# Patient Record
Sex: FEMALE | Race: WHITE | NOT HISPANIC OR LATINO | Employment: OTHER | URBAN - NONMETROPOLITAN AREA
[De-identification: names, ages, dates, MRNs, and addresses within clinical notes are randomized per-mention and may not be internally consistent; named-entity substitution may affect disease eponyms.]

---

## 2017-01-04 RX ORDER — PRAVASTATIN SODIUM 40 MG
TABLET ORAL
Qty: 30 TABLET | Refills: 5 | OUTPATIENT
Start: 2017-01-04

## 2017-01-16 ENCOUNTER — LAB (OUTPATIENT)
Dept: FAMILY MEDICINE CLINIC | Facility: CLINIC | Age: 52
End: 2017-01-16

## 2017-01-16 DIAGNOSIS — E53.8 VITAMIN B 12 DEFICIENCY: Chronic | ICD-10-CM

## 2017-01-16 DIAGNOSIS — E78.5 HYPERLIPIDEMIA: Chronic | ICD-10-CM

## 2017-01-16 DIAGNOSIS — E55.9 VITAMIN D DEFICIENCY: ICD-10-CM

## 2017-01-16 DIAGNOSIS — E08.40 DIABETES MELLITUS DUE TO UNDERLYING CONDITION WITH DIABETIC NEUROPATHY (HCC): ICD-10-CM

## 2017-01-16 LAB
25(OH)D3 SERPL-MCNC: 25 NG/ML
ALBUMIN SERPL-MCNC: 4.6 G/DL (ref 3.5–5)
ALBUMIN UR-MCNC: 5.2 MG/L
ALBUMIN/GLOB SERPL: 1.4 G/DL (ref 1.5–2.5)
ALP SERPL-CCNC: 93 U/L (ref 46–116)
ALT SERPL W P-5'-P-CCNC: 46 U/L (ref 10–36)
ANION GAP SERPL CALCULATED.3IONS-SCNC: 9.5 MMOL/L (ref 3.6–11.2)
AST SERPL-CCNC: 36 U/L (ref 10–30)
BASOPHILS # BLD AUTO: 0.03 10*3/MM3 (ref 0–0.3)
BASOPHILS NFR BLD AUTO: 0.4 % (ref 0–2)
BILIRUB SERPL-MCNC: 0.9 MG/DL (ref 0.2–1.8)
BUN BLD-MCNC: 12 MG/DL (ref 7–21)
BUN/CREAT SERPL: 18.5 (ref 7–25)
CALCIUM SPEC-SCNC: 9.7 MG/DL (ref 7.7–10)
CHLORIDE SERPL-SCNC: 103 MMOL/L (ref 99–112)
CHOLEST SERPL-MCNC: 147 MG/DL (ref 0–200)
CO2 SERPL-SCNC: 27.5 MMOL/L (ref 24.3–31.9)
CREAT BLD-MCNC: 0.65 MG/DL (ref 0.43–1.29)
DEPRECATED RDW RBC AUTO: 40.3 FL (ref 37–54)
EOSINOPHIL # BLD AUTO: 0.29 10*3/MM3 (ref 0–0.7)
EOSINOPHIL NFR BLD AUTO: 4 % (ref 0–5)
ERYTHROCYTE [DISTWIDTH] IN BLOOD BY AUTOMATED COUNT: 12.7 % (ref 11.5–14.5)
GFR SERPL CREATININE-BSD FRML MDRD: 95 ML/MIN/1.73
GLOBULIN UR ELPH-MCNC: 3.4 GM/DL
GLUCOSE BLD-MCNC: 279 MG/DL (ref 70–110)
HBA1C MFR BLD: 8.9 % (ref 4.5–5.7)
HCT VFR BLD AUTO: 44.5 % (ref 37–47)
HDLC SERPL-MCNC: 38 MG/DL (ref 60–100)
HGB BLD-MCNC: 15.1 G/DL (ref 12–16)
IMM GRANULOCYTES # BLD: 0.04 10*3/MM3 (ref 0–0.03)
IMM GRANULOCYTES NFR BLD: 0.6 % (ref 0–0.5)
LDLC SERPL CALC-MCNC: 56 MG/DL (ref 0–100)
LDLC/HDLC SERPL: 1.47 {RATIO}
LYMPHOCYTES # BLD AUTO: 2.68 10*3/MM3 (ref 1–3)
LYMPHOCYTES NFR BLD AUTO: 37.3 % (ref 21–51)
MCH RBC QN AUTO: 30.4 PG (ref 27–33)
MCHC RBC AUTO-ENTMCNC: 33.9 G/DL (ref 33–37)
MCV RBC AUTO: 89.5 FL (ref 80–94)
MONOCYTES # BLD AUTO: 0.56 10*3/MM3 (ref 0.1–0.9)
MONOCYTES NFR BLD AUTO: 7.8 % (ref 0–10)
NEUTROPHILS # BLD AUTO: 3.58 10*3/MM3 (ref 1.4–6.5)
NEUTROPHILS NFR BLD AUTO: 49.9 % (ref 30–70)
OSMOLALITY SERPL CALC.SUM OF ELEC: 289.2 MOSM/KG (ref 273–305)
PLATELET # BLD AUTO: 264 10*3/MM3 (ref 130–400)
PMV BLD AUTO: 10.9 FL (ref 6–10)
POTASSIUM BLD-SCNC: 3.9 MMOL/L (ref 3.5–5.3)
PROT SERPL-MCNC: 8 G/DL (ref 6–8)
RBC # BLD AUTO: 4.97 10*6/MM3 (ref 4.2–5.4)
SODIUM BLD-SCNC: 140 MMOL/L (ref 135–153)
TRIGL SERPL-MCNC: 265 MG/DL (ref 0–150)
TSH SERPL DL<=0.05 MIU/L-ACNC: 1.77 MIU/ML (ref 0.55–4.78)
VIT B12 BLD-MCNC: 430 PG/ML (ref 211–911)
VLDLC SERPL-MCNC: 53 MG/DL
WBC NRBC COR # BLD: 7.18 10*3/MM3 (ref 4.5–12.5)

## 2017-01-16 PROCEDURE — 80061 LIPID PANEL: CPT | Performed by: NURSE PRACTITIONER

## 2017-01-16 PROCEDURE — 82043 UR ALBUMIN QUANTITATIVE: CPT | Performed by: NURSE PRACTITIONER

## 2017-01-16 PROCEDURE — 83036 HEMOGLOBIN GLYCOSYLATED A1C: CPT | Performed by: NURSE PRACTITIONER

## 2017-01-16 PROCEDURE — 82306 VITAMIN D 25 HYDROXY: CPT | Performed by: NURSE PRACTITIONER

## 2017-01-16 PROCEDURE — 80053 COMPREHEN METABOLIC PANEL: CPT | Performed by: NURSE PRACTITIONER

## 2017-01-16 PROCEDURE — 82607 VITAMIN B-12: CPT | Performed by: NURSE PRACTITIONER

## 2017-01-16 PROCEDURE — 84443 ASSAY THYROID STIM HORMONE: CPT | Performed by: NURSE PRACTITIONER

## 2017-01-16 PROCEDURE — 36415 COLL VENOUS BLD VENIPUNCTURE: CPT

## 2017-01-16 PROCEDURE — 85025 COMPLETE CBC W/AUTO DIFF WBC: CPT | Performed by: NURSE PRACTITIONER

## 2017-01-18 ENCOUNTER — OFFICE VISIT (OUTPATIENT)
Dept: FAMILY MEDICINE CLINIC | Facility: CLINIC | Age: 52
End: 2017-01-18

## 2017-01-18 VITALS
TEMPERATURE: 97 F | HEIGHT: 68 IN | WEIGHT: 183 LBS | HEART RATE: 89 BPM | OXYGEN SATURATION: 97 % | DIASTOLIC BLOOD PRESSURE: 62 MMHG | BODY MASS INDEX: 27.74 KG/M2 | SYSTOLIC BLOOD PRESSURE: 120 MMHG

## 2017-01-18 DIAGNOSIS — Z79.4 DIABETES MELLITUS DUE TO UNDERLYING CONDITION WITH DIABETIC NEUROPATHY, WITH LONG-TERM CURRENT USE OF INSULIN (HCC): Primary | Chronic | ICD-10-CM

## 2017-01-18 DIAGNOSIS — E53.8 VITAMIN B 12 DEFICIENCY: ICD-10-CM

## 2017-01-18 DIAGNOSIS — Z12.31 ENCOUNTER FOR SCREENING MAMMOGRAM FOR BREAST CANCER: ICD-10-CM

## 2017-01-18 DIAGNOSIS — Z90.710 S/P HYSTERECTOMY: ICD-10-CM

## 2017-01-18 DIAGNOSIS — Z13.820 ENCOUNTER FOR SCREENING FOR OSTEOPOROSIS: ICD-10-CM

## 2017-01-18 DIAGNOSIS — Z13.9 SCREENING: ICD-10-CM

## 2017-01-18 DIAGNOSIS — E78.5 HYPERLIPIDEMIA, UNSPECIFIED HYPERLIPIDEMIA TYPE: Chronic | ICD-10-CM

## 2017-01-18 DIAGNOSIS — E08.40 DIABETES MELLITUS DUE TO UNDERLYING CONDITION WITH DIABETIC NEUROPATHY, WITH LONG-TERM CURRENT USE OF INSULIN (HCC): Primary | Chronic | ICD-10-CM

## 2017-01-18 DIAGNOSIS — E55.9 VITAMIN D DEFICIENCY: ICD-10-CM

## 2017-01-18 PROCEDURE — 99214 OFFICE O/P EST MOD 30 MIN: CPT | Performed by: NURSE PRACTITIONER

## 2017-01-18 RX ORDER — ERGOCALCIFEROL 1.25 MG/1
50000 CAPSULE ORAL
Qty: 4 CAPSULE | Refills: 3 | Status: SHIPPED | OUTPATIENT
Start: 2017-01-18 | End: 2017-02-09

## 2017-01-18 NOTE — MR AVS SNAPSHOT
Sandra Leahy   1/18/2017 8:30 AM   Office Visit    Dept Phone:  [phone]   Encounter #:  [id number]    Provider:  ASH Chaudhry   Department:  [facility]                Your Full Care Plan              Today's Medication Changes          These changes are accurate as of: 1/18/17  9:14 AM.  If you have any questions, ask your nurse or doctor.               New Medication(s)Ordered:     vitamin D 02707 UNITS capsule capsule   Commonly known as:  ERGOCALCIFEROL   Take 1 capsule by mouth Every 7 (Seven) Days for 4 doses.   Started by:  ASH Chaudhry         Stop taking medication(s)listed here:     amoxicillin-clavulanate 875-125 MG per tablet   Commonly known as:  AUGMENTIN   Stopped by:  ASH Chaudhry           neomycin-polymyxin-hydrocortisone 3.5-72570-6 otic solution   Commonly known as:  CORTISPORIN   Stopped by:  ASH Chaudhry           rosuvastatin 20 MG tablet   Commonly known as:  CRESTOR   Stopped by:  ASH Chaudhry                Where to Get Your Medications      These medications were sent to [facility] [address] [place] [address] - [phone]  - [phone]   [address] [facility] [address]     Phone:  [phone]     vitamin D 33020 UNITS capsule capsule                  Your Updated Medication List          This list is accurate as of: 1/18/17  9:14 AM.  Always use your most recent med list.                aspirin 81 MG tablet       CYMBALTA 30 MG capsule   Generic drug:  DULoxetine       Dulaglutide 0.75 MG/0.5ML solution pen-injector   Commonly known as:  TRULICITY   Inject 0.75 mg under the skin 1 (one) time per week.       meloxicam 15 MG tablet   Commonly known as:  MOBIC   Take 1 tablet by mouth daily.       metFORMIN 1000 MG tablet   Commonly known as:  GLUCOPHAGE   Take 1 tablet by mouth 2 (two) times a day.       RELION SHORT PEN  NEEDLES 31G X 8 MM misc   Generic drug:  Insulin Pen Needle       vitamin D 96906 UNITS capsule capsule   Commonly known as:  ERGOCALCIFEROL   Take 1 capsule by mouth Every 7 (Seven) Days for 4 doses.       ZETIA 10 MG tablet   Generic drug:  ezetimibe               You Were Diagnosed With        Codes Comments    Vitamin D deficiency    -  Primary ICD-10-CM: E55.9  ICD-9-CM: 268.9       Medications to be Given to You by a Medical Professional       Instructions    Type 2 Diabetes Mellitus, Adult  Type 2 diabetes mellitus, often simply referred to as type 2 diabetes, is a long-lasting (chronic) disease. In type 2 diabetes, the pancreas does not make enough insulin (a hormone), the cells are less responsive to the insulin that is made (insulin resistance), or both. Normally, insulin moves sugars from food into the tissue cells. The tissue cells use the sugars for energy. The lack of insulin or the lack of normal response to insulin causes excess sugars to build up in the blood instead of going into the tissue cells. As a result, high blood sugar (hyperglycemia) develops. The effect of high sugar (glucose) levels can cause many complications.   Type 2 diabetes was also previously called adult-onset diabetes, but it can occur at any age.     RISK FACTORS   A person is predisposed to developing type 2 diabetes if someone in the family has the disease and also has one or more of the following primary risk factors:  · Weight gain, or being overweight or obese.  · An inactive lifestyle.  · A history of consistently eating high-calorie foods.  Maintaining a normal weight and regular physical activity can reduce the chance of developing type 2 diabetes.  SYMPTOMS   A person with type 2 diabetes may not show symptoms initially. The symptoms of type 2 diabetes appear slowly. The symptoms include:  · Increased thirst (polydipsia).  · Increased urination (polyuria).  · Increased urination during the night (nocturia).  · Sudden  or unexplained weight changes.  · Frequent, recurring infections.  · Tiredness (fatigue).  · Weakness.  · Vision changes, such as blurred vision.  · Fruity smell to your breath.  · Abdominal pain.  · Nausea or vomiting.  · Cuts or bruises which are slow to heal.  · Tingling or numbness in the hands or feet.  · An open skin wound (ulcer).  DIAGNOSIS  Type 2 diabetes is frequently not diagnosed until complications of diabetes are present. Type 2 diabetes is diagnosed when symptoms or complications are present and when blood glucose levels are increased. Your blood glucose level may be checked by one or more of the following blood tests:  · A fasting blood glucose test. You will not be allowed to eat for at least 8 hours before a blood sample is taken.  · A random blood glucose test. Your blood glucose is checked at any time of the day regardless of when you ate.  · A hemoglobin A1c blood glucose test. A hemoglobin A1c test provides information about blood glucose control over the previous 3 months.  · An oral glucose tolerance test (OGTT). Your blood glucose is measured after you have not eaten (fasted) for 2 hours and then after you drink a glucose-containing beverage.  TREATMENT   · You may need to take insulin or diabetes medicine daily to keep blood glucose levels in the desired range.  · If you use insulin, you may need to adjust the dosage depending on the carbohydrates that you eat with each meal or snack.  · Lifestyle changes are recommended as part of your treatment. These may include:    Following an individualized diet plan developed by a nutritionist or dietitian.    Exercising daily.  Your health care providers will set individualized treatment goals for you based on your age, your medicines, how long you have had diabetes, and any other medical conditions you have. Generally, the goal of treatment is to maintain the following blood glucose levels:  · Before meals (preprandial):  mg/dL.  · After  meals (postprandial): below 180 mg/dL.  · A1c: less than 6.5-7%.  HOME CARE INSTRUCTIONS   · Have your hemoglobin A1c level checked twice a year.  · Perform daily blood glucose monitoring as directed by your health care provider.  · Monitor urine ketones when you are ill and as directed by your health care provider.  · Take your diabetes medicine or insulin as directed by your health care provider to maintain your blood glucose levels in the desired range.    Never run out of diabetes medicine or insulin. It is needed every day.    If you are using insulin, you may need to adjust the amount of insulin given based on your intake of carbohydrates. Carbohydrates can raise blood glucose levels but need to be included in your diet. Carbohydrates provide vitamins, minerals, and fiber which are an essential part of a healthy diet. Carbohydrates are found in fruits, vegetables, whole grains, dairy products, legumes, and foods containing added sugars.  · Eat healthy foods. You should make an appointment to see a registered dietitian to help you create an eating plan that is right for you.  · Lose weight if you are overweight.  · Carry a medical alert card or wear your medical alert jewelry.  · Carry a 15-gram carbohydrate snack with you at all times to treat low blood glucose (hypoglycemia). Some examples of 15-gram carbohydrate snacks include:    Glucose tablets, 3 or 4.    Glucose gel, 15-gram tube.    Raisins, 2 tablespoons (24 grams).    Jelly beans, 6.    Animal crackers, 8.    Regular pop, 4 ounces (120 mL).    Gummy treats, 9.  · Recognize hypoglycemia. Hypoglycemia occurs with blood glucose levels of 70 mg/dL and below. The risk for hypoglycemia increases when fasting or skipping meals, during or after intense exercise, and during sleep. Hypoglycemia symptoms can include:    Tremors or shakes.    Decreased ability to concentrate.    Sweating.    Increased heart rate.    Headache.    Dry mouth.    Hunger.     Irritability.    Anxiety.    Restless sleep.    Altered speech or coordination.    Confusion.  · Treat hypoglycemia promptly. If you are alert and able to safely swallow, follow the 15:15 rule:    Take 15-20 grams of rapid-acting glucose or carbohydrate. Rapid-acting options include glucose gel, glucose tablets, or 4 ounces (120 mL) of fruit juice, regular soda, or low-fat milk.    Check your blood glucose level 15 minutes after taking the glucose.    Take 15-20 grams more of glucose if the repeat blood glucose level is still 70 mg/dL or below.    Eat a meal or snack within 1 hour once blood glucose levels return to normal.  · Be alert to feeling very thirsty and urinating more frequently than usual, which are early signs of hyperglycemia. An early awareness of hyperglycemia allows for prompt treatment. Treat hyperglycemia as directed by your health care provider.  · Engage in at least 150 minutes of moderate-intensity physical activity a week, spread over at least 3 days of the week or as directed by your health care provider. In addition, you should engage in resistance exercise at least 2 times a week or as directed by your health care provider. Try to spend no more than 90 minutes at one time inactive.  · Adjust your medicine and food intake as needed if you start a new exercise or sport.  · Follow your sick-day plan anytime you are unable to eat or drink as usual.  · Do not use any tobacco products including cigarettes, chewing tobacco, or electronic cigarettes. If you need help quitting, ask your health care provider.  · Limit alcohol intake to no more than 1 drink per day for nonpregnant women and 2 drinks per day for men. You should drink alcohol only when you are also eating food. Talk with your health care provider whether alcohol is safe for you. Tell your health care provider if you drink alcohol several times a week.  · Keep all follow-up visits as directed by your health care provider. This is  important.  · Schedule an eye exam soon after the diagnosis of type 2 diabetes and then annually.  · Perform daily skin and foot care. Examine your skin and feet daily for cuts, bruises, redness, nail problems, bleeding, blisters, or sores. A foot exam by a health care provider should be done annually.  · Brush your teeth and gums at least twice a day and floss at least once a day. Follow up with your dentist regularly.  · Share your diabetes management plan with your workplace or school.  · Keep your immunizations up to date. It is recommended that you receive a flu (influenza) vaccine every year. It is also recommended that you receive a pneumonia (pneumococcal) vaccine. If you are 65 years of age or older and have never received a pneumonia vaccine, this vaccine may be given as a series of two separate shots. Ask your health care provider which additional vaccines may be recommended.  · Learn to manage stress.  · Obtain ongoing diabetes education and support as needed.  · Participate in or seek rehabilitation as needed to maintain or improve independence and quality of life. Request a physical or occupational therapy referral if you are having foot or hand numbness, or difficulties with grooming, dressing, eating, or physical activity.  SEEK MEDICAL CARE IF:   · You are unable to eat food or drink fluids for more than 6 hours.  · You have nausea and vomiting for more than 6 hours.  · Your blood glucose level is over 240 mg/dL.  · There is a change in mental status.  · You develop an additional serious illness.  · You have diarrhea for more than 6 hours.  · You have been sick or have had a fever for a couple of days and are not getting better.  · You have pain during any physical activity.    SEEK IMMEDIATE MEDICAL CARE IF:  · You have difficulty breathing.  · You have moderate to large ketone levels.     This information is not intended to replace advice given to you by your health care provider. Make sure you  "discuss any questions you have with your health care provider.     Document Released: 12/18/2006 Document Revised: 09/07/2016 Document Reviewed: 07/16/2013  [employer] Interactive Patient Education ©2016 [employer]  DASH Eating Plan  DASH stands for \"Dietary Approaches to Stop Hypertension.\" The DASH eating plan is a healthy eating plan that has been shown to reduce high blood pressure (hypertension). Additional health benefits may include reducing the risk of type 2 diabetes mellitus, heart disease, and stroke. The DASH eating plan may also help with weight loss.  WHAT DO I NEED TO KNOW ABOUT THE DASH EATING PLAN?  For the DASH eating plan, you will follow these general guidelines:  · Choose foods with a percent daily value for sodium of less than 5% (as listed on the food label).  · Use salt-free seasonings or herbs instead of table salt or sea salt.  · Check with your health care provider or pharmacist before using salt substitutes.  · Eat lower-sodium products, often labeled as \"lower sodium\" or \"no salt added.\"  · Eat fresh foods.  · Eat more vegetables, fruits, and low-fat dairy products.  · Choose whole grains. Look for the word \"whole\" as the first word in the ingredient list.  · Choose fish and skinless chicken or turkey more often than red meat. Limit fish, poultry, and meat to 6 oz (170 g) each day.  · Limit sweets, desserts, sugars, and sugary drinks.  · Choose heart-healthy fats.  · Limit cheese to 1 oz (28 g) per day.  · Eat more home-cooked food and less restaurant, buffet, and fast food.  · Limit fried foods.  · Cook foods using methods other than frying.  · Limit canned vegetables. If you do use them, rinse them well to decrease the sodium.  · When eating at a restaurant, ask that your food be prepared with less salt, or no salt if possible.  WHAT FOODS CAN I EAT?  Seek help from a dietitian for individual calorie needs.  Grains  Whole grain or whole wheat bread. Brown rice. Whole grain or whole " wheat pasta. Quinoa, bulgur, and whole grain cereals. Low-sodium cereals. Corn or whole wheat flour tortillas. Whole grain cornbread. Whole grain crackers. Low-sodium crackers.  Vegetables  Fresh or frozen vegetables (raw, steamed, roasted, or grilled). Low-sodium or reduced-sodium tomato and vegetable juices. Low-sodium or reduced-sodium tomato sauce and paste. Low-sodium or reduced-sodium canned vegetables.   Fruits  All fresh, canned (in natural juice), or frozen fruits.  Meat and Other Protein Products  Ground beef (85% or leaner), grass-fed beef, or beef trimmed of fat. Skinless chicken or turkey. Ground chicken or turkey. Pork trimmed of fat. All fish and seafood. Eggs. Dried beans, peas, or lentils. Unsalted nuts and seeds. Unsalted canned beans.  Dairy  Low-fat dairy products, such as skim or 1% milk, 2% or reduced-fat cheeses, low-fat ricotta or cottage cheese, or plain low-fat yogurt. Low-sodium or reduced-sodium cheeses.  Fats and Oils  Tub margarines without trans fats. Light or reduced-fat mayonnaise and salad dressings (reduced sodium). Avocado. Safflower, olive, or canola oils. Natural peanut or almond butter.  Other  Unsalted popcorn and pretzels.  The items listed above may not be a complete list of recommended foods or beverages. Contact your dietitian for more options.  WHAT FOODS ARE NOT RECOMMENDED?  Grains  White bread. White pasta. White rice. Refined cornbread. Bagels and croissants. Crackers that contain trans fat.  Vegetables  Creamed or fried vegetables. Vegetables in a cheese sauce. Regular canned vegetables. Regular canned tomato sauce and paste. Regular tomato and vegetable juices.  Fruits  Dried fruits. Canned fruit in light or heavy syrup. Fruit juice.  Meat and Other Protein Products  Fatty cuts of meat. Ribs, chicken wings, alicea, sausage, bologna, salami, chitterlings, fatback, hot dogs, bratwurst, and packaged luncheon meats. Salted nuts and seeds. Canned beans with  salt.  Dairy  Whole or 2% milk, cream, half-and-half, and cream cheese. Whole-fat or sweetened yogurt. Full-fat cheeses or blue cheese. Nondairy creamers and whipped toppings. Processed cheese, cheese spreads, or cheese curds.  Condiments  Onion and garlic salt, seasoned salt, table salt, and sea salt. Canned and packaged gravies. Worcestershire sauce. Tartar sauce. Barbecue sauce. Teriyaki sauce. Soy sauce, including reduced sodium. Steak sauce. Fish sauce. Oyster sauce. Cocktail sauce. Horseradish. Ketchup and mustard. Meat flavorings and tenderizers. Bouillon cubes. Hot sauce. Tabasco sauce. Marinades. Taco seasonings. Relishes.  Fats and Oils  Butter, stick margarine, lard, shortening, ghee, and alicea fat. Coconut, palm kernel, or palm oils. Regular salad dressings.  Other  Pickles and olives. Salted popcorn and pretzels.  The items listed above may not be a complete list of foods and beverages to avoid. Contact your dietitian for more information.  WHERE CAN I FIND MORE INFORMATION?  National Heart, Lung, and [employer] [place]: www.nhlbi.nih.gov/health/health-topics/topics/dash/     This information is not intended to replace advice given to you by your health care provider. Make sure you discuss any questions you have with your health care provider.     Document Released: 12/06/2012 Document Revised: 01/08/2016 Document Reviewed: 10/22/2014  [employer] Interactive Patient Education ©2016 [employer]  Food Choices to Lower Your Triglycerides  Triglycerides are a type of fat in your blood. High levels of triglycerides can increase the risk of heart disease and stroke. If your triglyceride levels are high, the foods you eat and your eating habits are very important. Choosing the right foods can help lower your triglycerides.   WHAT GENERAL GUIDELINES DO I NEED TO FOLLOW?  · Lose weight if you are overweight.    · Limit or avoid alcohol.    · Fill one half of your plate with vegetables and green salads.    · Limit  "fruit to two servings a day. Choose fruit instead of juice.    · Make one fourth of your plate whole grains. Look for the word \"whole\" as the first word in the ingredient list.  · Fill one fourth of your plate with lean protein foods.  · Enjoy fatty fish (such as salmon, mackerel, sardines, and tuna) three times a week.    · Choose healthy fats.    · Limit foods high in starch and sugar.  · Eat more home-cooked food and less restaurant, buffet, and fast food.  · Limit fried foods.  · Cook foods using methods other than frying.  · Limit saturated fats.  · Check ingredient lists to avoid foods with partially hydrogenated oils (trans fats) in them.  WHAT FOODS CAN I EAT?   Grains  Whole grains, such as whole wheat or whole grain breads, crackers, cereals, and pasta. Unsweetened oatmeal, bulgur, barley, quinoa, or brown rice. Corn or whole wheat flour tortillas.   Vegetables  Fresh or frozen vegetables (raw, steamed, roasted, or grilled). Green salads.  Fruits  All fresh, canned (in natural juice), or frozen fruits.  Meat and Other Protein Products  Ground beef (85% or leaner), grass-fed beef, or beef trimmed of fat. Skinless chicken or turkey. Ground chicken or turkey. Pork trimmed of fat. All fish and seafood. Eggs. Dried beans, peas, or lentils. Unsalted nuts or seeds. Unsalted canned or dry beans.  Dairy  Low-fat dairy products, such as skim or 1% milk, 2% or reduced-fat cheeses, low-fat ricotta or cottage cheese, or plain low-fat yogurt.  Fats and Oils  Tub margarines without trans fats. Light or reduced-fat mayonnaise and salad dressings. Avocado. Safflower, olive, or canola oils. Natural peanut or almond butter.  The items listed above may not be a complete list of recommended foods or beverages. Contact your dietitian for more options.  WHAT FOODS ARE NOT RECOMMENDED?   Grains  White bread. White pasta. White rice. Cornbread. Bagels, pastries, and croissants. Crackers that contain trans fat.  Vegetables  White " potatoes. Corn. Creamed or fried vegetables. Vegetables in a cheese sauce.  Fruits  Dried fruits. Canned fruit in light or heavy syrup. Fruit juice.  Meat and Other Protein Products  Fatty cuts of meat. Ribs, chicken wings, alicea, sausage, bologna, salami, chitterlings, fatback, hot dogs, bratwurst, and packaged luncheon meats.  Dairy  Whole or 2% milk, cream, half-and-half, and cream cheese. Whole-fat or sweetened yogurt. Full-fat cheeses. Nondairy creamers and whipped toppings. Processed cheese, cheese spreads, or cheese curds.  Sweets and Desserts  Corn syrup, sugars, honey, and molasses. Candy. Jam and jelly. Syrup. Sweetened cereals. Cookies, pies, cakes, donuts, muffins, and ice cream.  Fats and Oils  Butter, stick margarine, lard, shortening, ghee, or alicea fat. Coconut, palm kernel, or palm oils.  Beverages  Alcohol. Sweetened drinks (such as sodas, lemonade, and fruit drinks or punches).  The items listed above may not be a complete list of foods and beverages to avoid. Contact your dietitian for more information.     This information is not intended to replace advice given to you by your health care provider. Make sure you discuss any questions you have with your health care provider.     Document Released: 10/05/2005 Document Revised: 01/08/2016 Document Reviewed: 10/22/2014  [employer] Interactive Patient Education ©2016 [employer]       Patient Instructions History      Upcoming Appointments     Visit Type Date Time Department    OFFICE VISIT 1/18/2017  8:30 AM [employer] [facility]    LAB 4/20/2017  8:30 AM [employer] [facility]    OFFICE VISIT 4/26/2017  8:30 AM [facility]      [employer] Signup     [employer] allows you to send messages to your doctor, view your test results, renew your prescriptions, schedule appointments, and more. To sign up, go to [employer] and click on the Sign Up Now link in the New User? box. Enter your [employer] Activation Code exactly as it  "appears below along with the last four digits of your Social Security Number and your Date of Birth () to complete the sign-up process. If you do not sign up before the expiration date, you must request a new code.    [employer] Activation Code: [email or web address]  Expires: 2017  5:44 AM    If you have questions, you can email [email or web address] or call [phone] to talk to our [employer] staff. Remember, [employer] is NOT to be used for urgent needs. For medical emergencies, dial 911.               Other Info from Your Visit           Your Appointments     2017  8:30 AM EDT   Lab with [facility] (--)    [address] [place] [id number]   [phone]            2017  8:30 AM EDT   Office Visit with ASH Chaudhry   [facility] (--)    [address] [place] [id number]   [phone]           Arrive 15 minutes prior to appointment.              Allergies     Exenatide  Nausea And Vomiting    Intolerant    Codeine      Lantus [Insulin Glargine]  Confusion    Sitagliptin        Vital Signs     Blood Pressure Pulse Temperature Height Weight Oxygen Saturation    120/62 (BP Location: Left arm, Patient Position: Sitting, Cuff Size: Adult) 89 97 °F (36.1 °C) (Oral) 68\" (172.7 cm) 183 lb (83 kg) 97%    Body Mass Index Smoking Status                27.83 kg/m2 Former Smoker          Problems and Diagnoses Noted     Vitamin D deficiency        "

## 2017-01-18 NOTE — PATIENT INSTRUCTIONS
Type 2 Diabetes Mellitus, Adult  Type 2 diabetes mellitus, often simply referred to as type 2 diabetes, is a long-lasting (chronic) disease. In type 2 diabetes, the pancreas does not make enough insulin (a hormone), the cells are less responsive to the insulin that is made (insulin resistance), or both. Normally, insulin moves sugars from food into the tissue cells. The tissue cells use the sugars for energy. The lack of insulin or the lack of normal response to insulin causes excess sugars to build up in the blood instead of going into the tissue cells. As a result, high blood sugar (hyperglycemia) develops. The effect of high sugar (glucose) levels can cause many complications.   Type 2 diabetes was also previously called adult-onset diabetes, but it can occur at any age.     RISK FACTORS   A person is predisposed to developing type 2 diabetes if someone in the family has the disease and also has one or more of the following primary risk factors:  · Weight gain, or being overweight or obese.  · An inactive lifestyle.  · A history of consistently eating high-calorie foods.  Maintaining a normal weight and regular physical activity can reduce the chance of developing type 2 diabetes.  SYMPTOMS   A person with type 2 diabetes may not show symptoms initially. The symptoms of type 2 diabetes appear slowly. The symptoms include:  · Increased thirst (polydipsia).  · Increased urination (polyuria).  · Increased urination during the night (nocturia).  · Sudden or unexplained weight changes.  · Frequent, recurring infections.  · Tiredness (fatigue).  · Weakness.  · Vision changes, such as blurred vision.  · Fruity smell to your breath.  · Abdominal pain.  · Nausea or vomiting.  · Cuts or bruises which are slow to heal.  · Tingling or numbness in the hands or feet.  · An open skin wound (ulcer).  DIAGNOSIS  Type 2 diabetes is frequently not diagnosed until complications of diabetes are present. Type 2 diabetes is diagnosed  when symptoms or complications are present and when blood glucose levels are increased. Your blood glucose level may be checked by one or more of the following blood tests:  · A fasting blood glucose test. You will not be allowed to eat for at least 8 hours before a blood sample is taken.  · A random blood glucose test. Your blood glucose is checked at any time of the day regardless of when you ate.  · A hemoglobin A1c blood glucose test. A hemoglobin A1c test provides information about blood glucose control over the previous 3 months.  · An oral glucose tolerance test (OGTT). Your blood glucose is measured after you have not eaten (fasted) for 2 hours and then after you drink a glucose-containing beverage.  TREATMENT   · You may need to take insulin or diabetes medicine daily to keep blood glucose levels in the desired range.  · If you use insulin, you may need to adjust the dosage depending on the carbohydrates that you eat with each meal or snack.  · Lifestyle changes are recommended as part of your treatment. These may include:    Following an individualized diet plan developed by a nutritionist or dietitian.    Exercising daily.  Your health care providers will set individualized treatment goals for you based on your age, your medicines, how long you have had diabetes, and any other medical conditions you have. Generally, the goal of treatment is to maintain the following blood glucose levels:  · Before meals (preprandial):  mg/dL.  · After meals (postprandial): below 180 mg/dL.  · A1c: less than 6.5-7%.  HOME CARE INSTRUCTIONS   · Have your hemoglobin A1c level checked twice a year.  · Perform daily blood glucose monitoring as directed by your health care provider.  · Monitor urine ketones when you are ill and as directed by your health care provider.  · Take your diabetes medicine or insulin as directed by your health care provider to maintain your blood glucose levels in the desired range.    Never run  out of diabetes medicine or insulin. It is needed every day.    If you are using insulin, you may need to adjust the amount of insulin given based on your intake of carbohydrates. Carbohydrates can raise blood glucose levels but need to be included in your diet. Carbohydrates provide vitamins, minerals, and fiber which are an essential part of a healthy diet. Carbohydrates are found in fruits, vegetables, whole grains, dairy products, legumes, and foods containing added sugars.  · Eat healthy foods. You should make an appointment to see a registered dietitian to help you create an eating plan that is right for you.  · Lose weight if you are overweight.  · Carry a medical alert card or wear your medical alert jewelry.  · Carry a 15-gram carbohydrate snack with you at all times to treat low blood glucose (hypoglycemia). Some examples of 15-gram carbohydrate snacks include:    Glucose tablets, 3 or 4.    Glucose gel, 15-gram tube.    Raisins, 2 tablespoons (24 grams).    Jelly beans, 6.    Animal crackers, 8.    Regular pop, 4 ounces (120 mL).    Gummy treats, 9.  · Recognize hypoglycemia. Hypoglycemia occurs with blood glucose levels of 70 mg/dL and below. The risk for hypoglycemia increases when fasting or skipping meals, during or after intense exercise, and during sleep. Hypoglycemia symptoms can include:    Tremors or shakes.    Decreased ability to concentrate.    Sweating.    Increased heart rate.    Headache.    Dry mouth.    Hunger.    Irritability.    Anxiety.    Restless sleep.    Altered speech or coordination.    Confusion.  · Treat hypoglycemia promptly. If you are alert and able to safely swallow, follow the 15:15 rule:    Take 15-20 grams of rapid-acting glucose or carbohydrate. Rapid-acting options include glucose gel, glucose tablets, or 4 ounces (120 mL) of fruit juice, regular soda, or low-fat milk.    Check your blood glucose level 15 minutes after taking the glucose.    Take 15-20 grams more of  glucose if the repeat blood glucose level is still 70 mg/dL or below.    Eat a meal or snack within 1 hour once blood glucose levels return to normal.  · Be alert to feeling very thirsty and urinating more frequently than usual, which are early signs of hyperglycemia. An early awareness of hyperglycemia allows for prompt treatment. Treat hyperglycemia as directed by your health care provider.  · Engage in at least 150 minutes of moderate-intensity physical activity a week, spread over at least 3 days of the week or as directed by your health care provider. In addition, you should engage in resistance exercise at least 2 times a week or as directed by your health care provider. Try to spend no more than 90 minutes at one time inactive.  · Adjust your medicine and food intake as needed if you start a new exercise or sport.  · Follow your sick-day plan anytime you are unable to eat or drink as usual.  · Do not use any tobacco products including cigarettes, chewing tobacco, or electronic cigarettes. If you need help quitting, ask your health care provider.  · Limit alcohol intake to no more than 1 drink per day for nonpregnant women and 2 drinks per day for men. You should drink alcohol only when you are also eating food. Talk with your health care provider whether alcohol is safe for you. Tell your health care provider if you drink alcohol several times a week.  · Keep all follow-up visits as directed by your health care provider. This is important.  · Schedule an eye exam soon after the diagnosis of type 2 diabetes and then annually.  · Perform daily skin and foot care. Examine your skin and feet daily for cuts, bruises, redness, nail problems, bleeding, blisters, or sores. A foot exam by a health care provider should be done annually.  · Brush your teeth and gums at least twice a day and floss at least once a day. Follow up with your dentist regularly.  · Share your diabetes management plan with your workplace or  "school.  · Keep your immunizations up to date. It is recommended that you receive a flu (influenza) vaccine every year. It is also recommended that you receive a pneumonia (pneumococcal) vaccine. If you are 65 years of age or older and have never received a pneumonia vaccine, this vaccine may be given as a series of two separate shots. Ask your health care provider which additional vaccines may be recommended.  · Learn to manage stress.  · Obtain ongoing diabetes education and support as needed.  · Participate in or seek rehabilitation as needed to maintain or improve independence and quality of life. Request a physical or occupational therapy referral if you are having foot or hand numbness, or difficulties with grooming, dressing, eating, or physical activity.  SEEK MEDICAL CARE IF:   · You are unable to eat food or drink fluids for more than 6 hours.  · You have nausea and vomiting for more than 6 hours.  · Your blood glucose level is over 240 mg/dL.  · There is a change in mental status.  · You develop an additional serious illness.  · You have diarrhea for more than 6 hours.  · You have been sick or have had a fever for a couple of days and are not getting better.  · You have pain during any physical activity.    SEEK IMMEDIATE MEDICAL CARE IF:  · You have difficulty breathing.  · You have moderate to large ketone levels.     This information is not intended to replace advice given to you by your health care provider. Make sure you discuss any questions you have with your health care provider.     Document Released: 12/18/2006 Document Revised: 09/07/2016 Document Reviewed: 07/16/2013  [employer] Interactive Patient Education ©2016 [employer]  DASH Eating Plan  DASH stands for \"Dietary Approaches to Stop Hypertension.\" The DASH eating plan is a healthy eating plan that has been shown to reduce high blood pressure (hypertension). Additional health benefits may include reducing the risk of type 2 diabetes " "mellitus, heart disease, and stroke. The DASH eating plan may also help with weight loss.  WHAT DO I NEED TO KNOW ABOUT THE DASH EATING PLAN?  For the DASH eating plan, you will follow these general guidelines:  · Choose foods with a percent daily value for sodium of less than 5% (as listed on the food label).  · Use salt-free seasonings or herbs instead of table salt or sea salt.  · Check with your health care provider or pharmacist before using salt substitutes.  · Eat lower-sodium products, often labeled as \"lower sodium\" or \"no salt added.\"  · Eat fresh foods.  · Eat more vegetables, fruits, and low-fat dairy products.  · Choose whole grains. Look for the word \"whole\" as the first word in the ingredient list.  · Choose fish and skinless chicken or turkey more often than red meat. Limit fish, poultry, and meat to 6 oz (170 g) each day.  · Limit sweets, desserts, sugars, and sugary drinks.  · Choose heart-healthy fats.  · Limit cheese to 1 oz (28 g) per day.  · Eat more home-cooked food and less restaurant, buffet, and fast food.  · Limit fried foods.  · Cook foods using methods other than frying.  · Limit canned vegetables. If you do use them, rinse them well to decrease the sodium.  · When eating at a restaurant, ask that your food be prepared with less salt, or no salt if possible.  WHAT FOODS CAN I EAT?  Seek help from a dietitian for individual calorie needs.  Grains  Whole grain or whole wheat bread. Brown rice. Whole grain or whole wheat pasta. Quinoa, bulgur, and whole grain cereals. Low-sodium cereals. Corn or whole wheat flour tortillas. Whole grain cornbread. Whole grain crackers. Low-sodium crackers.  Vegetables  Fresh or frozen vegetables (raw, steamed, roasted, or grilled). Low-sodium or reduced-sodium tomato and vegetable juices. Low-sodium or reduced-sodium tomato sauce and paste. Low-sodium or reduced-sodium canned vegetables.   Fruits  All fresh, canned (in natural juice), or frozen fruits.  Meat " and Other Protein Products  Ground beef (85% or leaner), grass-fed beef, or beef trimmed of fat. Skinless chicken or turkey. Ground chicken or turkey. Pork trimmed of fat. All fish and seafood. Eggs. Dried beans, peas, or lentils. Unsalted nuts and seeds. Unsalted canned beans.  Dairy  Low-fat dairy products, such as skim or 1% milk, 2% or reduced-fat cheeses, low-fat ricotta or cottage cheese, or plain low-fat yogurt. Low-sodium or reduced-sodium cheeses.  Fats and Oils  Tub margarines without trans fats. Light or reduced-fat mayonnaise and salad dressings (reduced sodium). Avocado. Safflower, olive, or canola oils. Natural peanut or almond butter.  Other  Unsalted popcorn and pretzels.  The items listed above may not be a complete list of recommended foods or beverages. Contact your dietitian for more options.  WHAT FOODS ARE NOT RECOMMENDED?  Grains  White bread. White pasta. White rice. Refined cornbread. Bagels and croissants. Crackers that contain trans fat.  Vegetables  Creamed or fried vegetables. Vegetables in a cheese sauce. Regular canned vegetables. Regular canned tomato sauce and paste. Regular tomato and vegetable juices.  Fruits  Dried fruits. Canned fruit in light or heavy syrup. Fruit juice.  Meat and Other Protein Products  Fatty cuts of meat. Ribs, chicken wings, alicea, sausage, bologna, salami, chitterlings, fatback, hot dogs, bratwurst, and packaged luncheon meats. Salted nuts and seeds. Canned beans with salt.  Dairy  Whole or 2% milk, cream, half-and-half, and cream cheese. Whole-fat or sweetened yogurt. Full-fat cheeses or blue cheese. Nondairy creamers and whipped toppings. Processed cheese, cheese spreads, or cheese curds.  Condiments  Onion and garlic salt, seasoned salt, table salt, and sea salt. Canned and packaged gravies. Worcestershire sauce. Tartar sauce. Barbecue sauce. Teriyaki sauce. Soy sauce, including reduced sodium. Steak sauce. Fish sauce. Oyster sauce. Cocktail sauce.  "Horseradish. Ketchup and mustard. Meat flavorings and tenderizers. Bouillon cubes. Hot sauce. Tabasco sauce. Marinades. Taco seasonings. Relishes.  Fats and Oils  Butter, stick margarine, lard, shortening, ghee, and alicea fat. Coconut, palm kernel, or palm oils. Regular salad dressings.  Other  Pickles and olives. Salted popcorn and pretzels.  The items listed above may not be a complete list of foods and beverages to avoid. Contact your dietitian for more information.  WHERE CAN I FIND MORE INFORMATION?  [employer] [place]: www.nhlbi.nih.gov/health/health-topics/topics/dash/     This information is not intended to replace advice given to you by your health care provider. Make sure you discuss any questions you have with your health care provider.     Document Released: 12/06/2012 Document Revised: 01/08/2016 Document Reviewed: 10/22/2014  [employer] Interactive Patient Education ©2016 [employer]  Food Choices to Lower Your Triglycerides  Triglycerides are a type of fat in your blood. High levels of triglycerides can increase the risk of heart disease and stroke. If your triglyceride levels are high, the foods you eat and your eating habits are very important. Choosing the right foods can help lower your triglycerides.   WHAT GENERAL GUIDELINES DO I NEED TO FOLLOW?  · Lose weight if you are overweight.    · Limit or avoid alcohol.    · Fill one half of your plate with vegetables and green salads.    · Limit fruit to two servings a day. Choose fruit instead of juice.    · Make one fourth of your plate whole grains. Look for the word \"whole\" as the first word in the ingredient list.  · Fill one fourth of your plate with lean protein foods.  · Enjoy fatty fish (such as salmon, mackerel, sardines, and tuna) three times a week.    · Choose healthy fats.    · Limit foods high in starch and sugar.  · Eat more home-cooked food and less restaurant, buffet, and fast food.  · Limit fried foods.  · Cook " foods using methods other than frying.  · Limit saturated fats.  · Check ingredient lists to avoid foods with partially hydrogenated oils (trans fats) in them.  WHAT FOODS CAN I EAT?   Grains  Whole grains, such as whole wheat or whole grain breads, crackers, cereals, and pasta. Unsweetened oatmeal, bulgur, barley, quinoa, or brown rice. Corn or whole wheat flour tortillas.   Vegetables  Fresh or frozen vegetables (raw, steamed, roasted, or grilled). Green salads.  Fruits  All fresh, canned (in natural juice), or frozen fruits.  Meat and Other Protein Products  Ground beef (85% or leaner), grass-fed beef, or beef trimmed of fat. Skinless chicken or turkey. Ground chicken or turkey. Pork trimmed of fat. All fish and seafood. Eggs. Dried beans, peas, or lentils. Unsalted nuts or seeds. Unsalted canned or dry beans.  Dairy  Low-fat dairy products, such as skim or 1% milk, 2% or reduced-fat cheeses, low-fat ricotta or cottage cheese, or plain low-fat yogurt.  Fats and Oils  Tub margarines without trans fats. Light or reduced-fat mayonnaise and salad dressings. Avocado. Safflower, olive, or canola oils. Natural peanut or almond butter.  The items listed above may not be a complete list of recommended foods or beverages. Contact your dietitian for more options.  WHAT FOODS ARE NOT RECOMMENDED?   Grains  White bread. White pasta. White rice. Cornbread. Bagels, pastries, and croissants. Crackers that contain trans fat.  Vegetables  White potatoes. Corn. Creamed or fried vegetables. Vegetables in a cheese sauce.  Fruits  Dried fruits. Canned fruit in light or heavy syrup. Fruit juice.  Meat and Other Protein Products  Fatty cuts of meat. Ribs, chicken wings, alicea, sausage, bologna, salami, chitterlings, fatback, hot dogs, bratwurst, and packaged luncheon meats.  Dairy  Whole or 2% milk, cream, half-and-half, and cream cheese. Whole-fat or sweetened yogurt. Full-fat cheeses. Nondairy creamers and whipped toppings. Processed  cheese, cheese spreads, or cheese curds.  Sweets and Desserts  Corn syrup, sugars, honey, and molasses. Candy. Jam and jelly. Syrup. Sweetened cereals. Cookies, pies, cakes, donuts, muffins, and ice cream.  Fats and Oils  Butter, stick margarine, lard, shortening, ghee, or alicea fat. Coconut, palm kernel, or palm oils.  Beverages  Alcohol. Sweetened drinks (such as sodas, lemonade, and fruit drinks or punches).  The items listed above may not be a complete list of foods and beverages to avoid. Contact your dietitian for more information.     This information is not intended to replace advice given to you by your health care provider. Make sure you discuss any questions you have with your health care provider.     Document Released: 10/05/2005 Document Revised: 01/08/2016 Document Reviewed: 10/22/2014  [employer] Interactive Patient Education ©2016 [employer]

## 2017-01-18 NOTE — PROGRESS NOTES
HPI Comments: Sandra presents to the office today in regards to her DM, type 2, Hyperlipidemia. She recently had lab work done pertaining to these conditions which will be reviewed with her. She does report that she and her  had just recovered from a virus when she had her lab work. She also shares that her medications have now been approved by patient assistance and she is receiving them on a regular basis.        Diabetes   She has type 2 diabetes mellitus. Her disease course has been improving. Associated symptoms include foot paresthesias. Pertinent negatives for diabetes include no blurred vision, no chest pain, no foot ulcerations, no polydipsia, no polyphagia, no polyuria and no weakness. Symptoms are improving. Diabetic complications include peripheral neuropathy. Risk factors for coronary artery disease include diabetes mellitus, dyslipidemia, family history and post-menopausal. Current diabetic treatment includes insulin injections, oral agent (monotherapy) and diet. She is following a generally healthy diet. She has had a previous visit with a dietitian. Eye exam is not current.   Hyperlipidemia   Pertinent negatives include no chest pain or shortness of breath. Current antihyperlipidemic treatment includes ezetimibe and statins. Risk factors for coronary artery disease include diabetes mellitus, dyslipidemia, family history, post-menopausal and stress.   The following portions of the patient's history were reviewed and updated as appropriate: allergies, current medications, past family history, past medical history, past social history, past surgical history and problem list.    Review of Systems   Constitutional: Negative for activity change, appetite change, chills, fatigue, fever and unexpected weight change.   HENT: Negative.    Eyes: Negative for visual disturbance.   Respiratory: Negative for cough, chest tightness, shortness of breath and wheezing.    Cardiovascular: Negative for chest  pain, palpitations and leg swelling.   Gastrointestinal: Negative for abdominal pain, constipation, diarrhea, nausea and vomiting.   Endocrine: Negative for cold intolerance, heat intolerance, polydipsia, polyphagia and polyuria.   Skin: Negative for color change and rash.   Neurological: Negative for dizziness, tremors, speech difficulty, weakness, light-headedness and headaches.   Hematological: Negative for adenopathy.   Psychiatric/Behavioral: Negative for confusion, decreased concentration and suicidal ideas. The patient is not nervous/anxious.    All other systems reviewed and are negative.    Physical Exam   Constitutional: She is oriented to person, place, and time. She appears well-developed and well-nourished.   HENT:   Head: Atraumatic.   Nose: Nose normal.   Mouth/Throat: Oropharynx is clear and moist.   Eyes: Conjunctivae and EOM are normal. Pupils are equal, round, and reactive to light. Right eye exhibits no discharge. Left eye exhibits no discharge.   Neck: Normal range of motion. Neck supple. No JVD present. No thyromegaly present.   Cardiovascular: Normal rate, regular rhythm and normal heart sounds.  Exam reveals no friction rub.    No murmur heard.  Pulmonary/Chest: Effort normal and breath sounds normal. No respiratory distress. She has no wheezes. She has no rales.   Abdominal: Soft. Bowel sounds are normal. She exhibits no distension. There is no tenderness. There is no rebound and no guarding.   Musculoskeletal: She exhibits no edema or tenderness.   Lymphadenopathy:     She has no cervical adenopathy.   Neurological: She is alert and oriented to person, place, and time.   Skin: Skin is warm and dry. No rash noted. No erythema.   Psychiatric: She has a normal mood and affect. Her behavior is normal. Judgment and thought content normal.   Nursing note and vitals reviewed.    Assessment/Plan   Diagnoses and all orders for this visit:    Diabetes mellitus due to underlying condition with  "diabetic neuropathy, with long-term current use of insulin  Comments:  Findings and recommendations discussed with Sandra. Reinforced diabetes self management skills with her including nutrition and activity.  Orders:  -     Comprehensive Metabolic Panel; Future  -     TSH; Future  -     Hemoglobin A1c; Future  -     MicroAlbumin, Urine, Random; Future    Hyperlipidemia, unspecified hyperlipidemia type  Comments:  Reviewed her lipid panel results with her which included:   TC: 147 mg/dL; Tri mg/dL HDL: 38 mg/dL and LDL: 56 mg/dL.   Orders:  -     Lipid Panel; Future    Vitamin D deficiency  Comments:  Vitamin D: 25 and will restart weekly Vitamin D capsules.  Orders:  -     vitamin D (ERGOCALCIFEROL) 46363 UNITS capsule capsule; Take 1 capsule by mouth Every 7 (Seven) Days for 4 doses.  -     Vitamin D 25 Hydroxy; Future    Vitamin B 12 deficiency  Comments:  Reviewed her Vitamin B12 results with her.   Orders:  -     Vitamin B12; Future    S/P hysterectomy  -     CBC & Differential; Future    Screening    Encounter for screening mammogram for breast cancer  -     Mammo Screening Bilateral With CAD; Future    Encounter for screening for osteoporosis  -     DEXA Bone Density Axial; Future    Findings and recommendations discussed with Sandra. Reinforced diabetes and cardiovascular management skills with her including nutrition and activity. Reviewed her lab results which included an A1C of 8.9 which is up from her last of 7.8. She reports she feels her elevated A1C and Lipid panel are a result of the holidays and she is \"back on track\".    Reviewed her health maintenance recommendations including her RANJAN which she has yet to have completed. She will schedule an appt soon for this. Will schedule a Mammogram and DEXA for her to have completed at [facility].   She will f/u with me in April; Sooner if problems/concerns occur.            "

## 2017-01-19 PROBLEM — Z90.710 S/P TAH (TOTAL ABDOMINAL HYSTERECTOMY): Status: ACTIVE | Noted: 2017-01-19

## 2017-01-19 RX ORDER — PRAVASTATIN SODIUM 40 MG
TABLET ORAL
Qty: 30 TABLET | Refills: 2 | Status: SHIPPED | OUTPATIENT
Start: 2017-01-19 | End: 2017-03-13 | Stop reason: SDUPTHER

## 2017-01-31 ENCOUNTER — HOSPITAL ENCOUNTER (OUTPATIENT)
Dept: MAMMOGRAPHY | Facility: HOSPITAL | Age: 52
Discharge: HOME | End: 2017-01-31
Admitting: NURSE PRACTITIONER

## 2017-01-31 ENCOUNTER — HOSPITAL ENCOUNTER (OUTPATIENT)
Dept: BONE DENSITY | Facility: HOSPITAL | Age: 52
Discharge: HOME | End: 2017-01-31

## 2017-01-31 ENCOUNTER — TELEPHONE (OUTPATIENT)
Dept: RETAIL CLINIC | Facility: CLINIC | Age: 52
End: 2017-01-31

## 2017-01-31 DIAGNOSIS — Z13.820 ENCOUNTER FOR SCREENING FOR OSTEOPOROSIS: ICD-10-CM

## 2017-01-31 DIAGNOSIS — Z12.31 ENCOUNTER FOR SCREENING MAMMOGRAM FOR BREAST CANCER: ICD-10-CM

## 2017-01-31 PROCEDURE — 77063 BREAST TOMOSYNTHESIS BI: CPT

## 2017-01-31 PROCEDURE — G0202 SCR MAMMO BI INCL CAD: HCPCS | Performed by: RADIOLOGY

## 2017-01-31 PROCEDURE — 77080 DXA BONE DENSITY AXIAL: CPT

## 2017-01-31 PROCEDURE — G0202 SCR MAMMO BI INCL CAD: HCPCS

## 2017-01-31 PROCEDURE — 77063 BREAST TOMOSYNTHESIS BI: CPT | Performed by: RADIOLOGY

## 2017-01-31 PROCEDURE — 77080 DXA BONE DENSITY AXIAL: CPT | Performed by: RADIOLOGY

## 2017-01-31 NOTE — TELEPHONE ENCOUNTER
Please call Sandra and let her know that both of her tests she recently had done: the Mammogram and Bone Density were good.

## 2017-03-13 RX ORDER — PRAVASTATIN SODIUM 40 MG
40 TABLET ORAL DAILY
Qty: 90 TABLET | Refills: 2 | Status: SHIPPED | OUTPATIENT
Start: 2017-03-13 | End: 2018-02-01

## 2017-03-29 RX ORDER — DULOXETIN HYDROCHLORIDE 30 MG/1
30 CAPSULE, DELAYED RELEASE ORAL 2 TIMES DAILY
Qty: 60 CAPSULE | Refills: 3 | Status: SHIPPED | OUTPATIENT
Start: 2017-03-29 | End: 2017-04-26 | Stop reason: SDUPTHER

## 2017-04-03 RX ORDER — EZETIMIBE 10 MG/1
10 TABLET ORAL DAILY
Qty: 30 TABLET | Refills: 3 | Status: SHIPPED | OUTPATIENT
Start: 2017-04-03 | End: 2017-08-02 | Stop reason: SDUPTHER

## 2017-04-20 ENCOUNTER — LAB (OUTPATIENT)
Dept: FAMILY MEDICINE CLINIC | Facility: CLINIC | Age: 52
End: 2017-04-20

## 2017-04-20 DIAGNOSIS — E08.40 DIABETES MELLITUS DUE TO UNDERLYING CONDITION WITH DIABETIC NEUROPATHY, WITH LONG-TERM CURRENT USE OF INSULIN (HCC): ICD-10-CM

## 2017-04-20 DIAGNOSIS — E53.8 VITAMIN B 12 DEFICIENCY: ICD-10-CM

## 2017-04-20 DIAGNOSIS — Z90.710 S/P HYSTERECTOMY: ICD-10-CM

## 2017-04-20 DIAGNOSIS — E55.9 VITAMIN D DEFICIENCY: ICD-10-CM

## 2017-04-20 DIAGNOSIS — E78.5 HYPERLIPIDEMIA, UNSPECIFIED HYPERLIPIDEMIA TYPE: Chronic | ICD-10-CM

## 2017-04-20 DIAGNOSIS — Z79.4 DIABETES MELLITUS DUE TO UNDERLYING CONDITION WITH DIABETIC NEUROPATHY, WITH LONG-TERM CURRENT USE OF INSULIN (HCC): ICD-10-CM

## 2017-04-20 LAB
25(OH)D3 SERPL-MCNC: 54 NG/ML
ALBUMIN SERPL-MCNC: 4.5 G/DL (ref 3.5–5)
ALBUMIN UR-MCNC: 6.4 MG/L
ALBUMIN/GLOB SERPL: 1.3 G/DL (ref 1.5–2.5)
ALP SERPL-CCNC: 92 U/L (ref 35–104)
ALT SERPL W P-5'-P-CCNC: 39 U/L (ref 10–36)
ANION GAP SERPL CALCULATED.3IONS-SCNC: 3.4 MMOL/L (ref 3.6–11.2)
AST SERPL-CCNC: 41 U/L (ref 10–30)
BASOPHILS # BLD AUTO: 0.03 10*3/MM3 (ref 0–0.3)
BASOPHILS NFR BLD AUTO: 0.5 % (ref 0–2)
BILIRUB SERPL-MCNC: 0.6 MG/DL (ref 0.2–1.8)
BUN BLD-MCNC: 10 MG/DL (ref 7–21)
BUN/CREAT SERPL: 14.3 (ref 7–25)
CALCIUM SPEC-SCNC: 10.2 MG/DL (ref 7.7–10)
CHLORIDE SERPL-SCNC: 104 MMOL/L (ref 99–112)
CHOLEST SERPL-MCNC: 146 MG/DL (ref 0–200)
CO2 SERPL-SCNC: 31.6 MMOL/L (ref 24.3–31.9)
CREAT BLD-MCNC: 0.7 MG/DL (ref 0.43–1.29)
DEPRECATED RDW RBC AUTO: 39.1 FL (ref 37–54)
EOSINOPHIL # BLD AUTO: 0.22 10*3/MM3 (ref 0–0.7)
EOSINOPHIL NFR BLD AUTO: 3.6 % (ref 0–5)
ERYTHROCYTE [DISTWIDTH] IN BLOOD BY AUTOMATED COUNT: 12.3 % (ref 11.5–14.5)
GFR SERPL CREATININE-BSD FRML MDRD: 88 ML/MIN/1.73
GLOBULIN UR ELPH-MCNC: 3.5 GM/DL
GLUCOSE BLD-MCNC: 296 MG/DL (ref 70–110)
HBA1C MFR BLD: 9.5 % (ref 4.5–5.7)
HCT VFR BLD AUTO: 42.1 % (ref 37–47)
HDLC SERPL-MCNC: 40 MG/DL (ref 60–100)
HGB BLD-MCNC: 14.3 G/DL (ref 12–16)
IMM GRANULOCYTES # BLD: 0.02 10*3/MM3 (ref 0–0.03)
IMM GRANULOCYTES NFR BLD: 0.3 % (ref 0–0.5)
LDLC SERPL CALC-MCNC: 64 MG/DL (ref 0–100)
LDLC/HDLC SERPL: 1.59 {RATIO}
LYMPHOCYTES # BLD AUTO: 2.22 10*3/MM3 (ref 1–3)
LYMPHOCYTES NFR BLD AUTO: 36.3 % (ref 21–51)
MCH RBC QN AUTO: 30.6 PG (ref 27–33)
MCHC RBC AUTO-ENTMCNC: 34 G/DL (ref 33–37)
MCV RBC AUTO: 90 FL (ref 80–94)
MONOCYTES # BLD AUTO: 0.57 10*3/MM3 (ref 0.1–0.9)
MONOCYTES NFR BLD AUTO: 9.3 % (ref 0–10)
NEUTROPHILS # BLD AUTO: 3.06 10*3/MM3 (ref 1.4–6.5)
NEUTROPHILS NFR BLD AUTO: 50 % (ref 30–70)
OSMOLALITY SERPL CALC.SUM OF ELEC: 287.6 MOSM/KG (ref 273–305)
PLATELET # BLD AUTO: 256 10*3/MM3 (ref 130–400)
PMV BLD AUTO: 10.8 FL (ref 6–10)
POTASSIUM BLD-SCNC: 3.9 MMOL/L (ref 3.5–5.3)
PROT SERPL-MCNC: 8 G/DL (ref 6–8)
RBC # BLD AUTO: 4.68 10*6/MM3 (ref 4.2–5.4)
SODIUM BLD-SCNC: 139 MMOL/L (ref 135–153)
TRIGL SERPL-MCNC: 212 MG/DL (ref 0–150)
TSH SERPL DL<=0.05 MIU/L-ACNC: 1.78 MIU/ML (ref 0.55–4.78)
VIT B12 BLD-MCNC: 466 PG/ML (ref 211–911)
VLDLC SERPL-MCNC: 42.4 MG/DL
WBC NRBC COR # BLD: 6.12 10*3/MM3 (ref 4.5–12.5)

## 2017-04-20 PROCEDURE — 85025 COMPLETE CBC W/AUTO DIFF WBC: CPT | Performed by: NURSE PRACTITIONER

## 2017-04-20 PROCEDURE — 80053 COMPREHEN METABOLIC PANEL: CPT | Performed by: NURSE PRACTITIONER

## 2017-04-20 PROCEDURE — 82043 UR ALBUMIN QUANTITATIVE: CPT | Performed by: NURSE PRACTITIONER

## 2017-04-20 PROCEDURE — 83036 HEMOGLOBIN GLYCOSYLATED A1C: CPT | Performed by: NURSE PRACTITIONER

## 2017-04-20 PROCEDURE — 80061 LIPID PANEL: CPT | Performed by: NURSE PRACTITIONER

## 2017-04-20 PROCEDURE — 36415 COLL VENOUS BLD VENIPUNCTURE: CPT

## 2017-04-20 PROCEDURE — 84443 ASSAY THYROID STIM HORMONE: CPT | Performed by: NURSE PRACTITIONER

## 2017-04-20 PROCEDURE — 82607 VITAMIN B-12: CPT | Performed by: NURSE PRACTITIONER

## 2017-04-20 PROCEDURE — 82306 VITAMIN D 25 HYDROXY: CPT | Performed by: NURSE PRACTITIONER

## 2017-04-26 ENCOUNTER — OFFICE VISIT (OUTPATIENT)
Dept: FAMILY MEDICINE CLINIC | Facility: CLINIC | Age: 52
End: 2017-04-26

## 2017-04-26 VITALS
BODY MASS INDEX: 27.28 KG/M2 | DIASTOLIC BLOOD PRESSURE: 76 MMHG | HEART RATE: 87 BPM | WEIGHT: 180 LBS | OXYGEN SATURATION: 97 % | TEMPERATURE: 97.4 F | HEIGHT: 68 IN | SYSTOLIC BLOOD PRESSURE: 126 MMHG

## 2017-04-26 DIAGNOSIS — E53.8 VITAMIN B 12 DEFICIENCY: ICD-10-CM

## 2017-04-26 DIAGNOSIS — H69.83 DYSFUNCTION OF EUSTACHIAN TUBE, BILATERAL: ICD-10-CM

## 2017-04-26 DIAGNOSIS — E78.5 HYPERLIPIDEMIA, UNSPECIFIED HYPERLIPIDEMIA TYPE: Chronic | ICD-10-CM

## 2017-04-26 DIAGNOSIS — Z79.4 DIABETES MELLITUS DUE TO UNDERLYING CONDITION WITH DIABETIC NEUROPATHY, WITH LONG-TERM CURRENT USE OF INSULIN (HCC): Primary | Chronic | ICD-10-CM

## 2017-04-26 DIAGNOSIS — E55.9 VITAMIN D DEFICIENCY: ICD-10-CM

## 2017-04-26 DIAGNOSIS — E08.40 DIABETES MELLITUS DUE TO UNDERLYING CONDITION WITH DIABETIC NEUROPATHY, WITH LONG-TERM CURRENT USE OF INSULIN (HCC): Primary | Chronic | ICD-10-CM

## 2017-04-26 DIAGNOSIS — Z12.11 SCREENING FOR COLON CANCER: ICD-10-CM

## 2017-04-26 PROCEDURE — 99214 OFFICE O/P EST MOD 30 MIN: CPT | Performed by: NURSE PRACTITIONER

## 2017-04-26 RX ORDER — OMEGA-3-ACID ETHYL ESTERS 1 G/1
2 CAPSULE, LIQUID FILLED ORAL 2 TIMES DAILY
COMMUNITY
End: 2020-02-13

## 2017-04-26 RX ORDER — CETIRIZINE HYDROCHLORIDE, PSEUDOEPHEDRINE HYDROCHLORIDE 5; 120 MG/1; MG/1
1 TABLET, FILM COATED, EXTENDED RELEASE ORAL 2 TIMES DAILY
Qty: 20 TABLET | Refills: 0 | Status: SHIPPED | OUTPATIENT
Start: 2017-04-26 | End: 2017-05-06

## 2017-04-26 RX ORDER — DULOXETIN HYDROCHLORIDE 30 MG/1
30 CAPSULE, DELAYED RELEASE ORAL 2 TIMES DAILY
Qty: 60 CAPSULE | Refills: 3 | Status: SHIPPED | OUTPATIENT
Start: 2017-04-26 | End: 2018-03-29 | Stop reason: SDUPTHER

## 2017-04-26 NOTE — PROGRESS NOTES
History of Present Illness     Sandra presents to the office today in regards to her DM, type 2, Hyperlipidemia. She recently had lab work done pertaining to these conditions which will be reviewed with her. She does report having decrease concentration at times.     The following portions of the patient's history were reviewed and updated as appropriate: allergies, current medications, past family history, past medical history, past social history, past surgical history and problem list.    Review of Systems   Constitutional: Positive for fatigue. Negative for activity change, appetite change, chills, fever and unexpected weight change.   HENT: Negative.    Eyes: Negative for discharge and visual disturbance.   Respiratory: Negative for cough, chest tightness, shortness of breath and wheezing.    Cardiovascular: Negative for chest pain, palpitations and leg swelling.   Gastrointestinal: Negative for abdominal pain, nausea and vomiting.   Endocrine: Negative for cold intolerance, heat intolerance, polydipsia, polyphagia and polyuria.   Skin: Negative for color change and rash.   Neurological: Positive for numbness. Negative for dizziness, tremors, speech difficulty, weakness, light-headedness and headaches.   Hematological: Negative for adenopathy.   Psychiatric/Behavioral: Positive for decreased concentration. Negative for confusion and suicidal ideas. The patient is not nervous/anxious.    All other systems reviewed and are negative.    Physical Exam   Constitutional: She is oriented to person, place, and time. She appears well-developed and well-nourished. No distress.   HENT:   Head: Normocephalic.   Nose: Nose normal.   Mouth/Throat: Oropharynx is clear and moist. No oropharyngeal exudate.   Eyes: Conjunctivae and EOM are normal. Pupils are equal, round, and reactive to light. No scleral icterus.   Neck: Neck supple. Normal carotid pulses and no JVD present. Carotid bruit is not present. No tracheal deviation  present. No thyromegaly present.   Cardiovascular: Normal rate, regular rhythm and normal heart sounds.  Exam reveals no friction rub.    No murmur heard.  Pulmonary/Chest: Effort normal and breath sounds normal. No respiratory distress. She has no wheezes. She has no rales.   Abdominal: Soft. Bowel sounds are normal. She exhibits no distension. There is no tenderness. There is no rebound and no guarding.   Musculoskeletal: She exhibits no edema or tenderness.    Sandra had a diabetic foot exam performed today.  Lymphadenopathy:     She has no cervical adenopathy.   Neurological: She is alert and oriented to person, place, and time.   Skin: Skin is warm and dry. No rash noted. No erythema.   Psychiatric: She has a normal mood and affect. Her behavior is normal. Judgment and thought content normal.   Nursing note and vitals reviewed.    Assessment/Plan   Diagnoses and all orders for this visit:    Diabetes mellitus due to underlying condition with diabetic neuropathy, with long-term current use of insulin  Comments:  Findings and recommendations discussed with Sandra. Reviewed her lab results with her. Discussed options with her. Will restart Trulicity 0.75 weekly.  Orders:  -     Dulaglutide (TRULICITY) 0.75 MG/0.5ML solution pen-injector; Inject 0.75 mg under the skin 1 (One) Time Per Week for 4 doses.  -     DULoxetine (CYMBALTA) 30 MG capsule; Take 1 capsule by mouth 2 (Two) Times a Day.  -     aspirin 81 MG tablet; Take 1 tablet by mouth Daily for 30 days.  -     Comprehensive Metabolic Panel; Future  -     TSH; Future  -     Hemoglobin A1c; Future  -     MicroAlbumin, Urine, Random; Future  -     Hepatitis C Antibody; Future    Hyperlipidemia, unspecified hyperlipidemia type  Comments:  Lipid panel reviewed. Continue current treatment  Orders:  -     Lipid Panel; Future    Vitamin D deficiency  Comments:  Vitamin D stable  Orders:  -     Vitamin D 25 Hydroxy; Future    Vitamin B 12  deficiency  Comments:  Vitamin B12 stable  Orders:  -     CBC & Differential; Future  -     Vitamin B12; Future    Dysfunction of eustachian tube, bilateral  Comments:  Reviewed options. Will try Zyrtec D.   Orders:  -     cetirizine-pseudoephedrine (ZyrTEC-D) 5-120 MG per 12 hr tablet; Take 1 tablet by mouth 2 (Two) Times a Day for 20 doses.    Screening for colon cancer  -     Occult Blood X 3, Stool; Future    Findings and recommendations discussed with Sandra. Reviewed her lab results with her. Discussed options with her. Will restart Trulicity 0.75 weekly. Discussed options regarding her decrease concentration. Emphasized importance of improving glycemic control. If this worsens, she will seek further medical evaluation or does not continue to improve.   Reinforced lifestyle and risk reduction recommendations including nutrition and exercise.   Health Maintenance recommendations reviewed with her. She does plan to have her RANJAN this summer. Vaccinations will be encouraged at her next visit which will be in July; sooner if problems/concerns occur.     Lab Results   Component Value Date    HGBA1C 9.50 (H) 04/20/2017    HGBA1C 8.90 (H) 01/16/2017    HGBA1C 7.80 (H) 09/14/2016     Lab Results   Component Value Date    MICROALBUR 6.4 04/20/2017    LDLCALC 64 04/20/2017    CREATININE 0.70 04/20/2017

## 2017-04-26 NOTE — PATIENT INSTRUCTIONS
Diabetes and Exercise  Exercising regularly is important. It is not just about losing weight. It has many health benefits, such as:  · Improving your overall fitness, flexibility, and endurance.  · Increasing your bone density.  · Helping with weight control.  · Decreasing your body fat.  · Increasing your muscle strength.  · Reducing stress and tension.  · Improving your overall health.  People with diabetes who exercise gain additional benefits because exercise:  · Reduces appetite.  · Improves the body's use of blood sugar (glucose).  · Helps lower or control blood glucose.  · Decreases blood pressure.  · Helps control blood lipids (such as cholesterol and triglycerides).  · Improves the body's use of the hormone insulin by:    Increasing the body's insulin sensitivity.    Reducing the body's insulin needs.  · Decreases the risk for heart disease because exercising:    Lowers cholesterol and triglycerides levels.    Increases the levels of good cholesterol (such as high-density lipoproteins [HDL]) in the body.    Lowers blood glucose levels.  YOUR ACTIVITY PLAN   Choose an activity that you enjoy, and set realistic goals. To exercise safely, you should begin practicing any new physical activity slowly, and gradually increase the intensity of the exercise over time. Your health care provider or diabetes educator can help create an activity plan that works for you. General recommendations include:  · Encouraging children to engage in at least 60 minutes of physical activity each day.  · Stretching and performing strength training exercises, such as yoga or weight lifting, at least 2 times per week.  · Performing a total of at least 150 minutes of moderate-intensity exercise each week, such as brisk walking or water aerobics.  · Exercising at least 3 days per week, making sure you allow no more than 2 consecutive days to pass without exercising.  · Avoiding long periods of inactivity (90 minutes or more). When you  have to spend an extended period of time sitting down, take frequent breaks to walk or stretch.  RECOMMENDATIONS FOR EXERCISING WITH TYPE 1 OR TYPE 2 DIABETES   · Check your blood glucose before exercising. If blood glucose levels are greater than 240 mg/dL, check for urine ketones. Do not exercise if ketones are present.  · Avoid injecting insulin into areas of the body that are going to be exercised. For example, avoid injecting insulin into:    The arms when playing tennis.    The legs when jogging.  · Keep a record of:    Food intake before and after you exercise.    Expected peak times of insulin action.    Blood glucose levels before and after you exercise.    The type and amount of exercise you have done.  · Review your records with your health care provider. Your health care provider will help you to develop guidelines for adjusting food intake and insulin amounts before and after exercising.  · If you take insulin or oral hypoglycemic agents, watch for signs and symptoms of hypoglycemia. They include:    Dizziness.    Shaking.    Sweating.    Chills.    Confusion.  · Drink plenty of water while you exercise to prevent dehydration or heat stroke. Body water is lost during exercise and must be replaced.  · Talk to your health care provider before starting an exercise program to make sure it is safe for you. Remember, almost any type of activity is better than none.     This information is not intended to replace advice given to you by your health care provider. Make sure you discuss any questions you have with your health care provider.     Document Released: 03/09/2005 Document Revised: 05/03/2016 Document Reviewed: 05/27/2014  [employer] Interactive Patient Education ©2016 [employer]  Food Choices to Lower Your Triglycerides  Triglycerides are a type of fat in your blood. High levels of triglycerides can increase the risk of heart disease and stroke. If your triglyceride levels are high, the foods you eat  "and your eating habits are very important. Choosing the right foods can help lower your triglycerides.   WHAT GENERAL GUIDELINES DO I NEED TO FOLLOW?  · Lose weight if you are overweight.    · Limit or avoid alcohol.    · Fill one half of your plate with vegetables and green salads.    · Limit fruit to two servings a day. Choose fruit instead of juice.    · Make one fourth of your plate whole grains. Look for the word \"whole\" as the first word in the ingredient list.  · Fill one fourth of your plate with lean protein foods.  · Enjoy fatty fish (such as salmon, mackerel, sardines, and tuna) three times a week.    · Choose healthy fats.    · Limit foods high in starch and sugar.  · Eat more home-cooked food and less restaurant, buffet, and fast food.  · Limit fried foods.  · Cook foods using methods other than frying.  · Limit saturated fats.  · Check ingredient lists to avoid foods with partially hydrogenated oils (trans fats) in them.  WHAT FOODS CAN I EAT?   Grains  Whole grains, such as whole wheat or whole grain breads, crackers, cereals, and pasta. Unsweetened oatmeal, bulgur, barley, quinoa, or brown rice. Corn or whole wheat flour tortillas.   Vegetables  Fresh or frozen vegetables (raw, steamed, roasted, or grilled). Green salads.  Fruits  All fresh, canned (in natural juice), or frozen fruits.  Meat and Other Protein Products  Ground beef (85% or leaner), grass-fed beef, or beef trimmed of fat. Skinless chicken or turkey. Ground chicken or turkey. Pork trimmed of fat. All fish and seafood. Eggs. Dried beans, peas, or lentils. Unsalted nuts or seeds. Unsalted canned or dry beans.  Dairy  Low-fat dairy products, such as skim or 1% milk, 2% or reduced-fat cheeses, low-fat ricotta or cottage cheese, or plain low-fat yogurt.  Fats and Oils  Tub margarines without trans fats. Light or reduced-fat mayonnaise and salad dressings. Avocado. Safflower, olive, or canola oils. Natural peanut or almond butter.  The items " listed above may not be a complete list of recommended foods or beverages. Contact your dietitian for more options.  WHAT FOODS ARE NOT RECOMMENDED?   Grains  White bread. White pasta. White rice. Cornbread. Bagels, pastries, and croissants. Crackers that contain trans fat.  Vegetables  White potatoes. Corn. Creamed or fried vegetables. Vegetables in a cheese sauce.  Fruits  Dried fruits. Canned fruit in light or heavy syrup. Fruit juice.  Meat and Other Protein Products  Fatty cuts of meat. Ribs, chicken wings, alicea, sausage, bologna, salami, chitterlings, fatback, hot dogs, bratwurst, and packaged luncheon meats.  Dairy  Whole or 2% milk, cream, half-and-half, and cream cheese. Whole-fat or sweetened yogurt. Full-fat cheeses. Nondairy creamers and whipped toppings. Processed cheese, cheese spreads, or cheese curds.  Sweets and Desserts  Corn syrup, sugars, honey, and molasses. Candy. Jam and jelly. Syrup. Sweetened cereals. Cookies, pies, cakes, donuts, muffins, and ice cream.  Fats and Oils  Butter, stick margarine, lard, shortening, ghee, or alicea fat. Coconut, palm kernel, or palm oils.  Beverages  Alcohol. Sweetened drinks (such as sodas, lemonade, and fruit drinks or punches).  The items listed above may not be a complete list of foods and beverages to avoid. Contact your dietitian for more information.     This information is not intended to replace advice given to you by your health care provider. Make sure you discuss any questions you have with your health care provider.     Document Released: 10/05/2005 Document Revised: 01/08/2016 Document Reviewed: 10/22/2014  [employer] Interactive Patient Education ©2016 [employer]  Type 2 Diabetes Mellitus, Adult  Type 2 diabetes (type 2 diabetes mellitus) is a long-term (chronic) disease. In type 2 diabetes, one or both of these problems may be present:  · The pancreas does not make enough of a hormone called insulin.  · The cells in the body are less responsive  to the insulin that the body makes (insulin resistance).  Normally, insulin moves sugars from food into tissue cells. The tissue cells use the sugars for energy. The lack of insulin or the lack of normal response to insulin causes excess sugars to build up in the blood instead of going into the tissue cells. As a result, high blood sugar (hyperglycemia) develops. High blood sugar (glucose) levels can cause many complications.  Previously, type 2 diabetes was also called adult-onset diabetes, but it can occur at any age.  RISK FACTORS  This condition is more likely to develop in people who:  · Have a family member with the disease.  · Are overweight or obese.  · Have an inactive (sedentary) lifestyle.  · Have a history of pre-diabetes, gestational diabetes, or polycystic ovarian syndrome (PCOS).  Doing physical activity on a regular basis and maintaining a healthy weight can reduce the chance of developing type 2 diabetes.  SYMPTOMS  In the early stage of this condition, a person may not show symptoms. The symptoms of this condition appear slowly. The symptoms include:  · Increased thirst (polydipsia).  · Increased hunger.  · Increased urination (polyuria).  · Increased urination during the night (nocturia).  · Sudden or unexplained weight changes.  · Frequent, recurring infections.  · Tiredness (fatigue).  · Weakness.  · Vision changes, such as blurred vision.  · Fruity-smelling breath.  · Cuts or bruises that are slow to heal.  · Tingling or numbness in the hands or feet.  DIAGNOSIS  In many cases, this condition is not diagnosed until complications of diabetes are present. Type 2 diabetes is diagnosed when symptoms or complications are present and when blood glucose levels are increased. Your blood glucose level may be checked with one or more of the following blood tests:  · A fasting blood glucose test. You will not be allowed to eat for at least 8 hours before a blood sample is taken. A diagnosis can be made if  the level is greater than 126 mg/dL.  · A random blood glucose test. This test checks your blood glucose at any time of the day regardless of when you ate. A diagnosis can be made if the level is greater than 200 mg/dL.  · An A1c (hemoglobin A1c) blood test. This test provides information about blood glucose control over the previous 3 months. A diagnosis can be made if the level is greater than 6.5%.  · An oral glucose tolerance test (OGTT). This test measures your blood glucose at two times:    Baseline level after you have not eaten (have fasted) overnight.    Two hours after you drink a glucose-containing beverage. A diagnosis can be made if the level is greater than 200 mg/dL.  TREATMENT  · You may need to take insulin or diabetes medicine daily to keep your blood glucose levels in the desired range.  · If you use insulin, you may need to adjust the dosage depending on the carbohydrates that you eat with each meal or snack.  · Lifestyle changes are recommended as part of your treatment. These may include:    Following an individualized diet plan that is developed by a nutritionist or dietitian.    Exercising regularly.  Your health care providers will set individualized treatment goals for you based on your age, your medicines, how long you have had diabetes, and any other medical conditions you have. Generally, the goal of treatment is to maintain the following blood glucose levels:  · Before meals (preprandial):  mg/dL.  · After meals (postprandial): below 180 mg/dL.  · A1c: less than 6.5-7%.  HOME CARE INSTRUCTIONS  Knowing Your Levels  · Have your A1c level checked two times per year.  · Check your blood glucose level every day as told by your health care provider.  · Check your urine ketones when you are ill and as often as told by your health care provider.  Medicine  · Take your diabetes medicines as told by your health care provider to keep your blood glucose levels in the desired range.    Do not  run out of insulin or any other diabetes medicines. You must have them available every day.    If you are using insulin, you may need to adjust the amount of insulin that you give yourself based on your intake of carbs (carbohydrates). Carbohydrates can raise blood glucose levels, but they need to be included in your diet. Carbohydrates provide vitamins, minerals, and fiber, which are essential parts of a healthy diet. Carbohydrates are found in fruits, vegetables, whole grains, dairy products, legumes, and foods that contain added sugars.  Eating and Diet  · Eat healthy snacks between healthy meals. Have 3 meals and 3 snacks each day. Make an appointment to see a registered dietitian to help you create an eating plan that is right for you.  · Lose weight if you are overweight.  · Always carry a 15-gram carbohydrate snack with you to treat low blood glucose (hypoglycemia). Examples of 15-gram carbohydrate snacks include:    3-4 glucose pills.    Glucose gel, 15-gram tube.    Raisins, 2 Tbsp (24 grams).    Fruit juice, 4 oz (120 mL).    Regular (not diet) soda, 4 oz (120 mL).  · Follow your sick-day plan whenever you cannot eat or drink as usual.  Managing Blood Sugar  · Recognize symptoms of low blood sugar (hypoglycemia). Hypoglycemia occurs with blood glucose levels at or below 70 mg/dL. The risk for hypoglycemia increases during or after intense exercise, during sleep, when ill, and when skipping meals or fasting. Hypoglycemia symptoms can include:    Tremors or shakes.    Decreased ability to concentrate.    Sweating.    Increased heart rate.    Headache.    Dry mouth.    Hunger.    Irritability.    Anxiety.    Restless sleep.    A change in speech.    A change in coordination.    Confusion.  · Treat hypoglycemia right away. If you are alert and able to swallow safely, follow the 15:15 rule:    Take 15-20 grams of a rapid-acting carbohydrate. Rapid-acting options include glucose gel, glucose pills, or 4 oz (120  mL) of fruit juice, regular soda, or low-fat milk.    Check your blood glucose level 15 minutes after you take the carbohydrate.    If the repeat blood glucose level is still at or below 70 mg/dL, take 15-20 grams of a carbohydrate again.    After your blood glucose level returns to normal, eat a meal or a snack within 1 hour.  · Be alert to early signs of high blood sugar (hyperglycemia), such as feeling very thirsty and urinating more frequently than usual. Early awareness of hyperglycemia allows for prompt treatment. Treat hyperglycemia as told by your health care provider.  Activity  · Be physically active.    Do moderate-intensity physical activity for at least 150 minutes per week or as much as told by your health care provider. Spread out activity over at least 3 days of the week or as told by your health care provider.    Do strength exercises using hand weights or resistance bands at least 2 times per week or as told by your health care provider.  · If you start a new exercise or activity, adjust your insulin or food intake as needed.  Lifestyle  · Do not use tobacco products, including cigarettes, chewing tobacco, or e-cigarettes. If you need help quitting, ask your health care provider.  · Limit alcohol intake to no more than 1 drink per day for non-pregnant women and 2 drinks per day for men.    One drink equals 12 oz of beer, 5 oz of wine, or 1½ oz of hard liquor.    Drink alcohol only when you are also eating food.    Ask your health care provider if alcohol is safe for you.    Tell your health care provider if you drink alcohol several times per week.  Caring for Your Body  · Make an appointment for an eye exam soon after you are told that you have type 2 diabetes, and schedule an exam one time each year after that.  · Perform skin and foot care every day. Check your skin and feet every day for cuts, bruises, redness, nail problems, bleeding, blisters, or sores. Schedule a foot exam with your health  care provider one time each year.  · Brush your teeth and gums two times per day, and floss one time per day. Visit your dentist on a regular basis.  General Instructions  · Keep all follow-up visits as told by your health care provider. This is important.  · Share your diabetes management plan with your workplace or school.  · Carry a medical alert card or wear your medical alert jewelry.  · Keep your vaccinations up to date. It is recommended that you receive a flu (influenza) vaccine every year. It is also recommended that you receive a pneumonia (pneumococcal) vaccine. If you are 65 years of age or older and you have never received a pneumonia vaccine, this vaccine may be given as a series of two shots. Ask your health care provider which additional vaccines may be recommended.  · Learn to manage stress. If you need help reducing stress, ask your health care provider.  · Ask your health care provider about:    Joining a support group.    Talking with a diabetes educator.  · Participate in or seek rehabilitation as needed to maintain or improve independence and quality of life. Request a physical or occupational therapy referral if you are having foot or hand numbness or difficulties with grooming, dressing, eating, or physical activity.  SEEK MEDICAL CARE IF:  · Your blood glucose level is higher than 240 mg/dL.  · You have a change in mental status.  · You develop an additional serious illness.  · You have been sick or have had a fever for 2 days or longer and you are not getting better.  · You have problems with your vision.  · You have any of the following problems for more than 6 hours:    You cannot eat or drink.    You have nausea and vomiting.    You have diarrhea.  SEEK IMMEDIATE MEDICAL CARE IF:  · You have difficulty breathing.  · You have moderate-to-high ketone levels.     This information is not intended to replace advice given to you by your health care provider. Make sure you discuss any questions  you have with your health care provider.     Document Released: 12/18/2006 Document Revised: 01/13/2017 Document Reviewed: 10/20/2016  [employer] Interactive Patient Education ©2016 [employer]

## 2017-07-19 ENCOUNTER — OFFICE VISIT (OUTPATIENT)
Dept: FAMILY MEDICINE CLINIC | Facility: CLINIC | Age: 52
End: 2017-07-19

## 2017-07-19 VITALS
DIASTOLIC BLOOD PRESSURE: 80 MMHG | TEMPERATURE: 98.2 F | BODY MASS INDEX: 26.67 KG/M2 | HEART RATE: 92 BPM | WEIGHT: 176 LBS | SYSTOLIC BLOOD PRESSURE: 134 MMHG | HEIGHT: 68 IN | OXYGEN SATURATION: 97 %

## 2017-07-19 DIAGNOSIS — E08.40 DIABETES MELLITUS DUE TO UNDERLYING CONDITION WITH DIABETIC NEUROPATHY, UNSPECIFIED LONG TERM INSULIN USE STATUS: Primary | Chronic | ICD-10-CM

## 2017-07-19 DIAGNOSIS — R42 DIZZINESS: ICD-10-CM

## 2017-07-19 DIAGNOSIS — Z79.4 DIABETES MELLITUS DUE TO UNDERLYING CONDITION WITH DIABETIC NEUROPATHY, WITH LONG-TERM CURRENT USE OF INSULIN (HCC): ICD-10-CM

## 2017-07-19 DIAGNOSIS — E55.9 VITAMIN D DEFICIENCY: ICD-10-CM

## 2017-07-19 DIAGNOSIS — E53.8 VITAMIN B 12 DEFICIENCY: ICD-10-CM

## 2017-07-19 DIAGNOSIS — E08.40 DIABETES MELLITUS DUE TO UNDERLYING CONDITION WITH DIABETIC NEUROPATHY, WITH LONG-TERM CURRENT USE OF INSULIN (HCC): ICD-10-CM

## 2017-07-19 DIAGNOSIS — E78.5 HYPERLIPIDEMIA, UNSPECIFIED HYPERLIPIDEMIA TYPE: Chronic | ICD-10-CM

## 2017-07-19 DIAGNOSIS — H69.83 DYSFUNCTION OF BOTH EUSTACHIAN TUBES: ICD-10-CM

## 2017-07-19 LAB
25(OH)D3 SERPL-MCNC: 42 NG/ML
ALBUMIN SERPL-MCNC: 5 G/DL (ref 3.5–5)
ALBUMIN UR-MCNC: 45.2 MG/L
ALBUMIN/GLOB SERPL: 1.3 G/DL (ref 1.5–2.5)
ALP SERPL-CCNC: 103 U/L (ref 35–104)
ALT SERPL W P-5'-P-CCNC: 68 U/L (ref 10–36)
ANION GAP SERPL CALCULATED.3IONS-SCNC: 5.2 MMOL/L (ref 3.6–11.2)
AST SERPL-CCNC: 69 U/L (ref 10–30)
BASOPHILS # BLD AUTO: 0.03 10*3/MM3 (ref 0–0.3)
BASOPHILS NFR BLD AUTO: 0.5 % (ref 0–2)
BILIRUB SERPL-MCNC: 0.8 MG/DL (ref 0.2–1.8)
BUN BLD-MCNC: 10 MG/DL (ref 7–21)
BUN/CREAT SERPL: 14.7 (ref 7–25)
CALCIUM SPEC-SCNC: 9.8 MG/DL (ref 7.7–10)
CHLORIDE SERPL-SCNC: 107 MMOL/L (ref 99–112)
CHOLEST SERPL-MCNC: 162 MG/DL (ref 0–200)
CO2 SERPL-SCNC: 29.8 MMOL/L (ref 24.3–31.9)
CREAT BLD-MCNC: 0.68 MG/DL (ref 0.43–1.29)
DEPRECATED RDW RBC AUTO: 40.9 FL (ref 37–54)
EOSINOPHIL # BLD AUTO: 0.26 10*3/MM3 (ref 0–0.7)
EOSINOPHIL NFR BLD AUTO: 4.3 % (ref 0–5)
ERYTHROCYTE [DISTWIDTH] IN BLOOD BY AUTOMATED COUNT: 12.6 % (ref 11.5–14.5)
GFR SERPL CREATININE-BSD FRML MDRD: 91 ML/MIN/1.73
GLOBULIN UR ELPH-MCNC: 3.8 GM/DL
GLUCOSE BLD-MCNC: 181 MG/DL (ref 70–110)
HBA1C MFR BLD: 8.2 % (ref 4.5–5.7)
HCT VFR BLD AUTO: 44.6 % (ref 37–47)
HCV AB SER DONR QL: NORMAL
HDLC SERPL-MCNC: 46 MG/DL (ref 60–100)
HGB BLD-MCNC: 14.9 G/DL (ref 12–16)
IMM GRANULOCYTES # BLD: 0.03 10*3/MM3 (ref 0–0.03)
IMM GRANULOCYTES NFR BLD: 0.5 % (ref 0–0.5)
LDLC SERPL CALC-MCNC: 89 MG/DL (ref 0–100)
LDLC/HDLC SERPL: 1.94 {RATIO}
LYMPHOCYTES # BLD AUTO: 2.36 10*3/MM3 (ref 1–3)
LYMPHOCYTES NFR BLD AUTO: 39.3 % (ref 21–51)
MCH RBC QN AUTO: 30.6 PG (ref 27–33)
MCHC RBC AUTO-ENTMCNC: 33.4 G/DL (ref 33–37)
MCV RBC AUTO: 91.6 FL (ref 80–94)
MONOCYTES # BLD AUTO: 0.43 10*3/MM3 (ref 0.1–0.9)
MONOCYTES NFR BLD AUTO: 7.2 % (ref 0–10)
NEUTROPHILS # BLD AUTO: 2.9 10*3/MM3 (ref 1.4–6.5)
NEUTROPHILS NFR BLD AUTO: 48.2 % (ref 30–70)
OSMOLALITY SERPL CALC.SUM OF ELEC: 286.7 MOSM/KG (ref 273–305)
PLATELET # BLD AUTO: 260 10*3/MM3 (ref 130–400)
PMV BLD AUTO: 10.9 FL (ref 6–10)
POTASSIUM BLD-SCNC: 4 MMOL/L (ref 3.5–5.3)
PROT SERPL-MCNC: 8.8 G/DL (ref 6–8)
RBC # BLD AUTO: 4.87 10*6/MM3 (ref 4.2–5.4)
SODIUM BLD-SCNC: 142 MMOL/L (ref 135–153)
TRIGL SERPL-MCNC: 133 MG/DL (ref 0–150)
TSH SERPL DL<=0.05 MIU/L-ACNC: 2.32 MIU/ML (ref 0.55–4.78)
VIT B12 BLD-MCNC: 318 PG/ML (ref 211–911)
VLDLC SERPL-MCNC: 26.6 MG/DL
WBC NRBC COR # BLD: 6.01 10*3/MM3 (ref 4.5–12.5)

## 2017-07-19 PROCEDURE — 80053 COMPREHEN METABOLIC PANEL: CPT | Performed by: NURSE PRACTITIONER

## 2017-07-19 PROCEDURE — 99214 OFFICE O/P EST MOD 30 MIN: CPT | Performed by: NURSE PRACTITIONER

## 2017-07-19 PROCEDURE — 83036 HEMOGLOBIN GLYCOSYLATED A1C: CPT | Performed by: NURSE PRACTITIONER

## 2017-07-19 PROCEDURE — 36415 COLL VENOUS BLD VENIPUNCTURE: CPT | Performed by: NURSE PRACTITIONER

## 2017-07-19 PROCEDURE — 82306 VITAMIN D 25 HYDROXY: CPT | Performed by: NURSE PRACTITIONER

## 2017-07-19 PROCEDURE — 86803 HEPATITIS C AB TEST: CPT | Performed by: NURSE PRACTITIONER

## 2017-07-19 PROCEDURE — 84443 ASSAY THYROID STIM HORMONE: CPT | Performed by: NURSE PRACTITIONER

## 2017-07-19 PROCEDURE — 80061 LIPID PANEL: CPT | Performed by: NURSE PRACTITIONER

## 2017-07-19 PROCEDURE — 85025 COMPLETE CBC W/AUTO DIFF WBC: CPT | Performed by: NURSE PRACTITIONER

## 2017-07-19 PROCEDURE — 82607 VITAMIN B-12: CPT | Performed by: NURSE PRACTITIONER

## 2017-07-19 PROCEDURE — 82043 UR ALBUMIN QUANTITATIVE: CPT | Performed by: NURSE PRACTITIONER

## 2017-07-19 RX ORDER — MECLIZINE HCL 12.5 MG/1
12.5 TABLET ORAL 3 TIMES DAILY PRN
Qty: 20 TABLET | Refills: 0 | Status: SHIPPED | OUTPATIENT
Start: 2017-07-19 | End: 2018-10-11

## 2017-07-19 NOTE — PROGRESS NOTES
HPI Comments:   Sandra presents to the office today in regards to her DM, type 2, Hyperlipidemia. She did not have her lab work done pertaining to these conditions because she has had intermittent dizziness and wanted to wait until today to have her labs done. The intermittent dizziness started in  early May. Sandra report this is improving with time and is not daily.     Diabetes   She has type 2 diabetes mellitus. Her disease course has been improving. Associated symptoms include foot paresthesias. Pertinent negatives for diabetes include no blurred vision, no chest pain, no foot ulcerations, no polydipsia, no polyphagia, no polyuria and no weakness. Symptoms are improving. Diabetic complications include peripheral neuropathy. Risk factors for coronary artery disease include diabetes mellitus, dyslipidemia, family history and post-menopausal. Current diabetic treatment includes insulin injections, oral agent (monotherapy) and diet. She is following a generally healthy diet. She has had a previous visit with a dietitian. Eye exam is not current.     Hyperlipidemia   Pertinent negatives include no chest pain or shortness of breath. Current antihyperlipidemic treatment includes ezetimibe and statins. Risk factors for coronary artery disease include diabetes mellitus, dyslipidemia, family history, post-menopausal and stress.     Dizziness   This is a new problem. The current episode started more than 1 month ago. The problem occurs intermittently. The problem has been gradually improving. Associated symptoms include fatigue, nausea and numbness (Neuropathy). Pertinent negatives include no abdominal pain, chest pain, chills, congestion, coughing, fever, headaches, rash, vomiting or weakness. Exacerbated by: Movement: Dizzy. She has tried lying down, rest and position changes for the symptoms. The treatment provided mild relief.      Vitals:    07/19/17 0938 07/19/17 1014   BP: 140/80 134/80   BP Location:  Left arm  "  Patient Position:  Sitting   Cuff Size:  Adult   Pulse: 94 92   Temp: 98.2 °F (36.8 °C)    TempSrc: Tympanic    SpO2: 97%    Weight: 176 lb (79.8 kg) 176 lb (79.8 kg)   Height: 68\" (172.7 cm)       The following portions of the patient's history were reviewed and updated as appropriate: allergies, current medications, past family history, past medical history, past social history, past surgical history and problem list.    Review of Systems   Constitutional: Positive for fatigue. Negative for activity change, appetite change, chills, fever and unexpected weight change.   HENT: Negative.  Negative for congestion, ear pain, postnasal drip, rhinorrhea, sinus pressure, tinnitus and trouble swallowing.    Eyes: Negative for visual disturbance.   Respiratory: Negative for cough, chest tightness, shortness of breath and wheezing.    Cardiovascular: Negative for chest pain, palpitations and leg swelling.   Gastrointestinal: Positive for nausea. Negative for abdominal pain, constipation, diarrhea and vomiting.   Endocrine: Negative for cold intolerance, heat intolerance, polydipsia, polyphagia and polyuria.   Skin: Negative for color change and rash.   Neurological: Positive for dizziness and numbness (Neuropathy). Negative for tremors, speech difficulty, weakness, light-headedness and headaches.   Hematological: Negative for adenopathy.   Psychiatric/Behavioral: Negative for agitation, behavioral problems, confusion, decreased concentration and suicidal ideas. The patient is not nervous/anxious.    All other systems reviewed and are negative.    Physical Exam   Constitutional: She is oriented to person, place, and time. She appears well-developed and well-nourished. She is cooperative. No distress.   HENT:   Head: Normocephalic.   Right Ear: No tenderness. Tympanic membrane is not erythematous and not bulging. A middle ear effusion is present.   Left Ear: No tenderness. Tympanic membrane is not erythematous and not " bulging. A middle ear effusion is present.   Nose: Nose normal.   Mouth/Throat: Oropharynx is clear and moist.   Eyes: Conjunctivae and EOM are normal. Pupils are equal, round, and reactive to light. Right eye exhibits no discharge. Left eye exhibits no discharge. No scleral icterus.   Neck: Normal range of motion. Neck supple. No JVD present. No thyromegaly present.   Cardiovascular: Normal rate, regular rhythm and normal heart sounds.  Exam reveals no friction rub.    No murmur heard.  Pulmonary/Chest: Effort normal and breath sounds normal. No respiratory distress. She has no wheezes. She has no rales.   Abdominal: Soft. She exhibits no distension. There is no tenderness. There is no rebound and no guarding.   Musculoskeletal: She exhibits no edema or tenderness.   Lymphadenopathy:     She has no cervical adenopathy.   Neurological: She is alert and oriented to person, place, and time. No cranial nerve deficit. She exhibits normal muscle tone. Coordination normal.   Skin: Skin is warm and dry. No rash noted. No erythema.   Psychiatric: She has a normal mood and affect. Her behavior is normal. Judgment and thought content normal.   Nursing note and vitals reviewed.    Assessment/Plan   Problems Addressed this Visit        Cardiovascular and Mediastinum    Hyperlipidemia    Relevant Orders    Lipid Panel       Digestive    Vitamin B 12 deficiency    Relevant Medications    cyanocobalamin injection 1,000 mcg (Start on 7/20/2017  3:30 PM)    Other Relevant Orders    Vitamin B12    Vitamin D deficiency    Relevant Orders    Vitamin D 25 Hydroxy       Endocrine    Diabetes mellitus due to underlying condition with diabetic neuropathy - Primary    Relevant Orders    Comprehensive Metabolic Panel    TSH    Hemoglobin A1c       Nervous and Auditory    Dysfunction of both eustachian tubes    Relevant Orders    CBC & Differential      Other Visit Diagnoses     Dizziness        Will continue to monitor; Labs done today and  will be reviewed with her. S/S of concern reviewed and if occur to seek further medical evaluation.       Findings and recommendations reviewed with Sandra. Labs completed today and reviewed. These will be reviewed with her by phone. Discussed possible causes for dizziness/vertigo.Lifestyle modifications including nutrition, exercise reinforced.   Will f/u in October with routine labs done prior to her appt. Sooner if problems/concerns occur.

## 2017-07-19 NOTE — PATIENT INSTRUCTIONS
Diabetes and Foot Care  Diabetes may cause you to have problems because of poor blood supply (circulation) to your feet and legs. This may cause the skin on your feet to become thinner, break easier, and heal more slowly. Your skin may become dry, and the skin may peel and crack. You may also have nerve damage in your legs and feet causing decreased feeling in them. You may not notice minor injuries to your feet that could lead to infections or more serious problems. Taking care of your feet is one of the most important things you can do for yourself.  HOME CARE INSTRUCTIONS  · Wear shoes at all times, even in the house. Do not go barefoot. Bare feet are easily injured.  · Check your feet daily for blisters, cuts, and redness. If you cannot see the bottom of your feet, use a mirror or ask someone for help.  · Wash your feet with warm water (do not use hot water) and mild soap. Then pat your feet and the areas between your toes until they are completely dry. Do not soak your feet as this can dry your skin.  · Apply a moisturizing lotion or petroleum jelly (that does not contain alcohol and is unscented) to the skin on your feet and to dry, brittle toenails. Do not apply lotion between your toes.  · Trim your toenails straight across. Do not dig under them or around the cuticle. File the edges of your nails with an emery board or nail file.  · Do not cut corns or calluses or try to remove them with medicine.  · Wear clean socks or stockings every day. Make sure they are not too tight. Do not wear knee-high stockings since they may decrease blood flow to your legs.  · Wear shoes that fit properly and have enough cushioning. To break in new shoes, wear them for just a few hours a day. This prevents you from injuring your feet. Always look in your shoes before you put them on to be sure there are no objects inside.  · Do not cross your legs. This may decrease the blood flow to your feet.  · If you find a minor scrape,  "cut, or break in the skin on your feet, keep it and the skin around it clean and dry. These areas may be cleansed with mild soap and water. Do not cleanse the area with peroxide, alcohol, or iodine.  · When you remove an adhesive bandage, be sure not to damage the skin around it.  · If you have a wound, look at it several times a day to make sure it is healing.  · Do not use heating pads or hot water bottles. They may burn your skin. If you have lost feeling in your feet or legs, you may not know it is happening until it is too late.  · Make sure your health care provider performs a complete foot exam at least annually or more often if you have foot problems. Report any cuts, sores, or bruises to your health care provider immediately.  SEEK MEDICAL CARE IF:   · You have an injury that is not healing.  · You have cuts or breaks in the skin.  · You have an ingrown nail.  · You notice redness on your legs or feet.  · You feel burning or tingling in your legs or feet.  · You have pain or cramps in your legs and feet.  · Your legs or feet are numb.  · Your feet always feel cold.  SEEK IMMEDIATE MEDICAL CARE IF:   · There is increasing redness, swelling, or pain in or around a wound.  · There is a red line that goes up your leg.  · Pus is coming from a wound.  · You develop a fever or as directed by your health care provider.  · You notice a bad smell coming from an ulcer or wound.     This information is not intended to replace advice given to you by your health care provider. Make sure you discuss any questions you have with your health care provider.     Document Released: 12/15/2001 Document Revised: 04/10/2017 Document Reviewed: 05/27/2014  [employer] Interactive Patient Education ©2017 [employer]  DASH Eating Plan  DASH stands for \"Dietary Approaches to Stop Hypertension.\" The DASH eating plan is a healthy eating plan that has been shown to reduce high blood pressure (hypertension). Additional health benefits may " "include reducing the risk of type 2 diabetes mellitus, heart disease, and stroke. The DASH eating plan may also help with weight loss.  WHAT DO I NEED TO KNOW ABOUT THE DASH EATING PLAN?  For the DASH eating plan, you will follow these general guidelines:  · Choose foods with less than 150 milligrams of sodium per serving (as listed on the food label).  · Use salt-free seasonings or herbs instead of table salt or sea salt.  · Check with your health care provider or pharmacist before using salt substitutes.  · Eat lower-sodium products. These are often labeled as \"low-sodium\" or \"no salt added.\"  · Eat fresh foods. Avoid eating a lot of canned foods.  · Eat more vegetables, fruits, and low-fat dairy products.  · Choose whole grains. Look for the word \"whole\" as the first word in the ingredient list.  · Choose fish and skinless chicken or turkey more often than red meat. Limit fish, poultry, and meat to 6 oz (170 g) each day.  · Limit sweets, desserts, sugars, and sugary drinks.  · Choose heart-healthy fats.  · Eat more home-cooked food and less restaurant, buffet, and fast food.  · Limit fried foods.  · Do not garcía foods. Cook foods using methods such as baking, boiling, grilling, and broiling instead.  · When eating at a restaurant, ask that your food be prepared with less salt, or no salt if possible.  WHAT FOODS CAN I EAT?  Seek help from a dietitian for individual calorie needs.  Grains  Whole grain or whole wheat bread. Brown rice. Whole grain or whole wheat pasta. Quinoa, bulgur, and whole grain cereals. Low-sodium cereals. Corn or whole wheat flour tortillas. Whole grain cornbread. Whole grain crackers. Low-sodium crackers.  Vegetables  Fresh or frozen vegetables (raw, steamed, roasted, or grilled). Low-sodium or reduced-sodium tomato and vegetable juices. Low-sodium or reduced-sodium tomato sauce and paste. Low-sodium or reduced-sodium canned vegetables.   Fruits  All fresh, canned (in natural juice), or " frozen fruits.  Meat and Other Protein Products  Ground beef (85% or leaner), grass-fed beef, or beef trimmed of fat. Skinless chicken or turkey. Ground chicken or turkey. Pork trimmed of fat. All fish and seafood. Eggs. Dried beans, peas, or lentils. Unsalted nuts and seeds. Unsalted canned beans.  Dairy  Low-fat dairy products, such as skim or 1% milk, 2% or reduced-fat cheeses, low-fat ricotta or cottage cheese, or plain low-fat yogurt. Low-sodium or reduced-sodium cheeses.  Fats and Oils  Tub margarines without trans fats. Light or reduced-fat mayonnaise and salad dressings (reduced sodium). Avocado. Safflower, olive, or canola oils. Natural peanut or almond butter.  Other  Unsalted popcorn and pretzels.  The items listed above may not be a complete list of recommended foods or beverages. Contact your dietitian for more options.  WHAT FOODS ARE NOT RECOMMENDED?  Grains  White bread. White pasta. White rice. Refined cornbread. Bagels and croissants. Crackers that contain trans fat.  Vegetables  Creamed or fried vegetables. Vegetables in a cheese sauce. Regular canned vegetables. Regular canned tomato sauce and paste. Regular tomato and vegetable juices.  Fruits  Canned fruit in light or heavy syrup. Fruit juice.  Meat and Other Protein Products  Fatty cuts of meat. Ribs, chicken wings, alicea, sausage, bologna, salami, chitterlings, fatback, hot dogs, bratwurst, and packaged luncheon meats. Salted nuts and seeds. Canned beans with salt.  Dairy  Whole or 2% milk, cream, half-and-half, and cream cheese. Whole-fat or sweetened yogurt. Full-fat cheeses or blue cheese. Nondairy creamers and whipped toppings. Processed cheese, cheese spreads, or cheese curds.  Condiments  Onion and garlic salt, seasoned salt, table salt, and sea salt. Canned and packaged gravies. Worcestershire sauce. Tartar sauce. Barbecue sauce. Teriyaki sauce. Soy sauce, including reduced sodium. Steak sauce. Fish sauce. Oyster sauce. Cocktail sauce.  Horseradish. Ketchup and mustard. Meat flavorings and tenderizers. Bouillon cubes. Hot sauce. Tabasco sauce. Marinades. Taco seasonings. Relishes.  Fats and Oils  Butter, stick margarine, lard, shortening, ghee, and alicea fat. Coconut, palm kernel, or palm oils. Regular salad dressings.  Other  Pickles and olives. Salted popcorn and pretzels.  The items listed above may not be a complete list of foods and beverages to avoid. Contact your dietitian for more information.  WHERE CAN I FIND MORE INFORMATION?  National Heart, [employer], and [employer] [place]: www.nhlbi.nih.gov/health/health-topics/topics/dash/     This information is not intended to replace advice given to you by your health care provider. Make sure you discuss any questions you have with your health care provider.     Document Released: 12/06/2012 Document Revised: 04/10/2017 Document Reviewed: 10/22/2014  [employer] Interactive Patient Education ©2017 [employer]  Type 2 Diabetes Mellitus, Self Care, Adult  When you have type 2 diabetes (type 2 diabetes mellitus), you must keep your blood sugar (glucose) under control. You can do this with:  · Nutrition.  · Exercise.  · Lifestyle changes.  · Medicines or insulin, if needed.  · Support from your doctors and others.  HOW DO I MANAGE MY BLOOD SUGAR?  · Check your blood sugar level every day, as often as told.  · Call your doctor if your blood sugar is above your goal numbers for 2 tests in a row.  · Have your A1c (hemoglobin A1c) level checked at least two times a year. Have it checked more often if your doctor tells you to.  Your doctor will set treatment goals for you. Generally, you should have these blood sugar levels:  · Before meals (preprandial):  mg/dL (4.4-7.2 mmol/L).  · After meals (postprandial): lower than 180 mg/dL (10 mmol/L).  · A1c level: less than 7%.  WHAT DO I NEED TO KNOW ABOUT HIGH BLOOD SUGAR?  High blood sugar is called hyperglycemia. Know the signs of high blood sugar. Signs may  include:  · Feeling:    Thirsty.    Hungry.    Very tired.  · Needing to pee (urinate) more than usual.  · Blurry vision.  WHAT DO I NEED TO KNOW ABOUT LOW BLOOD SUGAR?  Low blood sugar is called hypoglycemia. This is when blood sugar is at or below 70 mg/dL (3.9 mmol/L). Symptoms may include:  · Feeling:    Hungry.    Worried or nervous (anxious).    Sweaty and clammy.    Confused.    Dizzy.    Sleepy.    Sick to your stomach (nauseous).  · Having:    A fast heartbeat (palpitations).    A headache.    A change in your vision.    Jerky movements that you cannot control (seizure).    Nightmares.    Tingling or no feeling (numbness) around the mouth, lips, or tongue.  · Having trouble with:    Talking.    Paying attention (concentrating).    Moving (coordination).    Sleeping.  · Shaking.  · Passing out (fainting).  · Getting upset easily (irritability).  Treating low blood sugar  To treat low blood sugar, eat or drink something sugary right away. If you can think clearly and swallow safely, follow the 15:15 rule:   · Take 15 grams of a fast-acting carb (carbohydrate). Some fast-acting carbs are:    1 tube of glucose gel.    3 sugar tablets (glucose pills).    6-8 pieces of hard candy.    4 oz (120 mL) of fruit juice.    4 oz (120 mL) regular (not diet) soda.  · Check your blood sugar 15 minutes after you take the carb.  · If your blood sugar is still at or below 70 mg/dL (3.9 mmol/L), take 15 grams of a carb again.  · If your blood sugar does not go above 70 mg/dL (3.9 mmol/L) after 3 tries, get help right away.  · After your blood sugar goes back to normal, eat a meal or a snack within 1 hour.  Treating very low blood sugar  If your blood sugar is at or below 54 mg/dL (3 mmol/L), you have very low blood sugar (severe hypoglycemia). This is an emergency. Do not wait to see if the symptoms will go away. Get medical help right away. Call your local emergency services (911 in the [place] Do not drive yourself to the  hospital.  If you have very low blood sugar and you cannot eat or drink, you may need a glucagon shot (injection). A family member or friend should learn how to check your blood sugar and how to give you a glucagon shot. Ask your doctor if you need to have a glucagon shot kit at home.  WHAT ELSE IS IMPORTANT TO MANAGE MY DIABETES?  Medicine  Follow these instructions about insulin and diabetes medicines:  · Take them as told by your doctor.  · Adjust them as told by your doctor.  · Do not run out of them.  Having diabetes can raise your risk for other long-term conditions. These include heart or kidney disease. Your doctor may prescribe medicines to help prevent problems from diabetes.  Food  · Make healthy food choices. These include:    Chicken, fish, egg whites, and beans.    Oats, whole wheat, bulgur, brown rice, quinoa, and millet.    Fresh fruits and vegetables.    Low-fat dairy products.    Nuts, avocado, olive oil, and canola oil.  · Make a food plan with a specialist (dietitian).  · Follow instructions from your doctor about what you cannot eat or drink.  · Drink enough fluid to keep your pee (urine) clear or pale yellow.  · Eat healthy snacks between healthy meals.  · Keep track of carbs that you eat. Read food labels. Learn food serving sizes.  · Follow your sick day plan when you cannot eat or drink normally. Make this plan with your doctor so it is ready to use.  Activity  · Exercise at least 3 times a week.  · Do not go more than 2 days without exercising.  · Talk with your doctor before you start a new exercise. Your doctor may need to adjust your insulin, medicines, or food.  Lifestyle  · Do not use any tobacco products. These include cigarettes, chewing tobacco, and e-cigarettes.If you need help quitting, ask your doctor.  · Ask your doctor how much alcohol is safe for you.  · Learn to deal with stress. If you need help with this, ask your doctor.  Body care  · Stay up to date with your shots  (immunizations).  · Have your eyes and feet checked by a doctor as often as told.  · Check your skin and feet every day. Check for cuts, bruises, redness, blisters, or sores.  · Brush your teeth and gums two times a day.  · Floss at least one time a day.  · Go to the dentist least one time every 6 months.  · Stay at a healthy weight.  General instructions  · Take over-the-counter and prescription medicines only as told by your doctor.  · Share your diabetes care plan with:    Your work or school.    People you live with.  · Check your pee (urine) for ketones:    When you are sick.    As told by your doctor.  · Carry a card or wear jewelry that says that you have diabetes.  · Ask your doctor:    Do I need to meet with a diabetes educator?    Where can I find a support group for people with diabetes?  · Keep all follow-up visits as told by your doctor. This is important.  WHERE TO FIND MORE INFORMATION:  To learn more about diabetes, visit:  · American Diabetes Association: www.diabetes.org  · American Association of Diabetes Educators: www.diabeteseducator.org/patient-resources     This information is not intended to replace advice given to you by your health care provider. Make sure you discuss any questions you have with your health care provider.     Document Released: 04/10/2017 Document Reviewed: 01/20/2017  [employer] Interactive Patient Education ©2017 [employer]

## 2017-07-20 PROBLEM — H69.93 DYSFUNCTION OF BOTH EUSTACHIAN TUBES: Status: ACTIVE | Noted: 2017-07-20

## 2017-07-20 PROBLEM — H69.83 DYSFUNCTION OF BOTH EUSTACHIAN TUBES: Status: ACTIVE | Noted: 2017-07-20

## 2017-07-20 RX ORDER — CYANOCOBALAMIN 1000 UG/ML
1000 INJECTION, SOLUTION INTRAMUSCULAR; SUBCUTANEOUS WEEKLY
Status: SHIPPED | OUTPATIENT
Start: 2017-07-20 | End: 2017-08-17

## 2017-08-03 RX ORDER — EZETIMIBE 10 MG/1
TABLET ORAL
Qty: 30 TABLET | Refills: 5 | Status: SHIPPED | OUTPATIENT
Start: 2017-08-03 | End: 2017-08-11

## 2017-08-04 ENCOUNTER — CLINICAL SUPPORT (OUTPATIENT)
Dept: FAMILY MEDICINE CLINIC | Facility: CLINIC | Age: 52
End: 2017-08-04

## 2017-08-04 PROCEDURE — 96372 THER/PROPH/DIAG INJ SC/IM: CPT | Performed by: NURSE PRACTITIONER

## 2017-08-04 RX ADMIN — CYANOCOBALAMIN 1000 MCG: 1000 INJECTION, SOLUTION INTRAMUSCULAR; SUBCUTANEOUS at 08:46

## 2017-08-11 RX ORDER — EZETIMIBE 10 MG/1
10 TABLET ORAL DAILY
Qty: 30 TABLET | Refills: 5 | Status: SHIPPED | OUTPATIENT
Start: 2017-08-11 | End: 2018-02-26 | Stop reason: SDUPTHER

## 2017-08-14 ENCOUNTER — CLINICAL SUPPORT (OUTPATIENT)
Dept: FAMILY MEDICINE CLINIC | Facility: CLINIC | Age: 52
End: 2017-08-14

## 2017-08-14 DIAGNOSIS — E53.8 VITAMIN B 12 DEFICIENCY: ICD-10-CM

## 2017-08-14 PROCEDURE — 96372 THER/PROPH/DIAG INJ SC/IM: CPT | Performed by: NURSE PRACTITIONER

## 2017-08-14 RX ADMIN — CYANOCOBALAMIN 1000 MCG: 1000 INJECTION, SOLUTION INTRAMUSCULAR; SUBCUTANEOUS at 08:11

## 2017-08-18 ENCOUNTER — CLINICAL SUPPORT (OUTPATIENT)
Dept: FAMILY MEDICINE CLINIC | Facility: CLINIC | Age: 52
End: 2017-08-18

## 2017-08-18 DIAGNOSIS — E53.8 VITAMIN B 12 DEFICIENCY: ICD-10-CM

## 2017-08-18 PROCEDURE — 96372 THER/PROPH/DIAG INJ SC/IM: CPT | Performed by: NURSE PRACTITIONER

## 2017-08-18 RX ADMIN — CYANOCOBALAMIN 1000 MCG: 1000 INJECTION, SOLUTION INTRAMUSCULAR; SUBCUTANEOUS at 08:24

## 2017-08-21 RX ORDER — FLUCONAZOLE 150 MG/1
150 TABLET ORAL DAILY
Qty: 3 TABLET | Refills: 0 | Status: SHIPPED | OUTPATIENT
Start: 2017-08-21 | End: 2017-10-14

## 2017-08-25 ENCOUNTER — CLINICAL SUPPORT (OUTPATIENT)
Dept: FAMILY MEDICINE CLINIC | Facility: CLINIC | Age: 52
End: 2017-08-25

## 2017-08-25 DIAGNOSIS — E53.8 B12 DEFICIENCY: Primary | ICD-10-CM

## 2017-08-25 DIAGNOSIS — E08.40 DIABETES MELLITUS DUE TO UNDERLYING CONDITION WITH DIABETIC NEUROPATHY, UNSPECIFIED LONG TERM INSULIN USE STATUS: ICD-10-CM

## 2017-08-25 PROCEDURE — 96372 THER/PROPH/DIAG INJ SC/IM: CPT | Performed by: GENERAL PRACTICE

## 2017-08-25 RX ORDER — CYANOCOBALAMIN 1000 UG/ML
1000 INJECTION, SOLUTION INTRAMUSCULAR; SUBCUTANEOUS
Status: DISCONTINUED | OUTPATIENT
Start: 2017-08-25 | End: 2018-01-03

## 2017-08-25 RX ADMIN — CYANOCOBALAMIN 1000 MCG: 1000 INJECTION, SOLUTION INTRAMUSCULAR; SUBCUTANEOUS at 08:14

## 2017-09-01 ENCOUNTER — CLINICAL SUPPORT (OUTPATIENT)
Dept: FAMILY MEDICINE CLINIC | Facility: CLINIC | Age: 52
End: 2017-09-01

## 2017-09-01 DIAGNOSIS — E53.8 VITAMIN B 12 DEFICIENCY: ICD-10-CM

## 2017-09-01 PROCEDURE — 96372 THER/PROPH/DIAG INJ SC/IM: CPT | Performed by: GENERAL PRACTICE

## 2017-09-01 RX ADMIN — CYANOCOBALAMIN 1000 MCG: 1000 INJECTION, SOLUTION INTRAMUSCULAR; SUBCUTANEOUS at 08:36

## 2017-09-08 ENCOUNTER — CLINICAL SUPPORT (OUTPATIENT)
Dept: FAMILY MEDICINE CLINIC | Facility: CLINIC | Age: 52
End: 2017-09-08

## 2017-09-08 PROCEDURE — 96372 THER/PROPH/DIAG INJ SC/IM: CPT | Performed by: GENERAL PRACTICE

## 2017-09-08 RX ADMIN — CYANOCOBALAMIN 1000 MCG: 1000 INJECTION, SOLUTION INTRAMUSCULAR; SUBCUTANEOUS at 08:30

## 2017-09-15 ENCOUNTER — CLINICAL SUPPORT (OUTPATIENT)
Dept: FAMILY MEDICINE CLINIC | Facility: CLINIC | Age: 52
End: 2017-09-15

## 2017-09-15 DIAGNOSIS — E53.8 VITAMIN B 12 DEFICIENCY: ICD-10-CM

## 2017-09-15 PROCEDURE — 96372 THER/PROPH/DIAG INJ SC/IM: CPT | Performed by: GENERAL PRACTICE

## 2017-09-25 RX ADMIN — CYANOCOBALAMIN 1000 MCG: 1000 INJECTION, SOLUTION INTRAMUSCULAR; SUBCUTANEOUS at 15:41

## 2017-10-10 ENCOUNTER — LAB (OUTPATIENT)
Dept: FAMILY MEDICINE CLINIC | Facility: CLINIC | Age: 52
End: 2017-10-10

## 2017-10-10 DIAGNOSIS — E53.8 VITAMIN B 12 DEFICIENCY: ICD-10-CM

## 2017-10-10 DIAGNOSIS — E08.40 DIABETES MELLITUS DUE TO UNDERLYING CONDITION WITH DIABETIC NEUROPATHY, UNSPECIFIED LONG TERM INSULIN USE STATUS: ICD-10-CM

## 2017-10-10 DIAGNOSIS — H69.83 DYSFUNCTION OF BOTH EUSTACHIAN TUBES: ICD-10-CM

## 2017-10-10 DIAGNOSIS — E55.9 VITAMIN D DEFICIENCY: ICD-10-CM

## 2017-10-10 DIAGNOSIS — E78.5 HYPERLIPIDEMIA, UNSPECIFIED HYPERLIPIDEMIA TYPE: Chronic | ICD-10-CM

## 2017-10-10 LAB
25(OH)D3 SERPL-MCNC: 25 NG/ML
ALBUMIN SERPL-MCNC: 4.3 G/DL (ref 3.5–5)
ALBUMIN/GLOB SERPL: 1.3 G/DL (ref 1.5–2.5)
ALP SERPL-CCNC: 116 U/L (ref 35–104)
ALT SERPL W P-5'-P-CCNC: 54 U/L (ref 10–36)
ANION GAP SERPL CALCULATED.3IONS-SCNC: 8.3 MMOL/L (ref 3.6–11.2)
AST SERPL-CCNC: 51 U/L (ref 10–30)
BASOPHILS # BLD AUTO: 0.02 10*3/MM3 (ref 0–0.3)
BASOPHILS NFR BLD AUTO: 0.4 % (ref 0–2)
BILIRUB SERPL-MCNC: 0.5 MG/DL (ref 0.2–1.8)
BUN BLD-MCNC: 10 MG/DL (ref 7–21)
BUN/CREAT SERPL: 14.9 (ref 7–25)
CALCIUM SPEC-SCNC: 9.7 MG/DL (ref 7.7–10)
CHLORIDE SERPL-SCNC: 108 MMOL/L (ref 99–112)
CHOLEST SERPL-MCNC: 153 MG/DL (ref 0–200)
CO2 SERPL-SCNC: 24.7 MMOL/L (ref 24.3–31.9)
CREAT BLD-MCNC: 0.67 MG/DL (ref 0.43–1.29)
DEPRECATED RDW RBC AUTO: 38.9 FL (ref 37–54)
EOSINOPHIL # BLD AUTO: 0.24 10*3/MM3 (ref 0–0.7)
EOSINOPHIL NFR BLD AUTO: 4.2 % (ref 0–5)
ERYTHROCYTE [DISTWIDTH] IN BLOOD BY AUTOMATED COUNT: 12.2 % (ref 11.5–14.5)
GFR SERPL CREATININE-BSD FRML MDRD: 92 ML/MIN/1.73
GLOBULIN UR ELPH-MCNC: 3.3 GM/DL
GLUCOSE BLD-MCNC: 317 MG/DL (ref 70–110)
HBA1C MFR BLD: 9.5 % (ref 4.5–5.7)
HCT VFR BLD AUTO: 41.4 % (ref 37–47)
HDLC SERPL-MCNC: 36 MG/DL (ref 60–100)
HGB BLD-MCNC: 14.1 G/DL (ref 12–16)
IMM GRANULOCYTES # BLD: 0.02 10*3/MM3 (ref 0–0.03)
IMM GRANULOCYTES NFR BLD: 0.4 % (ref 0–0.5)
LDLC SERPL CALC-MCNC: 66 MG/DL (ref 0–100)
LDLC/HDLC SERPL: 1.82 {RATIO}
LYMPHOCYTES # BLD AUTO: 2.22 10*3/MM3 (ref 1–3)
LYMPHOCYTES NFR BLD AUTO: 39.2 % (ref 21–51)
MCH RBC QN AUTO: 30.7 PG (ref 27–33)
MCHC RBC AUTO-ENTMCNC: 34.1 G/DL (ref 33–37)
MCV RBC AUTO: 90 FL (ref 80–94)
MONOCYTES # BLD AUTO: 0.49 10*3/MM3 (ref 0.1–0.9)
MONOCYTES NFR BLD AUTO: 8.7 % (ref 0–10)
NEUTROPHILS # BLD AUTO: 2.67 10*3/MM3 (ref 1.4–6.5)
NEUTROPHILS NFR BLD AUTO: 47.1 % (ref 30–70)
OSMOLALITY SERPL CALC.SUM OF ELEC: 292.4 MOSM/KG (ref 273–305)
PLATELET # BLD AUTO: 237 10*3/MM3 (ref 130–400)
PMV BLD AUTO: 10.8 FL (ref 6–10)
POTASSIUM BLD-SCNC: 3.8 MMOL/L (ref 3.5–5.3)
PROT SERPL-MCNC: 7.6 G/DL (ref 6–8)
RBC # BLD AUTO: 4.6 10*6/MM3 (ref 4.2–5.4)
SODIUM BLD-SCNC: 141 MMOL/L (ref 135–153)
TRIGL SERPL-MCNC: 257 MG/DL (ref 0–150)
TSH SERPL DL<=0.05 MIU/L-ACNC: 1.76 MIU/ML (ref 0.55–4.78)
VIT B12 BLD-MCNC: 636 PG/ML (ref 211–911)
VLDLC SERPL-MCNC: 51.4 MG/DL
WBC NRBC COR # BLD: 5.66 10*3/MM3 (ref 4.5–12.5)

## 2017-10-10 PROCEDURE — 80053 COMPREHEN METABOLIC PANEL: CPT | Performed by: NURSE PRACTITIONER

## 2017-10-10 PROCEDURE — 85025 COMPLETE CBC W/AUTO DIFF WBC: CPT | Performed by: NURSE PRACTITIONER

## 2017-10-10 PROCEDURE — 83036 HEMOGLOBIN GLYCOSYLATED A1C: CPT | Performed by: NURSE PRACTITIONER

## 2017-10-10 PROCEDURE — 84443 ASSAY THYROID STIM HORMONE: CPT | Performed by: NURSE PRACTITIONER

## 2017-10-10 PROCEDURE — 36415 COLL VENOUS BLD VENIPUNCTURE: CPT

## 2017-10-10 PROCEDURE — 96372 THER/PROPH/DIAG INJ SC/IM: CPT | Performed by: GENERAL PRACTICE

## 2017-10-10 PROCEDURE — 82607 VITAMIN B-12: CPT | Performed by: NURSE PRACTITIONER

## 2017-10-10 PROCEDURE — 80061 LIPID PANEL: CPT | Performed by: NURSE PRACTITIONER

## 2017-10-10 PROCEDURE — 82306 VITAMIN D 25 HYDROXY: CPT | Performed by: NURSE PRACTITIONER

## 2017-10-10 RX ADMIN — CYANOCOBALAMIN 1000 MCG: 1000 INJECTION, SOLUTION INTRAMUSCULAR; SUBCUTANEOUS at 08:39

## 2017-10-11 ENCOUNTER — OFFICE VISIT (OUTPATIENT)
Dept: FAMILY MEDICINE CLINIC | Facility: CLINIC | Age: 52
End: 2017-10-11

## 2017-10-11 VITALS
SYSTOLIC BLOOD PRESSURE: 110 MMHG | TEMPERATURE: 98.1 F | DIASTOLIC BLOOD PRESSURE: 70 MMHG | HEART RATE: 91 BPM | BODY MASS INDEX: 26.22 KG/M2 | OXYGEN SATURATION: 97 % | HEIGHT: 68 IN | WEIGHT: 173 LBS

## 2017-10-11 DIAGNOSIS — R42 DIZZINESS: ICD-10-CM

## 2017-10-11 DIAGNOSIS — H69.83 DYSFUNCTION OF BOTH EUSTACHIAN TUBES: ICD-10-CM

## 2017-10-11 DIAGNOSIS — E53.8 VITAMIN B 12 DEFICIENCY: ICD-10-CM

## 2017-10-11 DIAGNOSIS — E78.5 HYPERLIPIDEMIA, UNSPECIFIED HYPERLIPIDEMIA TYPE: Chronic | ICD-10-CM

## 2017-10-11 DIAGNOSIS — E55.9 VITAMIN D DEFICIENCY: ICD-10-CM

## 2017-10-11 DIAGNOSIS — E08.40 DIABETES MELLITUS DUE TO UNDERLYING CONDITION WITH DIABETIC NEUROPATHY, UNSPECIFIED LONG TERM INSULIN USE STATUS: Primary | Chronic | ICD-10-CM

## 2017-10-11 PROCEDURE — 99214 OFFICE O/P EST MOD 30 MIN: CPT | Performed by: NURSE PRACTITIONER

## 2017-10-11 NOTE — PROGRESS NOTES
HPI Comments: Sandra presents to the office today for follow up and to review labs she recently had done in regards to her DM, type 2, Hyperlipidemia and Vitamin deficiencies.     Diabetes   She has type 2 diabetes mellitus. Her disease course has been worsening. Associated symptoms include foot paresthesias and weakness. Pertinent negatives for diabetes include no blurred vision, no chest pain, no foot ulcerations, no polydipsia, no polyphagia, no polyuria. Symptoms are improving. Diabetic complications include peripheral neuropathy. Risk factors for coronary artery disease include diabetes mellitus, dyslipidemia, family history and post-menopausal. Current diabetic treatment includes insulin injections, oral agent (monotherapy) and diet. She is following a generally healthy diet. She has had a previous visit with a dietitian. Eye exam has been encouraged but is not current.     Hyperlipidemia   Pertinent negatives include no chest pain or shortness of breath. Current antihyperlipidemic treatment includes ezetimibe and statins. Risk factors for coronary artery disease include diabetes mellitus, dyslipidemia, family history, post-menopausal and stress.     Dizziness   She reports she has continued to have intermittent episodes of dizziness and almost syncope. The current episodes started more than 1 month ago. The episodes occurs intermittently. The episodes are described as occurring suddenly with  warning. She first has a metallic taste and can warn her family she is becoming weak. She does no LOC and can speak and understand those around her. She has no headache, no loss of bladder or bowel control. Pertinent negatives include no abdominal pain or chest pain. The episodes last appx three minutes and afterwards she feels exhausted and has to rest. The family has monitored her bp and blood sugar during these episodes and they are within normal range. She has had previous CT of Head, Chest Xray and EKG. Results of  "CT and Xray are within normal limits. I have requested obtaining the results of the EKG.       Vitals:    10/11/17 1140   BP: 110/70   Pulse: 91   Temp: 98.1 °F (36.7 °C)   TempSrc: Temporal Artery    SpO2: 97%   Weight: 173 lb (78.5 kg)   Height: 68\" (172.7 cm)        The following portions of the patient's history were reviewed and updated as appropriate: allergies, current medications, past family history, past medical history, past social history, past surgical history and problem list.    Review of Systems   Constitutional: Positive for fatigue. Negative for activity change, appetite change, chills, fever and unexpected weight change.   HENT: Negative.    Eyes: Negative for visual disturbance.   Respiratory: Negative for cough, chest tightness, shortness of breath and wheezing.    Cardiovascular: Negative for chest pain, palpitations and leg swelling.   Gastrointestinal: Negative for abdominal pain, constipation, diarrhea, nausea and vomiting.   Endocrine: Negative for cold intolerance, heat intolerance, polydipsia, polyphagia and polyuria.   Skin: Negative for color change and rash.   Neurological: Positive for dizziness and numbness. Negative for tremors, speech difficulty, weakness, light-headedness and headaches. Syncope: Near.   Hematological: Negative for adenopathy.   Psychiatric/Behavioral: Negative for confusion, decreased concentration and suicidal ideas. The patient is not nervous/anxious.    All other systems reviewed and are negative.    Physical Exam   Constitutional: She is oriented to person, place, and time. She appears well-developed and well-nourished. No distress.   HENT:   Head: Normocephalic.   Right Ear: Tympanic membrane is not erythematous and not bulging. A middle ear effusion is present.   Left Ear: Tympanic membrane is not erythematous and not bulging. A middle ear effusion is present.   Nose: Nose normal. Right sinus exhibits no maxillary sinus tenderness and no frontal sinus " tenderness. Left sinus exhibits no maxillary sinus tenderness and no frontal sinus tenderness.   Mouth/Throat: Oropharynx is clear and moist and mucous membranes are normal. No oropharyngeal exudate.   Eyes: Conjunctivae and EOM are normal. Pupils are equal, round, and reactive to light. Right eye exhibits no discharge. Left eye exhibits no discharge. No scleral icterus.   Neck: Neck supple. Normal carotid pulses and no JVD present. Carotid bruit is not present. No rigidity. No thyromegaly present.   Cardiovascular: Normal rate, regular rhythm and normal heart sounds.  Exam reveals no friction rub.    No murmur heard.  Pulmonary/Chest: Effort normal. No respiratory distress. She has no decreased breath sounds. She has no wheezes. She has no rhonchi. She has no rales.   Abdominal: Soft. Bowel sounds are normal. She exhibits no distension. There is no tenderness. There is no rebound and no guarding.   Musculoskeletal: She exhibits no edema or tenderness.   Lymphadenopathy:     She has no cervical adenopathy.   Neurological: She is alert and oriented to person, place, and time. No cranial nerve deficit or sensory deficit. She exhibits normal muscle tone. Gait normal.   Skin: Skin is warm and dry. No rash noted. No erythema.   Psychiatric: She has a normal mood and affect. Her behavior is normal. Judgment and thought content normal.   Nursing note and vitals reviewed.    Assessment/Plan   Problems Addressed this Visit        Cardiovascular and Mediastinum    Hyperlipidemia       Digestive    Vitamin B 12 deficiency    Vitamin D deficiency       Endocrine    Diabetes mellitus due to underlying condition with diabetic neuropathy - Primary       Nervous and Auditory    Dysfunction of both eustachian tubes       Other    Dizziness        Results for orders placed or performed in visit on 10/10/17   Comprehensive Metabolic Panel   Result Value Ref Range    Glucose 317 (H) 70 - 110 mg/dL    BUN 10 7 - 21 mg/dL    Creatinine  0.67 0.43 - 1.29 mg/dL    Sodium 141 135 - 153 mmol/L    Potassium 3.8 3.5 - 5.3 mmol/L    Chloride 108 99 - 112 mmol/L    CO2 24.7 24.3 - 31.9 mmol/L    Calcium 9.7 7.7 - 10.0 mg/dL    Total Protein 7.6 6.0 - 8.0 g/dL    Albumin 4.30 3.50 - 5.00 g/dL    ALT (SGPT) 54 (H) 10 - 36 U/L    AST (SGOT) 51 (H) 10 - 30 U/L    Alkaline Phosphatase 116 (H) 35 - 104 U/L    Total Bilirubin 0.5 0.2 - 1.8 mg/dL    eGFR Non African Amer 92 >60 mL/min/1.73    Globulin 3.3 gm/dL    A/G Ratio 1.3 (L) 1.5 - 2.5 g/dL    BUN/Creatinine Ratio 14.9 7.0 - 25.0    Anion Gap 8.3 3.6 - 11.2 mmol/L   Lipid Panel   Result Value Ref Range    Total Cholesterol 153 0 - 200 mg/dL    Triglycerides 257 (H) 0 - 150 mg/dL    HDL Cholesterol 36 (L) 60 - 100 mg/dL    LDL Cholesterol  66 0 - 100 mg/dL    VLDL Cholesterol 51.4 mg/dL    LDL/HDL Ratio 1.82    TSH   Result Value Ref Range    TSH 1.765 0.550 - 4.780 mIU/mL   Hemoglobin A1c   Result Value Ref Range    Hemoglobin A1C 9.50 (H) 4.50 - 5.70 %   Vitamin D 25 Hydroxy   Result Value Ref Range    25 Hydroxy, Vitamin D 25.0 ng/ml   Vitamin B12   Result Value Ref Range    Vitamin B-12 636 211 - 911 pg/mL   CBC Auto Differential   Result Value Ref Range    WBC 5.66 4.50 - 12.50 10*3/mm3    RBC 4.60 4.20 - 5.40 10*6/mm3    Hemoglobin 14.1 12.0 - 16.0 g/dL    Hematocrit 41.4 37.0 - 47.0 %    MCV 90.0 80.0 - 94.0 fL    MCH 30.7 27.0 - 33.0 pg    MCHC 34.1 33.0 - 37.0 g/dL    RDW 12.2 11.5 - 14.5 %    RDW-SD 38.9 37.0 - 54.0 fl    MPV 10.8 (H) 6.0 - 10.0 fL    Platelets 237 130 - 400 10*3/mm3    Neutrophil % 47.1 30.0 - 70.0 %    Lymphocyte % 39.2 21.0 - 51.0 %    Monocyte % 8.7 0.0 - 10.0 %    Eosinophil % 4.2 0.0 - 5.0 %    Basophil % 0.4 0.0 - 2.0 %    Immature Grans % 0.4 0.0 - 0.5 %    Neutrophils, Absolute 2.67 1.40 - 6.50 10*3/mm3    Lymphocytes, Absolute 2.22 1.00 - 3.00 10*3/mm3    Monocytes, Absolute 0.49 0.10 - 0.90 10*3/mm3    Eosinophils, Absolute 0.24 0.00 - 0.70 10*3/mm3    Basophils, Absolute  0.02 0.00 - 0.30 10*3/mm3    Immature Grans, Absolute 0.02 0.00 - 0.03 10*3/mm3   Osmolality, Calculated   Result Value Ref Range    Osmolality Calc 292.4 273.0 - 305.0 mOsm/kg     Findings and recommendations discussed with Sandra. Reviewed her recent lab results with her which included: FBS of 317 mg/dL and an A1C of 9.5. Lipid panel reviewed and included: TC of 153 mg/dL; Triglycerides: 257 mg/dL; HDL: 36 mg/dL and LDL: 66 mg/dL. Discussed her episodes of dizziness and near syncope. Discuss options with her. She does admit she forgets to take her medications at times. Strategies to correct this were discussed. She will increase her blood glucose, blood pressure and include pulse monitoring.Logbook provided for her. Encouraged her to keep a diary of any additional episodes and if worsen to go to nearest Emergency Room. She will f/u with me in two weeks.  Lifestyle and risk reduction modifications reinforced. Provided education pertaining to nutrition and  disease management.

## 2017-10-14 PROBLEM — R42 DIZZINESS: Status: ACTIVE | Noted: 2017-10-14

## 2017-10-23 ENCOUNTER — LAB (OUTPATIENT)
Dept: FAMILY MEDICINE CLINIC | Facility: CLINIC | Age: 52
End: 2017-10-23

## 2017-10-23 DIAGNOSIS — E08.40 DIABETES MELLITUS DUE TO UNDERLYING CONDITION WITH DIABETIC NEUROPATHY, UNSPECIFIED LONG TERM INSULIN USE STATUS: Primary | ICD-10-CM

## 2017-10-23 DIAGNOSIS — R42 DIZZINESS: ICD-10-CM

## 2017-10-23 LAB
ALBUMIN SERPL-MCNC: 4.7 G/DL (ref 3.5–5)
ALBUMIN/GLOB SERPL: 1.3 G/DL (ref 1.5–2.5)
ALP SERPL-CCNC: 81 U/L (ref 35–104)
ALT SERPL W P-5'-P-CCNC: 46 U/L (ref 10–36)
ANION GAP SERPL CALCULATED.3IONS-SCNC: 4.8 MMOL/L (ref 3.6–11.2)
AST SERPL-CCNC: 39 U/L (ref 10–30)
BILIRUB SERPL-MCNC: 1 MG/DL (ref 0.2–1.8)
BUN BLD-MCNC: 10 MG/DL (ref 7–21)
BUN/CREAT SERPL: 14.3 (ref 7–25)
CALCIUM SPEC-SCNC: 9.9 MG/DL (ref 7.7–10)
CHLORIDE SERPL-SCNC: 106 MMOL/L (ref 99–112)
CO2 SERPL-SCNC: 29.2 MMOL/L (ref 24.3–31.9)
CREAT BLD-MCNC: 0.7 MG/DL (ref 0.43–1.29)
GFR SERPL CREATININE-BSD FRML MDRD: 88 ML/MIN/1.73
GLOBULIN UR ELPH-MCNC: 3.5 GM/DL
GLUCOSE BLD-MCNC: 191 MG/DL (ref 70–110)
OSMOLALITY SERPL CALC.SUM OF ELEC: 283.6 MOSM/KG (ref 273–305)
POTASSIUM BLD-SCNC: 3.9 MMOL/L (ref 3.5–5.3)
PROT SERPL-MCNC: 8.2 G/DL (ref 6–8)
SODIUM BLD-SCNC: 140 MMOL/L (ref 135–153)

## 2017-10-23 PROCEDURE — 80053 COMPREHEN METABOLIC PANEL: CPT | Performed by: NURSE PRACTITIONER

## 2017-10-23 PROCEDURE — 36415 COLL VENOUS BLD VENIPUNCTURE: CPT

## 2017-10-25 ENCOUNTER — OFFICE VISIT (OUTPATIENT)
Dept: FAMILY MEDICINE CLINIC | Facility: CLINIC | Age: 52
End: 2017-10-25

## 2017-10-25 VITALS
DIASTOLIC BLOOD PRESSURE: 80 MMHG | BODY MASS INDEX: 26.22 KG/M2 | WEIGHT: 173 LBS | OXYGEN SATURATION: 98 % | HEIGHT: 68 IN | TEMPERATURE: 98.4 F | SYSTOLIC BLOOD PRESSURE: 120 MMHG | HEART RATE: 93 BPM

## 2017-10-25 DIAGNOSIS — E78.5 HYPERLIPIDEMIA, UNSPECIFIED HYPERLIPIDEMIA TYPE: Chronic | ICD-10-CM

## 2017-10-25 DIAGNOSIS — R42 DIZZINESS: ICD-10-CM

## 2017-10-25 DIAGNOSIS — E55.9 VITAMIN D DEFICIENCY: ICD-10-CM

## 2017-10-25 DIAGNOSIS — E08.40 DIABETES MELLITUS DUE TO UNDERLYING CONDITION WITH DIABETIC NEUROPATHY, UNSPECIFIED LONG TERM INSULIN USE STATUS: Chronic | ICD-10-CM

## 2017-10-25 DIAGNOSIS — Z23 ENCOUNTER FOR IMMUNIZATION: Primary | ICD-10-CM

## 2017-10-25 DIAGNOSIS — E53.8 VITAMIN B 12 DEFICIENCY: ICD-10-CM

## 2017-10-25 PROCEDURE — 99214 OFFICE O/P EST MOD 30 MIN: CPT | Performed by: NURSE PRACTITIONER

## 2017-10-25 PROCEDURE — 90686 IIV4 VACC NO PRSV 0.5 ML IM: CPT | Performed by: NURSE PRACTITIONER

## 2017-10-25 PROCEDURE — G0008 ADMIN INFLUENZA VIRUS VAC: HCPCS | Performed by: NURSE PRACTITIONER

## 2017-10-25 NOTE — PATIENT INSTRUCTIONS
Decrease Cymbalta for now to once daily in the evening  Put Vitamin B 112 injections on hold  Increase insulin to 42 units for two days..and if blood sugars are still running above 150 mg/dL most of the time increase ot 45 unts    Type 2 Diabetes Mellitus, Self Care, Adult  When you have type 2 diabetes (type 2 diabetes mellitus), you must keep your blood sugar (glucose) under control. You can do this with:  · Nutrition.  · Exercise.  · Lifestyle changes.  · Medicines or insulin, if needed.  · Support from your doctors and others.  HOW DO I MANAGE MY BLOOD SUGAR?  · Check your blood sugar level every day, as often as told.  · Call your doctor if your blood sugar is above your goal numbers for 2 tests in a row.  · Have your A1c (hemoglobin A1c) level checked at least two times a year. Have it checked more often if your doctor tells you to.  Your doctor will set treatment goals for you. Generally, you should have these blood sugar levels:  · Before meals (preprandial):  mg/dL (4.4-7.2 mmol/L).  · After meals (postprandial): lower than 180 mg/dL (10 mmol/L).  · A1c level: less than 7%.  WHAT DO I NEED TO KNOW ABOUT HIGH BLOOD SUGAR?  High blood sugar is called hyperglycemia. Know the signs of high blood sugar. Signs may include:  · Feeling:    Thirsty.    Hungry.    Very tired.  · Needing to pee (urinate) more than usual.  · Blurry vision.  WHAT DO I NEED TO KNOW ABOUT LOW BLOOD SUGAR?  Low blood sugar is called hypoglycemia. This is when blood sugar is at or below 70 mg/dL (3.9 mmol/L). Symptoms may include:  · Feeling:    Hungry.    Worried or nervous (anxious).    Sweaty and clammy.    Confused.    Dizzy.    Sleepy.    Sick to your stomach (nauseous).  · Having:    A fast heartbeat (palpitations).    A headache.    A change in your vision.    Jerky movements that you cannot control (seizure).    Nightmares.    Tingling or no feeling (numbness) around the mouth, lips, or tongue.  · Having trouble with:     Talking.    Paying attention (concentrating).    Moving (coordination).    Sleeping.  · Shaking.  · Passing out (fainting).  · Getting upset easily (irritability).  Treating low blood sugar  To treat low blood sugar, eat or drink something sugary right away. If you can think clearly and swallow safely, follow the 15:15 rule:   · Take 15 grams of a fast-acting carb (carbohydrate). Some fast-acting carbs are:    1 tube of glucose gel.    3 sugar tablets (glucose pills).    6-8 pieces of hard candy.    4 oz (120 mL) of fruit juice.    4 oz (120 mL) regular (not diet) soda.  · Check your blood sugar 15 minutes after you take the carb.  · If your blood sugar is still at or below 70 mg/dL (3.9 mmol/L), take 15 grams of a carb again.  · If your blood sugar does not go above 70 mg/dL (3.9 mmol/L) after 3 tries, get help right away.  · After your blood sugar goes back to normal, eat a meal or a snack within 1 hour.  Treating very low blood sugar  If your blood sugar is at or below 54 mg/dL (3 mmol/L), you have very low blood sugar (severe hypoglycemia). This is an emergency. Do not wait to see if the symptoms will go away. Get medical help right away. Call your local emergency services (911 in the [place] Do not drive yourself to the hospital.  If you have very low blood sugar and you cannot eat or drink, you may need a glucagon shot (injection). A family member or friend should learn how to check your blood sugar and how to give you a glucagon shot. Ask your doctor if you need to have a glucagon shot kit at home.  WHAT ELSE IS IMPORTANT TO MANAGE MY DIABETES?  Medicine  Follow these instructions about insulin and diabetes medicines:  · Take them as told by your doctor.  · Adjust them as told by your doctor.  · Do not run out of them.  Having diabetes can raise your risk for other long-term conditions. These include heart or kidney disease. Your doctor may prescribe medicines to help prevent problems from  diabetes.  Food  · Make healthy food choices. These include:    Chicken, fish, egg whites, and beans.    Oats, whole wheat, bulgur, brown rice, quinoa, and millet.    Fresh fruits and vegetables.    Low-fat dairy products.    Nuts, avocado, olive oil, and canola oil.  · Make a food plan with a specialist (dietitian).  · Follow instructions from your doctor about what you cannot eat or drink.  · Drink enough fluid to keep your pee (urine) clear or pale yellow.  · Eat healthy snacks between healthy meals.  · Keep track of carbs that you eat. Read food labels. Learn food serving sizes.  · Follow your sick day plan when you cannot eat or drink normally. Make this plan with your doctor so it is ready to use.  Activity  · Exercise at least 3 times a week.  · Do not go more than 2 days without exercising.  · Talk with your doctor before you start a new exercise. Your doctor may need to adjust your insulin, medicines, or food.  Lifestyle  · Do not use any tobacco products. These include cigarettes, chewing tobacco, and e-cigarettes.If you need help quitting, ask your doctor.  · Ask your doctor how much alcohol is safe for you.  · Learn to deal with stress. If you need help with this, ask your doctor.  Body care  · Stay up to date with your shots (immunizations).  · Have your eyes and feet checked by a doctor as often as told.  · Check your skin and feet every day. Check for cuts, bruises, redness, blisters, or sores.  · Brush your teeth and gums two times a day.  · Floss at least one time a day.  · Go to the dentist least one time every 6 months.  · Stay at a healthy weight.  General instructions  · Take over-the-counter and prescription medicines only as told by your doctor.  · Share your diabetes care plan with:    Your work or school.    People you live with.  · Check your pee (urine) for ketones:    When you are sick.    As told by your doctor.  · Carry a card or wear jewelry that says that you have diabetes.  · Ask  your doctor:    Do I need to meet with a diabetes educator?    Where can I find a support group for people with diabetes?  · Keep all follow-up visits as told by your doctor. This is important.  WHERE TO FIND MORE INFORMATION:  To learn more about diabetes, visit:  · American Diabetes Association: www.diabetes.org  · American Association of Diabetes Educators: www.diabeteseducator.org/patient-resources     This information is not intended to replace advice given to you by your health care provider. Make sure you discuss any questions you have with your health care provider.     Document Released: 04/10/2017 Document Reviewed: 01/20/2017  [employer] Interactive Patient Education ©2017 [employer]    Vitamin D Deficiency  Vitamin D deficiency is when your body does not have enough vitamin D. Vitamin D is important because:  · It helps your body use other minerals that your body needs.  · It helps keep your bones strong and healthy.  · It may help to prevent some diseases.  · It helps your heart and other muscles work well.  You can get vitamin D by:  · Eating foods with vitamin D in them.  · Drinking or eating milk or other foods that have had vitamin D added to them.  · Taking a vitamin D supplement.  · Being in the sun.  Not getting enough vitamin D can make your bones become soft. It can also cause other health problems.  HOME CARE  · Take medicines and supplements only as told by your doctor.  · Eat foods that have vitamin D. These include:     Dairy products, cereals, or juices with added vitamin D. Check the label for vitamin D.       Fatty fish like salmon or trout.       Eggs.       Oysters.    · Do not use tanning beds.    · Stay at a healthy weight. Lose weight, if needed.    · Keep all follow-up visits as told by your doctor. This is important.    GET HELP IF:  · Your symptoms do not go away.  · You feel sick to your stomach (nauseous).  · You throw up (vomit).    · You poop less often than usual or you  have trouble pooping (constipation).     This information is not intended to replace advice given to you by your health care provider. Make sure you discuss any questions you have with your health care provider.     Document Released: 12/06/2012 Document Revised: 09/07/2016 Document Reviewed: 05/04/2016  [employer] Patient Education ©2017 [employer]    Vitamin B12 Deficiency  Vitamin B12 deficiency means that your body is not getting enough vitamin B12. Your body needs vitamin B12 for important bodily functions. If you do not have enough vitamin B12 in your body, you can have health problems.  HOME CARE  · Take supplements only as told by your doctor. Follow the directions carefully.  · Get any shots (injections) as told by your doctor. Do not miss your visits to the doctor.  · Eat lots of healthy foods that contain vitamin B12. Ask your doctor if you should work with someone who is trained in how food affects health (dietitian). Foods that contain vitamin B12 include:    Meat.    Meat from birds (poultry).    Fish.    Eggs.    Cereal and dairy products that are fortified. This means that vitamin B12 has been added to the food. Check the label on the package to see if the food is fortified.  · Do not drink too much (do not abuse) alcohol.  · Keep all follow-up visits as told by your doctor. This is important.  GET HELP IF:  · Your symptoms come back.  GET HELP RIGHT AWAY IF:  · You have trouble breathing.  · You have chest pain.  · You get dizzy.  · You pass out (lose consciousness).     This information is not intended to replace advice given to you by your health care provider. Make sure you discuss any questions you have with your health care provider.     Document Released: 12/06/2012 Document Revised: 04/10/2017 Document Reviewed: 05/04/2016  [employer] Patient Education ©2017 [employer]

## 2017-10-26 NOTE — PROGRESS NOTES
HPI Comments: Sandra presents to the clinic today for follow up.She recently had lab work which revealed DM, uncontrolled and continued intermittent episodes of dizziness and close to syncope episodes. At her last visit, we discussed possible strategies including increase in monitoring blood sugars and improving glycemic control as well as monitoring and logging further episodes. She has had no further dizziness/close to syncopal episodes.     Diabetes   She has type 2 diabetes mellitus. Her disease course has been improving. Associated symptoms include foot paresthesias. Pertinent negatives for diabetes include no blurred vision, no chest pain, no foot ulcerations, no polydipsia, no polyphagia, no polyuria and no weakness. Symptoms are improving. Diabetic complications include peripheral neuropathy. Risk factors for coronary artery disease include diabetes mellitus, dyslipidemia, family history and post-menopausal. Current diabetic treatment includes insulin injections, oral agent (monotherapy) and diet. She is following a generally healthy diet. She has had a previous visit with a dietitian. Eye exam is not current. She does have an area on her right foot which she has had for several weeks which is painful. She does admit she goes barefoot in the house most of the time. She has tried putting a corn pad on the area which is has helped.     Hyperlipidemia   Pertinent negatives include no chest pain or shortness of breath. Current antihyperlipidemic treatment includes ezetimibe and statins. Risk factors for coronary artery disease include diabetes mellitus, dyslipidemia, family history, post-menopausal and stress.     Dizziness   Symptoms are improving.The current episode started more than 1 month ago. The problem occurs intermittently. The problem has been improving with improvement with glycemic control. Pertinent negatives include no abdominal pain, chest pain, chills, congestion, coughing, fever, headaches,  "rash, vomiting or weakness.    Upper Extremity Issue   This is a recurrent problem. The current episode started in the past 7 days. The problem has been waxing and waning. Associated symptoms include arthralgias (Left Shoulder). Exacerbated by: Movement. She has tried position changes for the symptoms. The treatment provided mild relief.      Vitals:    10/25/17 0957   BP: 120/80   Pulse: 93   Temp: 98.4 °F (36.9 °C)   TempSrc: Temporal Artery    SpO2: 98%   Weight: 173 lb (78.5 kg)   Height: 68\" (172.7 cm)      The following portions of the patient's history were reviewed and updated as appropriate: allergies, current medications, past family history, past medical history, past social history, past surgical history and problem list.    Review of Systems   Constitutional: Negative for activity change, appetite change and unexpected weight change.   HENT: Negative.    Eyes: Negative for visual disturbance.   Respiratory: Negative for chest tightness, shortness of breath and wheezing.    Cardiovascular: Negative for palpitations and leg swelling.   Endocrine: Negative for cold intolerance, heat intolerance, polydipsia, polyphagia and polyuria.   Musculoskeletal: Positive for arthralgias (Left Shoulder).   Skin: Positive for wound (Right foot). Negative for color change.   Neurological: Positive for dizziness (Improving and no recent episodes). Negative for tremors, speech difficulty and light-headedness.   Hematological: Negative for adenopathy.   Psychiatric/Behavioral: Negative for confusion, decreased concentration and self-injury. The patient is not nervous/anxious.    All other systems reviewed and are negative.    Physical Exam   Constitutional: She is oriented to person, place, and time. She appears well-developed and well-nourished. No distress.   HENT:   Head: Normocephalic.   Nose: Nose normal.   Mouth/Throat: Oropharynx is clear and moist. No oropharyngeal exudate.   Eyes: Conjunctivae are normal. Pupils " are equal, round, and reactive to light. No scleral icterus.   Neck: Neck supple. No JVD present. No thyromegaly present.   Cardiovascular: Normal rate, regular rhythm and normal heart sounds.  Exam reveals no friction rub.    No murmur heard.  Pulmonary/Chest: Effort normal and breath sounds normal. No respiratory distress. She has no wheezes. She has no rales.   Abdominal: Soft. She exhibits no distension. There is no tenderness. There is no rebound and no guarding.   Musculoskeletal: She exhibits tenderness. She exhibits no edema.        Left shoulder: She exhibits decreased range of motion and tenderness.    Diabetic foot exam performed: Circular area on right plantar area which is tender.    Vascular Status -  Her exam exhibits right foot vasculature normal. Her exam exhibits no right foot edema. Her exam exhibits left foot vasculature normal. Her exam exhibits no left foot edema.   Skin Integrity  -  Her right foot skin is intact.     Sandra 's left foot skin is intact. .  Lymphadenopathy:     She has no cervical adenopathy.   Neurological: She is alert and oriented to person, place, and time. No cranial nerve deficit. Coordination normal.   Skin: Skin is warm and dry. No rash noted. No erythema.   Psychiatric: She has a normal mood and affect. Her behavior is normal. Judgment and thought content normal.   Vitals reviewed.    Assessment/Plan   Problems Addressed this Visit        Cardiovascular and Mediastinum    Hyperlipidemia       Digestive    Vitamin B 12 deficiency    Vitamin D deficiency       Endocrine    Diabetes mellitus due to underlying condition with diabetic neuropathy       Other    Dizziness      Other Visit Diagnoses     Encounter for immunization    -  Primary    Influenza vaccination given today.    Relevant Orders    Flu Vaccine Quad PF 3YR+ (FLUARIX/FLUZONE [id number]) (Completed)        Findings and recommendations discussed with Sandra. She does bring in her blood glucose log which reveals  much improved glycemic control. Will increase her basal insulin to 42 units for two days and if continue to be not at goal will increase to 45 units. Noted her CMP which included a FBS of 191 mg/dL which is much improved. She had increased her Cymbalta to bid which was causing somnolence due to her increase neuropathy. She will decrease this to once daily or if needed she will try taking Cymbalta 60 mg nightly.   She Wwll continue to monitor her dizziness/ close to syncope episodes and if further episodes she will seek further medical evaluation.   She will also monitor her Left Shoulder and Right foot symptoms and if needed will notify the office. I will refer her to Podiatrist for right foot evaluation. Her Left shoulder has previously responded to steroid injection which she would like to postpone due to her glycemic control.   She will f/u with me in January with her routine labs done prior to her appt. Sooner if problems/concerns occur.

## 2017-11-09 DIAGNOSIS — M25.50 ARTHRALGIA, UNSPECIFIED JOINT: Primary | ICD-10-CM

## 2017-11-09 RX ORDER — MELOXICAM 7.5 MG/1
TABLET ORAL
Qty: 50 TABLET | Refills: 1 | Status: SHIPPED | OUTPATIENT
Start: 2017-11-09 | End: 2018-01-04 | Stop reason: SINTOL

## 2017-11-10 ENCOUNTER — TELEPHONE (OUTPATIENT)
Dept: FAMILY MEDICINE CLINIC | Facility: CLINIC | Age: 52
End: 2017-11-10

## 2017-11-10 NOTE — TELEPHONE ENCOUNTER
----- Message from ASH Chaudhry sent at 11/9/2017  1:51 PM EST -----  Regarding: RE: Pt request refill on meloxicam  I sent  It a prescription to [facility]. Please tell her she needs to take lowest dose which helps with pain. Also, check to see how she is feeling...thanks  ----- Message -----     From: Ramone Storey MD     Sent: 11/9/2017   9:38 AM       To: ASH Chaudhry  Subject: FW: Pt request refill on meloxicam                   ----- Message -----     From: Ramone Treviño MA     Sent: 11/8/2017   4:39 PM       To: Ramone Storey MD  Subject: Pt request refill on meloxicam                   Pt called and ask for refill on mobic. Please advise. PKF

## 2018-01-02 ENCOUNTER — TELEPHONE (OUTPATIENT)
Dept: FAMILY MEDICINE CLINIC | Facility: CLINIC | Age: 53
End: 2018-01-02

## 2018-01-02 NOTE — TELEPHONE ENCOUNTER
----- Message from ASH Chaudhry sent at 1/2/2018  1:03 PM EST -----  If she feels she needs immediate attention she needs to go to ER. If she wants to come in tomorrow, looks like 11:00 am would be the best time.   ----- Message -----     From: Jane Fang     Sent: 1/2/2018  12:48 PM       To: ASH Chaudhry    Sandra has not had a b 12 in about 3 months, she is now having the same issues as before when you first started the injections. Memory loss, fainting and etc     Left message for patient regarding apt for 11 am tomorrow, and to please call me back to confirm

## 2018-01-03 ENCOUNTER — OFFICE VISIT (OUTPATIENT)
Dept: FAMILY MEDICINE CLINIC | Facility: CLINIC | Age: 53
End: 2018-01-03

## 2018-01-03 VITALS
HEIGHT: 68 IN | DIASTOLIC BLOOD PRESSURE: 80 MMHG | OXYGEN SATURATION: 96 % | SYSTOLIC BLOOD PRESSURE: 120 MMHG | BODY MASS INDEX: 25.76 KG/M2 | WEIGHT: 170 LBS | HEART RATE: 100 BPM | TEMPERATURE: 97 F

## 2018-01-03 DIAGNOSIS — E53.8 VITAMIN B 12 DEFICIENCY: ICD-10-CM

## 2018-01-03 DIAGNOSIS — E78.5 HYPERLIPIDEMIA, UNSPECIFIED HYPERLIPIDEMIA TYPE: Chronic | ICD-10-CM

## 2018-01-03 DIAGNOSIS — E08.40 DIABETES MELLITUS DUE TO UNDERLYING CONDITION WITH DIABETIC NEUROPATHY, UNSPECIFIED LONG TERM INSULIN USE STATUS: Primary | Chronic | ICD-10-CM

## 2018-01-03 DIAGNOSIS — R55 NEAR SYNCOPE: ICD-10-CM

## 2018-01-03 DIAGNOSIS — E55.9 VITAMIN D DEFICIENCY: ICD-10-CM

## 2018-01-03 PROCEDURE — 96372 THER/PROPH/DIAG INJ SC/IM: CPT | Performed by: NURSE PRACTITIONER

## 2018-01-03 PROCEDURE — 99214 OFFICE O/P EST MOD 30 MIN: CPT | Performed by: NURSE PRACTITIONER

## 2018-01-03 RX ORDER — CYANOCOBALAMIN 1000 UG/ML
1000 INJECTION, SOLUTION INTRAMUSCULAR; SUBCUTANEOUS WEEKLY
Status: DISCONTINUED | OUTPATIENT
Start: 2018-01-03 | End: 2018-01-18

## 2018-01-03 RX ADMIN — CYANOCOBALAMIN 1000 MCG: 1000 INJECTION, SOLUTION INTRAMUSCULAR; SUBCUTANEOUS at 12:16

## 2018-01-03 NOTE — PATIENT INSTRUCTIONS
Type 2 Diabetes Mellitus, Self Care, Adult  When you have type 2 diabetes (type 2 diabetes mellitus), you must keep your blood sugar (glucose) under control. You can do this with:  · Nutrition.  · Exercise.  · Lifestyle changes.  · Medicines or insulin, if needed.  · Support from your doctors and others.  HOW DO I MANAGE MY BLOOD SUGAR?  · Check your blood sugar level every day, as often as told.  · Call your doctor if your blood sugar is above your goal numbers for 2 tests in a row.  · Have your A1c (hemoglobin A1c) level checked at least two times a year. Have it checked more often if your doctor tells you to.  Your doctor will set treatment goals for you. Generally, you should have these blood sugar levels:  · Before meals (preprandial):  mg/dL (4.4-7.2 mmol/L).  · After meals (postprandial): lower than 180 mg/dL (10 mmol/L).  · A1c level: less than 7%.  WHAT DO I NEED TO KNOW ABOUT HIGH BLOOD SUGAR?  High blood sugar is called hyperglycemia. Know the signs of high blood sugar. Signs may include:  · Feeling:    Thirsty.    Hungry.    Very tired.  · Needing to pee (urinate) more than usual.  · Blurry vision.  WHAT DO I NEED TO KNOW ABOUT LOW BLOOD SUGAR?  Low blood sugar is called hypoglycemia. This is when blood sugar is at or below 70 mg/dL (3.9 mmol/L). Symptoms may include:  · Feeling:    Hungry.    Worried or nervous (anxious).    Sweaty and clammy.    Confused.    Dizzy.    Sleepy.    Sick to your stomach (nauseous).  · Having:    A fast heartbeat (palpitations).    A headache.    A change in your vision.    Jerky movements that you cannot control (seizure).    Nightmares.    Tingling or no feeling (numbness) around the mouth, lips, or tongue.  · Having trouble with:    Talking.    Paying attention (concentrating).    Moving (coordination).    Sleeping.  · Shaking.  · Passing out (fainting).  · Getting upset easily (irritability).  Treating low blood sugar  To treat low blood sugar, eat or drink  something sugary right away. If you can think clearly and swallow safely, follow the 15:15 rule:   · Take 15 grams of a fast-acting carb (carbohydrate). Some fast-acting carbs are:    1 tube of glucose gel.    3 sugar tablets (glucose pills).    6-8 pieces of hard candy.    4 oz (120 mL) of fruit juice.    4 oz (120 mL) regular (not diet) soda.  · Check your blood sugar 15 minutes after you take the carb.  · If your blood sugar is still at or below 70 mg/dL (3.9 mmol/L), take 15 grams of a carb again.  · If your blood sugar does not go above 70 mg/dL (3.9 mmol/L) after 3 tries, get help right away.  · After your blood sugar goes back to normal, eat a meal or a snack within 1 hour.  Treating very low blood sugar  If your blood sugar is at or below 54 mg/dL (3 mmol/L), you have very low blood sugar (severe hypoglycemia). This is an emergency. Do not wait to see if the symptoms will go away. Get medical help right away. Call your local emergency services (911 in the [place] Do not drive yourself to the hospital.  If you have very low blood sugar and you cannot eat or drink, you may need a glucagon shot (injection). A family member or friend should learn how to check your blood sugar and how to give you a glucagon shot. Ask your doctor if you need to have a glucagon shot kit at home.  WHAT ELSE IS IMPORTANT TO MANAGE MY DIABETES?  Medicine  Follow these instructions about insulin and diabetes medicines:  · Take them as told by your doctor.  · Adjust them as told by your doctor.  · Do not run out of them.  Having diabetes can raise your risk for other long-term conditions. These include heart or kidney disease. Your doctor may prescribe medicines to help prevent problems from diabetes.  Food  · Make healthy food choices. These include:    Chicken, fish, egg whites, and beans.    Oats, whole wheat, bulgur, brown rice, quinoa, and millet.    Fresh fruits and vegetables.    Low-fat dairy products.    Nuts, avocado, olive  oil, and canola oil.  · Make a food plan with a specialist (dietitian).  · Follow instructions from your doctor about what you cannot eat or drink.  · Drink enough fluid to keep your pee (urine) clear or pale yellow.  · Eat healthy snacks between healthy meals.  · Keep track of carbs that you eat. Read food labels. Learn food serving sizes.  · Follow your sick day plan when you cannot eat or drink normally. Make this plan with your doctor so it is ready to use.  Activity  · Exercise at least 3 times a week.  · Do not go more than 2 days without exercising.  · Talk with your doctor before you start a new exercise. Your doctor may need to adjust your insulin, medicines, or food.  Lifestyle  · Do not use any tobacco products. These include cigarettes, chewing tobacco, and e-cigarettes.If you need help quitting, ask your doctor.  · Ask your doctor how much alcohol is safe for you.  · Learn to deal with stress. If you need help with this, ask your doctor.  Body care  · Stay up to date with your shots (immunizations).  · Have your eyes and feet checked by a doctor as often as told.  · Check your skin and feet every day. Check for cuts, bruises, redness, blisters, or sores.  · Brush your teeth and gums two times a day.  · Floss at least one time a day.  · Go to the dentist least one time every 6 months.  · Stay at a healthy weight.  General instructions  · Take over-the-counter and prescription medicines only as told by your doctor.  · Share your diabetes care plan with:    Your work or school.    People you live with.  · Check your pee (urine) for ketones:    When you are sick.    As told by your doctor.  · Carry a card or wear jewelry that says that you have diabetes.  · Ask your doctor:    Do I need to meet with a diabetes educator?    Where can I find a support group for people with diabetes?  · Keep all follow-up visits as told by your doctor. This is important.  WHERE TO FIND MORE INFORMATION:  To learn more about  diabetes, visit:  · American Diabetes Association: www.diabetes.org  · American Association of Diabetes Educators: www.diabeteseducator.org/patient-resources     This information is not intended to replace advice given to you by your health care provider. Make sure you discuss any questions you have with your health care provider.     Document Released: 04/10/2017 Document Reviewed: 01/20/2017  [employer] Interactive Patient Education ©2017 [employer]

## 2018-01-03 NOTE — PROGRESS NOTES
HPI Comments: Sandra presents to the clinic today for follow up related to her  DM, type 2 uncontrolled and continued intermittent episodes of dizziness and close to syncope episodes which she had been symptom free until December which she has had two episodes. At her last visit, we discussed possible strategies including increase in monitoring blood sugars, adherence to medications and improving glycemic control as well as monitoring and logging further episodes.    Diabetes   She has type 2 diabetes mellitus. Her disease course she reports has been improving. Associated symptoms include foot paresthesias. Pertinent negatives for diabetes include no blurred vision, no chest pain, no foot ulcerations, no polydipsia, no polyphagia, no polyuria and no weakness.  Diabetic complications include peripheral neuropathy. Risk factors for coronary artery disease include diabetes mellitus, dyslipidemia, family history and post-menopausal. Current diabetic treatment includes insulin injections, oral agent (monotherapy) and diet. She is following a generally healthy diet. She has had a previous visit with a dietitian. She did keep her Podiatry appt and was diagnosed with a Plantar Wart which was removed.     Hyperlipidemia   Pertinent negatives include no chest pain or shortness of breath. Current antihyperlipidemic treatment includes ezetimibe and statins. Risk factors for coronary artery disease include diabetes mellitus, dyslipidemia, family history, post-menopausal and stress.     Dizziness   Symptoms are waxing and waning. The current episode started more than 1 month ago. The problem occurs intermittently. The problem had been improving until three to four weeks ago. She relates the episodes always occur at home and possibly to being upset. The episodes begin with an odor which she cannot identify. Episodes last for 2-3 minutes without loss of consciousness.She is aware of voices around her. The episodes have been  "treated successfully with cool packs. She has had no trauma due to the episodes.  Her  has checked bp, blood sugar and pulse and they are within range. She denies any palpitations, loss of bladder/bowel control or tongue biting during these episodes.  Pertinent negatives include no abdominal pain, chest pain, chills, congestion, coughing, fever, headaches, rash, vomiting or weakness. Sandra feels they are related to her not having her Vitamin B 12 injections for the past several months.      Vitals:    01/03/18 1052   BP: 120/80   BP Location: Left arm   Patient Position: Sitting   Cuff Size: Adult   Pulse: 100   Temp: 97 °F (36.1 °C)   TempSrc: Tympanic   SpO2: 96%   Weight: 77.1 kg (170 lb)   Height: 172.7 cm (67.99\")      The following portions of the patient's history were reviewed and updated as appropriate: allergies, current medications, past family history, past medical history, past social history, past surgical history and problem list.    Review of Systems   Constitutional: Negative for activity change, appetite change, chills, fatigue, fever and unexpected weight change.   HENT: Negative.    Eyes: Negative for visual disturbance.   Respiratory: Negative for cough, chest tightness, shortness of breath and wheezing.    Cardiovascular: Negative for chest pain, palpitations and leg swelling.   Gastrointestinal: Negative for abdominal pain, constipation, diarrhea, nausea and vomiting.   Endocrine: Negative for cold intolerance, heat intolerance, polydipsia, polyphagia and polyuria.   Musculoskeletal: Positive for arthralgias (Baseline).   Skin: Negative for color change and rash.   Neurological: Positive for dizziness (Intermittent) and numbness (Peripheral Neuropathy). Negative for tremors, seizures, facial asymmetry, speech difficulty, weakness, light-headedness and headaches. Syncope: Near.   Hematological: Negative for adenopathy.   Psychiatric/Behavioral: Negative for confusion, decreased " concentration and suicidal ideas. The patient is not nervous/anxious.    All other systems reviewed and are negative.    Physical Exam   Constitutional: She is oriented to person, place, and time. She appears well-developed and well-nourished. No distress.   HENT:   Head: Normocephalic.   Right Ear: A middle ear effusion is present.   Left Ear: A middle ear effusion is present.   Nose: Nose normal.   Mouth/Throat: Oropharynx is clear and moist. No oropharyngeal exudate or posterior oropharyngeal erythema.   Eyes: Conjunctivae are normal. Pupils are equal, round, and reactive to light. Right eye exhibits no discharge. Left eye exhibits no discharge. No scleral icterus.   Neck: Neck supple. No JVD present.   Cardiovascular: Normal rate, regular rhythm and normal heart sounds.  Exam reveals no friction rub.    No murmur heard.  Pulmonary/Chest: Effort normal and breath sounds normal. No respiratory distress. She has no decreased breath sounds. She has no wheezes. She has no rhonchi. She has no rales.   Abdominal: Soft. Bowel sounds are normal. She exhibits no distension. There is no tenderness. There is no rigidity, no rebound and no guarding.   Musculoskeletal: She exhibits no edema.   Lymphadenopathy:     She has no cervical adenopathy.   Neurological: She is alert and oriented to person, place, and time. No cranial nerve deficit or sensory deficit. She exhibits normal muscle tone. Coordination normal.   Skin: Skin is warm and dry. No rash noted. No erythema.   Psychiatric: She has a normal mood and affect. Her behavior is normal.   Vitals reviewed.    Assessment/Plan   Problems Addressed this Visit        Cardiovascular and Mediastinum    Hyperlipidemia    Near syncope       Digestive    Vitamin B 12 deficiency    Relevant Medications    cyanocobalamin injection 1,000 mcg    Vitamin D deficiency       Endocrine    Diabetes mellitus due to underlying condition with diabetic neuropathy - Primary        Findings and  recommendations reviewed with Sandra and her . She will have her labs done next week. Reinforced benefits of glycemic control.  Treatment options discussed related to her near Syncopal episodes.. At this time, she declines any diagnostic testing or referrals. She feels strongly they are related to her not taking Vitamin B12 injections. These will be restarted on weekly basis for four weeks then monthly.  Reinforced S/S of concern and if occur to seek further medical evaluation immediately.   She will have her labs done on 01/09/2018. These will be reviewed with her when she comes in with her 's appt on 01/17th. Follow up appt with me will be discussed at that time.

## 2018-01-04 PROBLEM — R55 NEAR SYNCOPE: Status: ACTIVE | Noted: 2018-01-04

## 2018-01-09 ENCOUNTER — LAB (OUTPATIENT)
Dept: FAMILY MEDICINE CLINIC | Facility: CLINIC | Age: 53
End: 2018-01-09

## 2018-01-09 DIAGNOSIS — E08.40 DIABETES MELLITUS DUE TO UNDERLYING CONDITION WITH DIABETIC NEUROPATHY, UNSPECIFIED LONG TERM INSULIN USE STATUS: ICD-10-CM

## 2018-01-09 DIAGNOSIS — R42 DIZZINESS: ICD-10-CM

## 2018-01-09 DIAGNOSIS — E53.8 VITAMIN B 12 DEFICIENCY: ICD-10-CM

## 2018-01-09 DIAGNOSIS — B37.9 YEAST INFECTION: Primary | ICD-10-CM

## 2018-01-09 DIAGNOSIS — E78.5 HYPERLIPIDEMIA, UNSPECIFIED HYPERLIPIDEMIA TYPE: Chronic | ICD-10-CM

## 2018-01-09 DIAGNOSIS — E55.9 VITAMIN D DEFICIENCY: ICD-10-CM

## 2018-01-09 LAB
25(OH)D3 SERPL-MCNC: 29 NG/ML
ALBUMIN SERPL-MCNC: 4.6 G/DL (ref 3.5–5)
ALBUMIN/GLOB SERPL: 1.4 G/DL (ref 1.5–2.5)
ALP SERPL-CCNC: 79 U/L (ref 35–104)
ALT SERPL W P-5'-P-CCNC: 56 U/L (ref 10–36)
ANION GAP SERPL CALCULATED.3IONS-SCNC: 8.2 MMOL/L (ref 3.6–11.2)
AST SERPL-CCNC: 58 U/L (ref 10–30)
BASOPHILS # BLD AUTO: 0.02 10*3/MM3 (ref 0–0.3)
BASOPHILS NFR BLD AUTO: 0.3 % (ref 0–2)
BILIRUB SERPL-MCNC: 0.7 MG/DL (ref 0.2–1.8)
BUN BLD-MCNC: 10 MG/DL (ref 7–21)
BUN/CREAT SERPL: 14.5 (ref 7–25)
CALCIUM SPEC-SCNC: 9.7 MG/DL (ref 7.7–10)
CHLORIDE SERPL-SCNC: 103 MMOL/L (ref 99–112)
CHOLEST SERPL-MCNC: 153 MG/DL (ref 0–200)
CO2 SERPL-SCNC: 29.8 MMOL/L (ref 24.3–31.9)
CREAT BLD-MCNC: 0.69 MG/DL (ref 0.43–1.29)
DEPRECATED RDW RBC AUTO: 39.8 FL (ref 37–54)
EOSINOPHIL # BLD AUTO: 0.3 10*3/MM3 (ref 0–0.7)
EOSINOPHIL NFR BLD AUTO: 4.2 % (ref 0–5)
ERYTHROCYTE [DISTWIDTH] IN BLOOD BY AUTOMATED COUNT: 12.3 % (ref 11.5–14.5)
GFR SERPL CREATININE-BSD FRML MDRD: 89 ML/MIN/1.73
GLOBULIN UR ELPH-MCNC: 3.2 GM/DL
GLUCOSE BLD-MCNC: 222 MG/DL (ref 70–110)
HBA1C MFR BLD: 9.8 % (ref 4.5–5.7)
HCT VFR BLD AUTO: 43.4 % (ref 37–47)
HDLC SERPL-MCNC: 42 MG/DL (ref 60–100)
HGB BLD-MCNC: 14.7 G/DL (ref 12–16)
IMM GRANULOCYTES # BLD: 0.03 10*3/MM3 (ref 0–0.03)
IMM GRANULOCYTES NFR BLD: 0.4 % (ref 0–0.5)
LDLC SERPL CALC-MCNC: 85 MG/DL (ref 0–100)
LDLC/HDLC SERPL: 2.02 {RATIO}
LYMPHOCYTES # BLD AUTO: 2.63 10*3/MM3 (ref 1–3)
LYMPHOCYTES NFR BLD AUTO: 37 % (ref 21–51)
MCH RBC QN AUTO: 30.8 PG (ref 27–33)
MCHC RBC AUTO-ENTMCNC: 33.9 G/DL (ref 33–37)
MCV RBC AUTO: 91 FL (ref 80–94)
MONOCYTES # BLD AUTO: 0.65 10*3/MM3 (ref 0.1–0.9)
MONOCYTES NFR BLD AUTO: 9.2 % (ref 0–10)
NEUTROPHILS # BLD AUTO: 3.47 10*3/MM3 (ref 1.4–6.5)
NEUTROPHILS NFR BLD AUTO: 48.9 % (ref 30–70)
OSMOLALITY SERPL CALC.SUM OF ELEC: 287.2 MOSM/KG (ref 273–305)
PLATELET # BLD AUTO: 272 10*3/MM3 (ref 130–400)
PMV BLD AUTO: 10.6 FL (ref 6–10)
POTASSIUM BLD-SCNC: 3.8 MMOL/L (ref 3.5–5.3)
PROT SERPL-MCNC: 7.8 G/DL (ref 6–8)
RBC # BLD AUTO: 4.77 10*6/MM3 (ref 4.2–5.4)
SODIUM BLD-SCNC: 141 MMOL/L (ref 135–153)
TRIGL SERPL-MCNC: 130 MG/DL (ref 0–150)
TSH SERPL DL<=0.05 MIU/L-ACNC: 2.3 MIU/ML (ref 0.55–4.78)
VIT B12 BLD-MCNC: 669 PG/ML (ref 211–911)
VLDLC SERPL-MCNC: 26 MG/DL
WBC NRBC COR # BLD: 7.1 10*3/MM3 (ref 4.5–12.5)

## 2018-01-09 PROCEDURE — 96372 THER/PROPH/DIAG INJ SC/IM: CPT | Performed by: NURSE PRACTITIONER

## 2018-01-09 PROCEDURE — 80053 COMPREHEN METABOLIC PANEL: CPT | Performed by: NURSE PRACTITIONER

## 2018-01-09 PROCEDURE — 80061 LIPID PANEL: CPT | Performed by: NURSE PRACTITIONER

## 2018-01-09 PROCEDURE — 82306 VITAMIN D 25 HYDROXY: CPT | Performed by: NURSE PRACTITIONER

## 2018-01-09 PROCEDURE — 36415 COLL VENOUS BLD VENIPUNCTURE: CPT

## 2018-01-09 PROCEDURE — 83036 HEMOGLOBIN GLYCOSYLATED A1C: CPT | Performed by: NURSE PRACTITIONER

## 2018-01-09 PROCEDURE — 82607 VITAMIN B-12: CPT | Performed by: NURSE PRACTITIONER

## 2018-01-09 PROCEDURE — 85025 COMPLETE CBC W/AUTO DIFF WBC: CPT | Performed by: NURSE PRACTITIONER

## 2018-01-09 PROCEDURE — 84443 ASSAY THYROID STIM HORMONE: CPT | Performed by: NURSE PRACTITIONER

## 2018-01-09 RX ORDER — FLUCONAZOLE 150 MG/1
150 TABLET ORAL ONCE
Qty: 1 TABLET | Refills: 0 | Status: SHIPPED | OUTPATIENT
Start: 2018-01-09 | End: 2018-01-09

## 2018-01-09 RX ADMIN — CYANOCOBALAMIN 1000 MCG: 1000 INJECTION, SOLUTION INTRAMUSCULAR; SUBCUTANEOUS at 08:20

## 2018-01-18 ENCOUNTER — OFFICE VISIT (OUTPATIENT)
Dept: FAMILY MEDICINE CLINIC | Facility: CLINIC | Age: 53
End: 2018-01-18

## 2018-01-18 ENCOUNTER — TELEPHONE (OUTPATIENT)
Dept: FAMILY MEDICINE CLINIC | Facility: CLINIC | Age: 53
End: 2018-01-18

## 2018-01-18 DIAGNOSIS — E53.8 VITAMIN B12 DEFICIENCY: Primary | ICD-10-CM

## 2018-01-18 DIAGNOSIS — R55 NEAR SYNCOPE: ICD-10-CM

## 2018-01-18 DIAGNOSIS — R42 DIZZINESS: ICD-10-CM

## 2018-01-18 PROCEDURE — 96372 THER/PROPH/DIAG INJ SC/IM: CPT | Performed by: NURSE PRACTITIONER

## 2018-01-18 RX ORDER — CYANOCOBALAMIN 1000 UG/ML
INJECTION, SOLUTION INTRAMUSCULAR; SUBCUTANEOUS
Qty: 10 ML | Refills: 0 | Status: SHIPPED | OUTPATIENT
Start: 2018-01-18 | End: 2019-01-02

## 2018-01-18 RX ADMIN — CYANOCOBALAMIN 1000 MCG: 1000 INJECTION, SOLUTION INTRAMUSCULAR; SUBCUTANEOUS at 15:23

## 2018-01-18 NOTE — TELEPHONE ENCOUNTER
Vitamin D would not be the cause. I would encourage her to work on her blood sugar control and consider an evaluation. I could order tests but if we get Gabrielle's input first I feel we could limit some of the costly tests.

## 2018-01-18 NOTE — TELEPHONE ENCOUNTER
Sandra also wants me to ask you if she could start doing her b12 shot at home if so we could send in her med b12

## 2018-01-18 NOTE — TELEPHONE ENCOUNTER
If you could schedule the appt and I will send the Vitamin B 12 into her pharmacy. She will need to have a f/u appt with me. Also, Charles will need an appt.

## 2018-01-18 NOTE — TELEPHONE ENCOUNTER
Pt was in and had her b12 shot she was wondering about her labs I showed her that her b12 look good she is still concerned about her memory loss

## 2018-01-26 ENCOUNTER — CLINICAL SUPPORT (OUTPATIENT)
Dept: FAMILY MEDICINE CLINIC | Facility: CLINIC | Age: 53
End: 2018-01-26

## 2018-01-26 DIAGNOSIS — E53.8 VITAMIN B 12 DEFICIENCY: ICD-10-CM

## 2018-01-26 PROCEDURE — 96372 THER/PROPH/DIAG INJ SC/IM: CPT | Performed by: GENERAL PRACTICE

## 2018-01-26 RX ORDER — CYANOCOBALAMIN 1000 UG/ML
1000 INJECTION, SOLUTION INTRAMUSCULAR; SUBCUTANEOUS
Status: DISCONTINUED | OUTPATIENT
Start: 2018-01-26 | End: 2019-01-02

## 2018-01-26 RX ADMIN — CYANOCOBALAMIN 1000 MCG: 1000 INJECTION, SOLUTION INTRAMUSCULAR; SUBCUTANEOUS at 14:05

## 2018-01-31 DIAGNOSIS — M25.50 ARTHRALGIA, UNSPECIFIED JOINT: ICD-10-CM

## 2018-02-01 ENCOUNTER — OFFICE VISIT (OUTPATIENT)
Dept: FAMILY MEDICINE CLINIC | Facility: CLINIC | Age: 53
End: 2018-02-01

## 2018-02-01 DIAGNOSIS — E53.8 VITAMIN B 12 DEFICIENCY: ICD-10-CM

## 2018-02-01 PROCEDURE — 96372 THER/PROPH/DIAG INJ SC/IM: CPT | Performed by: NURSE PRACTITIONER

## 2018-02-01 RX ORDER — PRAVASTATIN SODIUM 40 MG
40 TABLET ORAL DAILY
Qty: 30 TABLET | Refills: 5 | Status: SHIPPED | OUTPATIENT
Start: 2018-02-01 | End: 2018-07-05 | Stop reason: SDUPTHER

## 2018-02-01 RX ORDER — INSULIN DETEMIR 100 [IU]/ML
INJECTION, SOLUTION SUBCUTANEOUS
Qty: 5 PEN | Refills: 5 | Status: SHIPPED | OUTPATIENT
Start: 2018-02-01 | End: 2018-05-11 | Stop reason: SDUPTHER

## 2018-02-01 RX ORDER — MELOXICAM 7.5 MG/1
TABLET ORAL
Qty: 50 TABLET | Refills: 1 | OUTPATIENT
Start: 2018-02-01

## 2018-02-01 RX ADMIN — CYANOCOBALAMIN 1000 MCG: 1000 INJECTION, SOLUTION INTRAMUSCULAR; SUBCUTANEOUS at 10:03

## 2018-02-07 ENCOUNTER — TELEPHONE (OUTPATIENT)
Dept: FAMILY MEDICINE CLINIC | Facility: CLINIC | Age: 53
End: 2018-02-07

## 2018-02-08 RX ORDER — FLUCONAZOLE 150 MG/1
150 TABLET ORAL EVERY OTHER DAY
Qty: 2 TABLET | Refills: 0 | Status: SHIPPED | OUTPATIENT
Start: 2018-02-08 | End: 2018-04-03 | Stop reason: SDUPTHER

## 2018-02-09 ENCOUNTER — CLINICAL SUPPORT (OUTPATIENT)
Dept: FAMILY MEDICINE CLINIC | Facility: CLINIC | Age: 53
End: 2018-02-09

## 2018-02-09 DIAGNOSIS — E53.8 VITAMIN B12 DEFICIENCY: Primary | ICD-10-CM

## 2018-02-09 PROCEDURE — 96372 THER/PROPH/DIAG INJ SC/IM: CPT | Performed by: GENERAL PRACTICE

## 2018-02-09 RX ORDER — CYANOCOBALAMIN 1000 UG/ML
1000 INJECTION, SOLUTION INTRAMUSCULAR; SUBCUTANEOUS
Status: DISCONTINUED | OUTPATIENT
Start: 2018-02-09 | End: 2019-01-02

## 2018-02-09 RX ADMIN — CYANOCOBALAMIN 1000 MCG: 1000 INJECTION, SOLUTION INTRAMUSCULAR; SUBCUTANEOUS at 14:44

## 2018-02-15 ENCOUNTER — CLINICAL SUPPORT (OUTPATIENT)
Dept: FAMILY MEDICINE CLINIC | Facility: CLINIC | Age: 53
End: 2018-02-15

## 2018-02-15 DIAGNOSIS — E53.8 VITAMIN B 12 DEFICIENCY: ICD-10-CM

## 2018-02-15 PROCEDURE — 96372 THER/PROPH/DIAG INJ SC/IM: CPT | Performed by: NURSE PRACTITIONER

## 2018-02-15 RX ADMIN — CYANOCOBALAMIN 1000 MCG: 1000 INJECTION, SOLUTION INTRAMUSCULAR; SUBCUTANEOUS at 15:09

## 2018-02-22 ENCOUNTER — CLINICAL SUPPORT (OUTPATIENT)
Dept: FAMILY MEDICINE CLINIC | Facility: CLINIC | Age: 53
End: 2018-02-22

## 2018-02-22 DIAGNOSIS — E53.8 VITAMIN B 12 DEFICIENCY: ICD-10-CM

## 2018-02-22 PROCEDURE — 96372 THER/PROPH/DIAG INJ SC/IM: CPT | Performed by: GENERAL PRACTICE

## 2018-02-26 DIAGNOSIS — M25.50 ARTHRALGIA, UNSPECIFIED JOINT: ICD-10-CM

## 2018-02-26 RX ORDER — DULOXETIN HYDROCHLORIDE 30 MG/1
CAPSULE, DELAYED RELEASE ORAL
Qty: 60 CAPSULE | Refills: 0 | Status: SHIPPED | OUTPATIENT
Start: 2018-02-26 | End: 2018-05-11 | Stop reason: SDUPTHER

## 2018-02-26 RX ORDER — EZETIMIBE 10 MG/1
TABLET ORAL
Qty: 30 TABLET | Refills: 0 | Status: SHIPPED | OUTPATIENT
Start: 2018-02-26 | End: 2018-04-29 | Stop reason: SDUPTHER

## 2018-02-26 RX ORDER — MELOXICAM 7.5 MG/1
TABLET ORAL
Qty: 60 TABLET | Refills: 0 | Status: SHIPPED | OUTPATIENT
Start: 2018-02-26 | End: 2018-04-29 | Stop reason: SDUPTHER

## 2018-03-02 ENCOUNTER — CLINICAL SUPPORT (OUTPATIENT)
Dept: FAMILY MEDICINE CLINIC | Facility: CLINIC | Age: 53
End: 2018-03-02

## 2018-03-02 PROCEDURE — 96372 THER/PROPH/DIAG INJ SC/IM: CPT | Performed by: GENERAL PRACTICE

## 2018-03-02 RX ADMIN — CYANOCOBALAMIN 1000 MCG: 1000 INJECTION, SOLUTION INTRAMUSCULAR; SUBCUTANEOUS at 08:10

## 2018-03-15 ENCOUNTER — CLINICAL SUPPORT (OUTPATIENT)
Dept: FAMILY MEDICINE CLINIC | Facility: CLINIC | Age: 53
End: 2018-03-15

## 2018-03-15 DIAGNOSIS — E53.8 VITAMIN B 12 DEFICIENCY: ICD-10-CM

## 2018-03-15 PROCEDURE — 96372 THER/PROPH/DIAG INJ SC/IM: CPT | Performed by: GENERAL PRACTICE

## 2018-03-15 RX ADMIN — CYANOCOBALAMIN 1000 MCG: 1000 INJECTION, SOLUTION INTRAMUSCULAR; SUBCUTANEOUS at 08:17

## 2018-03-23 ENCOUNTER — CLINICAL SUPPORT (OUTPATIENT)
Dept: FAMILY MEDICINE CLINIC | Facility: CLINIC | Age: 53
End: 2018-03-23

## 2018-03-23 DIAGNOSIS — E53.8 VITAMIN B 12 DEFICIENCY: ICD-10-CM

## 2018-03-23 PROCEDURE — 96372 THER/PROPH/DIAG INJ SC/IM: CPT | Performed by: NURSE PRACTITIONER

## 2018-03-23 RX ADMIN — CYANOCOBALAMIN 1000 MCG: 1000 INJECTION, SOLUTION INTRAMUSCULAR; SUBCUTANEOUS at 14:15

## 2018-03-29 ENCOUNTER — CLINICAL SUPPORT (OUTPATIENT)
Dept: FAMILY MEDICINE CLINIC | Facility: CLINIC | Age: 53
End: 2018-03-29

## 2018-03-29 DIAGNOSIS — E08.40 DIABETES MELLITUS DUE TO UNDERLYING CONDITION WITH DIABETIC NEUROPATHY, WITH LONG-TERM CURRENT USE OF INSULIN (HCC): Chronic | ICD-10-CM

## 2018-03-29 DIAGNOSIS — E53.8 VITAMIN B 12 DEFICIENCY: ICD-10-CM

## 2018-03-29 DIAGNOSIS — Z79.4 DIABETES MELLITUS DUE TO UNDERLYING CONDITION WITH DIABETIC NEUROPATHY, WITH LONG-TERM CURRENT USE OF INSULIN (HCC): Chronic | ICD-10-CM

## 2018-03-29 PROCEDURE — 96372 THER/PROPH/DIAG INJ SC/IM: CPT | Performed by: GENERAL PRACTICE

## 2018-03-29 RX ORDER — DULOXETIN HYDROCHLORIDE 30 MG/1
30 CAPSULE, DELAYED RELEASE ORAL DAILY
Qty: 60 CAPSULE | Refills: 2 | Status: SHIPPED | OUTPATIENT
Start: 2018-03-29 | End: 2018-06-08 | Stop reason: SDUPTHER

## 2018-03-29 RX ADMIN — CYANOCOBALAMIN 1000 MCG: 1000 INJECTION, SOLUTION INTRAMUSCULAR; SUBCUTANEOUS at 08:11

## 2018-04-03 RX ORDER — FLUCONAZOLE 150 MG/1
150 TABLET ORAL EVERY OTHER DAY
Qty: 2 TABLET | Refills: 0 | Status: SHIPPED | OUTPATIENT
Start: 2018-04-03 | End: 2018-06-21

## 2018-04-06 ENCOUNTER — CLINICAL SUPPORT (OUTPATIENT)
Dept: FAMILY MEDICINE CLINIC | Facility: CLINIC | Age: 53
End: 2018-04-06

## 2018-04-06 DIAGNOSIS — E53.8 VITAMIN B 12 DEFICIENCY: ICD-10-CM

## 2018-04-06 PROCEDURE — 96372 THER/PROPH/DIAG INJ SC/IM: CPT | Performed by: GENERAL PRACTICE

## 2018-04-06 RX ADMIN — CYANOCOBALAMIN 1000 MCG: 1000 INJECTION, SOLUTION INTRAMUSCULAR; SUBCUTANEOUS at 11:53

## 2018-04-16 ENCOUNTER — CLINICAL SUPPORT (OUTPATIENT)
Dept: FAMILY MEDICINE CLINIC | Facility: CLINIC | Age: 53
End: 2018-04-16

## 2018-04-16 DIAGNOSIS — E53.8 VITAMIN B 12 DEFICIENCY: ICD-10-CM

## 2018-04-16 PROCEDURE — 96372 THER/PROPH/DIAG INJ SC/IM: CPT | Performed by: GENERAL PRACTICE

## 2018-04-16 RX ADMIN — CYANOCOBALAMIN 1000 MCG: 1000 INJECTION, SOLUTION INTRAMUSCULAR; SUBCUTANEOUS at 09:50

## 2018-04-23 ENCOUNTER — CLINICAL SUPPORT (OUTPATIENT)
Dept: FAMILY MEDICINE CLINIC | Facility: CLINIC | Age: 53
End: 2018-04-23

## 2018-04-23 DIAGNOSIS — E53.8 VITAMIN B 12 DEFICIENCY: ICD-10-CM

## 2018-04-23 PROCEDURE — 96372 THER/PROPH/DIAG INJ SC/IM: CPT | Performed by: GENERAL PRACTICE

## 2018-04-23 RX ADMIN — CYANOCOBALAMIN 1000 MCG: 1000 INJECTION, SOLUTION INTRAMUSCULAR; SUBCUTANEOUS at 16:07

## 2018-04-29 DIAGNOSIS — M25.50 ARTHRALGIA, UNSPECIFIED JOINT: ICD-10-CM

## 2018-04-30 ENCOUNTER — CLINICAL SUPPORT (OUTPATIENT)
Dept: FAMILY MEDICINE CLINIC | Facility: CLINIC | Age: 53
End: 2018-04-30

## 2018-04-30 DIAGNOSIS — E53.8 VITAMIN B 12 DEFICIENCY: ICD-10-CM

## 2018-04-30 PROCEDURE — 96372 THER/PROPH/DIAG INJ SC/IM: CPT | Performed by: GENERAL PRACTICE

## 2018-04-30 RX ORDER — MELOXICAM 7.5 MG/1
TABLET ORAL
Qty: 60 TABLET | Refills: 0 | Status: SHIPPED | OUTPATIENT
Start: 2018-04-30 | End: 2018-05-30 | Stop reason: SDUPTHER

## 2018-04-30 RX ORDER — EZETIMIBE 10 MG/1
TABLET ORAL
Qty: 30 TABLET | Refills: 0 | Status: SHIPPED | OUTPATIENT
Start: 2018-04-30 | End: 2018-05-30 | Stop reason: SDUPTHER

## 2018-04-30 RX ADMIN — CYANOCOBALAMIN 1000 MCG: 1000 INJECTION, SOLUTION INTRAMUSCULAR; SUBCUTANEOUS at 07:54

## 2018-05-11 ENCOUNTER — OFFICE VISIT (OUTPATIENT)
Dept: FAMILY MEDICINE CLINIC | Facility: CLINIC | Age: 53
End: 2018-05-11

## 2018-05-11 VITALS
WEIGHT: 177 LBS | BODY MASS INDEX: 26.83 KG/M2 | HEIGHT: 68 IN | HEART RATE: 103 BPM | SYSTOLIC BLOOD PRESSURE: 118 MMHG | TEMPERATURE: 98.4 F | DIASTOLIC BLOOD PRESSURE: 66 MMHG | OXYGEN SATURATION: 99 %

## 2018-05-11 DIAGNOSIS — N30.91 CYSTITIS WITH HEMATURIA: Primary | ICD-10-CM

## 2018-05-11 DIAGNOSIS — R30.0 DYSURIA: ICD-10-CM

## 2018-05-11 LAB
BILIRUB BLD-MCNC: NEGATIVE MG/DL
CLARITY, POC: CLEAR
COLOR UR: ABNORMAL
GLUCOSE UR STRIP-MCNC: ABNORMAL MG/DL
KETONES UR QL: NEGATIVE
LEUKOCYTE EST, POC: NEGATIVE
NITRITE UR-MCNC: NEGATIVE MG/ML
PH UR: 6 [PH] (ref 5–8)
PROT UR STRIP-MCNC: NEGATIVE MG/DL
RBC # UR STRIP: ABNORMAL /UL
SP GR UR: 1.01 (ref 1–1.03)
UROBILINOGEN UR QL: NORMAL

## 2018-05-11 PROCEDURE — 81003 URINALYSIS AUTO W/O SCOPE: CPT | Performed by: PHYSICIAN ASSISTANT

## 2018-05-11 PROCEDURE — 99213 OFFICE O/P EST LOW 20 MIN: CPT | Performed by: PHYSICIAN ASSISTANT

## 2018-05-11 RX ORDER — PHENAZOPYRIDINE HYDROCHLORIDE 200 MG/1
200 TABLET, FILM COATED ORAL 3 TIMES DAILY PRN
Qty: 15 TABLET | Refills: 0 | Status: SHIPPED | OUTPATIENT
Start: 2018-05-11 | End: 2018-07-05

## 2018-05-11 RX ORDER — SULFAMETHOXAZOLE AND TRIMETHOPRIM 800; 160 MG/1; MG/1
1 TABLET ORAL 2 TIMES DAILY
Qty: 6 TABLET | Refills: 0 | Status: SHIPPED | OUTPATIENT
Start: 2018-05-11 | End: 2018-06-21

## 2018-05-11 NOTE — PROGRESS NOTES
"Rika Leahy is a 52 y.o. female.     Chief complaint-dysuria    History of Present Illness     Dysuria-  She complains of moderate burning pain with urination.  She also complains of lower abdominal pressure that began this morning.  She complains of dark urine for the past 3 days.  Some relief with 3 penicillin pills that she found from a leftover prescription.    The following portions of the patient's history were reviewed and updated as appropriate: allergies, current medications, past family history, past medical history, past social history, past surgical history and problem list.    Review of Systems   Constitutional: Negative for activity change, appetite change and fever.   HENT: Negative for ear pain, sinus pressure and sore throat.    Eyes: Negative for pain and visual disturbance.   Respiratory: Negative for cough and chest tightness.    Cardiovascular: Negative for chest pain and palpitations.   Gastrointestinal: Negative for abdominal pain, constipation, diarrhea, nausea and vomiting.   Endocrine: Negative for polydipsia and polyuria.   Genitourinary: Positive for frequency, hematuria, pelvic pain and urgency. Negative for dysuria.   Musculoskeletal: Negative for back pain and myalgias.   Skin: Negative for color change and rash.   Allergic/Immunologic: Negative for food allergies and immunocompromised state.   Neurological: Negative for dizziness, syncope and headaches.   Hematological: Negative for adenopathy. Does not bruise/bleed easily.   Psychiatric/Behavioral: Negative for hallucinations and suicidal ideas. The patient is not nervous/anxious.        /66   Pulse 103   Temp 98.4 °F (36.9 °C) (Oral)   Ht 172.7 cm (67.99\")   Wt 80.3 kg (177 lb)   SpO2 99%   BMI 26.92 kg/m²   Lab on 01/09/2018   Component Date Value Ref Range Status   • Glucose 01/09/2018 222* 70 - 110 mg/dL Final   • BUN 01/09/2018 10  7 - 21 mg/dL Final   • Creatinine 01/09/2018 0.69  0.43 - 1.29 mg/dL " Final   • Sodium 01/09/2018 141  135 - 153 mmol/L Final   • Potassium 01/09/2018 3.8  3.5 - 5.3 mmol/L Final   • Chloride 01/09/2018 103  99 - 112 mmol/L Final   • CO2 01/09/2018 29.8  24.3 - 31.9 mmol/L Final   • Calcium 01/09/2018 9.7  7.7 - 10.0 mg/dL Final   • Total Protein 01/09/2018 7.8  6.0 - 8.0 g/dL Final   • Albumin 01/09/2018 4.60  3.50 - 5.00 g/dL Final   • ALT (SGPT) 01/09/2018 56* 10 - 36 U/L Final   • AST (SGOT) 01/09/2018 58* 10 - 30 U/L Final   • Alkaline Phosphatase 01/09/2018 79  35 - 104 U/L Final   • Total Bilirubin 01/09/2018 0.7  0.2 - 1.8 mg/dL Final   • eGFR Non African Amer 01/09/2018 89  >60 mL/min/1.73 Final   • Globulin 01/09/2018 3.2  gm/dL Final   • A/G Ratio 01/09/2018 1.4* 1.5 - 2.5 g/dL Final   • BUN/Creatinine Ratio 01/09/2018 14.5  7.0 - 25.0 Final   • Anion Gap 01/09/2018 8.2  3.6 - 11.2 mmol/L Final   • Total Cholesterol 01/09/2018 153  0 - 200 mg/dL Final   • Triglycerides 01/09/2018 130  0 - 150 mg/dL Final   • HDL Cholesterol 01/09/2018 42* 60 - 100 mg/dL Final   • LDL Cholesterol  01/09/2018 85  0 - 100 mg/dL Final   • VLDL Cholesterol 01/09/2018 26  mg/dL Final   • LDL/HDL Ratio 01/09/2018 2.02   Final   • TSH 01/09/2018 2.297  0.550 - 4.780 mIU/mL Final   • Hemoglobin A1C 01/09/2018 9.80* 4.50 - 5.70 % Final   • 25 Hydroxy, Vitamin D 01/09/2018 29.0  ng/ml Final   • Vitamin B-12 01/09/2018 669  211 - 911 pg/mL Final   • WBC 01/09/2018 7.10  4.50 - 12.50 10*3/mm3 Final   • RBC 01/09/2018 4.77  4.20 - 5.40 10*6/mm3 Final   • Hemoglobin 01/09/2018 14.7  12.0 - 16.0 g/dL Final   • Hematocrit 01/09/2018 43.4  37.0 - 47.0 % Final   • MCV 01/09/2018 91.0  80.0 - 94.0 fL Final   • MCH 01/09/2018 30.8  27.0 - 33.0 pg Final   • MCHC 01/09/2018 33.9  33.0 - 37.0 g/dL Final   • RDW 01/09/2018 12.3  11.5 - 14.5 % Final   • RDW-SD 01/09/2018 39.8  37.0 - 54.0 fl Final   • MPV 01/09/2018 10.6* 6.0 - 10.0 fL Final   • Platelets 01/09/2018 272  130 - 400 10*3/mm3 Final   • Neutrophil %  01/09/2018 48.9  30.0 - 70.0 % Final   • Lymphocyte % 01/09/2018 37.0  21.0 - 51.0 % Final   • Monocyte % 01/09/2018 9.2  0.0 - 10.0 % Final   • Eosinophil % 01/09/2018 4.2  0.0 - 5.0 % Final   • Basophil % 01/09/2018 0.3  0.0 - 2.0 % Final   • Immature Grans % 01/09/2018 0.4  0.0 - 0.5 % Final   • Neutrophils, Absolute 01/09/2018 3.47  1.40 - 6.50 10*3/mm3 Final   • Lymphocytes, Absolute 01/09/2018 2.63  1.00 - 3.00 10*3/mm3 Final   • Monocytes, Absolute 01/09/2018 0.65  0.10 - 0.90 10*3/mm3 Final   • Eosinophils, Absolute 01/09/2018 0.30  0.00 - 0.70 10*3/mm3 Final   • Basophils, Absolute 01/09/2018 0.02  0.00 - 0.30 10*3/mm3 Final   • Immature Grans, Absolute 01/09/2018 0.03  0.00 - 0.03 10*3/mm3 Final   • Osmolality Calc 01/09/2018 287.2  273.0 - 305.0 mOsm/kg Final       Physical Exam   Constitutional: She is oriented to person, place, and time. She appears well-developed and well-nourished.   Cardiovascular: Normal rate, regular rhythm and normal heart sounds.    No murmur heard.  Pulmonary/Chest: Effort normal and breath sounds normal. She has no wheezes.   Neurological: She is alert and oriented to person, place, and time.   Skin: Skin is warm. No erythema.   Psychiatric: She has a normal mood and affect. Her behavior is normal.   Nursing note and vitals reviewed.      Assessment/Plan     Diagnoses and all orders for this visit:    Cystitis with hematuria  -     sulfamethoxazole-trimethoprim (BACTRIM DS) 800-160 MG per tablet; Take 1 tablet by mouth 2 (Two) Times a Day.  -     phenazopyridine (PYRIDIUM) 200 MG tablet; Take 1 tablet by mouth 3 (Three) Times a Day As Needed for bladder spasms.    Dysuria  -     POC Urinalysis Dipstick, Automated      Urinalysis likely inaccurate due to patient taking antibiotics from old prescription.           This document has been electronically signed by:  Jazmine Mathews PA-C

## 2018-05-16 ENCOUNTER — OFFICE VISIT (OUTPATIENT)
Dept: PSYCHIATRY | Facility: CLINIC | Age: 53
End: 2018-05-16

## 2018-05-16 DIAGNOSIS — Z00.8 ENCOUNTER FOR PSYCHOLOGICAL EVALUATION: Primary | ICD-10-CM

## 2018-05-16 PROCEDURE — 90791 PSYCH DIAGNOSTIC EVALUATION: CPT | Performed by: SOCIAL WORKER
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
## 2018-05-30 DIAGNOSIS — M25.50 ARTHRALGIA, UNSPECIFIED JOINT: ICD-10-CM

## 2018-05-30 RX ORDER — EZETIMIBE 10 MG/1
TABLET ORAL
Qty: 30 TABLET | Refills: 0 | Status: SHIPPED | OUTPATIENT
Start: 2018-05-30 | End: 2018-08-27

## 2018-05-30 RX ORDER — MELOXICAM 7.5 MG/1
TABLET ORAL
Qty: 60 TABLET | Refills: 0 | Status: SHIPPED | OUTPATIENT
Start: 2018-05-30 | End: 2018-06-08 | Stop reason: SDUPTHER

## 2018-06-01 ENCOUNTER — CLINICAL SUPPORT (OUTPATIENT)
Dept: FAMILY MEDICINE CLINIC | Facility: CLINIC | Age: 53
End: 2018-06-01

## 2018-06-01 DIAGNOSIS — E53.8 VITAMIN B 12 DEFICIENCY: ICD-10-CM

## 2018-06-01 PROCEDURE — 96372 THER/PROPH/DIAG INJ SC/IM: CPT | Performed by: GENERAL PRACTICE

## 2018-06-01 RX ORDER — PEN NEEDLE, DIABETIC 29 G X1/2"
NEEDLE, DISPOSABLE MISCELLANEOUS
Qty: 30 EACH | Refills: 3 | Status: SHIPPED | OUTPATIENT
Start: 2018-06-01 | End: 2018-07-05 | Stop reason: SDUPTHER

## 2018-06-01 RX ADMIN — CYANOCOBALAMIN 1000 MCG: 1000 INJECTION, SOLUTION INTRAMUSCULAR; SUBCUTANEOUS at 10:33

## 2018-06-08 DIAGNOSIS — E08.40 DIABETES MELLITUS DUE TO UNDERLYING CONDITION WITH DIABETIC NEUROPATHY, WITH LONG-TERM CURRENT USE OF INSULIN (HCC): Chronic | ICD-10-CM

## 2018-06-08 DIAGNOSIS — M25.50 ARTHRALGIA, UNSPECIFIED JOINT: ICD-10-CM

## 2018-06-08 DIAGNOSIS — Z79.4 DIABETES MELLITUS DUE TO UNDERLYING CONDITION WITH DIABETIC NEUROPATHY, WITH LONG-TERM CURRENT USE OF INSULIN (HCC): Chronic | ICD-10-CM

## 2018-06-08 RX ORDER — EZETIMIBE 10 MG/1
10 TABLET ORAL DAILY
Qty: 30 TABLET | Refills: 0 | Status: SHIPPED | OUTPATIENT
Start: 2018-06-08 | End: 2018-07-29 | Stop reason: SDUPTHER

## 2018-06-08 RX ORDER — MELOXICAM 7.5 MG/1
7.5 TABLET ORAL DAILY
Qty: 60 TABLET | Refills: 0 | Status: SHIPPED | OUTPATIENT
Start: 2018-06-08 | End: 2018-08-27

## 2018-06-08 RX ORDER — DULOXETIN HYDROCHLORIDE 30 MG/1
30 CAPSULE, DELAYED RELEASE ORAL DAILY
Qty: 60 CAPSULE | Refills: 2 | Status: SHIPPED | OUTPATIENT
Start: 2018-06-08 | End: 2019-03-02

## 2018-06-21 ENCOUNTER — OFFICE VISIT (OUTPATIENT)
Dept: FAMILY MEDICINE CLINIC | Facility: CLINIC | Age: 53
End: 2018-06-21

## 2018-06-21 VITALS
BODY MASS INDEX: 26.52 KG/M2 | HEIGHT: 68 IN | SYSTOLIC BLOOD PRESSURE: 130 MMHG | HEART RATE: 95 BPM | TEMPERATURE: 96.6 F | DIASTOLIC BLOOD PRESSURE: 80 MMHG | WEIGHT: 175 LBS | OXYGEN SATURATION: 96 %

## 2018-06-21 DIAGNOSIS — B37.31 VAGINAL YEAST INFECTION: ICD-10-CM

## 2018-06-21 DIAGNOSIS — R30.0 DYSURIA: Primary | ICD-10-CM

## 2018-06-21 LAB
BILIRUB BLD-MCNC: NEGATIVE MG/DL
CLARITY, POC: ABNORMAL
COLOR UR: YELLOW
GLUCOSE UR STRIP-MCNC: ABNORMAL MG/DL
KETONES UR QL: NEGATIVE
LEUKOCYTE EST, POC: NEGATIVE
NITRITE UR-MCNC: NEGATIVE MG/ML
PH UR: 6 [PH] (ref 5–8)
PROT UR STRIP-MCNC: NEGATIVE MG/DL
RBC # UR STRIP: NEGATIVE /UL
SP GR UR: 1.02 (ref 1–1.03)
UROBILINOGEN UR QL: NORMAL

## 2018-06-21 PROCEDURE — 99214 OFFICE O/P EST MOD 30 MIN: CPT | Performed by: NURSE PRACTITIONER

## 2018-06-21 PROCEDURE — 81003 URINALYSIS AUTO W/O SCOPE: CPT | Performed by: NURSE PRACTITIONER

## 2018-06-21 RX ORDER — FLUCONAZOLE 150 MG/1
150 TABLET ORAL WEEKLY
Qty: 1 TABLET | Refills: 5 | Status: SHIPPED | OUTPATIENT
Start: 2018-06-21 | End: 2018-06-21 | Stop reason: SDUPTHER

## 2018-06-21 RX ORDER — FLUCONAZOLE 150 MG/1
150 TABLET ORAL EVERY OTHER DAY
Qty: 3 TABLET | Refills: 0 | Status: SHIPPED | OUTPATIENT
Start: 2018-06-21 | End: 2018-06-26

## 2018-06-21 NOTE — PROGRESS NOTES
"Rika Leahy is a 52 y.o. female.     Chief Complaint   Patient presents with   • Difficulty Urinating       History of Present Illness     Dysuria and vaginal itch ×3 days.    Diabetes with diabetic neuropathy-some hyperglycemia.  Frequent yeast infections.      The following portions of the patient's history were reviewed and updated as appropriate: allergies, current medications, past family history, past medical history, past social history, past surgical history and problem list.    Review of Systems   Constitutional: Negative for appetite change, fatigue and unexpected weight change.   HENT: Negative for congestion, ear pain, nosebleeds, postnasal drip, rhinorrhea, sore throat, trouble swallowing and voice change.    Eyes: Negative for pain and visual disturbance.   Respiratory: Negative for cough, shortness of breath and wheezing.    Cardiovascular: Negative for chest pain and palpitations.   Gastrointestinal: Negative for abdominal pain, blood in stool, constipation and diarrhea.   Endocrine: Negative for cold intolerance and polydipsia.   Genitourinary: Positive for dysuria and vaginal discharge. Negative for difficulty urinating, flank pain and hematuria.   Musculoskeletal: Negative for arthralgias, back pain, gait problem, joint swelling and myalgias.   Skin: Negative for color change and rash.   Allergic/Immunologic: Positive for environmental allergies.   Neurological: Negative for syncope, numbness and headaches.   Hematological: Negative.    Psychiatric/Behavioral: Negative for sleep disturbance and suicidal ideas.   All other systems reviewed and are negative.      Objective     /80   Pulse 95   Temp 96.6 °F (35.9 °C) (Tympanic)   Ht 172.7 cm (67.99\")   Wt 79.4 kg (175 lb)   SpO2 96%   BMI 26.62 kg/m²   Office Visit on 05/11/2018   Component Date Value Ref Range Status   • Color 05/11/2018 Mari  Yellow, Straw, Dark Yellow, Mari Final   • Clarity, UA 05/11/2018 Clear  Clear " Final   • Glucose, UA 05/11/2018 3+* Negative, 1000 mg/dL (3+) mg/dL Final   • Bilirubin 05/11/2018 Negative  Negative Final   • Ketones, UA 05/11/2018 Negative  Negative Final   • Specific Gravity  05/11/2018 1.015  1.005 - 1.030 Final   • Blood, UA 05/11/2018 2+* Negative Final   • pH, Urine 05/11/2018 6.0  5.0 - 8.0 Final   • Protein, POC 05/11/2018 Negative  Negative mg/dL Final   • Urobilinogen, UA 05/11/2018 Normal  Normal Final   • Leukocytes 05/11/2018 Negative  Negative Final   • Nitrite, UA 05/11/2018 Negative  Negative Final       Physical Exam   Constitutional: She is oriented to person, place, and time. Vital signs are normal. She appears well-developed and well-nourished. No distress.   Pleasant adult female.   HENT:   Head: Atraumatic.   Eyes: Pupils are equal, round, and reactive to light.   Neck: Neck supple.   Cardiovascular: Normal rate, regular rhythm and normal heart sounds.    Pulmonary/Chest: Effort normal and breath sounds normal. No respiratory distress.   Abdominal: Soft. Bowel sounds are normal. There is no tenderness. There is no guarding.   Lymphadenopathy:     She has no cervical adenopathy.   Neurological: She is alert and oriented to person, place, and time. No cranial nerve deficit.   Skin: Skin is warm and dry. No rash noted. She is not diaphoretic. No erythema. No pallor.   Psychiatric: She has a normal mood and affect. Her speech is normal and behavior is normal. Thought content normal. Cognition and memory are normal.   Vitals reviewed.      Assessment/Plan     Problem List Items Addressed This Visit        Genitourinary    Vaginal yeast infection    Relevant Medications    fluconazole (DIFLUCAN) 150 MG tablet      Other Visit Diagnoses     Dysuria    -  Primary    Relevant Orders    POC Urinalysis Dipstick, Automated    POC Urinalysis Dipstick        Start Diflucan 150 mg 1 weekly ×4.  Due to frequency of vaginal yeast infections I will provide her with refills on this  regimen.  I have discussed diagnosis in detail today allowing time for questions and answers. Pt is aware of reasons to seek urgent or emergent medical care as well as reasons to return to the clinic for evaluation. Possible side effects, interactions and progression of symptoms discussed as well. Pt / family states understanding.   Emotional support and active listening provided.   Sick day rules reviewed.  UA obtained today.    RTC 2-5 days if not improved, sooner if condition worsens/changes. Symptomatic care advised as well as reasons for urgent or emergent care. Pt / family state understanding.     Errors in dictation may reflect use of voice recognition software and not all errors in transcription may have been detected prior to signing.     .               This document has been electronically signed by:  ASH Coates, NP-C

## 2018-06-28 ENCOUNTER — LAB (OUTPATIENT)
Dept: FAMILY MEDICINE CLINIC | Facility: CLINIC | Age: 53
End: 2018-06-28

## 2018-06-28 DIAGNOSIS — E53.8 VITAMIN B 12 DEFICIENCY: ICD-10-CM

## 2018-06-28 DIAGNOSIS — E55.9 VITAMIN D DEFICIENCY: ICD-10-CM

## 2018-06-28 DIAGNOSIS — R55 NEAR SYNCOPE: ICD-10-CM

## 2018-06-28 DIAGNOSIS — R42 DIZZINESS: ICD-10-CM

## 2018-06-28 DIAGNOSIS — H69.83 DYSFUNCTION OF BOTH EUSTACHIAN TUBES: ICD-10-CM

## 2018-06-28 DIAGNOSIS — E78.5 HYPERLIPIDEMIA, UNSPECIFIED HYPERLIPIDEMIA TYPE: Primary | ICD-10-CM

## 2018-06-28 DIAGNOSIS — E08.40 DIABETES MELLITUS DUE TO UNDERLYING CONDITION WITH DIABETIC NEUROPATHY, UNSPECIFIED LONG TERM INSULIN USE STATUS: ICD-10-CM

## 2018-06-28 LAB
25(OH)D3 SERPL-MCNC: 36 NG/ML
ALBUMIN SERPL-MCNC: 4.7 G/DL (ref 3.5–5)
ALBUMIN UR-MCNC: 18.1 MG/L
ALBUMIN/GLOB SERPL: 1.5 G/DL (ref 1.5–2.5)
ALP SERPL-CCNC: 89 U/L (ref 35–104)
ALT SERPL W P-5'-P-CCNC: 46 U/L (ref 10–36)
ANION GAP SERPL CALCULATED.3IONS-SCNC: 7.9 MMOL/L (ref 3.6–11.2)
AST SERPL-CCNC: 33 U/L (ref 10–30)
BASOPHILS # BLD AUTO: 0.03 10*3/MM3 (ref 0–0.3)
BASOPHILS NFR BLD AUTO: 0.5 % (ref 0–2)
BILIRUB SERPL-MCNC: 0.9 MG/DL (ref 0.2–1.8)
BUN BLD-MCNC: 14 MG/DL (ref 7–21)
BUN/CREAT SERPL: 18.7 (ref 7–25)
CALCIUM SPEC-SCNC: 9.6 MG/DL (ref 7.7–10)
CHLORIDE SERPL-SCNC: 102 MMOL/L (ref 99–112)
CHOLEST SERPL-MCNC: 159 MG/DL (ref 0–200)
CO2 SERPL-SCNC: 26.1 MMOL/L (ref 24.3–31.9)
CREAT BLD-MCNC: 0.75 MG/DL (ref 0.43–1.29)
DEPRECATED RDW RBC AUTO: 37.2 FL (ref 37–54)
EOSINOPHIL # BLD AUTO: 0.28 10*3/MM3 (ref 0–0.7)
EOSINOPHIL NFR BLD AUTO: 4.2 % (ref 0–5)
ERYTHROCYTE [DISTWIDTH] IN BLOOD BY AUTOMATED COUNT: 11.8 % (ref 11.5–14.5)
GFR SERPL CREATININE-BSD FRML MDRD: 81 ML/MIN/1.73
GLOBULIN UR ELPH-MCNC: 3.1 GM/DL
GLUCOSE BLD-MCNC: 306 MG/DL (ref 70–110)
HBA1C MFR BLD: 10.7 % (ref 4.5–5.7)
HCT VFR BLD AUTO: 43.2 % (ref 37–47)
HDLC SERPL-MCNC: 37 MG/DL (ref 60–100)
HGB BLD-MCNC: 15.3 G/DL (ref 12–16)
IMM GRANULOCYTES # BLD: 0.03 10*3/MM3 (ref 0–0.03)
IMM GRANULOCYTES NFR BLD: 0.5 % (ref 0–0.5)
LDLC SERPL CALC-MCNC: 91 MG/DL (ref 0–100)
LDLC/HDLC SERPL: 2.47 {RATIO}
LYMPHOCYTES # BLD AUTO: 2.04 10*3/MM3 (ref 1–3)
LYMPHOCYTES NFR BLD AUTO: 31 % (ref 21–51)
MCH RBC QN AUTO: 31 PG (ref 27–33)
MCHC RBC AUTO-ENTMCNC: 35.4 G/DL (ref 33–37)
MCV RBC AUTO: 87.6 FL (ref 80–94)
MONOCYTES # BLD AUTO: 0.5 10*3/MM3 (ref 0.1–0.9)
MONOCYTES NFR BLD AUTO: 7.6 % (ref 0–10)
NEUTROPHILS # BLD AUTO: 3.71 10*3/MM3 (ref 1.4–6.5)
NEUTROPHILS NFR BLD AUTO: 56.2 % (ref 30–70)
OSMOLALITY SERPL CALC.SUM OF ELEC: 284 MOSM/KG (ref 273–305)
PLATELET # BLD AUTO: 253 10*3/MM3 (ref 130–400)
PMV BLD AUTO: 10.8 FL (ref 6–10)
POTASSIUM BLD-SCNC: 4.1 MMOL/L (ref 3.5–5.3)
PROT SERPL-MCNC: 7.8 G/DL (ref 6–8)
RBC # BLD AUTO: 4.93 10*6/MM3 (ref 4.2–5.4)
SODIUM BLD-SCNC: 136 MMOL/L (ref 135–153)
TRIGL SERPL-MCNC: 153 MG/DL (ref 0–150)
TSH SERPL DL<=0.05 MIU/L-ACNC: 2.34 MIU/ML (ref 0.55–4.78)
VIT B12 BLD-MCNC: 776 PG/ML (ref 211–911)
VLDLC SERPL-MCNC: 30.6 MG/DL
WBC NRBC COR # BLD: 6.59 10*3/MM3 (ref 4.5–12.5)

## 2018-06-28 PROCEDURE — 80061 LIPID PANEL: CPT | Performed by: NURSE PRACTITIONER

## 2018-06-28 PROCEDURE — 82043 UR ALBUMIN QUANTITATIVE: CPT | Performed by: NURSE PRACTITIONER

## 2018-06-28 PROCEDURE — 83036 HEMOGLOBIN GLYCOSYLATED A1C: CPT | Performed by: NURSE PRACTITIONER

## 2018-06-28 PROCEDURE — 36415 COLL VENOUS BLD VENIPUNCTURE: CPT

## 2018-06-28 PROCEDURE — 82607 VITAMIN B-12: CPT | Performed by: NURSE PRACTITIONER

## 2018-06-28 PROCEDURE — 85025 COMPLETE CBC W/AUTO DIFF WBC: CPT | Performed by: NURSE PRACTITIONER

## 2018-06-28 PROCEDURE — 84443 ASSAY THYROID STIM HORMONE: CPT | Performed by: NURSE PRACTITIONER

## 2018-06-28 PROCEDURE — 82306 VITAMIN D 25 HYDROXY: CPT | Performed by: NURSE PRACTITIONER

## 2018-06-28 PROCEDURE — 96372 THER/PROPH/DIAG INJ SC/IM: CPT | Performed by: NURSE PRACTITIONER

## 2018-06-28 PROCEDURE — 80053 COMPREHEN METABOLIC PANEL: CPT | Performed by: NURSE PRACTITIONER

## 2018-06-28 PROCEDURE — 36415 COLL VENOUS BLD VENIPUNCTURE: CPT | Performed by: NURSE PRACTITIONER

## 2018-06-28 RX ADMIN — CYANOCOBALAMIN 1000 MCG: 1000 INJECTION, SOLUTION INTRAMUSCULAR; SUBCUTANEOUS at 09:20

## 2018-07-05 ENCOUNTER — OFFICE VISIT (OUTPATIENT)
Dept: FAMILY MEDICINE CLINIC | Facility: CLINIC | Age: 53
End: 2018-07-05

## 2018-07-05 VITALS
OXYGEN SATURATION: 97 % | WEIGHT: 175 LBS | HEIGHT: 68 IN | BODY MASS INDEX: 26.52 KG/M2 | SYSTOLIC BLOOD PRESSURE: 140 MMHG | DIASTOLIC BLOOD PRESSURE: 70 MMHG | TEMPERATURE: 98.1 F | HEART RATE: 95 BPM

## 2018-07-05 DIAGNOSIS — R41.3 MEMORY PROBLEM: ICD-10-CM

## 2018-07-05 DIAGNOSIS — R42 DIZZINESS: ICD-10-CM

## 2018-07-05 DIAGNOSIS — E78.5 HYPERLIPIDEMIA, UNSPECIFIED HYPERLIPIDEMIA TYPE: Chronic | ICD-10-CM

## 2018-07-05 DIAGNOSIS — E08.40 DIABETES MELLITUS DUE TO UNDERLYING CONDITION WITH DIABETIC NEUROPATHY, WITHOUT LONG-TERM CURRENT USE OF INSULIN (HCC): Primary | Chronic | ICD-10-CM

## 2018-07-05 DIAGNOSIS — E53.8 VITAMIN B 12 DEFICIENCY: ICD-10-CM

## 2018-07-05 DIAGNOSIS — E55.9 VITAMIN D DEFICIENCY: ICD-10-CM

## 2018-07-05 PROCEDURE — 99214 OFFICE O/P EST MOD 30 MIN: CPT | Performed by: NURSE PRACTITIONER

## 2018-07-05 RX ORDER — PRAVASTATIN SODIUM 40 MG
40 TABLET ORAL DAILY
Qty: 30 TABLET | Refills: 3 | Status: SHIPPED | OUTPATIENT
Start: 2018-07-05 | End: 2019-01-28 | Stop reason: SDUPTHER

## 2018-07-05 NOTE — PATIENT INSTRUCTIONS
Type 2 Diabetes Mellitus, Self Care, Adult  When you have type 2 diabetes (type 2 diabetes mellitus), you must keep your blood sugar (glucose) under control. You can do this with:  · Nutrition.  · Exercise.  · Lifestyle changes.  · Medicines or insulin, if needed.  · Support from your doctors and others.    How do I manage my blood sugar?  · Check your blood sugar level every day, as often as told.  · Call your doctor if your blood sugar is above your goal numbers for 2 tests in a row.  · Have your A1c (hemoglobin A1c) level checked at least two times a year. Have it checked more often if your doctor tells you to.  Your doctor will set treatment goals for you. Generally, you should have these blood sugar levels:  · Before meals (preprandial):  mg/dL (4.4-7.2 mmol/L).  · After meals (postprandial): lower than 180 mg/dL (10 mmol/L).  · A1c level: less than 7%.    What do I need to know about high blood sugar?  High blood sugar is called hyperglycemia. Know the signs of high blood sugar. Signs may include:  · Feeling:  ? Thirsty.  ? Hungry.  ? Very tired.  · Needing to pee (urinate) more than usual.  · Blurry vision.    What do I need to know about low blood sugar?  Low blood sugar is called hypoglycemia. This is when blood sugar is at or below 70 mg/dL (3.9 mmol/L). Symptoms may include:  · Feeling:  ? Hungry.  ? Worried or nervous (anxious).  ? Sweaty and clammy.  ? Confused.  ? Dizzy.  ? Sleepy.  ? Sick to your stomach (nauseous).  · Having:  ? A fast heartbeat (palpitations).  ? A headache.  ? A change in your vision.  ? Jerky movements that you cannot control (seizure).  ? Nightmares.  ? Tingling or no feeling (numbness) around the mouth, lips, or tongue.  · Having trouble with:  ? Talking.  ? Paying attention (concentrating).  ? Moving (coordination).  ? Sleeping.  · Shaking.  · Passing out (fainting).  · Getting upset easily (irritability).    Treating low blood sugar    To treat low blood sugar, eat or  drink something sugary right away. If you can think clearly and swallow safely, follow the 15:15 rule:  · Take 15 grams of a fast-acting carb (carbohydrate). Some fast-acting carbs are:  ? 1 tube of glucose gel.  ? 3 sugar tablets (glucose pills).  ? 6-8 pieces of hard candy.  ? 4 oz (120 mL) of fruit juice.  ? 4 oz (120 mL) regular (not diet) soda.  · Check your blood sugar 15 minutes after you take the carb.  · If your blood sugar is still at or below 70 mg/dL (3.9 mmol/L), take 15 grams of a carb again.  · If your blood sugar does not go above 70 mg/dL (3.9 mmol/L) after 3 tries, get help right away.  · After your blood sugar goes back to normal, eat a meal or a snack within 1 hour.    Treating very low blood sugar  If your blood sugar is at or below 54 mg/dL (3 mmol/L), you have very low blood sugar (severe hypoglycemia). This is an emergency. Do not wait to see if the symptoms will go away. Get medical help right away. Call your local emergency services (911 in the [place] Do not drive yourself to the hospital.  If you have very low blood sugar and you cannot eat or drink, you may need a glucagon shot (injection). A family member or friend should learn how to check your blood sugar and how to give you a glucagon shot. Ask your doctor if you need to have a glucagon shot kit at home.  What else is important to manage my diabetes?  Medicine  Follow these instructions about insulin and diabetes medicines:  · Take them as told by your doctor.  · Adjust them as told by your doctor.  · Do not run out of them.    Having diabetes can raise your risk for other long-term conditions. These include heart or kidney disease. Your doctor may prescribe medicines to help prevent problems from diabetes.  Food    · Make healthy food choices. These include:  ? Chicken, fish, egg whites, and beans.  ? Oats, whole wheat, bulgur, brown rice, quinoa, and millet.  ? Fresh fruits and vegetables.  ? Low-fat dairy products.  ? Nuts,  avocado, olive oil, and canola oil.  · Make a food plan with a specialist (dietitian).  · Follow instructions from your doctor about what you cannot eat or drink.  · Drink enough fluid to keep your pee (urine) clear or pale yellow.  · Eat healthy snacks between healthy meals.  · Keep track of carbs that you eat. Read food labels. Learn food serving sizes.  · Follow your sick day plan when you cannot eat or drink normally. Make this plan with your doctor so it is ready to use.  Activity  · Exercise at least 3 times a week.  · Do not go more than 2 days without exercising.  · Talk with your doctor before you start a new exercise. Your doctor may need to adjust your insulin, medicines, or food.  Lifestyle    · Do not use any tobacco products. These include cigarettes, chewing tobacco, and e-cigarettes.If you need help quitting, ask your doctor.  · Ask your doctor how much alcohol is safe for you.  · Learn to deal with stress. If you need help with this, ask your doctor.  Body care  · Stay up to date with your shots (immunizations).  · Have your eyes and feet checked by a doctor as often as told.  · Check your skin and feet every day. Check for cuts, bruises, redness, blisters, or sores.  · Brush your teeth and gums two times a day.  · Floss at least one time a day.  · Go to the dentist least one time every 6 months.  · Stay at a healthy weight.  General instructions    · Take over-the-counter and prescription medicines only as told by your doctor.  · Share your diabetes care plan with:  ? Your work or school.  ? People you live with.  · Check your pee (urine) for ketones:  ? When you are sick.  ? As told by your doctor.  · Carry a card or wear jewelry that says that you have diabetes.  · Ask your doctor:  ? Do I need to meet with a diabetes educator?  ? Where can I find a support group for people with diabetes?  · Keep all follow-up visits as told by your doctor. This is important.  Where to find more information:  To  "learn more about diabetes, visit:  · American Diabetes Association: www.diabetes.org  · American Association of Diabetes Educators: www.diabeteseducator.org/patient-resources    This information is not intended to replace advice given to you by your health care provider. Make sure you discuss any questions you have with your health care provider.  Document Released: 04/10/2017 Document Revised: 05/25/2017 Document Reviewed: 01/20/2017  [employer] Interactive Patient Education © 2018 [employer]  Start taking Levemir:  20 Units at 8:00am  40Units at 8:00pm  Gradually increase the morning dose of Levemir every 3 days by 2 units until blood sugars are hitting target of below 150 mg/dl but not under 70 mg/dL.  DASH Eating Plan  DASH stands for \"Dietary Approaches to Stop Hypertension.\" The DASH eating plan is a healthy eating plan that has been shown to reduce high blood pressure (hypertension). It may also reduce your risk for type 2 diabetes, heart disease, and stroke. The DASH eating plan may also help with weight loss.  What are tips for following this plan?  General guidelines  · Avoid eating more than 2,300 mg (milligrams) of salt (sodium) a day. If you have hypertension, you may need to reduce your sodium intake to 1,500 mg a day.  · Limit alcohol intake to no more than 1 drink a day for nonpregnant women and 2 drinks a day for men. One drink equals 12 oz of beer, 5 oz of wine, or 1½ oz of hard liquor.  · Work with your health care provider to maintain a healthy body weight or to lose weight. Ask what an ideal weight is for you.  · Get at least 30 minutes of exercise that causes your heart to beat faster (aerobic exercise) most days of the week. Activities may include walking, swimming, or biking.  · Work with your health care provider or diet and nutrition specialist (dietitian) to adjust your eating plan to your individual calorie needs.  Reading food labels  · Check food labels for the amount of sodium per " "serving. Choose foods with less than 5 percent of the Daily Value of sodium. Generally, foods with less than 300 mg of sodium per serving fit into this eating plan.  · To find whole grains, look for the word \"whole\" as the first word in the ingredient list.  Shopping  · Buy products labeled as \"low-sodium\" or \"no salt added.\"  · Buy fresh foods. Avoid canned foods and premade or frozen meals.  Cooking  · Avoid adding salt when cooking. Use salt-free seasonings or herbs instead of table salt or sea salt. Check with your health care provider or pharmacist before using salt substitutes.  · Do not garcía foods. Cook foods using healthy methods such as baking, boiling, grilling, and broiling instead.  · Cook with heart-healthy oils, such as olive, canola, soybean, or sunflower oil.  Meal planning    · Eat a balanced diet that includes:  ? 5 or more servings of fruits and vegetables each day. At each meal, try to fill half of your plate with fruits and vegetables.  ? Up to 6-8 servings of whole grains each day.  ? Less than 6 oz of lean meat, poultry, or fish each day. A 3-oz serving of meat is about the same size as a deck of cards. One egg equals 1 oz.  ? 2 servings of low-fat dairy each day.  ? A serving of nuts, seeds, or beans 5 times each week.  ? Heart-healthy fats. Healthy fats called Omega-3 fatty acids are found in foods such as flaxseeds and coldwater fish, like sardines, salmon, and mackerel.  · Limit how much you eat of the following:  ? Canned or prepackaged foods.  ? Food that is high in trans fat, such as fried foods.  ? Food that is high in saturated fat, such as fatty meat.  ? Sweets, desserts, sugary drinks, and other foods with added sugar.  ? Full-fat dairy products.  · Do not salt foods before eating.  · Try to eat at least 2 vegetarian meals each week.  · Eat more home-cooked food and less restaurant, buffet, and fast food.  · When eating at a restaurant, ask that your food be prepared with less salt " or no salt, if possible.  What foods are recommended?  The items listed may not be a complete list. Talk with your dietitian about what dietary choices are best for you.  Grains  Whole-grain or whole-wheat bread. Whole-grain or whole-wheat pasta. Brown rice. Oatmeal. Quinoa. Bulgur. Whole-grain and low-sodium cereals. Kristine bread. Low-fat, low-sodium crackers. Whole-wheat flour tortillas.  Vegetables  Fresh or frozen vegetables (raw, steamed, roasted, or grilled). Low-sodium or reduced-sodium tomato and vegetable juice. Low-sodium or reduced-sodium tomato sauce and tomato paste. Low-sodium or reduced-sodium canned vegetables.  Fruits  All fresh, dried, or frozen fruit. Canned fruit in natural juice (without added sugar).  Meat and other protein foods  Skinless chicken or turkey. Ground chicken or turkey. Pork with fat trimmed off. Fish and seafood. Egg whites. Dried beans, peas, or lentils. Unsalted nuts, nut butters, and seeds. Unsalted canned beans. Lean cuts of beef with fat trimmed off. Low-sodium, lean deli meat.  Dairy  Low-fat (1%) or fat-free (skim) milk. Fat-free, low-fat, or reduced-fat cheeses. Nonfat, low-sodium ricotta or cottage cheese. Low-fat or nonfat yogurt. Low-fat, low-sodium cheese.  Fats and oils  Soft margarine without trans fats. Vegetable oil. Low-fat, reduced-fat, or light mayonnaise and salad dressings (reduced-sodium). Canola, safflower, olive, soybean, and sunflower oils. Avocado.  Seasoning and other foods  Herbs. Spices. Seasoning mixes without salt. Unsalted popcorn and pretzels. Fat-free sweets.  What foods are not recommended?  The items listed may not be a complete list. Talk with your dietitian about what dietary choices are best for you.  Grains  Baked goods made with fat, such as croissants, muffins, or some breads. Dry pasta or rice meal packs.  Vegetables  Creamed or fried vegetables. Vegetables in a cheese sauce. Regular canned vegetables (not low-sodium or reduced-sodium).  Regular canned tomato sauce and paste (not low-sodium or reduced-sodium). Regular tomato and vegetable juice (not low-sodium or reduced-sodium). Pickles. Olives.  Fruits  Canned fruit in a light or heavy syrup. Fried fruit. Fruit in cream or butter sauce.  Meat and other protein foods  Fatty cuts of meat. Ribs. Fried meat. Ascencio. Sausage. Bologna and other processed lunch meats. Salami. Fatback. Hotdogs. Bratwurst. Salted nuts and seeds. Canned beans with added salt. Canned or smoked fish. Whole eggs or egg yolks. Chicken or turkey with skin.  Dairy  Whole or 2% milk, cream, and half-and-half. Whole or full-fat cream cheese. Whole-fat or sweetened yogurt. Full-fat cheese. Nondairy creamers. Whipped toppings. Processed cheese and cheese spreads.  Fats and oils  Butter. Stick margarine. Lard. Shortening. Ghee. Ascencio fat. Tropical oils, such as coconut, palm kernel, or palm oil.  Seasoning and other foods  Salted popcorn and pretzels. Onion salt, garlic salt, seasoned salt, table salt, and sea salt. Worcestershire sauce. Tartar sauce. Barbecue sauce. Teriyaki sauce. Soy sauce, including reduced-sodium. Steak sauce. Canned and packaged gravies. Fish sauce. Oyster sauce. Cocktail sauce. Horseradish that you find on the shelf. Ketchup. Mustard. Meat flavorings and tenderizers. Bouillon cubes. Hot sauce and Tabasco sauce. Premade or packaged marinades. Premade or packaged taco seasonings. Relishes. Regular salad dressings.  Where to find more information:  · National Heart, Lung, and [facility] [place]: www.nhlbi.nih.gov  · American Heart Association: www.heart.org  Summary  · The DASH eating plan is a healthy eating plan that has been shown to reduce high blood pressure (hypertension). It may also reduce your risk for type 2 diabetes, heart disease, and stroke.  · With the DASH eating plan, you should limit salt (sodium) intake to 2,300 mg a day. If you have hypertension, you may need to reduce your sodium intake to 1,500 mg  a day.  · When on the DASH eating plan, aim to eat more fresh fruits and vegetables, whole grains, lean proteins, low-fat dairy, and heart-healthy fats.  · Work with your health care provider or diet and nutrition specialist (dietitian) to adjust your eating plan to your individual calorie needs.  This information is not intended to replace advice given to you by your health care provider. Make sure you discuss any questions you have with your health care provider.  Document Released: 12/06/2012 Document Revised: 12/11/2017 Document Reviewed: 12/11/2017  [employer] Interactive Patient Education © 2018 [employer]

## 2018-07-05 NOTE — PROGRESS NOTES
"History of Present Illness   Sandra presents to the clinic today for follow up and to review lab results  related to her  DM, type 2 uncontrolled, Hyperlipidemia, and Vitamin deficiencies. In addition, she reports her episodes of dizziness have improved since her last visit. Sandra did have an appt with a LCSW to evaluate her memory loss which Sandra's reports ' She said I did not have Alzheimer\".     Diabetes   She has type 2 diabetes mellitus. Her disease course has been worsening . Associated symptoms include foot paresthesias. Pertinent negatives for diabetes include no blurred vision, no chest pain, no foot ulcerations, no polydipsia, no polyphagia, no polyuria and no weakness.  Diabetic complications include peripheral neuropathy. Risk factors for coronary artery disease include diabetes mellitus, dyslipidemia, family history and post-menopausal. Current diabetic treatment includes insulin injections, oral agent (monotherapy) and diet. She is following a generally unhealthy diet with increase in refined sugars. She has had a previous visit with a dietitian. She has seen a Podiatrist. Her eye exam is not UTD due to her schedule she reports.     Hyperlipidemia   Pertinent negatives include no chest pain or shortness of breath. Current antihyperlipidemic treatment includes ezetimibe and statins. Risk factors for coronary artery disease include diabetes mellitus, dyslipidemia, family history, post-menopausal and stress.    Dizziness   Symptoms are improving.  The last episode was more than 3 months ago. She relates the episodes had always occured at home and possibly to being upset.  She has had no trauma due to the episodes.  Her  has checked bp, blood sugar and pulse and they are within range. She denies any palpitations, loss of bladder/bowel control or tongue biting during these episodes.  Pertinent negatives include no abdominal pain, chest pain, chills, congestion, coughing, fever, headaches, rash, " "vomiting or weakness.     Vitals:    07/05/18 1440   BP: 140/70   Pulse: 95   Temp: 98.1 °F (36.7 °C)   TempSrc: Oral   SpO2: 97%   Weight: 79.4 kg (175 lb)   Height: 172.7 cm (67.99\")      The following portions of the patient's history were reviewed and updated as appropriate: allergies, current medications, past family history, past medical history, past social history, past surgical history and problem list.    Review of Systems   Constitutional: Positive for fatigue. Negative for activity change, appetite change, chills, fever and unexpected weight change.   HENT: Negative.    Eyes: Positive for visual disturbance (Relates to her elevated blood sugars).   Respiratory: Negative for cough, chest tightness, shortness of breath and wheezing.    Cardiovascular: Negative for chest pain, palpitations and leg swelling.   Gastrointestinal: Negative for abdominal pain, nausea and vomiting.   Endocrine: Negative for cold intolerance, heat intolerance, polydipsia, polyphagia and polyuria.   Skin: Negative for color change and rash.   Neurological: Negative for dizziness, tremors, seizures, speech difficulty, weakness, light-headedness and headaches.        Memory loss   Hematological: Negative for adenopathy.   Psychiatric/Behavioral: Negative for confusion, decreased concentration and suicidal ideas. The patient is not nervous/anxious.    All other systems reviewed and are negative.    Physical Exam   Constitutional: She is oriented to person, place, and time. She appears well-developed and well-nourished. No distress.   HENT:   Head: Normocephalic.   Nose: Nose normal.   Mouth/Throat: Oropharynx is clear and moist. No oropharyngeal exudate.   Eyes: Conjunctivae are normal. Pupils are equal, round, and reactive to light. Right eye exhibits no discharge. Left eye exhibits no discharge. No scleral icterus.   Neck: Neck supple. No JVD present. No thyromegaly present.   Cardiovascular: Normal rate, regular rhythm and normal " heart sounds.  Exam reveals no friction rub.    No murmur heard.  Pulmonary/Chest: Effort normal and breath sounds normal. No respiratory distress. She has no wheezes. She has no rales.   Abdominal: Soft. Bowel sounds are normal. She exhibits no distension. There is no tenderness. There is no rebound and no guarding.   Musculoskeletal: She exhibits no edema.    Sandra had a diabetic foot exam performed today.    Neurological Sensory Findings - Unaltered hot/cold right ankle/foot discrimination and unaltered hot/cold left ankle/foot discrimination. Unaltered sharp/dull right ankle/foot discrimination and unaltered sharp/dull left ankle/foot discrimination. No right ankle/foot altered proprioception and no left ankle/foot altered proprioception  Vascular Status -  Her right foot exhibits normal foot vasculature  and no edema. Her left foot exhibits normal foot vasculature  and no edema.  Skin Integrity  -  Her right foot skin is intact.Her left foot skin is intact..  Lymphadenopathy:     She has no cervical adenopathy.   Neurological: She is alert and oriented to person, place, and time. No cranial nerve deficit or sensory deficit.   Skin: Skin is warm and dry. No rash noted. No erythema.   Psychiatric: She has a normal mood and affect. Her speech is normal and behavior is normal. Judgment and thought content normal.   Vitals reviewed.    Assessment/Plan   Problems Addressed this Visit        Cardiovascular and Mediastinum    Hyperlipidemia    Relevant Medications    pravastatin (PRAVACHOL) 40 MG tablet    Other Relevant Orders    Lipid Panel       Digestive    Vitamin B 12 deficiency    Relevant Orders    Vitamin B12    Vitamin D deficiency    Relevant Orders    Vitamin D 25 Hydroxy       Endocrine    Diabetes mellitus due to underlying condition with diabetic neuropathy (CMS/[facility]) - Primary    Relevant Medications    insulin detemir (LEVEMIR) 100 UNIT/ML injection    Other Relevant Orders    Comprehensive Metabolic  Panel    TSH    Hemoglobin A1c    MicroAlbumin, Urine, Random - Urine, Clean Catch       Other    Dizziness    Relevant Orders    CBC & Differential        Results for orders placed or performed in visit on 06/28/18   Comprehensive Metabolic Panel   Result Value Ref Range    Glucose 306 (H) 70 - 110 mg/dL    BUN 14 7 - 21 mg/dL    Creatinine 0.75 0.43 - 1.29 mg/dL    Sodium 136 135 - 153 mmol/L    Potassium 4.1 3.5 - 5.3 mmol/L    Chloride 102 99 - 112 mmol/L    CO2 26.1 24.3 - 31.9 mmol/L    Calcium 9.6 7.7 - 10.0 mg/dL    Total Protein 7.8 6.0 - 8.0 g/dL    Albumin 4.70 3.50 - 5.00 g/dL    ALT (SGPT) 46 (H) 10 - 36 U/L    AST (SGOT) 33 (H) 10 - 30 U/L    Alkaline Phosphatase 89 35 - 104 U/L    Total Bilirubin 0.9 0.2 - 1.8 mg/dL    eGFR Non African Amer 81 >60 mL/min/1.73    Globulin 3.1 gm/dL    A/G Ratio 1.5 1.5 - 2.5 g/dL    BUN/Creatinine Ratio 18.7 7.0 - 25.0    Anion Gap 7.9 3.6 - 11.2 mmol/L   Hemoglobin A1c   Result Value Ref Range    Hemoglobin A1C 10.70 (H) 4.50 - 5.70 %   Lipid Panel   Result Value Ref Range    Total Cholesterol 159 0 - 200 mg/dL    Triglycerides 153 (H) 0 - 150 mg/dL    HDL Cholesterol 37 (L) 60 - 100 mg/dL    LDL Cholesterol  91 0 - 100 mg/dL    VLDL Cholesterol 30.6 mg/dL    LDL/HDL Ratio 2.47    MicroAlbumin, Urine, Random - Urine, Clean Catch   Result Value Ref Range    Microalbumin, Urine 18.1 mg/L   TSH   Result Value Ref Range    TSH 2.339 0.550 - 4.780 mIU/mL   Vitamin B12   Result Value Ref Range    Vitamin B-12 776 211 - 911 pg/mL   Vitamin D 25 Hydroxy   Result Value Ref Range    25 Hydroxy, Vitamin D 36.0 ng/ml   CBC Auto Differential   Result Value Ref Range    WBC 6.59 4.50 - 12.50 10*3/mm3    RBC 4.93 4.20 - 5.40 10*6/mm3    Hemoglobin 15.3 12.0 - 16.0 g/dL    Hematocrit 43.2 37.0 - 47.0 %    MCV 87.6 80.0 - 94.0 fL    MCH 31.0 27.0 - 33.0 pg    MCHC 35.4 33.0 - 37.0 g/dL    RDW 11.8 11.5 - 14.5 %    RDW-SD 37.2 37.0 - 54.0 fl    MPV 10.8 (H) 6.0 - 10.0 fL    Platelets  253 130 - 400 10*3/mm3    Neutrophil % 56.2 30.0 - 70.0 %    Lymphocyte % 31.0 21.0 - 51.0 %    Monocyte % 7.6 0.0 - 10.0 %    Eosinophil % 4.2 0.0 - 5.0 %    Basophil % 0.5 0.0 - 2.0 %    Immature Grans % 0.5 0.0 - 0.5 %    Neutrophils, Absolute 3.71 1.40 - 6.50 10*3/mm3    Lymphocytes, Absolute 2.04 1.00 - 3.00 10*3/mm3    Monocytes, Absolute 0.50 0.10 - 0.90 10*3/mm3    Eosinophils, Absolute 0.28 0.00 - 0.70 10*3/mm3    Basophils, Absolute 0.03 0.00 - 0.30 10*3/mm3    Immature Grans, Absolute 0.03 0.00 - 0.03 10*3/mm3   Osmolality, Calculated   Result Value Ref Range    Osmolality Calc 284.0 273.0 - 305.0 mOsm/kg     Findings and recommendations discussed with Sandra. Reviewed her recent lab results with her which included an A1C of 10.7 which is an increase from her last A1C of 9.8.  Emphasized the benefits of improving glycemic control. Discussed options with her. She will titrate her basal insulin especially morning dose until she has blood sugars consistently under 150 mg/dL. Encouraged aggressive glycemic control. Overall, she reports she is doing much better on the monthly Vitamin B12 injections. At this time, she or her  desire further evaluation.   Health Maintenance recommendations reviewed and at this time she declines. Will continue to encourage.  She will follow up with me in October with routine labs completed prior to her appointment; sooner if problems/concerns occur.

## 2018-07-07 PROBLEM — B37.31 VAGINAL YEAST INFECTION: Status: RESOLVED | Noted: 2018-06-21 | Resolved: 2018-07-07

## 2018-07-08 PROBLEM — R55 NEAR SYNCOPE: Status: RESOLVED | Noted: 2018-01-04 | Resolved: 2018-07-08

## 2018-07-08 PROBLEM — R41.3 MEMORY PROBLEM: Status: ACTIVE | Noted: 2018-07-08

## 2018-07-08 RX ORDER — ERGOCALCIFEROL 1.25 MG/1
50000 CAPSULE ORAL
Qty: 12 CAPSULE | Refills: 0 | Status: SHIPPED | OUTPATIENT
Start: 2018-07-08 | End: 2019-04-10 | Stop reason: SDUPTHER

## 2018-07-30 ENCOUNTER — CLINICAL SUPPORT (OUTPATIENT)
Dept: FAMILY MEDICINE CLINIC | Facility: CLINIC | Age: 53
End: 2018-07-30

## 2018-07-30 DIAGNOSIS — E53.8 VITAMIN B 12 DEFICIENCY: ICD-10-CM

## 2018-07-30 PROCEDURE — 96372 THER/PROPH/DIAG INJ SC/IM: CPT | Performed by: GENERAL PRACTICE

## 2018-07-30 RX ORDER — EZETIMIBE 10 MG/1
TABLET ORAL
Qty: 30 TABLET | Refills: 0 | Status: SHIPPED | OUTPATIENT
Start: 2018-07-30 | End: 2018-08-27 | Stop reason: SDUPTHER

## 2018-07-30 RX ADMIN — CYANOCOBALAMIN 1000 MCG: 1000 INJECTION, SOLUTION INTRAMUSCULAR; SUBCUTANEOUS at 09:51

## 2018-08-27 DIAGNOSIS — M25.50 ARTHRALGIA, UNSPECIFIED JOINT: ICD-10-CM

## 2018-08-27 RX ORDER — EZETIMIBE 10 MG/1
TABLET ORAL
Qty: 30 TABLET | Refills: 0 | Status: SHIPPED | OUTPATIENT
Start: 2018-08-27 | End: 2018-10-01 | Stop reason: SDUPTHER

## 2018-08-27 RX ORDER — MELOXICAM 7.5 MG/1
TABLET ORAL
Qty: 60 TABLET | Refills: 0 | Status: SHIPPED | OUTPATIENT
Start: 2018-08-27 | End: 2020-02-13

## 2018-08-30 ENCOUNTER — CLINICAL SUPPORT (OUTPATIENT)
Dept: FAMILY MEDICINE CLINIC | Facility: CLINIC | Age: 53
End: 2018-08-30

## 2018-08-30 DIAGNOSIS — E53.8 VITAMIN B12 DEFICIENCY: Primary | ICD-10-CM

## 2018-08-30 PROCEDURE — 96372 THER/PROPH/DIAG INJ SC/IM: CPT | Performed by: GENERAL PRACTICE

## 2018-08-30 RX ADMIN — CYANOCOBALAMIN 1000 MCG: 1000 INJECTION, SOLUTION INTRAMUSCULAR; SUBCUTANEOUS at 08:08

## 2018-09-26 ENCOUNTER — CLINICAL SUPPORT (OUTPATIENT)
Dept: FAMILY MEDICINE CLINIC | Facility: CLINIC | Age: 53
End: 2018-09-26

## 2018-09-26 DIAGNOSIS — E53.8 VITAMIN B 12 DEFICIENCY: Primary | ICD-10-CM

## 2018-09-26 PROCEDURE — 96372 THER/PROPH/DIAG INJ SC/IM: CPT | Performed by: GENERAL PRACTICE

## 2018-09-26 RX ORDER — CYANOCOBALAMIN 1000 UG/ML
1000 INJECTION, SOLUTION INTRAMUSCULAR; SUBCUTANEOUS
Status: DISCONTINUED | OUTPATIENT
Start: 2018-09-26 | End: 2019-01-02

## 2018-09-26 RX ADMIN — CYANOCOBALAMIN 1000 MCG: 1000 INJECTION, SOLUTION INTRAMUSCULAR; SUBCUTANEOUS at 13:57

## 2018-10-01 RX ORDER — EZETIMIBE 10 MG/1
TABLET ORAL
Qty: 30 TABLET | Refills: 5 | Status: SHIPPED | OUTPATIENT
Start: 2018-10-01 | End: 2019-04-10 | Stop reason: SDUPTHER

## 2018-10-05 ENCOUNTER — LAB (OUTPATIENT)
Dept: FAMILY MEDICINE CLINIC | Facility: CLINIC | Age: 53
End: 2018-10-05

## 2018-10-05 DIAGNOSIS — E53.8 VITAMIN B 12 DEFICIENCY: ICD-10-CM

## 2018-10-05 DIAGNOSIS — E55.9 VITAMIN D DEFICIENCY: ICD-10-CM

## 2018-10-05 DIAGNOSIS — E78.5 HYPERLIPIDEMIA, UNSPECIFIED HYPERLIPIDEMIA TYPE: Chronic | ICD-10-CM

## 2018-10-05 DIAGNOSIS — R42 DIZZINESS: ICD-10-CM

## 2018-10-05 DIAGNOSIS — E08.40 DIABETES MELLITUS DUE TO UNDERLYING CONDITION WITH DIABETIC NEUROPATHY, WITHOUT LONG-TERM CURRENT USE OF INSULIN (HCC): ICD-10-CM

## 2018-10-05 LAB
25(OH)D3 SERPL-MCNC: 43 NG/ML
ALBUMIN SERPL-MCNC: 4.6 G/DL (ref 3.5–5)
ALBUMIN UR-MCNC: 11.3 MG/L
ALBUMIN/GLOB SERPL: 1.5 G/DL (ref 1.5–2.5)
ALP SERPL-CCNC: 122 U/L (ref 35–104)
ALT SERPL W P-5'-P-CCNC: 45 U/L (ref 10–36)
ANION GAP SERPL CALCULATED.3IONS-SCNC: 8.3 MMOL/L (ref 3.6–11.2)
AST SERPL-CCNC: 37 U/L (ref 10–30)
BASOPHILS # BLD AUTO: 0.03 10*3/MM3 (ref 0–0.3)
BASOPHILS NFR BLD AUTO: 0.4 % (ref 0–2)
BILIRUB SERPL-MCNC: 0.5 MG/DL (ref 0.2–1.8)
BUN BLD-MCNC: 13 MG/DL (ref 7–21)
BUN/CREAT SERPL: 18.1 (ref 7–25)
CALCIUM SPEC-SCNC: 8.9 MG/DL (ref 7.7–10)
CHLORIDE SERPL-SCNC: 104 MMOL/L (ref 99–112)
CHOLEST SERPL-MCNC: 141 MG/DL (ref 0–200)
CO2 SERPL-SCNC: 20.7 MMOL/L (ref 24.3–31.9)
CREAT BLD-MCNC: 0.72 MG/DL (ref 0.43–1.29)
DEPRECATED RDW RBC AUTO: 38.5 FL (ref 37–54)
EOSINOPHIL # BLD AUTO: 0.3 10*3/MM3 (ref 0–0.7)
EOSINOPHIL NFR BLD AUTO: 4.2 % (ref 0–5)
ERYTHROCYTE [DISTWIDTH] IN BLOOD BY AUTOMATED COUNT: 12.2 % (ref 11.5–14.5)
GFR SERPL CREATININE-BSD FRML MDRD: 84 ML/MIN/1.73
GLOBULIN UR ELPH-MCNC: 3 GM/DL
GLUCOSE BLD-MCNC: 335 MG/DL (ref 70–110)
HBA1C MFR BLD: 8.9 % (ref 4.5–5.7)
HCT VFR BLD AUTO: 43.3 % (ref 37–47)
HDLC SERPL-MCNC: 35 MG/DL (ref 60–100)
HGB BLD-MCNC: 15 G/DL (ref 12–16)
IMM GRANULOCYTES # BLD: 0.02 10*3/MM3 (ref 0–0.03)
IMM GRANULOCYTES NFR BLD: 0.3 % (ref 0–0.5)
LDLC SERPL CALC-MCNC: ABNORMAL MG/DL (ref 0–100)
LDLC/HDLC SERPL: ABNORMAL {RATIO}
LYMPHOCYTES # BLD AUTO: 2.45 10*3/MM3 (ref 1–3)
LYMPHOCYTES NFR BLD AUTO: 34 % (ref 21–51)
MCH RBC QN AUTO: 30.7 PG (ref 27–33)
MCHC RBC AUTO-ENTMCNC: 34.6 G/DL (ref 33–37)
MCV RBC AUTO: 88.7 FL (ref 80–94)
MONOCYTES # BLD AUTO: 0.65 10*3/MM3 (ref 0.1–0.9)
MONOCYTES NFR BLD AUTO: 9 % (ref 0–10)
NEUTROPHILS # BLD AUTO: 3.75 10*3/MM3 (ref 1.4–6.5)
NEUTROPHILS NFR BLD AUTO: 52.1 % (ref 30–70)
OSMOLALITY SERPL CALC.SUM OF ELEC: 279.6 MOSM/KG (ref 273–305)
PLATELET # BLD AUTO: 250 10*3/MM3 (ref 130–400)
PMV BLD AUTO: 10.8 FL (ref 6–10)
POTASSIUM BLD-SCNC: 4.2 MMOL/L (ref 3.5–5.3)
PROT SERPL-MCNC: 7.6 G/DL (ref 6–8)
RBC # BLD AUTO: 4.88 10*6/MM3 (ref 4.2–5.4)
SODIUM BLD-SCNC: 133 MMOL/L (ref 135–153)
TRIGL SERPL-MCNC: 402 MG/DL (ref 0–150)
TSH SERPL DL<=0.05 MIU/L-ACNC: 2.23 MIU/ML (ref 0.55–4.78)
VIT B12 BLD-MCNC: >2000 PG/ML (ref 211–911)
VLDLC SERPL-MCNC: ABNORMAL MG/DL
WBC NRBC COR # BLD: 7.2 10*3/MM3 (ref 4.5–12.5)

## 2018-10-05 PROCEDURE — 84443 ASSAY THYROID STIM HORMONE: CPT | Performed by: NURSE PRACTITIONER

## 2018-10-05 PROCEDURE — 85025 COMPLETE CBC W/AUTO DIFF WBC: CPT | Performed by: NURSE PRACTITIONER

## 2018-10-05 PROCEDURE — 36415 COLL VENOUS BLD VENIPUNCTURE: CPT | Performed by: GENERAL PRACTICE

## 2018-10-05 PROCEDURE — 80061 LIPID PANEL: CPT | Performed by: NURSE PRACTITIONER

## 2018-10-05 PROCEDURE — 82607 VITAMIN B-12: CPT | Performed by: NURSE PRACTITIONER

## 2018-10-05 PROCEDURE — 80053 COMPREHEN METABOLIC PANEL: CPT | Performed by: NURSE PRACTITIONER

## 2018-10-05 PROCEDURE — 82306 VITAMIN D 25 HYDROXY: CPT | Performed by: NURSE PRACTITIONER

## 2018-10-05 PROCEDURE — 83036 HEMOGLOBIN GLYCOSYLATED A1C: CPT | Performed by: NURSE PRACTITIONER

## 2018-10-05 PROCEDURE — 82043 UR ALBUMIN QUANTITATIVE: CPT | Performed by: NURSE PRACTITIONER

## 2018-10-11 ENCOUNTER — OFFICE VISIT (OUTPATIENT)
Dept: FAMILY MEDICINE CLINIC | Facility: CLINIC | Age: 53
End: 2018-10-11

## 2018-10-11 VITALS
SYSTOLIC BLOOD PRESSURE: 130 MMHG | DIASTOLIC BLOOD PRESSURE: 88 MMHG | BODY MASS INDEX: 26.51 KG/M2 | TEMPERATURE: 98 F | OXYGEN SATURATION: 100 % | HEIGHT: 69 IN | HEART RATE: 88 BPM | WEIGHT: 179 LBS

## 2018-10-11 DIAGNOSIS — E55.9 VITAMIN D DEFICIENCY: ICD-10-CM

## 2018-10-11 DIAGNOSIS — R41.3 MEMORY PROBLEM: ICD-10-CM

## 2018-10-11 DIAGNOSIS — F41.9 ANXIETY: ICD-10-CM

## 2018-10-11 DIAGNOSIS — E08.40 DIABETES MELLITUS DUE TO UNDERLYING CONDITION WITH DIABETIC NEUROPATHY, WITHOUT LONG-TERM CURRENT USE OF INSULIN (HCC): Primary | Chronic | ICD-10-CM

## 2018-10-11 DIAGNOSIS — E78.5 HYPERLIPIDEMIA, UNSPECIFIED HYPERLIPIDEMIA TYPE: Chronic | ICD-10-CM

## 2018-10-11 DIAGNOSIS — E53.8 VITAMIN B 12 DEFICIENCY: ICD-10-CM

## 2018-10-11 DIAGNOSIS — Z23 ENCOUNTER FOR VACCINATION: ICD-10-CM

## 2018-10-11 LAB
ALBUMIN SERPL-MCNC: 4.6 G/DL (ref 3.5–5)
ALBUMIN/GLOB SERPL: 1.4 G/DL (ref 1.5–2.5)
ALP SERPL-CCNC: 91 U/L (ref 35–104)
ALT SERPL W P-5'-P-CCNC: 49 U/L (ref 10–36)
ANION GAP SERPL CALCULATED.3IONS-SCNC: 7.4 MMOL/L (ref 3.6–11.2)
AST SERPL-CCNC: 35 U/L (ref 10–30)
BILIRUB SERPL-MCNC: 0.6 MG/DL (ref 0.2–1.8)
BUN BLD-MCNC: 13 MG/DL (ref 7–21)
BUN/CREAT SERPL: 17.1 (ref 7–25)
CALCIUM SPEC-SCNC: 9.2 MG/DL (ref 7.7–10)
CHLORIDE SERPL-SCNC: 104 MMOL/L (ref 99–112)
CO2 SERPL-SCNC: 26.6 MMOL/L (ref 24.3–31.9)
CREAT BLD-MCNC: 0.76 MG/DL (ref 0.43–1.29)
GFR SERPL CREATININE-BSD FRML MDRD: 79 ML/MIN/1.73
GLOBULIN UR ELPH-MCNC: 3.3 GM/DL
GLUCOSE BLD-MCNC: 237 MG/DL (ref 70–110)
OSMOLALITY SERPL CALC.SUM OF ELEC: 283.5 MOSM/KG (ref 273–305)
POTASSIUM BLD-SCNC: 3.7 MMOL/L (ref 3.5–5.3)
PROT SERPL-MCNC: 7.9 G/DL (ref 6–8)
SODIUM BLD-SCNC: 138 MMOL/L (ref 135–153)

## 2018-10-11 PROCEDURE — 80053 COMPREHEN METABOLIC PANEL: CPT | Performed by: NURSE PRACTITIONER

## 2018-10-11 PROCEDURE — 90686 IIV4 VACC NO PRSV 0.5 ML IM: CPT | Performed by: NURSE PRACTITIONER

## 2018-10-11 PROCEDURE — 36415 COLL VENOUS BLD VENIPUNCTURE: CPT | Performed by: NURSE PRACTITIONER

## 2018-10-11 PROCEDURE — G0008 ADMIN INFLUENZA VIRUS VAC: HCPCS | Performed by: NURSE PRACTITIONER

## 2018-10-11 PROCEDURE — 99214 OFFICE O/P EST MOD 30 MIN: CPT | Performed by: NURSE PRACTITIONER

## 2018-10-11 RX ORDER — ESCITALOPRAM OXALATE 5 MG/1
10 TABLET ORAL DAILY
Qty: 30 TABLET | Refills: 0 | Status: SHIPPED | OUTPATIENT
Start: 2018-10-11 | End: 2018-10-31 | Stop reason: SDUPTHER

## 2018-10-11 NOTE — PROGRESS NOTES
"  History of Present Illness   Sandra presents to the clinic today for follow up and to review lab results  related to her  DM, type 2 uncontrolled, Hyperlipidemia, and Vitamin deficiencies. In addition, she reports her episodes of dizziness have improved since her last visit. Sandra did have an appt with a LCSW to evaluate her memory loss which Sandra's reports ' She said I did not have Alzheimer\". She feels her change in memory can be contributed to the increase in stress and demands of adopting two of their grandsons. She reports she has had increase in anxiety especially in social settings. Her , Charles, is with her today and agrees that the stress of having two young children has put extra demands on Sandra.      Diabetes   She has type 2 diabetes mellitus. Her disease course has been improving  Associated symptoms include increase thirst, fatigue and  foot paresthesias. Pertinent negatives for diabetes include no blurred vision, no chest pain, no foot ulcerations, no polyphagia, no polyuria and no weakness.  Diabetic complications include peripheral neuropathy. Risk factors for coronary artery disease include diabetes mellitus, dyslipidemia, family history and post-menopausal. Current diabetic treatment includes insulin injections, oral agent (monotherapy) and diet. She is following a generally unhealthy diet with increase in refined sugars. She has had a previous visit with a dietitian. She has seen a Podiatrist. Her eye exam is not UTD due to her schedule she reports.     Hyperlipidemia   Pertinent negatives include no chest pain or shortness of breath. Current antihyperlipidemic treatment includes ezetimibe and statins. Risk factors for coronary artery disease include diabetes mellitus, dyslipidemia, family history, post-menopausal and stress.    Dizziness   Symptoms are improving.  The last episode was more than 3 months ago. She relates the episodes had always occurred at home and possibly to being " "upset.  She has had no trauma due to the episodes.  Her  has checked bp, blood sugar and pulse and they are within range. She denies any palpitations, loss of bladder/bowel control or tongue biting during these episodes.  Pertinent negatives include no abdominal pain, chest pain, chills, congestion, coughing, fever, headaches, rash, vomiting or weakness.    Anxiety    Anxiety has been gradually worsening. Symptoms include excessive worry, confusion, decreased concentration, and malaise. Sandra reports no chest pain, nausea,  palpitations, shortness of breath or suicidal ideas. The severity of symptoms is interfering with daily activities. The symptoms are aggravated by family issues and social activities.   Risk factors include a major life event.     Refer to ROS for additional information.    Vitals:    10/11/18 1436   BP: 130/88   BP Location: Right arm   Patient Position: Sitting   Cuff Size: Adult   Pulse: 88   Temp: 98 °F (36.7 °C)   TempSrc: Tympanic   SpO2: 100%   Weight: 81.2 kg (179 lb)   Height: 175.3 cm (69\")      The following portions of the patient's history were reviewed and updated as appropriate: allergies, current medications, past family history, past medical history, past social history, past surgical history and problem list.    Review of Systems   Constitutional: Positive for fatigue. Negative for activity change, appetite change, chills, fever and unexpected weight change.   HENT: Negative.    Eyes: Negative for visual disturbance.   Respiratory: Negative for cough, chest tightness and wheezing.    Cardiovascular: Negative for leg swelling.   Gastrointestinal: Negative for abdominal pain, constipation, diarrhea and vomiting.   Endocrine: Positive for polydipsia. Negative for cold intolerance, heat intolerance, polyphagia and polyuria.   Skin: Negative for color change and rash.   Neurological: Positive for weakness and numbness. Negative for tremors, speech difficulty, light-headedness " and headaches.   Hematological: Negative for adenopathy.   Psychiatric/Behavioral: Positive for confusion and decreased concentration. Negative for behavioral problems, hallucinations, self-injury and suicidal ideas. The patient is nervous/anxious.    All other systems reviewed and are negative.    Physical Exam   Constitutional: She is oriented to person, place, and time. She appears well-developed and well-nourished. No distress.   HENT:   Head: Normocephalic.   Nose: Nose normal.   Mouth/Throat: Oropharynx is clear and moist. No oropharyngeal exudate.   Eyes: Pupils are equal, round, and reactive to light. Conjunctivae and EOM are normal. Right eye exhibits no discharge. Left eye exhibits no discharge. No scleral icterus.   Neck: Neck supple. No JVD present. No thyromegaly present.   Cardiovascular: Normal rate, regular rhythm, normal heart sounds and intact distal pulses.  Exam reveals no friction rub.    No murmur heard.  Pulmonary/Chest: Effort normal and breath sounds normal. No respiratory distress. She has no wheezes. She has no rales.   Abdominal: Soft. Bowel sounds are normal. She exhibits no distension. There is no tenderness. There is no rebound and no guarding.   Musculoskeletal: She exhibits no edema.   Lymphadenopathy:     She has no cervical adenopathy.   Neurological: She is alert and oriented to person, place, and time. She has normal strength. No cranial nerve deficit.   Skin: Skin is warm and dry. No rash noted. No erythema.   Psychiatric: Her speech is normal. Her mood appears anxious. Thought content is not paranoid and not delusional. She expresses no homicidal and no suicidal ideation. She expresses no suicidal plans and no homicidal plans.   Vitals reviewed.    Assessment/Plan   Problems Addressed this Visit        Cardiovascular and Mediastinum    Hyperlipidemia       Digestive    Vitamin B 12 deficiency    Vitamin D deficiency       Endocrine    Diabetes mellitus due to underlying  condition with diabetic neuropathy (CMS/HCC) - Primary    Relevant Medications    Insulin Pen Needle (RELION PEN NEEDLE 31G/8MM) 31G X 8 MM misc    Other Relevant Orders    Comprehensive Metabolic Panel (Completed)    Osmolality, Calculated (Completed)       Other    Memory problem    Anxiety    Relevant Medications    escitalopram (LEXAPRO) 5 MG tablet      Other Visit Diagnoses     Encounter for vaccination        Influenza vaccination given today.     Relevant Orders    Fluarix/Fluzone/Afluria Quad/FluLaval Quad (Completed)        Results for orders placed or performed in visit on 10/11/18   Comprehensive Metabolic Panel   Result Value Ref Range    Glucose 237 (H) 70 - 110 mg/dL    BUN 13 7 - 21 mg/dL    Creatinine 0.76 0.43 - 1.29 mg/dL    Sodium 138 135 - 153 mmol/L    Potassium 3.7 3.5 - 5.3 mmol/L    Chloride 104 99 - 112 mmol/L    CO2 26.6 24.3 - 31.9 mmol/L    Calcium 9.2 7.7 - 10.0 mg/dL    Total Protein 7.9 6.0 - 8.0 g/dL    Albumin 4.60 3.50 - 5.00 g/dL    ALT (SGPT) 49 (H) 10 - 36 U/L    AST (SGOT) 35 (H) 10 - 30 U/L    Alkaline Phosphatase 91 35 - 104 U/L    Total Bilirubin 0.6 0.2 - 1.8 mg/dL    eGFR Non African Amer 79 >60 mL/min/1.73    Globulin 3.3 gm/dL    A/G Ratio 1.4 (L) 1.5 - 2.5 g/dL    BUN/Creatinine Ratio 17.1 7.0 - 25.0    Anion Gap 7.4 3.6 - 11.2 mmol/L   Osmolality, Calculated   Result Value Ref Range    Osmolality Calc 283.5 273.0 - 305.0 mOsm/kg     Findings and recommendations discussed with Sandra and Charles. Reviewed her recent lab results which does show an improved A1C from 10.7 to 8.9. Lipid panel reviewed with noted Triglycerides of over 400 mg/dL. Sandra admits she has just been eating a great deal of sweets including ice cream. Reinforced lifestyle modifications including nutrition and exercise.   Treatment options reviewed for anxiety. Will start Lexapro and return in four weeks for reevaluation. Discussed she may begin with 1/2 tablet and increase. S/E reviewed.  Counseled  regarding stress and schedule management. She will return in four weeks for reevaluation. Repeated CMP due to her Na+ level <135 mg/dL with her routine labs. Today's Na+ level is above normal with a RBS of 237 mg/dL which is an improvement from her more recent labs.

## 2018-10-15 ENCOUNTER — TELEPHONE (OUTPATIENT)
Dept: FAMILY MEDICINE CLINIC | Facility: CLINIC | Age: 53
End: 2018-10-15

## 2018-10-15 NOTE — TELEPHONE ENCOUNTER
Pt is aware of this information.   ----- Message from ASH Chaudhry sent at 10/14/2018  7:20 PM EDT -----  Regarding: Lab results and f/u appt  Diana,     Would you please call Sandra to let her know her Sodium level is good now and her blood sugar was 237 mg/dL on Thursday. I also had requested she return in 4 weeks due to starting Lexapro and her f/u was scheduled for January. She needs an appt for Nov 7th and Charles needs to schedule a lab appt for appt one week prior to her visit so I can check his levels.  thanks

## 2018-10-31 DIAGNOSIS — F41.9 ANXIETY: ICD-10-CM

## 2018-10-31 RX ORDER — ESCITALOPRAM OXALATE 10 MG/1
10 TABLET ORAL DAILY
Qty: 30 TABLET | Refills: 2 | Status: SHIPPED | OUTPATIENT
Start: 2018-10-31 | End: 2018-11-07 | Stop reason: SDUPTHER

## 2018-11-05 ENCOUNTER — CLINICAL SUPPORT (OUTPATIENT)
Dept: FAMILY MEDICINE CLINIC | Facility: CLINIC | Age: 53
End: 2018-11-05

## 2018-11-05 DIAGNOSIS — E53.8 VITAMIN B 12 DEFICIENCY: ICD-10-CM

## 2018-11-05 PROCEDURE — 96372 THER/PROPH/DIAG INJ SC/IM: CPT | Performed by: NURSE PRACTITIONER

## 2018-11-05 RX ADMIN — CYANOCOBALAMIN 1000 MCG: 1000 INJECTION, SOLUTION INTRAMUSCULAR; SUBCUTANEOUS at 08:05

## 2018-11-07 ENCOUNTER — OFFICE VISIT (OUTPATIENT)
Dept: FAMILY MEDICINE CLINIC | Facility: CLINIC | Age: 53
End: 2018-11-07

## 2018-11-07 VITALS
HEART RATE: 92 BPM | OXYGEN SATURATION: 96 % | TEMPERATURE: 97 F | WEIGHT: 178 LBS | SYSTOLIC BLOOD PRESSURE: 118 MMHG | BODY MASS INDEX: 26.36 KG/M2 | DIASTOLIC BLOOD PRESSURE: 70 MMHG | HEIGHT: 69 IN

## 2018-11-07 DIAGNOSIS — E55.9 VITAMIN D DEFICIENCY: ICD-10-CM

## 2018-11-07 DIAGNOSIS — R41.3 MEMORY PROBLEM: ICD-10-CM

## 2018-11-07 DIAGNOSIS — F41.9 ANXIETY: ICD-10-CM

## 2018-11-07 DIAGNOSIS — E08.40 DIABETES MELLITUS DUE TO UNDERLYING CONDITION WITH DIABETIC NEUROPATHY, WITHOUT LONG-TERM CURRENT USE OF INSULIN (HCC): Primary | Chronic | ICD-10-CM

## 2018-11-07 DIAGNOSIS — E53.8 VITAMIN B 12 DEFICIENCY: ICD-10-CM

## 2018-11-07 DIAGNOSIS — E78.5 HYPERLIPIDEMIA, UNSPECIFIED HYPERLIPIDEMIA TYPE: Chronic | ICD-10-CM

## 2018-11-07 DIAGNOSIS — Z23 ENCOUNTER FOR IMMUNIZATION: ICD-10-CM

## 2018-11-07 PROCEDURE — 99214 OFFICE O/P EST MOD 30 MIN: CPT | Performed by: NURSE PRACTITIONER

## 2018-11-07 RX ORDER — ESCITALOPRAM OXALATE 10 MG/1
10 TABLET ORAL DAILY
Qty: 30 TABLET | Refills: 2 | Status: SHIPPED | OUTPATIENT
Start: 2018-11-07 | End: 2019-04-10 | Stop reason: SDUPTHER

## 2018-11-07 NOTE — PROGRESS NOTES
History of Present Illness   Sandra presents to the clinic today for follow up in relation to her Diabetes, Hyperlipidemia, and Anxiety. She was started on Lexapro at her last visit because of her increase in anxiety, decreased concentration and fatigue. She reports she is feeling much better with improvement in her concentration, memory, fatigue and decreased anxiety.       Diabetes   She has type 2 diabetes mellitus. Her disease course has been improving  Associated symptoms include increase thirst, fatigue and  foot paresthesias. Pertinent negatives for diabetes include no blurred vision, no chest pain, no foot ulcerations, no polyphagia, no polyuria and no weakness.  Diabetic complications include peripheral neuropathy. Risk factors for coronary artery disease include diabetes mellitus, dyslipidemia, family history and post-menopausal. Current diabetic treatment includes insulin injections, oral agent (monotherapy) and diet. She reports the family is eating much better with less refined sugars. She has had a previous visit with a dietitian. She has seen a Podiatrist. Her eye exam is not UTD due to her schedule she reports.     Hyperlipidemia   Pertinent negatives include no chest pain or shortness of breath. Current antihyperlipidemic treatment includes ezetimibe and statins. Risk factors for coronary artery disease include diabetes mellitus, dyslipidemia, family history, post-menopausal and stress.    Dizziness   Symptoms are improving.  The last episode was more than 4months ago. She relates the episodes had always occurred at home and possibly to being upset.  She has had no trauma due to the episodes.  Her  has checked bp, blood sugar and pulse and they are within range. She denies any palpitations, loss of bladder/bowel control or tongue biting during these episodes.  Pertinent negatives include no abdominal pain, chest pain, chills, congestion, coughing, fever, headaches, rash, vomiting or  "weakness.    Anxiety    Anxiety has been gradually worsening. Symptoms include excessive worry, confusion, decreased concentration, and malaise. Sandra reports no chest pain, nausea,  palpitations, shortness of breath or suicidal ideas. The severity of symptoms is interfering with daily activities. The symptoms are aggravated by family issues and social activities.   Risk factors include a major life event.     Vitals:    11/07/18 0834   BP: 118/70   Pulse: 92   Temp: 97 °F (36.1 °C)   TempSrc: Temporal Artery    SpO2: 96%   Weight: 80.7 kg (178 lb)   Height: 175.3 cm (69\")      The following portions of the patient's history were reviewed and updated as appropriate: allergies, current medications, past family history, past medical history, past social history, past surgical history and problem list.    Review of Systems   Constitutional: Positive for activity change (Increased ). Negative for appetite change, chills, fatigue (Improving) and unexpected weight change.   HENT: Negative.    Eyes: Negative for visual disturbance.   Respiratory: Negative for cough, shortness of breath and wheezing.    Cardiovascular: Negative for chest pain, palpitations and leg swelling.   Gastrointestinal: Negative for nausea and vomiting.   Endocrine: Negative for cold intolerance, heat intolerance, polydipsia, polyphagia and polyuria.   Musculoskeletal: Positive for arthralgias (Relieved by Mobic prn).   Skin: Negative for color change and rash.   Neurological: Positive for numbness. Negative for dizziness, tremors, speech difficulty, weakness, light-headedness and headaches.   Psychiatric/Behavioral: Negative for suicidal ideas. Confusion: Improving. The patient is not nervous/anxious (Improving).    All other systems reviewed and are negative.    Physical Exam   Constitutional: She is oriented to person, place, and time. She appears well-developed and well-nourished. No distress.   HENT:   Head: Normocephalic.   Nose: Nose normal. "   Mouth/Throat: Oropharynx is clear and moist.   Eyes: Pupils are equal, round, and reactive to light. Conjunctivae are normal. Right eye exhibits no discharge. Left eye exhibits no discharge. No scleral icterus.   Neck: Neck supple. No JVD present. No thyromegaly present.   Cardiovascular: Normal rate, regular rhythm and normal heart sounds.  Exam reveals no friction rub.    No murmur heard.  Pulmonary/Chest: Effort normal and breath sounds normal. No respiratory distress. She has no wheezes. She has no rales.   Abdominal: Soft. Bowel sounds are normal. She exhibits no distension. There is no tenderness. There is no rebound and no guarding.   Musculoskeletal: She exhibits no edema.   Lymphadenopathy:     She has no cervical adenopathy.   Neurological: She is alert and oriented to person, place, and time.   Skin: Skin is warm and dry. Capillary refill takes less than 2 seconds. No rash noted. No erythema.   Psychiatric: She has a normal mood and affect. Her behavior is normal. Judgment and thought content normal.   Vitals reviewed.    Assessment/Plan   Problems Addressed this Visit        Cardiovascular and Mediastinum    Hyperlipidemia    Relevant Orders    Lipid Panel       Digestive    Vitamin B 12 deficiency    Relevant Orders    Vitamin B12    Vitamin D deficiency    Relevant Orders    Vitamin D 25 Hydroxy       Endocrine    Diabetes mellitus due to underlying condition with diabetic neuropathy (CMS/[facility]) - Primary    Relevant Orders    Ambulatory Referral to Ophthalmology (Completed)    Comprehensive Metabolic Panel    TSH    Hemoglobin A1c    MicroAlbumin, Urine, Random - Urine, Clean Catch       Other    Memory problem    Relevant Orders    CBC & Differential    Anxiety    Relevant Medications    escitalopram (LEXAPRO) 10 MG tablet      Other Visit Diagnoses     Encounter for immunization        Relevant Orders    Pneumococcal Conjugate Vaccine 13-Valent All        Findings and recommendations reviewed with  Sandra. Lifestyle modifications reinforced including nutrition and mental and physical activity. Healthcare maintenance reviewed. Prevnar 13 ordered. [facility]AN referral placed. Sandra will return in January for f/u with routine labs prior to her visit; sooner if problems/concerns occur.

## 2018-11-07 NOTE — PATIENT INSTRUCTIONS
Type 2 Diabetes Mellitus, Self Care, Adult  When you have type 2 diabetes (type 2 diabetes mellitus), you must keep your blood sugar (glucose) under control. You can do this with:  · Nutrition.  · Exercise.  · Lifestyle changes.  · Medicines or insulin, if needed.  · Support from your doctors and others.    How do I manage my blood sugar?  · Check your blood sugar level every day, as often as told.  · Call your doctor if your blood sugar is above your goal numbers for 2 tests in a row.  · Have your A1c (hemoglobin A1c) level checked at least two times a year. Have it checked more often if your doctor tells you to.  Your doctor will set treatment goals for you. Generally, you should have these blood sugar levels:  · Before meals (preprandial):  mg/dL (4.4-7.2 mmol/L).  · After meals (postprandial): lower than 180 mg/dL (10 mmol/L).  · A1c level: less than 7%.    What do I need to know about high blood sugar?  High blood sugar is called hyperglycemia. Know the signs of high blood sugar. Signs may include:  · Feeling:  ? Thirsty.  ? Hungry.  ? Very tired.  · Needing to pee (urinate) more than usual.  · Blurry vision.    What do I need to know about low blood sugar?  Low blood sugar is called hypoglycemia. This is when blood sugar is at or below 70 mg/dL (3.9 mmol/L). Symptoms may include:  · Feeling:  ? Hungry.  ? Worried or nervous (anxious).  ? Sweaty and clammy.  ? Confused.  ? Dizzy.  ? Sleepy.  ? Sick to your stomach (nauseous).  · Having:  ? A fast heartbeat (palpitations).  ? A headache.  ? A change in your vision.  ? Jerky movements that you cannot control (seizure).  ? Nightmares.  ? Tingling or no feeling (numbness) around the mouth, lips, or tongue.  · Having trouble with:  ? Talking.  ? Paying attention (concentrating).  ? Moving (coordination).  ? Sleeping.  · Shaking.  · Passing out (fainting).  · Getting upset easily (irritability).    Treating low blood sugar    To treat low blood sugar, eat or  drink something sugary right away. If you can think clearly and swallow safely, follow the 15:15 rule:  · Take 15 grams of a fast-acting carb (carbohydrate). Some fast-acting carbs are:  ? 1 tube of glucose gel.  ? 3 sugar tablets (glucose pills).  ? 6-8 pieces of hard candy.  ? 4 oz (120 mL) of fruit juice.  ? 4 oz (120 mL) regular (not diet) soda.  · Check your blood sugar 15 minutes after you take the carb.  · If your blood sugar is still at or below 70 mg/dL (3.9 mmol/L), take 15 grams of a carb again.  · If your blood sugar does not go above 70 mg/dL (3.9 mmol/L) after 3 tries, get help right away.  · After your blood sugar goes back to normal, eat a meal or a snack within 1 hour.    Treating very low blood sugar  If your blood sugar is at or below 54 mg/dL (3 mmol/L), you have very low blood sugar (severe hypoglycemia). This is an emergency. Do not wait to see if the symptoms will go away. Get medical help right away. Call your local emergency services (911 in the [place] Do not drive yourself to the hospital.  If you have very low blood sugar and you cannot eat or drink, you may need a glucagon shot (injection). A family member or friend should learn how to check your blood sugar and how to give you a glucagon shot. Ask your doctor if you need to have a glucagon shot kit at home.  What else is important to manage my diabetes?  Medicine  Follow these instructions about insulin and diabetes medicines:  · Take them as told by your doctor.  · Adjust them as told by your doctor.  · Do not run out of them.    Having diabetes can raise your risk for other long-term conditions. These include heart or kidney disease. Your doctor may prescribe medicines to help prevent problems from diabetes.  Food    · Make healthy food choices. These include:  ? Chicken, fish, egg whites, and beans.  ? Oats, whole wheat, bulgur, brown rice, quinoa, and millet.  ? Fresh fruits and vegetables.  ? Low-fat dairy products.  ? Nuts,  avocado, olive oil, and canola oil.  · Make a food plan with a specialist (dietitian).  · Follow instructions from your doctor about what you cannot eat or drink.  · Drink enough fluid to keep your pee (urine) clear or pale yellow.  · Eat healthy snacks between healthy meals.  · Keep track of carbs that you eat. Read food labels. Learn food serving sizes.  · Follow your sick day plan when you cannot eat or drink normally. Make this plan with your doctor so it is ready to use.  Activity  · Exercise at least 3 times a week.  · Do not go more than 2 days without exercising.  · Talk with your doctor before you start a new exercise. Your doctor may need to adjust your insulin, medicines, or food.  Lifestyle    · Do not use any tobacco products. These include cigarettes, chewing tobacco, and e-cigarettes.If you need help quitting, ask your doctor.  · Ask your doctor how much alcohol is safe for you.  · Learn to deal with stress. If you need help with this, ask your doctor.  Body care  · Stay up to date with your shots (immunizations).  · Have your eyes and feet checked by a doctor as often as told.  · Check your skin and feet every day. Check for cuts, bruises, redness, blisters, or sores.  · Brush your teeth and gums two times a day.  · Floss at least one time a day.  · Go to the dentist least one time every 6 months.  · Stay at a healthy weight.  General instructions    · Take over-the-counter and prescription medicines only as told by your doctor.  · Share your diabetes care plan with:  ? Your work or school.  ? People you live with.  · Check your pee (urine) for ketones:  ? When you are sick.  ? As told by your doctor.  · Carry a card or wear jewelry that says that you have diabetes.  · Ask your doctor:  ? Do I need to meet with a diabetes educator?  ? Where can I find a support group for people with diabetes?  · Keep all follow-up visits as told by your doctor. This is important.  Where to find more information:  To  "learn more about diabetes, visit:  · American Diabetes Association: www.diabetes.org  · American Association of Diabetes Educators: www.diabeteseducator.org/patient-resources    This information is not intended to replace advice given to you by your health care provider. Make sure you discuss any questions you have with your health care provider.  Document Released: 04/10/2017 Document Revised: 05/25/2017 Document Reviewed: 01/20/2017  [employer] Interactive Patient Education © 2018 [employer]  DASH Eating Plan  DASH stands for \"Dietary Approaches to Stop Hypertension.\" The DASH eating plan is a healthy eating plan that has been shown to reduce high blood pressure (hypertension). It may also reduce your risk for type 2 diabetes, heart disease, and stroke. The DASH eating plan may also help with weight loss.  What are tips for following this plan?  General guidelines  · Avoid eating more than 2,300 mg (milligrams) of salt (sodium) a day. If you have hypertension, you may need to reduce your sodium intake to 1,500 mg a day.  · Limit alcohol intake to no more than 1 drink a day for nonpregnant women and 2 drinks a day for men. One drink equals 12 oz of beer, 5 oz of wine, or 1½ oz of hard liquor.  · Work with your health care provider to maintain a healthy body weight or to lose weight. Ask what an ideal weight is for you.  · Get at least 30 minutes of exercise that causes your heart to beat faster (aerobic exercise) most days of the week. Activities may include walking, swimming, or biking.  · Work with your health care provider or diet and nutrition specialist (dietitian) to adjust your eating plan to your individual calorie needs.  Reading food labels  · Check food labels for the amount of sodium per serving. Choose foods with less than 5 percent of the Daily Value of sodium. Generally, foods with less than 300 mg of sodium per serving fit into this eating plan.  · To find whole grains, look for the word \"whole\" as " "the first word in the ingredient list.  Shopping  · Buy products labeled as \"low-sodium\" or \"no salt added.\"  · Buy fresh foods. Avoid canned foods and premade or frozen meals.  Cooking  · Avoid adding salt when cooking. Use salt-free seasonings or herbs instead of table salt or sea salt. Check with your health care provider or pharmacist before using salt substitutes.  · Do not garcía foods. Cook foods using healthy methods such as baking, boiling, grilling, and broiling instead.  · Cook with heart-healthy oils, such as olive, canola, soybean, or sunflower oil.  Meal planning    · Eat a balanced diet that includes:  ? 5 or more servings of fruits and vegetables each day. At each meal, try to fill half of your plate with fruits and vegetables.  ? Up to 6-8 servings of whole grains each day.  ? Less than 6 oz of lean meat, poultry, or fish each day. A 3-oz serving of meat is about the same size as a deck of cards. One egg equals 1 oz.  ? 2 servings of low-fat dairy each day.  ? A serving of nuts, seeds, or beans 5 times each week.  ? Heart-healthy fats. Healthy fats called Omega-3 fatty acids are found in foods such as flaxseeds and coldwater fish, like sardines, salmon, and mackerel.  · Limit how much you eat of the following:  ? Canned or prepackaged foods.  ? Food that is high in trans fat, such as fried foods.  ? Food that is high in saturated fat, such as fatty meat.  ? Sweets, desserts, sugary drinks, and other foods with added sugar.  ? Full-fat dairy products.  · Do not salt foods before eating.  · Try to eat at least 2 vegetarian meals each week.  · Eat more home-cooked food and less restaurant, buffet, and fast food.  · When eating at a restaurant, ask that your food be prepared with less salt or no salt, if possible.  What foods are recommended?  The items listed may not be a complete list. Talk with your dietitian about what dietary choices are best for you.  Grains  Whole-grain or whole-wheat bread. " Whole-grain or whole-wheat pasta. Brown rice. Oatmeal. Quinoa. Bulgur. Whole-grain and low-sodium cereals. Kristine bread. Low-fat, low-sodium crackers. Whole-wheat flour tortillas.  Vegetables  Fresh or frozen vegetables (raw, steamed, roasted, or grilled). Low-sodium or reduced-sodium tomato and vegetable juice. Low-sodium or reduced-sodium tomato sauce and tomato paste. Low-sodium or reduced-sodium canned vegetables.  Fruits  All fresh, dried, or frozen fruit. Canned fruit in natural juice (without added sugar).  Meat and other protein foods  Skinless chicken or turkey. Ground chicken or turkey. Pork with fat trimmed off. Fish and seafood. Egg whites. Dried beans, peas, or lentils. Unsalted nuts, nut butters, and seeds. Unsalted canned beans. Lean cuts of beef with fat trimmed off. Low-sodium, lean deli meat.  Dairy  Low-fat (1%) or fat-free (skim) milk. Fat-free, low-fat, or reduced-fat cheeses. Nonfat, low-sodium ricotta or cottage cheese. Low-fat or nonfat yogurt. Low-fat, low-sodium cheese.  Fats and oils  Soft margarine without trans fats. Vegetable oil. Low-fat, reduced-fat, or light mayonnaise and salad dressings (reduced-sodium). Canola, safflower, olive, soybean, and sunflower oils. Avocado.  Seasoning and other foods  Herbs. Spices. Seasoning mixes without salt. Unsalted popcorn and pretzels. Fat-free sweets.  What foods are not recommended?  The items listed may not be a complete list. Talk with your dietitian about what dietary choices are best for you.  Grains  Baked goods made with fat, such as croissants, muffins, or some breads. Dry pasta or rice meal packs.  Vegetables  Creamed or fried vegetables. Vegetables in a cheese sauce. Regular canned vegetables (not low-sodium or reduced-sodium). Regular canned tomato sauce and paste (not low-sodium or reduced-sodium). Regular tomato and vegetable juice (not low-sodium or reduced-sodium). Pickles. Olives.  Fruits  Canned fruit in a light or heavy syrup.  Fried fruit. Fruit in cream or butter sauce.  Meat and other protein foods  Fatty cuts of meat. Ribs. Fried meat. Ascencio. Sausage. Bologna and other processed lunch meats. Salami. Fatback. Hotdogs. Bratwurst. Salted nuts and seeds. Canned beans with added salt. Canned or smoked fish. Whole eggs or egg yolks. Chicken or turkey with skin.  Dairy  Whole or 2% milk, cream, and half-and-half. Whole or full-fat cream cheese. Whole-fat or sweetened yogurt. Full-fat cheese. Nondairy creamers. Whipped toppings. Processed cheese and cheese spreads.  Fats and oils  Butter. Stick margarine. Lard. Shortening. Ghee. Ascencio fat. Tropical oils, such as coconut, palm kernel, or palm oil.  Seasoning and other foods  Salted popcorn and pretzels. Onion salt, garlic salt, seasoned salt, table salt, and sea salt. Worcestershire sauce. Tartar sauce. Barbecue sauce. Teriyaki sauce. Soy sauce, including reduced-sodium. Steak sauce. Canned and packaged gravies. Fish sauce. Oyster sauce. Cocktail sauce. Horseradish that you find on the shelf. Ketchup. Mustard. Meat flavorings and tenderizers. Bouillon cubes. Hot sauce and Tabasco sauce. Premade or packaged marinades. Premade or packaged taco seasonings. Relishes. Regular salad dressings.  Where to find more information:  · National Heart, Lung, [employer] [place]: www.nhlbi.nih.gov  · American Heart Association: www.heart.org  Summary  · The DASH eating plan is a healthy eating plan that has been shown to reduce high blood pressure (hypertension). It may also reduce your risk for type 2 diabetes, heart disease, and stroke.  · With the DASH eating plan, you should limit salt (sodium) intake to 2,300 mg a day. If you have hypertension, you may need to reduce your sodium intake to 1,500 mg a day.  · When on the DASH eating plan, aim to eat more fresh fruits and vegetables, whole grains, lean proteins, low-fat dairy, and heart-healthy fats.  · Work with your health care provider or diet and  nutrition specialist (dietitian) to adjust your eating plan to your individual calorie needs.  This information is not intended to replace advice given to you by your health care provider. Make sure you discuss any questions you have with your health care provider.  Document Released: 12/06/2012 Document Revised: 12/11/2017 Document Reviewed: 12/11/2017  [employer] Interactive Patient Education © 2018 [employer]  Stress and Stress Management  Stress is a normal reaction to life events. It is what you feel when life demands more than you are used to or more than you can handle. Some stress can be useful. For example, the stress reaction can help you catch the last bus of the day, study for a test, or meet a deadline at work. But stress that occurs too often or for too long can cause problems. It can affect your emotional health and interfere with relationships and normal daily activities. Too much stress can weaken your immune system and increase your risk for physical illness. If you already have a medical problem, stress can make it worse.  What are the causes?  All sorts of life events may cause stress. An event that causes stress for one person may not be stressful for another person. Major life events commonly cause stress. These may be positive or negative. Examples include losing your job, moving into a new home, getting , having a baby, or losing a loved one. Less obvious life events may also cause stress, especially if they occur day after day or in combination. Examples include working long hours, driving in traffic, caring for children, being in debt, or being in a difficult relationship.  What are the signs or symptoms?  Stress may cause emotional symptoms including, the following:  · Anxiety. This is feeling worried, afraid, on edge, overwhelmed, or out of control.  · Anger. This is feeling irritated or impatient.  · Depression. This is feeling sad, down, helpless, or guilty.  · Difficulty  focusing, remembering, or making decisions.    Stress may cause physical symptoms, including the following:  · Aches and pains. These may affect your head, neck, back, stomach, or other areas of your body.  · Tight muscles or clenched jaw.  · Low energy or trouble sleeping.    Stress may cause unhealthy behaviors, including the following:  · Eating to feel better (overeating) or skipping meals.  · Sleeping too little, too much, or both.  · Working too much or putting off tasks (procrastination).  · Smoking, drinking alcohol, or using drugs to feel better.    How is this diagnosed?  Stress is diagnosed through an assessment by your health care provider. Your health care provider will ask questions about your symptoms and any stressful life events. Your health care provider will also ask about your medical history and may order blood tests or other tests. Certain medical conditions and medicine can cause physical symptoms similar to stress. Mental illness can cause emotional symptoms and unhealthy behaviors similar to stress. Your health care provider may refer you to a mental health professional for further evaluation.  How is this treated?  Stress management is the recommended treatment for stress. The goals of stress management are reducing stressful life events and coping with stress in healthy ways.  Techniques for reducing stressful life events include the following:  · Stress identification. Self-monitor for stress and identify what causes stress for you. These skills may help you to avoid some stressful events.  · Time management. Set your priorities, keep a calendar of events, and learn to say “no.” These tools can help you avoid making too many commitments.    Techniques for coping with stress include the following:  · Rethinking the problem. Try to think realistically about stressful events rather than ignoring them or overreacting. Try to find the positives in a stressful situation rather than focusing on  the negatives.  · Exercise. Physical exercise can release both physical and emotional tension. The key is to find a form of exercise you enjoy and do it regularly.  · Relaxation techniques. These relax the body and mind. Examples include yoga, meditation, sumaya chi, biofeedback, deep breathing, progressive muscle relaxation, listening to music, being out in nature, journaling, and other hobbies. Again, the key is to find one or more that you enjoy and can do regularly.  · Healthy lifestyle. Eat a balanced diet, get plenty of sleep, and do not smoke. Avoid using alcohol or drugs to relax.  · Strong support network. Spend time with family, friends, or other people you enjoy being around. Express your feelings and talk things over with someone you trust.    Counseling or talktherapy with a mental health professional may be helpful if you are having difficulty managing stress on your own. Medicine is typically not recommended for the treatment of stress. Talk to your health care provider if you think you need medicine for symptoms of stress.  Follow these instructions at home:  · Keep all follow-up visits as directed by your health care provider.  · Take all medicines as directed by your health care provider.  Contact a health care provider if:  · Your symptoms get worse or you start having new symptoms.  · You feel overwhelmed by your problems and can no longer manage them on your own.  Get help right away if:  · You feel like hurting yourself or someone else.  This information is not intended to replace advice given to you by your health care provider. Make sure you discuss any questions you have with your health care provider.  Document Released: 06/13/2002 Document Revised: 05/25/2017 Document Reviewed: 08/12/2014  [employer] Interactive Patient Education © 2017 [employer]

## 2018-11-08 PROCEDURE — G0009 ADMIN PNEUMOCOCCAL VACCINE: HCPCS | Performed by: NURSE PRACTITIONER

## 2018-11-08 PROCEDURE — 90670 PCV13 VACCINE IM: CPT | Performed by: NURSE PRACTITIONER

## 2018-11-21 RX ORDER — INSULIN DETEMIR 100 [IU]/ML
INJECTION, SOLUTION SUBCUTANEOUS
Qty: 15 ML | Refills: 3 | Status: SHIPPED | OUTPATIENT
Start: 2018-11-21 | End: 2019-03-07 | Stop reason: SDUPTHER

## 2018-12-03 ENCOUNTER — CLINICAL SUPPORT (OUTPATIENT)
Dept: FAMILY MEDICINE CLINIC | Facility: CLINIC | Age: 53
End: 2018-12-03

## 2018-12-03 DIAGNOSIS — E53.8 VITAMIN B 12 DEFICIENCY: ICD-10-CM

## 2018-12-03 PROCEDURE — 96372 THER/PROPH/DIAG INJ SC/IM: CPT | Performed by: NURSE PRACTITIONER

## 2018-12-03 RX ADMIN — CYANOCOBALAMIN 1000 MCG: 1000 INJECTION, SOLUTION INTRAMUSCULAR; SUBCUTANEOUS at 08:03

## 2018-12-21 ENCOUNTER — LAB (OUTPATIENT)
Dept: FAMILY MEDICINE CLINIC | Facility: CLINIC | Age: 53
End: 2018-12-21

## 2018-12-21 DIAGNOSIS — E78.5 HYPERLIPIDEMIA, UNSPECIFIED HYPERLIPIDEMIA TYPE: Chronic | ICD-10-CM

## 2018-12-21 DIAGNOSIS — E55.9 VITAMIN D DEFICIENCY: ICD-10-CM

## 2018-12-21 DIAGNOSIS — E08.40 DIABETES MELLITUS DUE TO UNDERLYING CONDITION WITH DIABETIC NEUROPATHY, WITHOUT LONG-TERM CURRENT USE OF INSULIN (HCC): ICD-10-CM

## 2018-12-21 DIAGNOSIS — R41.3 MEMORY PROBLEM: ICD-10-CM

## 2018-12-21 DIAGNOSIS — E53.8 VITAMIN B 12 DEFICIENCY: ICD-10-CM

## 2018-12-21 LAB
25(OH)D3 SERPL-MCNC: 43 NG/ML
ALBUMIN SERPL-MCNC: 4.8 G/DL (ref 3.5–5)
ALBUMIN UR-MCNC: 4.1 MG/L
ALBUMIN/GLOB SERPL: 1.5 G/DL (ref 1.5–2.5)
ALP SERPL-CCNC: 75 U/L (ref 35–104)
ALT SERPL W P-5'-P-CCNC: 41 U/L (ref 10–36)
ANION GAP SERPL CALCULATED.3IONS-SCNC: 9.7 MMOL/L (ref 3.6–11.2)
AST SERPL-CCNC: 40 U/L (ref 10–30)
BASOPHILS # BLD AUTO: 0.02 10*3/MM3 (ref 0–0.3)
BASOPHILS NFR BLD AUTO: 0.3 % (ref 0–2)
BILIRUB SERPL-MCNC: 1 MG/DL (ref 0.2–1.8)
BUN BLD-MCNC: 11 MG/DL (ref 7–21)
BUN/CREAT SERPL: 12.8 (ref 7–25)
CALCIUM SPEC-SCNC: 9.6 MG/DL (ref 7.7–10)
CHLORIDE SERPL-SCNC: 102 MMOL/L (ref 99–112)
CHOLEST SERPL-MCNC: 146 MG/DL (ref 0–200)
CO2 SERPL-SCNC: 27.3 MMOL/L (ref 24.3–31.9)
CREAT BLD-MCNC: 0.86 MG/DL (ref 0.43–1.29)
DEPRECATED RDW RBC AUTO: 40.2 FL (ref 37–54)
EOSINOPHIL # BLD AUTO: 0.23 10*3/MM3 (ref 0–0.7)
EOSINOPHIL NFR BLD AUTO: 3.2 % (ref 0–5)
ERYTHROCYTE [DISTWIDTH] IN BLOOD BY AUTOMATED COUNT: 12.5 % (ref 11.5–14.5)
GFR SERPL CREATININE-BSD FRML MDRD: 69 ML/MIN/1.73
GLOBULIN UR ELPH-MCNC: 3.1 GM/DL
GLUCOSE BLD-MCNC: 161 MG/DL (ref 70–110)
HBA1C MFR BLD: 9.1 % (ref 4.5–5.7)
HCT VFR BLD AUTO: 44.1 % (ref 37–47)
HDLC SERPL-MCNC: 40 MG/DL (ref 60–100)
HGB BLD-MCNC: 15.3 G/DL (ref 12–16)
IMM GRANULOCYTES # BLD AUTO: 0.03 10*3/MM3 (ref 0–0.03)
IMM GRANULOCYTES NFR BLD AUTO: 0.4 % (ref 0–0.5)
LDLC SERPL CALC-MCNC: 76 MG/DL (ref 0–100)
LDLC/HDLC SERPL: 1.9 {RATIO}
LYMPHOCYTES # BLD AUTO: 2.19 10*3/MM3 (ref 1–3)
LYMPHOCYTES NFR BLD AUTO: 30.2 % (ref 21–51)
MCH RBC QN AUTO: 30.9 PG (ref 27–33)
MCHC RBC AUTO-ENTMCNC: 34.7 G/DL (ref 33–37)
MCV RBC AUTO: 89.1 FL (ref 80–94)
MONOCYTES # BLD AUTO: 0.7 10*3/MM3 (ref 0.1–0.9)
MONOCYTES NFR BLD AUTO: 9.6 % (ref 0–10)
NEUTROPHILS # BLD AUTO: 4.09 10*3/MM3 (ref 1.4–6.5)
NEUTROPHILS NFR BLD AUTO: 56.3 % (ref 30–70)
OSMOLALITY SERPL CALC.SUM OF ELEC: 280.4 MOSM/KG (ref 273–305)
PLATELET # BLD AUTO: 275 10*3/MM3 (ref 130–400)
PMV BLD AUTO: 10.7 FL (ref 6–10)
POTASSIUM BLD-SCNC: 3.8 MMOL/L (ref 3.5–5.3)
PROT SERPL-MCNC: 7.9 G/DL (ref 6–8)
RBC # BLD AUTO: 4.95 10*6/MM3 (ref 4.2–5.4)
SODIUM BLD-SCNC: 139 MMOL/L (ref 135–153)
TRIGL SERPL-MCNC: 150 MG/DL (ref 0–150)
TSH SERPL DL<=0.05 MIU/L-ACNC: 1.45 MIU/ML (ref 0.55–4.78)
VIT B12 BLD-MCNC: >2000 PG/ML (ref 211–911)
VLDLC SERPL-MCNC: 30 MG/DL
WBC NRBC COR # BLD: 7.26 10*3/MM3 (ref 4.5–12.5)

## 2018-12-21 PROCEDURE — 83036 HEMOGLOBIN GLYCOSYLATED A1C: CPT | Performed by: NURSE PRACTITIONER

## 2018-12-21 PROCEDURE — 82306 VITAMIN D 25 HYDROXY: CPT | Performed by: NURSE PRACTITIONER

## 2018-12-21 PROCEDURE — 84443 ASSAY THYROID STIM HORMONE: CPT | Performed by: NURSE PRACTITIONER

## 2018-12-21 PROCEDURE — 82607 VITAMIN B-12: CPT | Performed by: NURSE PRACTITIONER

## 2018-12-21 PROCEDURE — 85025 COMPLETE CBC W/AUTO DIFF WBC: CPT | Performed by: NURSE PRACTITIONER

## 2018-12-21 PROCEDURE — 80053 COMPREHEN METABOLIC PANEL: CPT | Performed by: NURSE PRACTITIONER

## 2018-12-21 PROCEDURE — 80061 LIPID PANEL: CPT | Performed by: NURSE PRACTITIONER

## 2018-12-21 PROCEDURE — 82043 UR ALBUMIN QUANTITATIVE: CPT | Performed by: NURSE PRACTITIONER

## 2019-01-02 ENCOUNTER — OFFICE VISIT (OUTPATIENT)
Dept: FAMILY MEDICINE CLINIC | Facility: CLINIC | Age: 54
End: 2019-01-02

## 2019-01-02 VITALS
TEMPERATURE: 96.8 F | DIASTOLIC BLOOD PRESSURE: 82 MMHG | SYSTOLIC BLOOD PRESSURE: 120 MMHG | HEART RATE: 86 BPM | OXYGEN SATURATION: 97 % | HEIGHT: 69 IN | WEIGHT: 176 LBS | BODY MASS INDEX: 26.07 KG/M2

## 2019-01-02 DIAGNOSIS — E53.8 VITAMIN B 12 DEFICIENCY: ICD-10-CM

## 2019-01-02 DIAGNOSIS — F41.9 ANXIETY: ICD-10-CM

## 2019-01-02 DIAGNOSIS — R41.3 MEMORY PROBLEM: ICD-10-CM

## 2019-01-02 DIAGNOSIS — E08.40 DIABETES MELLITUS DUE TO UNDERLYING CONDITION WITH DIABETIC NEUROPATHY, WITHOUT LONG-TERM CURRENT USE OF INSULIN (HCC): Primary | Chronic | ICD-10-CM

## 2019-01-02 DIAGNOSIS — E55.9 VITAMIN D DEFICIENCY: ICD-10-CM

## 2019-01-02 DIAGNOSIS — R21 RASH: ICD-10-CM

## 2019-01-02 DIAGNOSIS — Z12.39 BREAST CANCER SCREENING: ICD-10-CM

## 2019-01-02 DIAGNOSIS — E78.5 HYPERLIPIDEMIA, UNSPECIFIED HYPERLIPIDEMIA TYPE: Chronic | ICD-10-CM

## 2019-01-02 PROBLEM — R42 DIZZINESS: Status: RESOLVED | Noted: 2017-10-14 | Resolved: 2019-01-02

## 2019-01-02 PROCEDURE — 99214 OFFICE O/P EST MOD 30 MIN: CPT | Performed by: NURSE PRACTITIONER

## 2019-01-02 PROCEDURE — 96372 THER/PROPH/DIAG INJ SC/IM: CPT | Performed by: NURSE PRACTITIONER

## 2019-01-02 RX ORDER — TRIAMCINOLONE ACETONIDE 1 MG/G
CREAM TOPICAL 3 TIMES DAILY
Qty: 80 G | Refills: 5 | Status: SHIPPED | OUTPATIENT
Start: 2019-01-02 | End: 2020-02-13

## 2019-01-02 RX ORDER — CYANOCOBALAMIN 1000 UG/ML
1000 INJECTION, SOLUTION INTRAMUSCULAR; SUBCUTANEOUS
Status: SHIPPED | OUTPATIENT
Start: 2019-01-02 | End: 2019-03-27

## 2019-01-02 RX ORDER — VALACYCLOVIR HYDROCHLORIDE 1 G/1
1000 TABLET, FILM COATED ORAL 3 TIMES DAILY
Qty: 21 TABLET | Refills: 0 | Status: SHIPPED | OUTPATIENT
Start: 2019-01-02 | End: 2019-01-02 | Stop reason: ALTCHOICE

## 2019-01-02 RX ORDER — FLUCONAZOLE 150 MG/1
150 TABLET ORAL EVERY OTHER DAY
Qty: 3 TABLET | Refills: 1 | Status: SHIPPED | OUTPATIENT
Start: 2019-01-02 | End: 2019-01-07

## 2019-01-02 RX ADMIN — CYANOCOBALAMIN 1000 MCG: 1000 INJECTION, SOLUTION INTRAMUSCULAR; SUBCUTANEOUS at 09:20

## 2019-01-02 NOTE — PROGRESS NOTES
Sandra presents to the clinic today for follow up and to review lab results  related to her  DM, type 2 uncontrolled, Hyperlipidemia, and Vitamin deficiencies. In addition, she reports her memory continues to improve with the addition of Lexapro. She reports no further episodes of dizziness. She does report she awakened with a rash on her abdomen this morning.      Diabetes   She has type 2 diabetes mellitus. Her disease course has been worsening with the holidays   Associated symptoms include increase in peripheral neuropathy. Pertinent negatives for diabetes include no blurred vision, no chest pain, no foot ulcerations, no polyphagia, no polyuria and no weakness.  Diabetic complications include peripheral neuropathy. Risk factors for coronary artery disease include diabetes mellitus, dyslipidemia, family history and post-menopausal. Current diabetic treatment includes insulin injections, oral agent (monotherapy) and diet. She is following a generally unhealthy diet with increase in refined sugars. She has had a previous visit with a dietitian. She has seen a Podiatrist. Her eye exam she reports is up to date and was completed in May 2018 per Dr Lopez. The note has been requested.     Hyperlipidemia   Pertinent negatives include no chest pain or shortness of breath. Current antihyperlipidemic treatment includes ezetimibe and statins. Risk factors for coronary artery disease include diabetes mellitus, dyslipidemia, family history, post-menopausal and stress.    Anxiety    Anxiety has been gradually worsening. Symptoms include excessive worry, confusion, decreased concentration, and malaise. Sandra reports no chest pain, nausea,  palpitations, shortness of breath or suicidal ideas. The severity of symptoms is interfering with daily activities. The symptoms are aggravated by family issues and social activities.   Risk factors include a major life event.       Rash   This is a new problem. The current episode started  "today. The affected locations include the abdomen. The rash is characterized by redness, itchiness and burning. She was exposed to nothing. Pertinent negatives include no cough, diarrhea, fatigue, shortness of breath or vomiting. Past treatments include nothing.      Vitals:    01/02/19 0838   BP: 120/82   Pulse: 86   Temp: 96.8 °F (36 °C)   SpO2: 97%   Weight: 79.8 kg (176 lb)   Height: 175.3 cm (69\")        The following portions of the patient's history were reviewed and updated as appropriate: allergies, current medications, past family history, past medical history, past social history, past surgical history and problem list.    Review of Systems   Constitutional: Negative for activity change, appetite change, chills, fatigue and unexpected weight change.   HENT: Negative.    Eyes: Negative for visual disturbance.   Respiratory: Negative for cough, shortness of breath and wheezing.    Cardiovascular: Negative for chest pain, palpitations and leg swelling.   Gastrointestinal: Negative for abdominal pain, constipation, diarrhea, nausea and vomiting.   Endocrine: Negative for cold intolerance, heat intolerance, polydipsia, polyphagia and polyuria.   Skin: Positive for color change and rash.   Neurological: Positive for numbness. Negative for dizziness, tremors, speech difficulty, weakness, light-headedness and headaches.   Hematological: Negative for adenopathy.   Psychiatric/Behavioral: Positive for decreased concentration (Improving). Negative for confusion and suicidal ideas. The patient is nervous/anxious (Improving).    All other systems reviewed and are negative.    Physical Exam   Constitutional: She is oriented to person, place, and time. She appears well-developed and well-nourished. No distress.   HENT:   Head: Normocephalic.   Nose: Nose normal.   Mouth/Throat: Oropharynx is clear and moist.   Eyes: Conjunctivae and EOM are normal. Pupils are equal, round, and reactive to light. Right eye exhibits no " discharge. Left eye exhibits no discharge. No scleral icterus.   Neck: Neck supple. No JVD present. No thyromegaly present.   Cardiovascular: Normal rate, regular rhythm and normal heart sounds. Exam reveals no friction rub.   No murmur heard.  Pulmonary/Chest: Effort normal and breath sounds normal. No respiratory distress. She has no wheezes. She has no rales.   Abdominal: Soft. Bowel sounds are normal. She exhibits no distension. There is no tenderness. There is no rebound and no guarding.   Musculoskeletal: She exhibits no edema or tenderness.   Lymphadenopathy:     She has no cervical adenopathy.   Neurological: She is alert and oriented to person, place, and time.   Skin: Skin is warm and dry. Capillary refill takes less than 2 seconds. Rash noted. There is erythema.   Multiple wheal type lesions on abdomen located under right breast and right  lower abdominal area. Also, several on her left upper back area   Psychiatric: She has a normal mood and affect. Her behavior is normal. Judgment and thought content normal.   Vitals reviewed.    Assessment/Plan   Problems Addressed this Visit        Cardiovascular and Mediastinum    Hyperlipidemia       Digestive    Vitamin B 12 deficiency    Relevant Medications    cyanocobalamin injection 1,000 mcg    Vitamin D deficiency       Endocrine    Diabetes mellitus due to underlying condition with diabetic neuropathy (CMS/[facility]) - Primary       Other    Memory problem    Anxiety      Other Visit Diagnoses     Breast cancer screening        Mammogram ordered.     Relevant Orders    Mammo Screening Bilateral With CAD    Rash        Counseled regarding supportive care measures.     Relevant Medications    triamcinolone (KENALOG) 0.1 % cream        Results for orders placed or performed in visit on 12/21/18   Comprehensive Metabolic Panel   Result Value Ref Range    Glucose 161 (H) 70 - 110 mg/dL    BUN 11 7 - 21 mg/dL    Creatinine 0.86 0.43 - 1.29 mg/dL    Sodium 139 135 - 153  mmol/L    Potassium 3.8 3.5 - 5.3 mmol/L    Chloride 102 99 - 112 mmol/L    CO2 27.3 24.3 - 31.9 mmol/L    Calcium 9.6 7.7 - 10.0 mg/dL    Total Protein 7.9 6.0 - 8.0 g/dL    Albumin 4.80 3.50 - 5.00 g/dL    ALT (SGPT) 41 (H) 10 - 36 U/L    AST (SGOT) 40 (H) 10 - 30 U/L    Alkaline Phosphatase 75 35 - 104 U/L    Total Bilirubin 1.0 0.2 - 1.8 mg/dL    eGFR Non African Amer 69 >60 mL/min/1.73    Globulin 3.1 gm/dL    A/G Ratio 1.5 1.5 - 2.5 g/dL    BUN/Creatinine Ratio 12.8 7.0 - 25.0    Anion Gap 9.7 3.6 - 11.2 mmol/L   Lipid Panel   Result Value Ref Range    Total Cholesterol 146 0 - 200 mg/dL    Triglycerides 150 0 - 150 mg/dL    HDL Cholesterol 40 (L) 60 - 100 mg/dL    LDL Cholesterol  76 0 - 100 mg/dL    VLDL Cholesterol 30 mg/dL    LDL/HDL Ratio 1.90    TSH   Result Value Ref Range    TSH 1.447 0.550 - 4.780 mIU/mL   Hemoglobin A1c   Result Value Ref Range    Hemoglobin A1C 9.10 (H) 4.50 - 5.70 %   Vitamin D 25 Hydroxy   Result Value Ref Range    25 Hydroxy, Vitamin D 43.0 ng/ml   Vitamin B12   Result Value Ref Range    Vitamin B-12 >2,000 (H) 211 - 911 pg/mL   MicroAlbumin, Urine, Random - Urine, Clean Catch   Result Value Ref Range    Microalbumin, Urine 4.1 mg/L   CBC Auto Differential   Result Value Ref Range    WBC 7.26 4.50 - 12.50 10*3/mm3    RBC 4.95 4.20 - 5.40 10*6/mm3    Hemoglobin 15.3 12.0 - 16.0 g/dL    Hematocrit 44.1 37.0 - 47.0 %    MCV 89.1 80.0 - 94.0 fL    MCH 30.9 27.0 - 33.0 pg    MCHC 34.7 33.0 - 37.0 g/dL    RDW 12.5 11.5 - 14.5 %    RDW-SD 40.2 37.0 - 54.0 fl    MPV 10.7 (H) 6.0 - 10.0 fL    Platelets 275 130 - 400 10*3/mm3    Neutrophil % 56.3 30.0 - 70.0 %    Lymphocyte % 30.2 21.0 - 51.0 %    Monocyte % 9.6 0.0 - 10.0 %    Eosinophil % 3.2 0.0 - 5.0 %    Basophil % 0.3 0.0 - 2.0 %    Immature Grans % 0.4 0.0 - 0.5 %    Neutrophils, Absolute 4.09 1.40 - 6.50 10*3/mm3    Lymphocytes, Absolute 2.19 1.00 - 3.00 10*3/mm3    Monocytes, Absolute 0.70 0.10 - 0.90 10*3/mm3    Eosinophils,  Absolute 0.23 0.00 - 0.70 10*3/mm3    Basophils, Absolute 0.02 0.00 - 0.30 10*3/mm3    Immature Grans, Absolute 0.03 0.00 - 0.03 10*3/mm3   Osmolality, Calculated   Result Value Ref Range    Osmolality Calc 280.4 273.0 - 305.0 mOsm/kg   Findings and recommendations discussed with Sandra. Reviewed her lab results with her including her A1C of 9.1. Treatment options reviewed. At this time, she will increase her basal insulin slowly. Requested she phone the office in two weeks with blood glucose report. Requested eye exam note from Dr Lopez.   Lifestyle modifications reviewed including nutrition and exercise. Emphasized importance of benefits of glycemic control.   She will f/u with me in April with routine labs prior sooner if problems/concerns occur.

## 2019-01-02 NOTE — PATIENT INSTRUCTIONS
Type 2 Diabetes Mellitus, Self Care, Adult  Caring for yourself after you have been diagnosed with type 2 diabetes (type 2 diabetes mellitus) means keeping your blood sugar (glucose) under control with a balance of:  · Nutrition.  · Exercise.  · Lifestyle changes.  · Medicines or insulin, if necessary.  · Support from your team of health care providers and others.    The following information explains what you need to know to manage your diabetes at home.  What do I need to do to manage my blood glucose?  · Check your blood glucose every day, as often as told by your health care provider.  · Contact your health care provider if your blood glucose is above your target for 2 tests in a row.  · Have your A1c (hemoglobin A1c) level checked at least two times a year, or as often as told by your health care provider.  Your health care provider will set individualized treatment goals for you. Generally, the goal of treatment is to maintain the following blood glucose levels:  · Before meals (preprandial):  mg/dL (4.4-7.2 mmol/L).  · After meals (postprandial): below 180 mg/dL (10 mmol/L).  · A1c level: less than 7%.    What do I need to know about hyperglycemia and hypoglycemia?  What is hyperglycemia?  Hyperglycemia, also called high blood glucose, occurs when blood glucose is too high. Make sure you know the early signs of hyperglycemia, such as:  · Increased thirst.  · Hunger.  · Feeling very tired.  · Needing to urinate more often than usual.  · Blurry vision.    What is hypoglycemia?  Hypoglycemia, also called low blood glucose, occurs with a blood glucose level at or below 70 mg/dL (3.9 mmol/L). The risk for hypoglycemia increases during or after exercise, during sleep, during illness, and when skipping meals or not eating for a long time (fasting).  It is important to know the symptoms of hypoglycemia and treat it right away. Always have a 15-gram rapid-acting carbohydrate snack with you to treat low blood  glucose. Family members and close friends should also know the symptoms and should understand how to treat hypoglycemia, in case you are not able to treat yourself.  What are the symptoms of hypoglycemia?  Hypoglycemia symptoms can include:  · Hunger.  · Anxiety.  · Sweating and feeling clammy.  · Confusion.  · Dizziness or feeling light-headed.  · Sleepiness.  · Nausea.  · Increased heart rate.  · Headache.  · Blurry vision.  · Seizure.  · Nightmares.  · Tingling or numbness around the mouth, lips, or tongue.  · A change in speech.  · Decreased ability to concentrate.  · A change in coordination.  · Restless sleep.  · Tremors or shakes.  · Fainting.  · Irritability.    How do I treat hypoglycemia?    If you are alert and able to swallow safely, follow the 15:15 rule:  · Take 15 grams of a rapid-acting carbohydrate. Rapid-acting options include:  ? 1 tube of glucose gel.  ? 3 glucose pills.  ? 6-8 pieces of hard candy.  ? 4 oz (120 mL) of fruit juice.  ? 4 oz (120 mL) of regular (not diet) soda.  · Check your blood glucose 15 minutes after you take the carbohydrate.  · If the repeat blood glucose level is still at or below 70 mg/dL (3.9 mmol/L), take 15 grams of a carbohydrate again.  · If your blood glucose level does not increase above 70 mg/dL (3.9 mmol/L) after 3 tries, seek emergency medical care.  · After your blood glucose level returns to normal, eat a meal or a snack within 1 hour.    How do I treat severe hypoglycemia?  Severe hypoglycemia is when your blood glucose level is at or below 54 mg/dL (3 mmol/L). Severe hypoglycemia is an emergency. Do not wait to see if the symptoms will go away. Get medical help right away. Call your local emergency services (911 in the [place] Do not drive yourself to the hospital.  If you have severe hypoglycemia and you cannot eat or drink, you may need an injection of glucagon. A family member or close friend should learn how to check your blood glucose and how to give you  a glucagon injection. Ask your health care provider if you need to have an emergency glucagon injection kit available.  Severe hypoglycemia may need to be treated in a hospital. The treatment may include getting glucose through an IV tube. You may also need treatment for the cause of your hypoglycemia.  Can having diabetes put me at risk for other conditions?  Having diabetes can put you at risk for other long-term (chronic) conditions, such as heart disease and kidney disease. Your health care provider may prescribe medicines to help prevent complications from diabetes. These medicines may include:  · Aspirin.  · Medicine to lower cholesterol.  · Medicine to control blood pressure.    What else can I do to manage my diabetes?  Take your diabetes medicines as told  · If your health care provider prescribed insulin or diabetes medicines, take them every day.  · Do not run out of insulin or other diabetes medicines that you take. Plan ahead so you always have these available.  · If you use insulin, adjust your dosage based on how physically active you are and what foods you eat. Your health care provider will tell you how to adjust your dosage.  Make healthy food choices    The things that you eat and drink affect your blood glucose and your insulin dosage. Making good choices helps to control your diabetes and prevent other health problems. A healthy meal plan includes eating lean proteins, complex carbohydrates, fresh fruits and vegetables, low-fat dairy products, and healthy fats.  Make an appointment to see a diet and nutrition specialist (registered dietitian) to help you create an eating plan that is right for you. Make sure that you:  · Follow instructions from your health care provider about eating or drinking restrictions.  · Drink enough fluid to keep your urine clear or pale yellow.  · Eat healthy snacks between nutritious meals.  · Track the carbohydrates that you eat. Do this by reading food labels and  learning the standard serving sizes of foods.  · Follow your sick day plan whenever you cannot eat or drink as usual. Make this plan in advance with your health care provider.    Stay active    Exercise regularly, as told by your health care provider. This may include:  · Stretching and doing strength exercises, such as yoga or weightlifting, at least 2 times a week.  · Doing at least 150 minutes of moderate-intensity or vigorous-intensity exercise each week. This could be brisk walking, biking, or water aerobics.  ? Spread out your activity over at least 3 days of the week.  ? Do not go more than 2 days in a row without doing some kind of physical activity.    When you start a new exercise or activity, work with your health care provider to adjust your insulin, medicines, or food intake as needed.  Make healthy lifestyle choices  · Do not use any tobacco products, such as cigarettes, chewing tobacco, and e-cigarettes. If you need help quitting, ask your health care provider.  · If your health care provider says that alcohol is safe for you, limit alcohol intake to no more than 1 drink per day for nonpregnant women and 2 drinks per day for men. One drink equals 12 oz of beer, 5 oz of wine, or 1½ oz of hard liquor.  · Learn to manage stress. If you need help with this, ask your health care provider.  Care for your body    · Keep your immunizations up to date. In addition to getting vaccinations as told by your health care provider, it is recommended that you get vaccinated against the following illnesses:  ? The flu (influenza). Get a flu shot every year.  ? Pneumonia.  ? Hepatitis B.  · Schedule an eye exam soon after your diagnosis, and then one time every year after that.  · Check your skin and feet every day for cuts, bruises, redness, blisters, or sores. Schedule a foot exam with your health care provider once every year.  · Brush your teeth and gums two times a day, and floss at least one time a day. Visit your  dentist at least once every 6 months.  · Maintain a healthy weight.  General instructions  · Take over-the-counter and prescription medicines only as told by your health care provider.  · Share your diabetes management plan with people in your workplace, school, and household.  · Check your urine for ketones when you are ill and as told by your health care provider.  · Ask your health care provider:  ? Do I need to meet with a diabetes educator?  ? Where can I find a support group for people with diabetes?  · Carry a medical alert card or wear medical alert jewelry.  · Keep all follow-up visits as told by your health care provider. This is important.  Where to find more information:  For more information about diabetes, visit:  · American Diabetes Association (ADA): www.diabetes.org  · American Association of Diabetes Educators (AADE): www.diabeteseducator.org/patient-resources    This information is not intended to replace advice given to you by your health care provider. Make sure you discuss any questions you have with your health care provider.  Document Released: 04/10/2017 Document Revised: 05/25/2017 Document Reviewed: 01/20/2017  [employer] Interactive Patient Education © 2018 [employer]    INCREASE YOUR LEVEMIR TO 42 UNITS TWICE DAILY.  IF YOUR BLOOD SUGARS CONTINUE TO BE ABOVE 150 MG IN THE MORNINGS GO UP TO 44 UNITS. CALL THE OFFICE IN TWO WEEKS TO LET ME KNOW HOW YOU ARE DOING.

## 2019-01-28 RX ORDER — PRAVASTATIN SODIUM 40 MG
TABLET ORAL
Qty: 90 TABLET | Refills: 1 | Status: SHIPPED | OUTPATIENT
Start: 2019-01-28 | End: 2019-04-10 | Stop reason: SDUPTHER

## 2019-02-04 ENCOUNTER — CLINICAL SUPPORT (OUTPATIENT)
Dept: FAMILY MEDICINE CLINIC | Facility: CLINIC | Age: 54
End: 2019-02-04

## 2019-02-04 DIAGNOSIS — E53.8 VITAMIN B 12 DEFICIENCY: ICD-10-CM

## 2019-02-04 PROCEDURE — 96372 THER/PROPH/DIAG INJ SC/IM: CPT | Performed by: NURSE PRACTITIONER

## 2019-02-04 RX ADMIN — CYANOCOBALAMIN 1000 MCG: 1000 INJECTION, SOLUTION INTRAMUSCULAR; SUBCUTANEOUS at 14:42

## 2019-02-06 ENCOUNTER — OFFICE VISIT (OUTPATIENT)
Dept: FAMILY MEDICINE CLINIC | Facility: CLINIC | Age: 54
End: 2019-02-06

## 2019-02-06 VITALS
TEMPERATURE: 98.2 F | WEIGHT: 179 LBS | SYSTOLIC BLOOD PRESSURE: 128 MMHG | HEIGHT: 69 IN | OXYGEN SATURATION: 97 % | BODY MASS INDEX: 26.51 KG/M2 | DIASTOLIC BLOOD PRESSURE: 82 MMHG | HEART RATE: 116 BPM

## 2019-02-06 DIAGNOSIS — L24.0 IRRITANT CONTACT DERMATITIS DUE TO DETERGENT: Primary | ICD-10-CM

## 2019-02-06 DIAGNOSIS — K21.9 GASTROESOPHAGEAL REFLUX DISEASE WITHOUT ESOPHAGITIS: ICD-10-CM

## 2019-02-06 PROCEDURE — 99213 OFFICE O/P EST LOW 20 MIN: CPT | Performed by: NURSE PRACTITIONER

## 2019-02-06 PROCEDURE — 96372 THER/PROPH/DIAG INJ SC/IM: CPT | Performed by: NURSE PRACTITIONER

## 2019-02-06 RX ORDER — METHYLPREDNISOLONE ACETATE 80 MG/ML
80 INJECTION, SUSPENSION INTRA-ARTICULAR; INTRALESIONAL; INTRAMUSCULAR; SOFT TISSUE ONCE
Status: COMPLETED | OUTPATIENT
Start: 2019-02-06 | End: 2019-02-06

## 2019-02-06 RX ORDER — RANITIDINE 150 MG/1
150 TABLET ORAL 2 TIMES DAILY
Qty: 60 TABLET | Refills: 5 | Status: SHIPPED | OUTPATIENT
Start: 2019-02-06 | End: 2019-03-07 | Stop reason: SDUPTHER

## 2019-02-06 RX ORDER — PREDNISONE 20 MG/1
TABLET ORAL
Qty: 6 TABLET | Refills: 0 | Status: SHIPPED | OUTPATIENT
Start: 2019-02-06 | End: 2019-02-16

## 2019-02-06 RX ORDER — CETIRIZINE HYDROCHLORIDE 10 MG/1
10 TABLET ORAL DAILY PRN
Qty: 30 TABLET | Refills: 5 | Status: SHIPPED | OUTPATIENT
Start: 2019-02-06 | End: 2019-11-04 | Stop reason: SDUPTHER

## 2019-02-06 RX ADMIN — METHYLPREDNISOLONE ACETATE 80 MG: 80 INJECTION, SUSPENSION INTRA-ARTICULAR; INTRALESIONAL; INTRAMUSCULAR; SOFT TISSUE at 15:28

## 2019-02-06 NOTE — PROGRESS NOTES
"Rika Leahy is a 53 y.o. female.     Chief Complaint   Patient presents with   • Rash       History of Present Illness     Rash-for approx 3 days.  She reports change in detergent.  She is having rash on bilateral flank areas.  Upper chest and around bra.  She has taken Benadryl.  She reports itching \"is driving me crazy\".  She does not have any on her lower extremities.  She does plan to re-wash her clothing.  Not at goal.    Reflux-has been using Tums.  She reports she has been having problems for \"couple of weeks\".  She reports she has cut down on soda.  She has increased her water intake.  Some sensation of reflux at night.      The following portions of the patient's history were reviewed and updated as appropriate: allergies, current medications, past family history, past medical history, past social history, past surgical history and problem list.    Review of Systems   Constitutional: Negative for appetite change, fatigue and unexpected weight change.   HENT: Negative for congestion, ear pain, nosebleeds, postnasal drip, rhinorrhea, sore throat, trouble swallowing and voice change.    Eyes: Negative for pain and visual disturbance.   Respiratory: Negative for cough, shortness of breath and wheezing.    Cardiovascular: Negative for chest pain and palpitations.   Gastrointestinal: Negative for abdominal pain, blood in stool, constipation and diarrhea.   Endocrine: Negative for cold intolerance and polydipsia.   Genitourinary: Negative for difficulty urinating, flank pain and hematuria.   Musculoskeletal: Negative for arthralgias, back pain, gait problem, joint swelling and myalgias.   Skin: Positive for rash. Negative for color change.   Allergic/Immunologic: Negative.    Neurological: Negative for syncope, numbness and headaches.   Hematological: Negative.    Psychiatric/Behavioral: Negative for sleep disturbance and suicidal ideas.   All other systems reviewed and are negative.      Objective " "    /82   Pulse 116   Temp 98.2 °F (36.8 °C) (Tympanic)   Ht 175.3 cm (69\")   Wt 81.2 kg (179 lb)   SpO2 97%   BMI 26.43 kg/m²     Physical Exam   Constitutional: She is oriented to person, place, and time. She appears well-developed and well-nourished. No distress.   HENT:   Head: Normocephalic and atraumatic.   Right Ear: External ear normal.   Left Ear: External ear normal.   Nose: Nose normal.   Mouth/Throat: Oropharynx is clear and moist.   Eyes: EOM are normal. Pupils are equal, round, and reactive to light. No scleral icterus.   Neck: Normal range of motion. Neck supple. No JVD present. No thyromegaly present.   Cardiovascular: Normal rate, regular rhythm and normal heart sounds.   Pulmonary/Chest: Effort normal and breath sounds normal.   Abdominal: Soft. Bowel sounds are normal. There is no tenderness.   Neurological: She is alert and oriented to person, place, and time. No cranial nerve deficit. Coordination normal.   Skin: Skin is warm and dry. Rash noted.   Contact appearing rash noted to upper chest wall.  Mid chest and back area, bilateral flank area   Psychiatric: She has a normal mood and affect. Her behavior is normal. Judgment and thought content normal.   Vitals reviewed.      Assessment/Plan     Problem List Items Addressed This Visit     None      Visit Diagnoses     Irritant contact dermatitis due to detergent    -  Primary    Relevant Medications    cetirizine (zyrTEC) 10 MG tablet    methylPREDNISolone acetate (DEPO-medrol) injection 80 mg (Completed)    predniSONE (DELTASONE) 20 MG tablet    Gastroesophageal reflux disease without esophagitis        Relevant Medications    raNITIdine (ZANTAC) 150 MG tablet          Understands disease processes and need for medications.  Understands reasons for urgent and emergent care.  Patient (& family) verbalized agreement for treatment plan.   Emotional support and active listening provided.  Patient provided time to verbalize feelings.  May " use TCM lotion at home  Injections today while in the office for acute symptom relief.   Advised to avoid known GI triggers such as spicy foods.  Upright 30 minutes after meals and avoid eating large meals.  Several small meals daily as able to avoid overfilling stomach.      RTC PRN 3-5 days for worsening or non resolving symptoms          This document has been electronically signed by:  ASH Bolanos, MOHANC

## 2019-02-28 DIAGNOSIS — E08.40 DIABETES MELLITUS DUE TO UNDERLYING CONDITION WITH DIABETIC NEUROPATHY, WITH LONG-TERM CURRENT USE OF INSULIN (HCC): Chronic | ICD-10-CM

## 2019-02-28 DIAGNOSIS — Z79.4 DIABETES MELLITUS DUE TO UNDERLYING CONDITION WITH DIABETIC NEUROPATHY, WITH LONG-TERM CURRENT USE OF INSULIN (HCC): Chronic | ICD-10-CM

## 2019-03-02 RX ORDER — DULOXETIN HYDROCHLORIDE 30 MG/1
CAPSULE, DELAYED RELEASE ORAL
Qty: 60 CAPSULE | Refills: 2 | Status: SHIPPED | OUTPATIENT
Start: 2019-03-02 | End: 2019-10-08 | Stop reason: ALTCHOICE

## 2019-03-06 ENCOUNTER — CLINICAL SUPPORT (OUTPATIENT)
Dept: FAMILY MEDICINE CLINIC | Facility: CLINIC | Age: 54
End: 2019-03-06

## 2019-03-06 DIAGNOSIS — E53.8 VITAMIN B 12 DEFICIENCY: ICD-10-CM

## 2019-03-06 PROCEDURE — 96372 THER/PROPH/DIAG INJ SC/IM: CPT | Performed by: NURSE PRACTITIONER

## 2019-03-06 RX ADMIN — CYANOCOBALAMIN 1000 MCG: 1000 INJECTION, SOLUTION INTRAMUSCULAR; SUBCUTANEOUS at 12:55

## 2019-03-07 DIAGNOSIS — K21.9 GASTROESOPHAGEAL REFLUX DISEASE WITHOUT ESOPHAGITIS: ICD-10-CM

## 2019-03-07 RX ORDER — RANITIDINE 150 MG/1
150 TABLET ORAL 2 TIMES DAILY
Qty: 60 TABLET | Refills: 5 | Status: SHIPPED | OUTPATIENT
Start: 2019-03-07 | End: 2019-04-10 | Stop reason: SDUPTHER

## 2019-03-11 ENCOUNTER — OFFICE VISIT (OUTPATIENT)
Dept: FAMILY MEDICINE CLINIC | Facility: CLINIC | Age: 54
End: 2019-03-11

## 2019-03-11 VITALS
TEMPERATURE: 98.2 F | WEIGHT: 177 LBS | SYSTOLIC BLOOD PRESSURE: 125 MMHG | OXYGEN SATURATION: 99 % | RESPIRATION RATE: 12 BRPM | BODY MASS INDEX: 26.22 KG/M2 | HEIGHT: 69 IN | HEART RATE: 89 BPM | DIASTOLIC BLOOD PRESSURE: 70 MMHG

## 2019-03-11 DIAGNOSIS — E08.40 DIABETES MELLITUS DUE TO UNDERLYING CONDITION WITH DIABETIC NEUROPATHY, WITH LONG-TERM CURRENT USE OF INSULIN (HCC): ICD-10-CM

## 2019-03-11 DIAGNOSIS — Z79.4 DIABETES MELLITUS DUE TO UNDERLYING CONDITION WITH DIABETIC NEUROPATHY, WITH LONG-TERM CURRENT USE OF INSULIN (HCC): ICD-10-CM

## 2019-03-11 DIAGNOSIS — E55.9 VITAMIN D DEFICIENCY: Primary | ICD-10-CM

## 2019-03-11 DIAGNOSIS — F41.9 ANXIETY: ICD-10-CM

## 2019-03-11 DIAGNOSIS — E78.2 MIXED HYPERLIPIDEMIA: ICD-10-CM

## 2019-03-11 DIAGNOSIS — R41.3 MEMORY PROBLEM: ICD-10-CM

## 2019-03-11 DIAGNOSIS — E53.8 VITAMIN B 12 DEFICIENCY: ICD-10-CM

## 2019-03-11 PROCEDURE — 99214 OFFICE O/P EST MOD 30 MIN: CPT | Performed by: GENERAL PRACTICE

## 2019-03-11 NOTE — PROGRESS NOTES
Subjective   Sandra Leahy is a 53 y.o. female.     History of Present Illness     Memory Impairment  Returns with an approximate 9-month history of increasing difficulty with her memory.  She has difficulty remembering same day conversations and events.  She has less difficulty with distant memories.  Throughout this time she has had occasional 4-5-minute episodes during which she feels flushed, has difficulty concentrating, and smells an odor similar to pneumonia.  There is no history of any new headaches and she denies any changes in her vision, strength, or sensation.  There is no history of any new aches or pains and she denies any rash, fever, or chills.  There is no history of any depression, anxiety, loss of interest in activities, change in her appetite, or difficulty sleeping.  There has been no change in her medications  Lab Results   Component Value Date    TSH 1.447 12/21/2018     Lab Results   Component Value Date    [id number] >2,000 (H) 12/21/2018     Diabetes  Current symptoms include paresthesia of the feet. Patient denies visual disturbances, polydipsia, polyuria, hypoglycemia and foot ulcerations. Evaluation to date has been: fasting blood sugar and hemoglobin A1C. Home sugars: BGs are running  consistent with Hgb A1C. Current treatments: metformin and basal insulin - levemir. Last dilated eye exam more than 1 year ago. Most recent hemoglobin A1c   Lab Results   Component Value Date    HGBA1C 9.10 (H) 12/21/2018    HGBA1C 8.90 (H) 10/05/2018    HGBA1C 10.70 (H) 06/28/2018      Dyslipidemia  Compliance with treatment has been good. The patient exercises intermittently. She is currently being prescribed the following medication for her dyslipidemia - pravastatin, ezetimibe. Patient denies side effects associated with her medications. Most recent lipids include  Lab Results   Component Value Date    TRIG 150 12/21/2018    TRIG 402 (H) 10/05/2018    HDL 40 (L) 12/21/2018    HDL 35 (L) 10/05/2018     LDL 76 12/21/2018    LDL  10/05/2018      Comment:      Unable to calculate     The following portions of the patient's history were reviewed and updated as appropriate: allergies, current medications, past family history, past medical history, past social history, past surgical history and problem list.    Review of Systems   Constitutional: Negative for appetite change, chills, fatigue, fever and unexpected weight change.   HENT: Negative for congestion, ear pain, rhinorrhea, sneezing, sore throat and voice change.    Eyes: Negative for visual disturbance.   Respiratory: Negative for cough, shortness of breath and wheezing.    Cardiovascular: Negative for chest pain, palpitations and leg swelling.   Gastrointestinal: Negative for abdominal pain, blood in stool, constipation, diarrhea, nausea and vomiting.   Endocrine: Negative for polydipsia.   Genitourinary: Negative for difficulty urinating, dysuria, frequency, hematuria, menstrual problem, pelvic pain, urgency, vaginal bleeding and vaginal discharge.   Musculoskeletal: Negative for arthralgias, back pain, joint swelling, myalgias and neck pain.   Skin: Negative for color change.   Neurological: Positive for numbness. Negative for tremors, weakness and headaches.   Psychiatric/Behavioral: Positive for confusion. Negative for dysphoric mood, sleep disturbance and suicidal ideas. The patient is not nervous/anxious.      Objective   Physical Exam   Constitutional: She is oriented to person, place, and time. No distress.   Accompanied by her . Bright and in good spirits. No apparent distress. No pallor, jaundice, diaphoresis, or cyanosis.   HENT:   Head: Atraumatic.   Right Ear: Tympanic membrane, external ear and ear canal normal.   Left Ear: Tympanic membrane, external ear and ear canal normal.   Nose: Nose normal.   Mouth/Throat: Oropharynx is clear and moist. Mucous membranes are not pale and not cyanotic.   Eyes: EOM are normal. Pupils are equal, round,  and reactive to light. No scleral icterus.   Neck: No JVD present. Carotid bruit is not present. No tracheal deviation present. No thyromegaly present.   Cardiovascular: Normal rate, regular rhythm, S1 normal, S2 normal and intact distal pulses. Exam reveals no gallop, no S3 and no S4.   No murmur heard.  Pulmonary/Chest: Breath sounds normal. No stridor. No respiratory distress.   Abdominal: Soft. Normal aorta and bowel sounds are normal. She exhibits no distension, no abdominal bruit and no mass. There is no hepatosplenomegaly. There is no tenderness. No hernia.   Musculoskeletal: She exhibits no tenderness or deformity.     Vascular Status -  Her right foot exhibits no edema. Her left foot exhibits no edema.  Lymphadenopathy:        Head (right side): No submandibular adenopathy present.        Head (left side): No submandibular adenopathy present.     She has no cervical adenopathy.   Neurological: She is alert and oriented to person, place, and time. She has normal strength and normal reflexes. She displays no tremor and normal reflexes. A sensory deficit (decreased vibration sense toes both feet) is present. No cranial nerve deficit. She exhibits normal muscle tone. She displays no seizure activity. Coordination and gait normal.   Reflex Scores:       Bicep reflexes are 2+ on the right side and 2+ on the left side.       Patellar reflexes are 2+ on the right side and 2+ on the left side.       Achilles reflexes are 2+ on the right side and 2+ on the left side.  MOCA score 22/30   Skin: Skin is warm and dry. No rash noted. She is not diaphoretic. No cyanosis. No pallor. Nails show no clubbing.   Psychiatric: She has a normal mood and affect. Her speech is normal and behavior is normal. Thought content normal. She exhibits abnormal recent memory. She exhibits normal remote memory.     Assessment/Plan   Problems Addressed this Visit        Cardiovascular and Mediastinum    Hyperlipidemia  Encouraged to continue  to work on her diet and exercise plan.  Continue current medication       Digestive    Vitamin B 12 deficiency  Corrected  Continue supplementation with monitoring.    Vitamin D deficiency   As above.        Endocrine    Diabetes mellitus due to underlying condition with diabetic neuropathy, with long-term current use of insulin (CMS/[facility])  Diabetes mellitus Type II, under inadequate control.   Encouraged to continue to pursue ADA diet  Encouraged aerobic exercise.  Continue current medication  Reminded to undergo an updated diabetic eye exam  Follow up with [facility]       Other    Memory problem  Concerning for early onset Alzheimer's dementia  RPR will be performed with her scheduled labs  We will arrange an MRI  Ref[facility] to [facility]    Relevant Orders    MRI Brain With Contrast    RPR    Ambulatory Referral to Neurology (Completed)    Anxiety  Well-controlled  Supportive therapy.   Continue current medication.

## 2019-03-18 ENCOUNTER — HOSPITAL ENCOUNTER (OUTPATIENT)
Dept: MRI IMAGING | Facility: HOSPITAL | Age: 54
Discharge: HOME | End: 2019-03-18
Admitting: GENERAL PRACTICE

## 2019-03-18 DIAGNOSIS — R41.3 MEMORY PROBLEM: ICD-10-CM

## 2019-03-18 PROCEDURE — 70552 MRI BRAIN STEM W/DYE: CPT | Performed by: RADIOLOGY

## 2019-03-18 PROCEDURE — 0 GADOBENATE DIMEGLUMINE 529 MG/ML SOLUTION: Performed by: GENERAL PRACTICE

## 2019-03-18 PROCEDURE — 70552 MRI BRAIN STEM W/DYE: CPT

## 2019-03-18 PROCEDURE — A9577 INJ MULTIHANCE: HCPCS | Performed by: GENERAL PRACTICE

## 2019-03-18 RX ADMIN — GADOBENATE DIMEGLUMINE 15 ML: 529 INJECTION, SOLUTION INTRAVENOUS at 08:51

## 2019-03-20 NOTE — PROGRESS NOTES
Spoke with pt about the following per Dr. Storey. VH    -- Please let patient know that her MRI was normal

## 2019-04-05 ENCOUNTER — CLINICAL SUPPORT (OUTPATIENT)
Dept: FAMILY MEDICINE CLINIC | Facility: CLINIC | Age: 54
End: 2019-04-05

## 2019-04-05 DIAGNOSIS — R41.3 MEMORY PROBLEM: ICD-10-CM

## 2019-04-05 DIAGNOSIS — E08.40 DIABETES MELLITUS DUE TO UNDERLYING CONDITION WITH DIABETIC NEUROPATHY, WITHOUT LONG-TERM CURRENT USE OF INSULIN (HCC): Chronic | ICD-10-CM

## 2019-04-05 DIAGNOSIS — R21 RASH: ICD-10-CM

## 2019-04-05 DIAGNOSIS — E78.5 HYPERLIPIDEMIA, UNSPECIFIED HYPERLIPIDEMIA TYPE: Chronic | ICD-10-CM

## 2019-04-05 DIAGNOSIS — E55.9 VITAMIN D DEFICIENCY: ICD-10-CM

## 2019-04-05 DIAGNOSIS — E53.8 VITAMIN B 12 DEFICIENCY: ICD-10-CM

## 2019-04-05 LAB
25(OH)D3 SERPL-MCNC: 32.1 NG/ML (ref 30–100)
ALBUMIN SERPL-MCNC: 3.9 G/DL (ref 3.5–5.2)
ALBUMIN/GLOB SERPL: 1 G/DL
ALP SERPL-CCNC: 83 U/L (ref 39–117)
ALT SERPL W P-5'-P-CCNC: 31 U/L (ref 1–33)
ANION GAP SERPL CALCULATED.3IONS-SCNC: 15.1 MMOL/L
AST SERPL-CCNC: 27 U/L (ref 1–32)
BASOPHILS # BLD AUTO: 0.04 10*3/MM3 (ref 0–0.2)
BASOPHILS NFR BLD AUTO: 0.6 % (ref 0–1.5)
BILIRUB SERPL-MCNC: 0.7 MG/DL (ref 0.2–1.2)
BUN BLD-MCNC: 11 MG/DL (ref 6–20)
BUN/CREAT SERPL: 14.5 (ref 7–25)
CALCIUM SPEC-SCNC: 9.8 MG/DL (ref 8.6–10.5)
CHLORIDE SERPL-SCNC: 99 MMOL/L (ref 98–107)
CHOLEST SERPL-MCNC: 141 MG/DL (ref 0–200)
CO2 SERPL-SCNC: 23.9 MMOL/L (ref 22–29)
CREAT BLD-MCNC: 0.76 MG/DL (ref 0.57–1)
DEPRECATED RDW RBC AUTO: 41 FL (ref 37–54)
EOSINOPHIL # BLD AUTO: 0.18 10*3/MM3 (ref 0–0.4)
EOSINOPHIL NFR BLD AUTO: 2.6 % (ref 0.3–6.2)
ERYTHROCYTE [DISTWIDTH] IN BLOOD BY AUTOMATED COUNT: 12.5 % (ref 12.3–15.4)
GFR SERPL CREATININE-BSD FRML MDRD: 79 ML/MIN/1.73
GLOBULIN UR ELPH-MCNC: 4 GM/DL
GLUCOSE BLD-MCNC: 314 MG/DL (ref 65–99)
HBA1C MFR BLD: 8.94 % (ref 4.8–5.6)
HCT VFR BLD AUTO: 43.5 % (ref 34–46.6)
HDLC SERPL-MCNC: 40 MG/DL (ref 40–60)
HGB BLD-MCNC: 14.4 G/DL (ref 12–15.9)
IMM GRANULOCYTES # BLD AUTO: 0.03 10*3/MM3 (ref 0–0.05)
IMM GRANULOCYTES NFR BLD AUTO: 0.4 % (ref 0–0.5)
LDLC SERPL CALC-MCNC: 70 MG/DL (ref 0–100)
LDLC/HDLC SERPL: 1.74 {RATIO}
LYMPHOCYTES # BLD AUTO: 1.96 10*3/MM3 (ref 0.7–3.1)
LYMPHOCYTES NFR BLD AUTO: 28.5 % (ref 19.6–45.3)
MCH RBC QN AUTO: 30 PG (ref 26.6–33)
MCHC RBC AUTO-ENTMCNC: 33.1 G/DL (ref 31.5–35.7)
MCV RBC AUTO: 90.6 FL (ref 79–97)
MONOCYTES # BLD AUTO: 0.54 10*3/MM3 (ref 0.1–0.9)
MONOCYTES NFR BLD AUTO: 7.9 % (ref 5–12)
NEUTROPHILS # BLD AUTO: 4.12 10*3/MM3 (ref 1.4–7)
NEUTROPHILS NFR BLD AUTO: 60 % (ref 42.7–76)
NRBC BLD AUTO-RTO: 0 /100 WBC (ref 0–0)
PLATELET # BLD AUTO: 264 10*3/MM3 (ref 140–450)
PMV BLD AUTO: 10.6 FL (ref 6–12)
POTASSIUM BLD-SCNC: 4.2 MMOL/L (ref 3.5–5.2)
PROT SERPL-MCNC: 7.9 G/DL (ref 6–8.5)
RBC # BLD AUTO: 4.8 10*6/MM3 (ref 3.77–5.28)
SODIUM BLD-SCNC: 138 MMOL/L (ref 136–145)
TRIGL SERPL-MCNC: 157 MG/DL (ref 0–150)
TSH SERPL DL<=0.05 MIU/L-ACNC: 1.66 MIU/ML (ref 0.27–4.2)
VIT B12 BLD-MCNC: >2000 PG/ML (ref 211–946)
VLDLC SERPL-MCNC: 31.4 MG/DL (ref 5–40)
WBC NRBC COR # BLD: 6.87 10*3/MM3 (ref 3.4–10.8)

## 2019-04-05 PROCEDURE — 85025 COMPLETE CBC W/AUTO DIFF WBC: CPT | Performed by: NURSE PRACTITIONER

## 2019-04-05 PROCEDURE — 86592 SYPHILIS TEST NON-TREP QUAL: CPT | Performed by: GENERAL PRACTICE

## 2019-04-05 PROCEDURE — 80053 COMPREHEN METABOLIC PANEL: CPT | Performed by: NURSE PRACTITIONER

## 2019-04-05 PROCEDURE — 82306 VITAMIN D 25 HYDROXY: CPT | Performed by: NURSE PRACTITIONER

## 2019-04-05 PROCEDURE — 82607 VITAMIN B-12: CPT | Performed by: NURSE PRACTITIONER

## 2019-04-05 PROCEDURE — 80061 LIPID PANEL: CPT | Performed by: NURSE PRACTITIONER

## 2019-04-05 PROCEDURE — 83036 HEMOGLOBIN GLYCOSYLATED A1C: CPT | Performed by: NURSE PRACTITIONER

## 2019-04-05 PROCEDURE — 84443 ASSAY THYROID STIM HORMONE: CPT | Performed by: NURSE PRACTITIONER

## 2019-04-06 LAB — RPR SER QL: NORMAL

## 2019-04-10 ENCOUNTER — OFFICE VISIT (OUTPATIENT)
Dept: FAMILY MEDICINE CLINIC | Facility: CLINIC | Age: 54
End: 2019-04-10

## 2019-04-10 VITALS
HEIGHT: 69 IN | BODY MASS INDEX: 26.51 KG/M2 | HEART RATE: 105 BPM | TEMPERATURE: 97.8 F | WEIGHT: 179 LBS | DIASTOLIC BLOOD PRESSURE: 70 MMHG | SYSTOLIC BLOOD PRESSURE: 120 MMHG | OXYGEN SATURATION: 98 %

## 2019-04-10 DIAGNOSIS — E08.40 DIABETES MELLITUS DUE TO UNDERLYING CONDITION WITH DIABETIC NEUROPATHY, WITH LONG-TERM CURRENT USE OF INSULIN (HCC): Primary | ICD-10-CM

## 2019-04-10 DIAGNOSIS — F41.9 ANXIETY: ICD-10-CM

## 2019-04-10 DIAGNOSIS — Z12.11 SCREENING FOR COLON CANCER: ICD-10-CM

## 2019-04-10 DIAGNOSIS — E53.8 VITAMIN B 12 DEFICIENCY: ICD-10-CM

## 2019-04-10 DIAGNOSIS — Z79.4 DIABETES MELLITUS DUE TO UNDERLYING CONDITION WITH DIABETIC NEUROPATHY, WITH LONG-TERM CURRENT USE OF INSULIN (HCC): Primary | ICD-10-CM

## 2019-04-10 DIAGNOSIS — E78.2 MIXED HYPERLIPIDEMIA: Chronic | ICD-10-CM

## 2019-04-10 DIAGNOSIS — K21.9 GASTROESOPHAGEAL REFLUX DISEASE WITHOUT ESOPHAGITIS: ICD-10-CM

## 2019-04-10 DIAGNOSIS — Z12.31 ENCOUNTER FOR SCREENING MAMMOGRAM FOR BREAST CANCER: ICD-10-CM

## 2019-04-10 DIAGNOSIS — R41.3 MEMORY PROBLEM: ICD-10-CM

## 2019-04-10 DIAGNOSIS — E55.9 VITAMIN D DEFICIENCY: ICD-10-CM

## 2019-04-10 PROCEDURE — 99214 OFFICE O/P EST MOD 30 MIN: CPT | Performed by: NURSE PRACTITIONER

## 2019-04-10 RX ORDER — ESCITALOPRAM OXALATE 10 MG/1
10 TABLET ORAL DAILY
Qty: 30 TABLET | Refills: 4 | Status: SHIPPED | OUTPATIENT
Start: 2019-04-10 | End: 2019-05-22 | Stop reason: SDUPTHER

## 2019-04-10 RX ORDER — ERGOCALCIFEROL 1.25 MG/1
50000 CAPSULE ORAL
Qty: 12 CAPSULE | Refills: 0 | Status: SHIPPED | OUTPATIENT
Start: 2019-04-10 | End: 2019-07-12 | Stop reason: SDUPTHER

## 2019-04-10 RX ORDER — PRAVASTATIN SODIUM 40 MG
40 TABLET ORAL DAILY
Qty: 30 TABLET | Refills: 4 | Status: SHIPPED | OUTPATIENT
Start: 2019-04-10 | End: 2019-08-19 | Stop reason: SDUPTHER

## 2019-04-10 RX ORDER — EZETIMIBE 10 MG/1
10 TABLET ORAL DAILY
Qty: 30 TABLET | Refills: 4 | Status: SHIPPED | OUTPATIENT
Start: 2019-04-10 | End: 2019-08-28 | Stop reason: SDUPTHER

## 2019-04-10 RX ORDER — RANITIDINE 150 MG/1
150 TABLET ORAL 2 TIMES DAILY
Qty: 60 TABLET | Refills: 5 | Status: SHIPPED | OUTPATIENT
Start: 2019-04-10 | End: 2020-02-13

## 2019-04-10 NOTE — PROGRESS NOTES
History of Present Illness   Sandra presents to the clinic today for follow up and to review lab results  related to her  DM, type 2 uncontrolled, Hyperlipidemia, and Vitamin deficiencies. In addition, she reports her memory continues to improve with the addition of Lexapro. She reports no further episodes of dizziness. She did have a MRI of the Brain in March which I reviewed and was unremarkable.      Diabetes   She has type 2 diabetes mellitus.  Associated symptoms include stable peripheral neuropathy. Pertinent negatives for diabetes include no blurred vision, no chest pain, no foot ulcerations, no polyphagia, no polyuria and no weakness.  Diabetic complications include peripheral neuropathy. Risk factors for coronary artery disease include diabetes mellitus, dyslipidemia, family history and post-menopausal. Current diabetic treatment includes insulin injections, oral agent (monotherapy) and diet. She is following a generally unhealthy diet with increase in refined sugars. She has had a previous visit with a dietitian. She has seen a Podiatrist. Her eye exam she reports is up to date and was completed in May 2018 per Dr Lopez. The note has been requested.      Hyperlipidemia   Pertinent negatives include no chest pain or shortness of breath. Current antihyperlipidemic treatment includes ezetimibe and statins. Risk factors for coronary artery disease include diabetes mellitus, dyslipidemia, family history, post-menopausal and stress.     Anxiety    Anxiety has been gradually improving. Symptoms include excessive worry, confusion, and decreased concentration. Sandra reports no chest pain, nausea,  palpitations, shortness of breath or suicidal ideas. The severity of symptoms is interfering with daily activities. The symptoms are aggravated by family issues and social activities.   Risk factors include a major life event.   Vitals:    04/10/19 0824   BP: 120/70   Pulse: 105   Temp: 97.8 °F (36.6 °C)   SpO2: 98%      The following portions of the patient's history were reviewed and updated as appropriate: allergies, current medications, past family history, past medical history, past social history, past surgical history and problem list.    Review of Systems   Constitutional: Negative for activity change, appetite change, chills, fatigue, fever and unexpected weight change.   HENT: Negative.    Eyes: Negative for visual disturbance.   Respiratory: Negative for cough, chest tightness, shortness of breath and wheezing.    Cardiovascular: Negative for chest pain, palpitations and leg swelling.   Gastrointestinal: Negative for abdominal pain, nausea and vomiting.   Endocrine: Negative for cold intolerance, heat intolerance, polydipsia, polyphagia and polyuria.   Musculoskeletal: Negative for gait problem.   Skin: Negative for color change and rash.   Neurological: Positive for numbness (Baseline). Negative for dizziness, tremors, speech difficulty, weakness, light-headedness and headaches.   Hematological: Negative for adenopathy.   Psychiatric/Behavioral: Positive for confusion (Improving). Negative for decreased concentration and suicidal ideas. The patient is nervous/anxious (Improving).    All other systems reviewed and are negative.    Physical Exam   Constitutional: She is oriented to person, place, and time. She appears well-developed and well-nourished. No distress.   HENT:   Head: Normocephalic.   Nose: Nose normal.   Mouth/Throat: Oropharynx is clear and moist. No oropharyngeal exudate.   Eyes: Conjunctivae are normal. Pupils are equal, round, and reactive to light. Right eye exhibits no discharge. Left eye exhibits no discharge. No scleral icterus.   Neck: Neck supple. No JVD present. No thyromegaly present.   Cardiovascular: Normal rate, regular rhythm and normal heart sounds. Exam reveals no friction rub.   No murmur heard.  Pulmonary/Chest: Effort normal and breath sounds normal. No respiratory distress. She has  no wheezes. She has no rales.   Abdominal: Soft. She exhibits no distension. There is no tenderness. There is no rebound and no guarding.   Musculoskeletal: She exhibits no edema.   Lymphadenopathy:     She has no cervical adenopathy.   Neurological: She is alert and oriented to person, place, and time.   Skin: Skin is warm and dry. Capillary refill takes less than 2 seconds. No rash noted. No erythema.   Psychiatric: She has a normal mood and affect. Her behavior is normal. Judgment and thought content normal.   Vitals reviewed.    Assessment/Plan   Problems Addressed this Visit        Cardiovascular and Mediastinum    Hyperlipidemia    Relevant Medications    pravastatin (PRAVACHOL) 40 MG tablet    ezetimibe (ZETIA) 10 MG tablet    Other Relevant Orders    Lipid Panel       Digestive    Vitamin B 12 deficiency    Relevant Orders    Vitamin B12    Vitamin D deficiency    Relevant Medications    vitamin D (ERGOCALCIFEROL) 56774 units capsule capsule    Other Relevant Orders    Vitamin D 25 Hydroxy       Other    Memory problem    Anxiety    Relevant Medications    escitalopram (LEXAPRO) 10 MG tablet      Other Visit Diagnoses     Diabetes mellitus due to underlying condition with diabetic neuropathy, without long-term current use of insulin (CMS/[facility])  (Chronic)   -  Primary    Findings and recommendations discussed with Sandra.  Reviewed her recent lab results which does show an improved A1C.    Relevant Medications    metFORMIN (GLUCOPHAGE) 1000 MG tablet    Insulin Pen Needle (RELION PEN NEEDLE 31G/8MM) 31G X 8 MM misc    insulin detemir (LEVEMIR FLEXTOUCH) 100 UNIT/ML injection    Other Relevant Orders    Comprehensive Metabolic Panel    TSH    Hemoglobin A1c    MicroAlbumin, Urine, Random - Urine, Clean Catch    Gastroesophageal reflux disease without esophagitis        Stable    Relevant Medications    raNITIdine (ZANTAC) 150 MG tablet    Other Relevant Orders    CBC & Differential    Encounter for screening  mammogram for breast cancer        Relevant Orders    Mammo Screening Bilateral With CAD    Screening for colon cancer        Relevant Orders    Cologuard - Stool, Per Rectum          Results for orders placed or performed in visit on 04/05/19   Comprehensive Metabolic Panel   Result Value Ref Range    Glucose 314 (H) 65 - 99 mg/dL    BUN 11 6 - 20 mg/dL    Creatinine 0.76 0.57 - 1.00 mg/dL    Sodium 138 136 - 145 mmol/L    Potassium 4.2 3.5 - 5.2 mmol/L    Chloride 99 98 - 107 mmol/L    CO2 23.9 22.0 - 29.0 mmol/L    Calcium 9.8 8.6 - 10.5 mg/dL    Total Protein 7.9 6.0 - 8.5 g/dL    Albumin 3.90 3.50 - 5.20 g/dL    ALT (SGPT) 31 1 - 33 U/L    AST (SGOT) 27 1 - 32 U/L    Alkaline Phosphatase 83 39 - 117 U/L    Total Bilirubin 0.7 0.2 - 1.2 mg/dL    eGFR Non African Amer 79 >60 mL/min/1.73    Globulin 4.0 gm/dL    A/G Ratio 1.0 g/dL    BUN/Creatinine Ratio 14.5 7.0 - 25.0    Anion Gap 15.1 mmol/L   Lipid Panel   Result Value Ref Range    Total Cholesterol 141 0 - 200 mg/dL    Triglycerides 157 (H) 0 - 150 mg/dL    HDL Cholesterol 40 40 - 60 mg/dL    LDL Cholesterol  70 0 - 100 mg/dL    VLDL Cholesterol 31.4 5 - 40 mg/dL    LDL/HDL Ratio 1.74    TSH   Result Value Ref Range    TSH 1.660 0.270 - 4.200 mIU/mL   Hemoglobin A1c   Result Value Ref Range    Hemoglobin A1C 8.94 (H) 4.80 - 5.60 %   Vitamin D 25 Hydroxy   Result Value Ref Range    25 Hydroxy, Vitamin D 32.1 30.0 - 100.0 ng/ml   Vitamin B12   Result Value Ref Range    Vitamin B-12 >2,000 (H) 211 - 946 pg/mL   RPR   Result Value Ref Range    RPR Non-Reactive Non-Reactive   CBC Auto Differential   Result Value Ref Range    WBC 6.87 3.40 - 10.80 10*3/mm3    RBC 4.80 3.77 - 5.28 10*6/mm3    Hemoglobin 14.4 12.0 - 15.9 g/dL    Hematocrit 43.5 34.0 - 46.6 %    MCV 90.6 79.0 - 97.0 fL    MCH 30.0 26.6 - 33.0 pg    MCHC 33.1 31.5 - 35.7 g/dL    RDW 12.5 12.3 - 15.4 %    RDW-SD 41.0 37.0 - 54.0 fl    MPV 10.6 6.0 - 12.0 fL    Platelets 264 140 - 450 10*3/mm3     Neutrophil % 60.0 42.7 - 76.0 %    Lymphocyte % 28.5 19.6 - 45.3 %    Monocyte % 7.9 5.0 - 12.0 %    Eosinophil % 2.6 0.3 - 6.2 %    Basophil % 0.6 0.0 - 1.5 %    Immature Grans % 0.4 0.0 - 0.5 %    Neutrophils, Absolute 4.12 1.40 - 7.00 10*3/mm3    Lymphocytes, Absolute 1.96 0.70 - 3.10 10*3/mm3    Monocytes, Absolute 0.54 0.10 - 0.90 10*3/mm3    Eosinophils, Absolute 0.18 0.00 - 0.40 10*3/mm3    Basophils, Absolute 0.04 0.00 - 0.20 10*3/mm3    Immature Grans, Absolute 0.03 0.00 - 0.05 10*3/mm3    nRBC 0.0 0.0 - 0.0 /100 WBC     Findings and recommendations discussed with Sandra. Reviewed her recent lab results with her. Noted some improvement in her glycemic management. At this time, will increase her evening dose of insulin up to 45 Units and continue morning dose to 40 Units. Lifestyle modifications including nutrition and exercise recommendations Health maintenance recommendations reviewed with her. She agreed to have her Mammogram scheduled. Also, begin with Cologuard screening due to no FH of colon cancer and asymptomatic. She will reduce her Vitamin B12 injections to every other month.   Follow up will be in July with labs prior to her appointment.

## 2019-04-10 NOTE — PATIENT INSTRUCTIONS
Type 2 Diabetes Mellitus, Self Care, Adult  When you have type 2 diabetes (type 2 diabetes mellitus), you must make sure your blood sugar (glucose) stays in a healthy range. You can do this with:  · Nutrition.  · Exercise.  · Lifestyle changes.  · Medicines or insulin, if needed.  · Support from your doctors and others.    How to stay aware of blood sugar  · Check your blood sugar level every day, as often as told.  · Have your A1c (hemoglobin A1c) level checked two or more times a year. Have it checked more often if your doctor tells you to.  Your doctor will set personal treatment goals for you. Generally, you should have these blood sugar levels:  · Before meals (preprandial):  mg/dL (4.4-7.2 mmol/L).  · After meals (postprandial): below 180 mg/dL (10 mmol/L).  · A1c level: less than 7%.    How to manage high and low blood sugar  Signs of high blood sugar  High blood sugar is called hyperglycemia. Know the signs of high blood sugar. Signs may include:  · Feeling:  ? Thirsty.  ? Hungry.  ? Very tired.  · Needing to pee (urinate) more than usual.  · Blurry vision.    Signs of low blood sugar  Low blood sugar is called hypoglycemia. This is when blood sugar is at or below 70 mg/dL (3.9 mmol/L). Signs may include:  · Feeling:  ? Hungry.  ? Worried or nervous (anxious).  ? Sweaty and clammy.  ? Confused.  ? Dizzy.  ? Sleepy.  ? Sick to your stomach (nauseous).  · Having:  ? A fast heartbeat.  ? A headache.  ? A change in your vision.  ? Jerky movements that you cannot control (seizure).  ? Tingling or no feeling (numbness) around your mouth, lips, or tongue.  · Having trouble with:  ? Moving (coordination).  ? Sleeping.  ? Passing out (fainting).  ? Getting upset easily (irritability).    Treating low blood sugar  To treat low blood sugar, eat or drink something sugary right away. If you can think clearly and swallow safely, follow the 15:15 rule:  · Take 15 grams of a fast-acting carb (carbohydrate). Some  fast-acting carbs are:  ? 1 tube of glucose gel.  ? 3 sugar tablets (glucose pills).  ? 6-8 pieces of hard candy.  ? 4 oz (120 mL) of fruit juice.  ? 4 oz (120 mL) regular (not diet) soda.  · Check your blood sugar 15 minutes after you take the carb.  · If your blood sugar is still at or below 70 mg/dL (3.9 mmol/L), take 15 grams of a carb again.  · If your blood sugar does not go above 70 mg/dL (3.9 mmol/L) after 3 tries, get help right away.  · After your blood sugar goes back to normal, eat a meal or a snack within 1 hour.    Treating very low blood sugar  If your blood sugar is at or below 54 mg/dL (3 mmol/L), you have very low blood sugar (severe hypoglycemia). This is an emergency. Do not wait to see if the symptoms will go away. Get medical help right away. Call your local emergency services (911 in the [place]  If you have very low blood sugar and you cannot eat or drink, you may need a glucagon shot (injection). A family member or friend should learn how to check your blood sugar and how to give you a glucagon shot. Ask your doctor if you need to have a glucagon shot kit at home.  Follow these instructions at home:  Medicine  · Take insulin and diabetes medicines as told.  · If your doctor says you should take more or less insulin and medicines, do this exactly as told.  · Do not run out of insulin or medicines.  Having diabetes can raise your risk for other long-term conditions. These include heart disease and kidney disease. Your doctor may prescribe medicines to help you not have these problems.  Food  · Make healthy food choices. These include:  ? Chicken, fish, egg whites, and beans.  ? Oats, whole wheat, bulgur, brown rice, quinoa, and millet.  ? Fresh fruits and vegetables.  ? Low-fat dairy products.  ? Nuts, avocado, olive oil, and canola oil.  · Meet with a  (dietitian). He or she can help you make an eating plan that is right for you.  · Follow instructions from your doctor about  what you cannot eat or drink.  · Drink enough fluid to keep your pee (urine) pale yellow.  · Keep track of carbs that you eat. Do this by reading food labels and learning food serving sizes.  · Follow your sick day plan when you cannot eat or drink normally. Make this plan with your doctor so it is ready to use.  Activity  · Exercise 3 or more times a week.  · Do not go more than 2 days without exercising.  · Talk with your doctor before you start a new exercise. Your doctor may need to tell you to change:  ? How much insulin or medicines you take.  ? How much food you eat.  Lifestyle  · Do not use any tobacco products. These include cigarettes, chewing tobacco, and e-cigarettes. If you need help quitting, ask your doctor.  · Ask your doctor how much alcohol is safe for you.  · Learn to deal with stress. If you need help with this, ask your doctor.  Body care  · Stay up to date with your shots (immunizations).  · Have your eyes and feet checked by a doctor as often as told.  · Check your skin and feet every day. Check for cuts, bruises, redness, blisters, or sores.  · Brush your teeth and gums two times a day. Floss one or more times a day.  · Go to the dentist one or more times every 6 months.  · Stay at a healthy weight.  General instructions  · Take over-the-counter and prescription medicines only as told by your doctor.  · Share your diabetes care plan with:  ? Your work or school.  ? People you live with.  · Carry a card or wear jewelry that says you have diabetes.  · Keep all follow-up visits as told by your doctor. This is important.  Questions to ask your doctor  · Do I need to meet with a diabetes educator?  · Where can I find a support group for people with diabetes?  Where to find more information  To learn more about diabetes, visit:  · American Diabetes Association: www.diabetes.org  · American Association of Diabetes Educators: www.diabeteseducator.org    Summary  · When you have type 2 diabetes, you  must make sure your blood sugar (glucose) stays in a healthy range.  · Check your blood sugar every day, as often as told.  · Having diabetes can raise your risk for other conditions. Your doctor may prescribe medicines to help you not have these problems.  · Keep all follow-up visits as told by your doctor. This is important.  This information is not intended to replace advice given to you by your health care provider. Make sure you discuss any questions you have with your health care provider.  Document Released: 04/10/2017 Document Revised: 07/20/2018 Document Reviewed: 01/20/2017  [employer] Interactive Patient Education © 2019 [employer]  INCREASE YOUR NIGHT TIME TO 45 UNITS AND CALL TO LET ME KNOW IN TWO WEEKS HOW BLOOD SUGARS ARE DOING  Vitamin B12 Deficiency  Vitamin B12 deficiency means that your body is not getting enough vitamin B12. Your body needs vitamin B12 for important bodily functions. If you do not have enough vitamin B12 in your body, you can have health problems.  Follow these instructions at home:  · Take supplements only as told by your doctor. Follow the directions carefully.  · Get any shots (injections) as told by your doctor. Do not miss your visits to the doctor.  · Eat lots of healthy foods that contain vitamin B12. Ask your doctor if you should work with someone who is trained in how food affects health (dietitian). Foods that contain vitamin B12 include:  ? Meat.  ? Meat from birds (poultry).  ? Fish.  ? Eggs.  ? Cereal and dairy products that are fortified. This means that vitamin B12 has been added to the food. Check the label on the package to see if the food is fortified.  · Do not drink too much (do not abuse) alcohol.  · Keep all follow-up visits as told by your doctor. This is important.  Contact a doctor if:  · Your symptoms come back.  Get help right away if:  · You have trouble breathing.  · You have chest pain.  · You get dizzy.  · You pass out (lose consciousness).  This  information is not intended to replace advice given to you by your health care provider. Make sure you discuss any questions you have with your health care provider.  Document Released: 12/06/2012 Document Revised: 05/25/2017 Document Reviewed: 05/04/2016  [employer] Interactive Patient Education © 2019 [employer]    WE ARE CHANGING YOUR VITAMIN B12 Injections  High Triglycerides Eating Plan  Triglycerides are a type of fat in the blood. High levels of triglycerides can increase your risk of heart disease and stroke. If your triglyceride levels are high, choosing the right foods can help lower your triglycerides and keep your heart healthy. Work with your health care provider or a diet and nutrition specialist (dietitian) to develop an eating plan that is right for you.  What are tips for following this plan?  General guidelines  · Lose weight, if you are overweight. For most people, losing 5-10 lbs (2-5 kg) helps lower triglyceride levels. A weight-loss plan may include.  ? 30 minutes of exercise at least 5 days a week.  ? Reducing the amount of calories, sugar, and fat you eat.  · Eat a wide variety of fresh fruits, vegetables, and whole grains. These foods are high in fiber.  · Eat foods that contain healthy fats, such as fatty fish, nuts, seeds, and olive oil.  · Avoid foods that are high in added sugar, added salt (sodium), saturated fat, and trans fat.  · Avoid low-fiber, refined carbohydrates such as white bread, crackers, noodles, and white rice.  · Avoid foods with partially hydrogenated oils (trans fats), such as fried foods or stick margarine.  · Limit alcohol intake to no more than 1 drink a day for nonpregnant women and 2 drinks a day for men. One drink equals 12 oz of beer, 5 oz of wine, or 1½ oz of hard liquor. Your health care provider may recommend that you drink less depending on your overall health.  Reading food labels  · Check food labels for the amount of saturated fat. Choose foods with no or  very little saturated fat.  · Check food labels for the amount of trans fat. Choose foods with no trans fat.  · Check food labels for the amount of cholesterol. Choose foods low in cholesterol. Ask your dietitian how much cholesterol you should have each day.  · Check food labels for the amount of sodium. Choose foods with less than 140 milligrams (mg) per serving.  Shopping  · Buy dairy products labeled as nonfat (skim) or low-fat (1%).  · Avoid buying processed or prepackaged foods. These are often high in added sugar, sodium, and fat.  Cooking  · Choose healthy fats when cooking, such as olive oil or canola oil.  · Cook foods using lower fat methods, such as baking, broiling, boiling, or grilling.  · Make your own sauces, dressings, and marinades when possible, instead of buying them. Store-bought sauces, dressings, and marinades are often high in sodium and sugar.  Meal planning  · Eat more home-cooked food and less restaurant, buffet, and fast food.  · Eat fatty fish at least 2 times each week. Examples of fatty fish include salmon, trout, mackerel, tuna, and herring.  · If you eat whole eggs, do not eat more than 3 egg yolks per week.  What foods are recommended?  The items listed may not be a complete list. Talk with your dietitian about what dietary choices are best for you.  Grains  Whole wheat or whole grain breads, crackers, cereals, and pasta. Unsweetened oatmeal. Bulgur. Barley. Quinoa. Brown rice. Whole wheat flour tortillas.  Vegetables  Fresh or frozen vegetables. Low-sodium canned vegetables.  Fruits  All fresh, canned (in natural juice), or frozen fruits.  Meats and other protein foods  Skinless chicken or turkey. Ground chicken or turkey. Lean cuts of pork, trimmed of fat. Fish and seafood, especially salmon, trout, and herring. Egg whites. Dried beans, peas, or lentils. Unsalted nuts or seeds. Unsalted canned beans. Natural peanut or almond butter.  Dairy  Low-fat dairy products. Skim or low-fat  (1%) milk. Reduced fat (2%) and low-sodium cheese. Low-fat ricotta cheese. Low-fat cottage cheese. Plain, low-fat yogurt.  Fats and oils  Tub margarine without trans fats. Light or reduced-fat mayonnaise. Light or reduced-fat salad dressings. Avocado. Safflower, olive, sunflower, soybean, and canola oils.  What foods are not recommended?  The items listed may not be a complete list. Talk with your dietitian about what dietary choices are best for you.  Grains  White bread. White (regular) pasta. White rice. Cornbread. Bagels. Pastries. Crackers that contain trans fat.  Vegetables  Creamed or fried vegetables. Vegetables in a cheese sauce.  Fruits  Sweetened dried fruit. Canned fruit in syrup. Fruit juice.  Meats and other protein foods  Fatty cuts of meat. Ribs. Chicken wings. Ascencio. Sausage. Bologna. Salami. Chitterlings. Fatback. Hot dogs. Bratwurst. Packaged lunch meats.  Dairy  Whole or reduced-fat (2%) milk. Half-and-half. Cream cheese. Full-fat or sweetened yogurt. Full-fat cheese. Nondairy creamers. Whipped toppings. Processed cheese or cheese spreads. Cheese curds.  Beverages  Alcohol. Sweetened drinks, such as soda, lemonade, fruit drinks, or punches.  Fats and oils  Butter. Stick margarine. Lard. Shortening. Ghee. Ascencio fat. Tropical oils, such as coconut, palm kernel, or palm oils.  Sweets and desserts  Corn syrup. Sugars. Honey. Molasses. Candy. Jam and jelly. Syrup. Sweetened cereals. Cookies. Pies. Cakes. Donuts. Muffins. Ice cream.  Condiments  Store-bought sauces, dressings, and marinades that are high in sugar, such as ketchup and barbecue sauce.  Summary  · High levels of triglycerides can increase the risk of heart disease and stroke. Choosing the right foods can help lower your triglycerides.  · Eat plenty of fresh fruits, vegetables, and whole grains. Choose low-fat dairy and lean meats. Eat fatty fish at least twice a week.  · Avoid processed and prepackaged foods with added sugar, sodium,  saturated fat, and trans fat.  · If you need suggestions or have questions about what types of food are good for you, talk with your health care provider or a dietitian.  This information is not intended to replace advice given to you by your health care provider. Make sure you discuss any questions you have with your health care provider.  Document Released: 10/05/2005 Document Revised: 02/20/2018 Document Reviewed: 02/20/2018  [employer] Interactive Patient Education © 2019 [employer]   TO EVERY OTHER MONTH

## 2019-04-12 ENCOUNTER — RESULTS ENCOUNTER (OUTPATIENT)
Dept: FAMILY MEDICINE CLINIC | Facility: CLINIC | Age: 54
End: 2019-04-12

## 2019-04-12 DIAGNOSIS — Z12.11 SCREENING FOR COLON CANCER: ICD-10-CM

## 2019-05-22 DIAGNOSIS — F41.9 ANXIETY: ICD-10-CM

## 2019-05-22 RX ORDER — ESCITALOPRAM OXALATE 10 MG/1
10 TABLET ORAL DAILY
Qty: 30 TABLET | Refills: 4 | Status: SHIPPED | OUTPATIENT
Start: 2019-05-22 | End: 2019-10-08 | Stop reason: SDUPTHER

## 2019-06-10 RX ORDER — INSULIN DETEMIR 100 [IU]/ML
INJECTION, SOLUTION SUBCUTANEOUS
Qty: 15 ML | Refills: 3 | Status: SHIPPED | OUTPATIENT
Start: 2019-06-10 | End: 2019-09-20

## 2019-06-21 ENCOUNTER — OFFICE VISIT (OUTPATIENT)
Dept: FAMILY MEDICINE CLINIC | Facility: CLINIC | Age: 54
End: 2019-06-21

## 2019-06-21 VITALS
SYSTOLIC BLOOD PRESSURE: 105 MMHG | TEMPERATURE: 98.4 F | HEIGHT: 69 IN | BODY MASS INDEX: 26.66 KG/M2 | WEIGHT: 180 LBS | OXYGEN SATURATION: 98 % | HEART RATE: 92 BPM | DIASTOLIC BLOOD PRESSURE: 70 MMHG

## 2019-06-21 DIAGNOSIS — F41.9 ANXIETY: ICD-10-CM

## 2019-06-21 DIAGNOSIS — E53.8 VITAMIN B 12 DEFICIENCY: ICD-10-CM

## 2019-06-21 DIAGNOSIS — E78.2 MIXED HYPERLIPIDEMIA: Chronic | ICD-10-CM

## 2019-06-21 DIAGNOSIS — E08.40 DIABETES MELLITUS DUE TO UNDERLYING CONDITION WITH DIABETIC NEUROPATHY, WITH LONG-TERM CURRENT USE OF INSULIN (HCC): Primary | Chronic | ICD-10-CM

## 2019-06-21 DIAGNOSIS — E55.9 VITAMIN D DEFICIENCY: ICD-10-CM

## 2019-06-21 DIAGNOSIS — R41.3 MEMORY PROBLEM: ICD-10-CM

## 2019-06-21 DIAGNOSIS — R21 RASH: ICD-10-CM

## 2019-06-21 DIAGNOSIS — Z79.4 DIABETES MELLITUS DUE TO UNDERLYING CONDITION WITH DIABETIC NEUROPATHY, WITH LONG-TERM CURRENT USE OF INSULIN (HCC): Primary | Chronic | ICD-10-CM

## 2019-06-21 PROCEDURE — 99214 OFFICE O/P EST MOD 30 MIN: CPT | Performed by: NURSE PRACTITIONER

## 2019-06-21 NOTE — PATIENT INSTRUCTIONS
Type 2 Diabetes Mellitus, Self Care, Adult  Caring for yourself after you have been diagnosed with type 2 diabetes (type 2 diabetes mellitus) means keeping your blood sugar (glucose) under control with a balance of:  · Nutrition.  · Exercise.  · Lifestyle changes.  · Medicines or insulin, if necessary.  · Support from your team of health care providers and others.    The following information explains what you need to know to manage your diabetes at home.  What are the risks?  Having diabetes can put you at risk for other long-term (chronic) conditions, such as heart disease and kidney disease. Your health care provider may prescribe medicines to help prevent complications from diabetes. These medicines may include:  · Aspirin.  · Medicine to lower cholesterol.  · Medicine to control blood pressure.    How to monitor blood glucose  · Check your blood glucose every day, as often as told by your health care provider.  · Have your A1c (hemoglobin A1c) level checked two or more times a year, or as often as told by your health care provider.  Your health care provider will set individualized treatment goals for you. Generally, the goal of treatment is to maintain the following blood glucose levels:  · Before meals (preprandial):  mg/dL (4.4-7.2 mmol/L).  · After meals (postprandial): below 180 mg/dL (10 mmol/L).  · A1c level: less than 7%.    How to manage hyperglycemia and hypoglycemia  Hyperglycemia symptoms  Hyperglycemia, also called high blood glucose, occurs when blood glucose is too high. Make sure you know the early signs of hyperglycemia, such as:  · Increased thirst.  · Hunger.  · Feeling very tired.  · Needing to urinate more often than usual.  · Blurry vision.    Hypoglycemia symptoms  Hypoglycemia, also called low blood glucose, occurs with a blood glucose level at or below 70 mg/dL (3.9 mmol/L). The risk for hypoglycemia increases during or after exercise, during sleep, during illness, and when  skipping meals or not eating for a long time (fasting).  It is important to know the symptoms of hypoglycemia and treat it right away. Always have a 15-gram rapid-acting carbohydrate snack with you to treat low blood glucose. Family members and close friends should also know the symptoms and should understand how to treat hypoglycemia, in case you are not able to treat yourself. Symptoms may include:  · Hunger.  · Anxiety.  · Sweating and feeling clammy.  · Confusion.  · Dizziness or feeling light-headed.  · Sleepiness.  · Nausea.  · Increased heart rate.  · Headache.  · Blurry vision.  · Irritability.  · A change in coordination.  · Tingling or numbness around the mouth, lips, or tongue.  · Restless sleep.  · Fainting.  · Seizure.    Treating hypoglycemia  If you are alert and able to swallow safely, follow the 15:15 rule:  · Take 15 grams of a rapid-acting carbohydrate. Rapid-acting options include:  ? 1 tube of glucose gel.  ? 3 glucose pills.  ? 6-8 pieces of hard candy.  ? 4 oz (120 mL) of fruit juice.  ? 4 oz (120 mL) of regular (not diet) soda.  · Check your blood glucose 15 minutes after you take the carbohydrate.  · If the repeat blood glucose level is still at or below 70 mg/dL (3.9 mmol/L), take 15 grams of a carbohydrate again.  · If your blood glucose level does not increase above 70 mg/dL (3.9 mmol/L) after 3 tries, seek emergency medical care.  · After your blood glucose level returns to normal, eat a meal or a snack within 1 hour.    Treating severe hypoglycemia  Severe hypoglycemia is when your blood glucose level is at or below 54 mg/dL (3 mmol/L). Severe hypoglycemia is an emergency. Do not wait to see if the symptoms will go away. Get medical help right away. Call your local emergency services (911 in the [place]  If you have severe hypoglycemia and you cannot eat or drink, you may need an injection of glucagon. A family member or close friend should learn how to check your blood glucose and how  to give you a glucagon injection. Ask your health care provider if you need to have an emergency glucagon injection kit available.  Severe hypoglycemia may need to be treated in a hospital. The treatment may include getting glucose through an IV. You may also need treatment for the cause of your hypoglycemia.  Follow these instructions at home:  Take diabetes medicines as told  · If your health care provider prescribed insulin or diabetes medicines, take them every day.  · Do not run out of insulin or other diabetes medicines that you take. Plan ahead so you always have these available.  · If you use insulin, adjust your dosage based on how physically active you are and what foods you eat. Your health care provider will tell you how to adjust your dosage.  Make healthy food choices  The things that you eat and drink affect your blood glucose and your insulin dosage. Making good choices helps to control your diabetes and prevent other health problems. A healthy meal plan includes eating lean proteins, complex carbohydrates, fresh fruits and vegetables, low-fat dairy products, and healthy fats.  Make an appointment to see a diet and nutrition specialist (registered dietitian) to help you create an eating plan that is right for you. Make sure that you:  · Follow instructions from your health care provider about eating or drinking restrictions.  · Drink enough fluid to keep your urine pale yellow.  · Keep a record of the carbohydrates that you eat. Do this by reading food labels and learning the standard serving sizes of foods.  · Follow your sick day plan whenever you cannot eat or drink as usual. Make this plan in advance with your health care provider.    Stay active  Exercise regularly, as told by your health care provider. This may include:  · Stretching and doing strength exercises, such as yoga or weightlifting, 2 or more times a week.  · Doing 150 minutes or more of moderate-intensity or vigorous-intensity  exercise each week. This could be brisk walking, biking, or water aerobics.  ? Spread out your activity over 3 or more days of the week.  ? Do not go more than 2 days in a row without doing some kind of physical activity.    When you start a new exercise or activity, work with your health care provider to adjust your insulin, medicines, or food intake as needed.  Make healthy lifestyle choices  · Do not use any tobacco products, such as cigarettes, chewing tobacco, and e-cigarettes. If you need help quitting, ask your health care provider.  · If your health care provider says that alcohol is safe for you, limit alcohol intake to no more than 1 drink per day for nonpregnant women and 2 drinks per day for men. One drink equals 12 oz of beer, 5 oz of wine, or 1½ oz of hard liquor.  · Learn to manage stress. If you need help with this, ask your health care provider.  Care for your body  · Keep your immunizations up to date. In addition to getting vaccinations as told by your health care provider, it is recommended that you get vaccinated against the following illnesses:  ? The flu (influenza). Get a flu shot every year.  ? Pneumonia.  ? Hepatitis B.  · Schedule an eye exam soon after your diagnosis, and then one time every year after that.  · Check your skin and feet every day for cuts, bruises, redness, blisters, or sores. Schedule a foot exam with your health care provider once every year.  · Brush your teeth and gums two times a day, and floss one or more times a day. Visit your dentist one or more times every 6 months.  · Maintain a healthy weight.  General instructions  · Take over-the-counter and prescription medicines only as told by your health care provider.  · Share your diabetes management plan with people in your workplace, school, and household.  · Carry a medical alert card or wear medical alert jewelry.  · Keep all follow-up visits as told by your health care provider. This is important.  Questions to ask  your health care provider  · Do I need to meet with a diabetes educator?  · Where can I find a support group for people with diabetes?  Where to find more information  For more information about diabetes, visit:  · American Diabetes Association (ADA): www.diabetes.org  · American Association of Diabetes Educators (AADE): www.diabeteseducator.org    Summary  · Caring for yourself after you have been diagnosed with (type 2 diabetes mellitus) means keeping your blood sugar (glucose) under control with a balance of nutrition, exercise, lifestyle changes, and medicine.  · Check your blood glucose every day, as often as told by your health care provider.  · Having diabetes can put you at risk for other long-term (chronic) conditions, such as heart disease and kidney disease. Your health care provider may prescribe medicines to help prevent complications from diabetes.  · Keep all follow-up visits as told by your health care provider. This is important.  This information is not intended to replace advice given to you by your health care provider. Make sure you discuss any questions you have with your health care provider.  Document Released: 04/10/2017 Document Revised: 07/20/2018 Document Reviewed: 01/20/2017  [employer] Interactive Patient Education © 2019 [employer]

## 2019-06-21 NOTE — PROGRESS NOTES
History of Present Illness   Sandra Leahy is a 53 y.o. Female who presents to the clinic today for follow up  related to her  DM, type 2 uncontrolled, Hyperlipidemia, and Vitamin deficiencies. In addition, she reports her memory continues to worsen at times. She reports no further episodes of dizziness. She did have a MRI of the Brain in March which I reviewed and was unremarkable.   Sandra reports she has had a rash on both her elbows which she has been applying steroid cream. This is helping.      Diabetes   She has type 2 diabetes mellitus.  Associated symptoms include stable peripheral neuropathy. Pertinent negatives for diabetes include no blurred vision, no chest pain, no foot ulcerations, no polyphagia, no polyuria and no weakness.  Diabetic complications include peripheral neuropathy. Risk factors for coronary artery disease include diabetes mellitus, dyslipidemia, family history and post-menopausal. Current diabetic treatment includes insulin injections, oral agent (monotherapy) and diet. She is following a generally unhealthy diet with increase in refined sugars. She has had a previous visit with a dietitian. She has seen a Podiatrist. Her last eye exam  was completed in May 2018 per Dr Lopez.  Lab Results   Component Value Date    HGBA1C 8.94 (H) 04/05/2019     Hyperlipidemia   Pertinent negatives include no chest pain or shortness of breath. Current antihyperlipidemic treatment includes ezetimibe and statins. Risk factors for coronary artery disease include diabetes mellitus, dyslipidemia, family history, post-menopausal and stress.     Lab Results   Component Value Date    CHOL 141 04/05/2019    CHLPL 138 05/18/2016    TRIG 157 (H) 04/05/2019    HDL 40 04/05/2019    LDL 70 04/05/2019     Anxiety    Anxiety has been gradually improving. Symptoms include excessive worry, confusion, and decreased concentration. Sandra reports no chest pain, nausea,  palpitations, shortness of breath or suicidal ideas.  "The severity of symptoms is interfering with daily activities. The symptoms are aggravated by family issues and especially during the late afternoon when the children are fussing with each other.   Risk factors include a major life event.     Vitals:    06/21/19 0831   BP: 105/70   Pulse: 92   Temp: 98.4 °F (36.9 °C)   TempSrc: Temporal   SpO2: 98%   Weight: 81.6 kg (180 lb)   Height: 175.3 cm (69\")      The following portions of the patient's history were reviewed and updated as appropriate: allergies, current medications, past family history, past medical history, past social history, past surgical history and problem list.    Review of Systems   Constitutional: Positive for fatigue. Negative for activity change, appetite change, chills, fever and unexpected weight change.   HENT: Negative.    Eyes: Negative for visual disturbance.   Respiratory: Negative for cough, chest tightness, shortness of breath and wheezing.    Cardiovascular: Negative for chest pain, palpitations and leg swelling.   Gastrointestinal: Negative for abdominal pain, nausea and vomiting.   Endocrine: Negative for cold intolerance, heat intolerance, polydipsia, polyphagia and polyuria.   Skin: Negative for color change and rash.   Neurological: Negative for dizziness, tremors, speech difficulty, weakness, light-headedness and headaches.   Hematological: Negative for adenopathy.   Psychiatric/Behavioral: Positive for decreased concentration. Negative for confusion and suicidal ideas. Behavioral problem: Increase in \"agruing\" The patient is not nervous/anxious.    All other systems reviewed and are negative.    Physical Exam   Constitutional: She is oriented to person, place, and time. She appears well-developed and well-nourished. No distress.   HENT:   Head: Normocephalic.   Nose: Nose normal.   Mouth/Throat: Oropharynx is clear and moist. No oropharyngeal exudate.   Eyes: Conjunctivae are normal. Pupils are equal, round, and reactive to light. " Right eye exhibits no discharge. Left eye exhibits no discharge. No scleral icterus.   Neck: Neck supple. Normal carotid pulses and no JVD present. Carotid bruit is not present. No thyromegaly present.   Cardiovascular: Normal rate, regular rhythm and normal heart sounds. Exam reveals no friction rub.   No murmur heard.  Pulmonary/Chest: Effort normal and breath sounds normal. No respiratory distress. She has no wheezes. She has no rales.   Abdominal: Soft. Bowel sounds are normal. She exhibits no distension. There is no tenderness. There is no rebound and no guarding.   Musculoskeletal: She exhibits no edema.   Lymphadenopathy:     She has no cervical adenopathy.   Neurological: She is alert and oriented to person, place, and time.   Skin: Skin is warm and dry. Capillary refill takes less than 2 seconds. Rash noted. No erythema.        Psychiatric: She has a normal mood and affect. Her behavior is normal. Judgment and thought content normal. She exhibits abnormal recent memory.   Vitals reviewed.    Assessment/Plan   PHQ-9 Depression Screening  PHQ-9 Score 0     Inocencio Body mass index is 26.58 kg/m². BMI is above normal parameters. Recommendations include: educational material, exercise counseling and nutrition counseling.   (Normal BMI:  18.5-24.9, OW 25-29.9, Obesity 30 or greater)    Fall Risk Assessment was completed, and Sandra Leahy is at low risk for falls.    Problems Addressed this Visit        Cardiovascular and Mediastinum    Hyperlipidemia       Digestive    Vitamin B 12 deficiency    Vitamin D deficiency       Endocrine    Diabetes mellitus due to underlying condition with diabetic neuropathy, with long-term current use of insulin (CMS/[facility]) - Primary       Other    Memory problem    Anxiety      Other Visit Diagnoses     Rash        Continue using the steroid cream. S/S of concern reviewed and if occur to seek furthe rmedical evaluation.      Assessment/Plan (continued)  Diabetes mellitus due to  "underlying condition with diabetic neuropathy, with long-term current use of insulin (CMS/[facility])  Findings and recommendations discussed with Sandra and Charles who is with her today. Emphasized the importance of glycemic control. Reinforced lifestyle modifications including nutrition which includes eliminating \"junk food\"  and exercise.   Mixed hyperlipidemia  Continue cardiovascular risk reduction with statin,   nutrition and exercise.   Memory problem  Provided her written information regarding her appt with [facility] on August 8th, 2019 at 3:30 pm and encouraged her to keep this appointment. She will have labs completed in July and f/u with me afterwards.   Anxiety  Comtinue Lexapro and reinforced  stress management skills  Vitamin B 12 deficiency  Vitamin B12 ordered  Vitamin D deficiency  Vitamin D ordered  Rash  Continue using the steroid cream. S/S of concern reviewed and if occur to seek furthe rmedical evaluation.  Health Maintenance recommendations reviewed and at this time, Sandra reports she does not have the time for her Mammogram and Eye Exam and prefers to wait until the children are make in school.       This document has been electronically signed by ASH Farfan, JORDAN-BC, ENEIDA  June 21, 2019 11:27 AM                       "

## 2019-07-11 ENCOUNTER — LAB (OUTPATIENT)
Dept: FAMILY MEDICINE CLINIC | Facility: CLINIC | Age: 54
End: 2019-07-11

## 2019-07-11 DIAGNOSIS — Z79.4 DIABETES MELLITUS DUE TO UNDERLYING CONDITION WITH DIABETIC NEUROPATHY, WITH LONG-TERM CURRENT USE OF INSULIN (HCC): ICD-10-CM

## 2019-07-11 DIAGNOSIS — E55.9 VITAMIN D DEFICIENCY: ICD-10-CM

## 2019-07-11 DIAGNOSIS — E08.40 DIABETES MELLITUS DUE TO UNDERLYING CONDITION WITH DIABETIC NEUROPATHY, WITH LONG-TERM CURRENT USE OF INSULIN (HCC): ICD-10-CM

## 2019-07-11 DIAGNOSIS — K21.9 GASTROESOPHAGEAL REFLUX DISEASE WITHOUT ESOPHAGITIS: ICD-10-CM

## 2019-07-11 DIAGNOSIS — E78.2 MIXED HYPERLIPIDEMIA: Chronic | ICD-10-CM

## 2019-07-11 DIAGNOSIS — E53.8 VITAMIN B 12 DEFICIENCY: ICD-10-CM

## 2019-07-11 LAB
25(OH)D3 SERPL-MCNC: 29.9 NG/ML (ref 30–100)
ALBUMIN SERPL-MCNC: 4.4 G/DL (ref 3.5–5.2)
ALBUMIN UR-MCNC: <1.2 MG/L
ALBUMIN/GLOB SERPL: 1.3 G/DL
ALP SERPL-CCNC: 77 U/L (ref 39–117)
ALT SERPL W P-5'-P-CCNC: 36 U/L (ref 1–33)
ANION GAP SERPL CALCULATED.3IONS-SCNC: 13.6 MMOL/L (ref 5–15)
AST SERPL-CCNC: 36 U/L (ref 1–32)
BASOPHILS # BLD AUTO: 0.05 10*3/MM3 (ref 0–0.2)
BASOPHILS NFR BLD AUTO: 0.8 % (ref 0–1.5)
BILIRUB SERPL-MCNC: 0.5 MG/DL (ref 0.2–1.2)
BUN BLD-MCNC: 10 MG/DL (ref 6–20)
BUN/CREAT SERPL: 17.9 (ref 7–25)
CALCIUM SPEC-SCNC: 10 MG/DL (ref 8.6–10.5)
CHLORIDE SERPL-SCNC: 101 MMOL/L (ref 98–107)
CHOLEST SERPL-MCNC: 166 MG/DL (ref 0–200)
CO2 SERPL-SCNC: 24.4 MMOL/L (ref 22–29)
CREAT BLD-MCNC: 0.56 MG/DL (ref 0.57–1)
DEPRECATED RDW RBC AUTO: 40.4 FL (ref 37–54)
EOSINOPHIL # BLD AUTO: 0.26 10*3/MM3 (ref 0–0.4)
EOSINOPHIL NFR BLD AUTO: 4 % (ref 0.3–6.2)
ERYTHROCYTE [DISTWIDTH] IN BLOOD BY AUTOMATED COUNT: 12.3 % (ref 12.3–15.4)
GFR SERPL CREATININE-BSD FRML MDRD: 112 ML/MIN/1.73
GLOBULIN UR ELPH-MCNC: 3.3 GM/DL
GLUCOSE BLD-MCNC: 238 MG/DL (ref 65–99)
HBA1C MFR BLD: 8.7 % (ref 4.8–5.6)
HCT VFR BLD AUTO: 46.4 % (ref 34–46.6)
HDLC SERPL-MCNC: 34 MG/DL (ref 40–60)
HGB BLD-MCNC: 15.1 G/DL (ref 12–15.9)
IMM GRANULOCYTES # BLD AUTO: 0.06 10*3/MM3 (ref 0–0.05)
IMM GRANULOCYTES NFR BLD AUTO: 0.9 % (ref 0–0.5)
LDLC SERPL CALC-MCNC: 67 MG/DL (ref 0–100)
LDLC/HDLC SERPL: 1.96 {RATIO}
LYMPHOCYTES # BLD AUTO: 2 10*3/MM3 (ref 0.7–3.1)
LYMPHOCYTES NFR BLD AUTO: 30.6 % (ref 19.6–45.3)
MCH RBC QN AUTO: 29.7 PG (ref 26.6–33)
MCHC RBC AUTO-ENTMCNC: 32.5 G/DL (ref 31.5–35.7)
MCV RBC AUTO: 91.3 FL (ref 79–97)
MONOCYTES # BLD AUTO: 0.59 10*3/MM3 (ref 0.1–0.9)
MONOCYTES NFR BLD AUTO: 9 % (ref 5–12)
NEUTROPHILS # BLD AUTO: 3.57 10*3/MM3 (ref 1.7–7)
NEUTROPHILS NFR BLD AUTO: 54.7 % (ref 42.7–76)
NRBC BLD AUTO-RTO: 0 /100 WBC (ref 0–0.2)
PLATELET # BLD AUTO: 265 10*3/MM3 (ref 140–450)
PMV BLD AUTO: 11.1 FL (ref 6–12)
POTASSIUM BLD-SCNC: 4.1 MMOL/L (ref 3.5–5.2)
PROT SERPL-MCNC: 7.7 G/DL (ref 6–8.5)
RBC # BLD AUTO: 5.08 10*6/MM3 (ref 3.77–5.28)
SODIUM BLD-SCNC: 139 MMOL/L (ref 136–145)
TRIGL SERPL-MCNC: 327 MG/DL (ref 0–150)
TSH SERPL DL<=0.05 MIU/L-ACNC: 3.18 MIU/ML (ref 0.27–4.2)
VIT B12 BLD-MCNC: 834 PG/ML (ref 211–946)
VLDLC SERPL-MCNC: 65.4 MG/DL (ref 5–40)
WBC NRBC COR # BLD: 6.53 10*3/MM3 (ref 3.4–10.8)

## 2019-07-11 PROCEDURE — 82306 VITAMIN D 25 HYDROXY: CPT | Performed by: NURSE PRACTITIONER

## 2019-07-11 PROCEDURE — 80061 LIPID PANEL: CPT | Performed by: NURSE PRACTITIONER

## 2019-07-11 PROCEDURE — 82607 VITAMIN B-12: CPT | Performed by: NURSE PRACTITIONER

## 2019-07-11 PROCEDURE — 85025 COMPLETE CBC W/AUTO DIFF WBC: CPT | Performed by: NURSE PRACTITIONER

## 2019-07-11 PROCEDURE — 84443 ASSAY THYROID STIM HORMONE: CPT | Performed by: NURSE PRACTITIONER

## 2019-07-11 PROCEDURE — 82043 UR ALBUMIN QUANTITATIVE: CPT | Performed by: NURSE PRACTITIONER

## 2019-07-11 PROCEDURE — 80053 COMPREHEN METABOLIC PANEL: CPT | Performed by: NURSE PRACTITIONER

## 2019-07-11 PROCEDURE — 83036 HEMOGLOBIN GLYCOSYLATED A1C: CPT | Performed by: NURSE PRACTITIONER

## 2019-07-12 ENCOUNTER — TELEPHONE (OUTPATIENT)
Dept: FAMILY MEDICINE CLINIC | Facility: CLINIC | Age: 54
End: 2019-07-12

## 2019-07-12 ENCOUNTER — CLINICAL SUPPORT (OUTPATIENT)
Dept: FAMILY MEDICINE CLINIC | Facility: CLINIC | Age: 54
End: 2019-07-12

## 2019-07-12 DIAGNOSIS — E53.8 VITAMIN B 12 DEFICIENCY: Primary | ICD-10-CM

## 2019-07-12 DIAGNOSIS — E55.9 VITAMIN D DEFICIENCY: ICD-10-CM

## 2019-07-12 PROCEDURE — 96372 THER/PROPH/DIAG INJ SC/IM: CPT | Performed by: NURSE PRACTITIONER

## 2019-07-12 RX ORDER — ERGOCALCIFEROL 1.25 MG/1
50000 CAPSULE ORAL
Qty: 12 CAPSULE | Refills: 0 | Status: SHIPPED | OUTPATIENT
Start: 2019-07-12 | End: 2019-12-30

## 2019-07-12 RX ORDER — CYANOCOBALAMIN 1000 UG/ML
1000 INJECTION, SOLUTION INTRAMUSCULAR; SUBCUTANEOUS ONCE
Status: COMPLETED | OUTPATIENT
Start: 2019-07-12 | End: 2019-07-12

## 2019-07-12 RX ADMIN — CYANOCOBALAMIN 1000 MCG: 1000 INJECTION, SOLUTION INTRAMUSCULAR; SUBCUTANEOUS at 15:47

## 2019-07-12 NOTE — TELEPHONE ENCOUNTER
Discussed labs from July 11th with Sandra. Requested she continue weekly Vitamin D 50,000 and to increase her night time insulin dose to  42 Units and may increase by two units until her morning blood sugars are running consistently under 150 mg/dL without hypoglycemia.

## 2019-07-18 DIAGNOSIS — B37.9 YEAST INFECTION: Primary | ICD-10-CM

## 2019-07-18 RX ORDER — FLUCONAZOLE 150 MG/1
150 TABLET ORAL DAILY
Qty: 3 TABLET | Refills: 1 | Status: SHIPPED | OUTPATIENT
Start: 2019-07-18 | End: 2020-01-08 | Stop reason: SDUPTHER

## 2019-08-19 DIAGNOSIS — R41.3 MEMORY PROBLEM: Primary | ICD-10-CM

## 2019-08-19 RX ORDER — PRAVASTATIN SODIUM 40 MG
40 TABLET ORAL DAILY
Qty: 30 TABLET | Refills: 4 | Status: SHIPPED | OUTPATIENT
Start: 2019-08-19 | End: 2020-02-13 | Stop reason: SDUPTHER

## 2019-08-28 RX ORDER — EZETIMIBE 10 MG/1
10 TABLET ORAL DAILY
Qty: 30 TABLET | Refills: 4 | Status: SHIPPED | OUTPATIENT
Start: 2019-08-28 | End: 2020-02-13 | Stop reason: SDUPTHER

## 2019-09-12 DIAGNOSIS — Z79.4 DIABETES MELLITUS DUE TO UNDERLYING CONDITION WITH DIABETIC NEUROPATHY, WITH LONG-TERM CURRENT USE OF INSULIN (HCC): ICD-10-CM

## 2019-09-12 DIAGNOSIS — R42 DIZZINESS: ICD-10-CM

## 2019-09-12 DIAGNOSIS — E78.2 MIXED HYPERLIPIDEMIA: ICD-10-CM

## 2019-09-12 DIAGNOSIS — E53.8 VITAMIN B 12 DEFICIENCY: Primary | ICD-10-CM

## 2019-09-12 DIAGNOSIS — E08.40 DIABETES MELLITUS DUE TO UNDERLYING CONDITION WITH DIABETIC NEUROPATHY, WITH LONG-TERM CURRENT USE OF INSULIN (HCC): ICD-10-CM

## 2019-09-12 DIAGNOSIS — E55.9 VITAMIN D DEFICIENCY: ICD-10-CM

## 2019-09-17 ENCOUNTER — LAB (OUTPATIENT)
Dept: FAMILY MEDICINE CLINIC | Facility: CLINIC | Age: 54
End: 2019-09-17

## 2019-09-17 ENCOUNTER — CLINICAL SUPPORT (OUTPATIENT)
Dept: FAMILY MEDICINE CLINIC | Facility: CLINIC | Age: 54
End: 2019-09-17

## 2019-09-17 DIAGNOSIS — E53.8 VITAMIN B 12 DEFICIENCY: ICD-10-CM

## 2019-09-17 DIAGNOSIS — E55.9 VITAMIN D DEFICIENCY: ICD-10-CM

## 2019-09-17 DIAGNOSIS — Z79.4 DIABETES MELLITUS DUE TO UNDERLYING CONDITION WITH DIABETIC NEUROPATHY, WITH LONG-TERM CURRENT USE OF INSULIN (HCC): ICD-10-CM

## 2019-09-17 DIAGNOSIS — E78.2 MIXED HYPERLIPIDEMIA: ICD-10-CM

## 2019-09-17 DIAGNOSIS — E08.40 DIABETES MELLITUS DUE TO UNDERLYING CONDITION WITH DIABETIC NEUROPATHY, WITH LONG-TERM CURRENT USE OF INSULIN (HCC): ICD-10-CM

## 2019-09-17 DIAGNOSIS — E53.8 VITAMIN B 12 DEFICIENCY: Primary | ICD-10-CM

## 2019-09-17 DIAGNOSIS — R42 DIZZINESS: ICD-10-CM

## 2019-09-17 LAB
25(OH)D3 SERPL-MCNC: 29.4 NG/ML (ref 30–100)
ALBUMIN SERPL-MCNC: 4.3 G/DL (ref 3.5–5.2)
ALBUMIN/GLOB SERPL: 1.4 G/DL
ALP SERPL-CCNC: 73 U/L (ref 39–117)
ALT SERPL W P-5'-P-CCNC: 32 U/L (ref 1–33)
ANION GAP SERPL CALCULATED.3IONS-SCNC: 11.7 MMOL/L (ref 5–15)
AST SERPL-CCNC: 26 U/L (ref 1–32)
BASOPHILS # BLD AUTO: 0.04 10*3/MM3 (ref 0–0.2)
BASOPHILS NFR BLD AUTO: 0.7 % (ref 0–1.5)
BILIRUB SERPL-MCNC: 0.7 MG/DL (ref 0.2–1.2)
BUN BLD-MCNC: 9 MG/DL (ref 6–20)
BUN/CREAT SERPL: 13.4 (ref 7–25)
CALCIUM SPEC-SCNC: 9.1 MG/DL (ref 8.6–10.5)
CHLORIDE SERPL-SCNC: 99 MMOL/L (ref 98–107)
CHOLEST SERPL-MCNC: 154 MG/DL (ref 0–200)
CO2 SERPL-SCNC: 25.3 MMOL/L (ref 22–29)
CREAT BLD-MCNC: 0.67 MG/DL (ref 0.57–1)
DEPRECATED RDW RBC AUTO: 42.5 FL (ref 37–54)
EOSINOPHIL # BLD AUTO: 0.21 10*3/MM3 (ref 0–0.4)
EOSINOPHIL NFR BLD AUTO: 3.4 % (ref 0.3–6.2)
ERYTHROCYTE [DISTWIDTH] IN BLOOD BY AUTOMATED COUNT: 12.7 % (ref 12.3–15.4)
GFR SERPL CREATININE-BSD FRML MDRD: 91 ML/MIN/1.73
GLOBULIN UR ELPH-MCNC: 3 GM/DL
GLUCOSE BLD-MCNC: 213 MG/DL (ref 65–99)
HBA1C MFR BLD: 9.5 % (ref 4.8–5.6)
HCT VFR BLD AUTO: 45.7 % (ref 34–46.6)
HDLC SERPL-MCNC: 32 MG/DL (ref 40–60)
HGB BLD-MCNC: 15.2 G/DL (ref 12–15.9)
IMM GRANULOCYTES # BLD AUTO: 0.02 10*3/MM3 (ref 0–0.05)
IMM GRANULOCYTES NFR BLD AUTO: 0.3 % (ref 0–0.5)
LDLC SERPL CALC-MCNC: 52 MG/DL (ref 0–100)
LDLC/HDLC SERPL: 1.63 {RATIO}
LYMPHOCYTES # BLD AUTO: 1.9 10*3/MM3 (ref 0.7–3.1)
LYMPHOCYTES NFR BLD AUTO: 31 % (ref 19.6–45.3)
MCH RBC QN AUTO: 30.6 PG (ref 26.6–33)
MCHC RBC AUTO-ENTMCNC: 33.3 G/DL (ref 31.5–35.7)
MCV RBC AUTO: 92.1 FL (ref 79–97)
MONOCYTES # BLD AUTO: 0.46 10*3/MM3 (ref 0.1–0.9)
MONOCYTES NFR BLD AUTO: 7.5 % (ref 5–12)
NEUTROPHILS # BLD AUTO: 3.49 10*3/MM3 (ref 1.7–7)
NEUTROPHILS NFR BLD AUTO: 57.1 % (ref 42.7–76)
NRBC BLD AUTO-RTO: 0 /100 WBC (ref 0–0.2)
PLATELET # BLD AUTO: 255 10*3/MM3 (ref 140–450)
PMV BLD AUTO: 10.7 FL (ref 6–12)
POTASSIUM BLD-SCNC: 4.2 MMOL/L (ref 3.5–5.2)
PROT SERPL-MCNC: 7.3 G/DL (ref 6–8.5)
RBC # BLD AUTO: 4.96 10*6/MM3 (ref 3.77–5.28)
SODIUM BLD-SCNC: 136 MMOL/L (ref 136–145)
TRIGL SERPL-MCNC: 350 MG/DL (ref 0–150)
TSH SERPL DL<=0.05 MIU/L-ACNC: 2.23 UIU/ML (ref 0.27–4.2)
VIT B12 BLD-MCNC: 598 PG/ML (ref 211–946)
VLDLC SERPL-MCNC: 70 MG/DL (ref 5–40)
WBC NRBC COR # BLD: 6.12 10*3/MM3 (ref 3.4–10.8)

## 2019-09-17 PROCEDURE — 82607 VITAMIN B-12: CPT | Performed by: NURSE PRACTITIONER

## 2019-09-17 PROCEDURE — 84443 ASSAY THYROID STIM HORMONE: CPT | Performed by: NURSE PRACTITIONER

## 2019-09-17 PROCEDURE — 85025 COMPLETE CBC W/AUTO DIFF WBC: CPT | Performed by: NURSE PRACTITIONER

## 2019-09-17 PROCEDURE — 83036 HEMOGLOBIN GLYCOSYLATED A1C: CPT | Performed by: NURSE PRACTITIONER

## 2019-09-17 PROCEDURE — 80053 COMPREHEN METABOLIC PANEL: CPT | Performed by: NURSE PRACTITIONER

## 2019-09-17 PROCEDURE — 82306 VITAMIN D 25 HYDROXY: CPT | Performed by: NURSE PRACTITIONER

## 2019-09-17 PROCEDURE — 96372 THER/PROPH/DIAG INJ SC/IM: CPT | Performed by: NURSE PRACTITIONER

## 2019-09-17 PROCEDURE — 80061 LIPID PANEL: CPT | Performed by: NURSE PRACTITIONER

## 2019-09-17 RX ORDER — CYANOCOBALAMIN 1000 UG/ML
1000 INJECTION, SOLUTION INTRAMUSCULAR; SUBCUTANEOUS ONCE
Status: COMPLETED | OUTPATIENT
Start: 2019-09-17 | End: 2019-09-17

## 2019-09-17 RX ADMIN — CYANOCOBALAMIN 1000 MCG: 1000 INJECTION, SOLUTION INTRAMUSCULAR; SUBCUTANEOUS at 08:35

## 2019-09-20 ENCOUNTER — OFFICE VISIT (OUTPATIENT)
Dept: FAMILY MEDICINE CLINIC | Facility: CLINIC | Age: 54
End: 2019-09-20

## 2019-09-20 DIAGNOSIS — F41.9 ANXIETY: ICD-10-CM

## 2019-09-20 DIAGNOSIS — R41.3 MEMORY PROBLEM: ICD-10-CM

## 2019-09-20 DIAGNOSIS — E53.8 VITAMIN B 12 DEFICIENCY: ICD-10-CM

## 2019-09-20 DIAGNOSIS — E55.9 VITAMIN D DEFICIENCY: ICD-10-CM

## 2019-09-20 DIAGNOSIS — E78.2 MIXED HYPERLIPIDEMIA: Chronic | ICD-10-CM

## 2019-09-20 DIAGNOSIS — IMO0002 TYPE 2 DIABETES, UNCONTROLLED, WITH NEUROPATHY: Primary | Chronic | ICD-10-CM

## 2019-09-20 PROCEDURE — 99214 OFFICE O/P EST MOD 30 MIN: CPT | Performed by: NURSE PRACTITIONER

## 2019-09-20 NOTE — PROGRESS NOTES
History of Present Illness   Sandra Leahy is a 53 y.o.female who presents to the clinic today for follow up and to review labs which she recently had done  related to her  DM, type 2 uncontrolled, Hyperlipidemia, and Vitamin deficiencies. In addition, she reports that the Center rescheduled her appointment for her memory evaluation until December. She does feel that it has improved dramatically for the past four weeks.  She reports no further episodes of dizziness.      Diabetes   She has type 2 diabetes mellitus.  Associated symptoms include stable peripheral neuropathy. Pertinent negatives for diabetes include no blurred vision, no chest pain, no foot ulcerations, no polyphagia, no polyuria and no weakness.  Diabetic complications include peripheral neuropathy. Risk factors for coronary artery disease include diabetes mellitus, dyslipidemia, family history and post-menopausal. Current diabetic treatment includes insulin injections, oral agent (monotherapy) and diet. She is following a generally unhealthy diet with increase in refined sugars. She has had a previous visit with a dietitian. She has seen a Podiatrist. Her last eye exam  was completed in May 2018 per Dr Lopez.She has just to keep her appointment for follow up exam.  Lab Results   Component Value Date    HGBA1C 9.50 (H) 09/17/2019     Lab Results   Component Value Date    HGBA1C 8.94 (H) 04/05/2019     Hyperlipidemia   Pertinent negatives include no chest pain or shortness of breath. Current antihyperlipidemic treatment includes ezetimibe and statins. Risk factors for coronary artery disease include diabetes mellitus, dyslipidemia, family history, post-menopausal and stress.     Lab Results   Component Value Date    CHOL 154 09/17/2019    CHLPL 138 05/18/2016    TRIG 350 (H) 09/17/2019    HDL 32 (L) 09/17/2019    LDL 52 09/17/2019     Lab Results   Component Value Date    CHOL 141 04/05/2019    CHLPL 138 05/18/2016    TRIG 157 (H) 04/05/2019    HDL  "40 04/05/2019    LDL 70 04/05/2019     Anxiety   Anxiety has been gradually improving. Symptoms include excessive worry, confusion, and decreased concentration. Sandra reports no chest pain, nausea,  palpitations, shortness of breath or suicidal ideas. The severity of symptoms at times interfering with daily activities. The symptoms are aggravated by family issues and especially during the late afternoon when the children are fussing with each other. Risk factors include a major life event.   Refer to ROS for additional information.   Vitals:    09/20/19 0818   BP: 120/78   Pulse: 78   Resp: 14   Temp: 97.1 °F (36.2 °C)   TempSrc: Temporal   SpO2: 98%   Weight: 80.3 kg (177 lb)   Height: 175.3 cm (69\")      The following portions of the patient's history were reviewed and updated as appropriate: allergies, current medications, past family history, past medical history, past social history, past surgical history and problem list.    Review of Systems   Constitutional: Negative for activity change, appetite change, chills, fatigue, fever and unexpected weight change.   HENT: Negative.    Eyes: Positive for visual disturbance.   Respiratory: Negative for cough, chest tightness, shortness of breath and wheezing.    Cardiovascular: Negative for chest pain, palpitations and leg swelling.   Gastrointestinal: Positive for diarrhea. Negative for abdominal pain, constipation, nausea and vomiting.   Endocrine: Negative for cold intolerance, heat intolerance, polydipsia, polyphagia and polyuria.   Skin: Negative for color change and rash.   Neurological: Positive for numbness. Negative for dizziness, tremors, speech difficulty, weakness, light-headedness and headaches.   Hematological: Negative for adenopathy.   Psychiatric/Behavioral: Negative for confusion (Improving), decreased concentration and suicidal ideas. The patient is nervous/anxious (Improving).    All other systems reviewed and are negative.    Physical Exam "   Constitutional: She is oriented to person, place, and time. She appears well-developed and well-nourished. No distress.   Bright, in pleasant spirits, well dressed and groomed   HENT:   Head: Normocephalic.   Nose: Nose normal.   Mouth/Throat: Oropharynx is clear and moist. No oropharyngeal exudate.   Eyes: Conjunctivae are normal. Pupils are equal, round, and reactive to light. Right eye exhibits no discharge. Left eye exhibits no discharge. No scleral icterus.   Neck: Neck supple. No JVD present. No thyromegaly present.   Cardiovascular: Normal rate, regular rhythm and normal heart sounds. Exam reveals no friction rub.   No murmur heard.  Pulmonary/Chest: Effort normal and breath sounds normal. No respiratory distress. She has no wheezes. She has no rales.   Abdominal: Soft. She exhibits no distension. There is no tenderness. There is no rebound and no guarding.   Musculoskeletal: She exhibits no edema or tenderness.    Sandra had a diabetic foot exam performed today.  Vascular Status -  Her right foot exhibits normal foot vasculature  and no edema. Her left foot exhibits normal foot vasculature  and no edema.  Skin Integrity  -  Her right foot skin is intact.Her left foot skin is intact..  Lymphadenopathy:     She has no cervical adenopathy.   Neurological: She is alert and oriented to person, place, and time.   Skin: Skin is warm and dry. Capillary refill takes less than 2 seconds. No rash noted. No erythema.   Psychiatric: She has a normal mood and affect. Her behavior is normal. Judgment and thought content normal.   Vitals reviewed.    Results for orders placed or performed in visit on 09/17/19   Comprehensive Metabolic Panel   Result Value Ref Range    Glucose 213 (H) 65 - 99 mg/dL    BUN 9 6 - 20 mg/dL    Creatinine 0.67 0.57 - 1.00 mg/dL    Sodium 136 136 - 145 mmol/L    Potassium 4.2 3.5 - 5.2 mmol/L    Chloride 99 98 - 107 mmol/L    CO2 25.3 22.0 - 29.0 mmol/L    Calcium 9.1 8.6 - 10.5 mg/dL    Total  Protein 7.3 6.0 - 8.5 g/dL    Albumin 4.30 3.50 - 5.20 g/dL    ALT (SGPT) 32 1 - 33 U/L    AST (SGOT) 26 1 - 32 U/L    Alkaline Phosphatase 73 39 - 117 U/L    Total Bilirubin 0.7 0.2 - 1.2 mg/dL    eGFR Non African Amer 91 >60 mL/min/1.73    Globulin 3.0 gm/dL    A/G Ratio 1.4 g/dL    BUN/Creatinine Ratio 13.4 7.0 - 25.0    Anion Gap 11.7 5.0 - 15.0 mmol/L   TSH   Result Value Ref Range    TSH 2.230 0.270 - 4.200 uIU/mL   Lipid Panel   Result Value Ref Range    Total Cholesterol 154 0 - 200 mg/dL    Triglycerides 350 (H) 0 - 150 mg/dL    HDL Cholesterol 32 (L) 40 - 60 mg/dL    LDL Cholesterol  52 0 - 100 mg/dL    VLDL Cholesterol 70 (H) 5 - 40 mg/dL    LDL/HDL Ratio 1.63    Hemoglobin A1c   Result Value Ref Range    Hemoglobin A1C 9.50 (H) 4.80 - 5.60 %   Vitamin D 25 Hydroxy   Result Value Ref Range    25 Hydroxy, Vitamin D 29.4 (L) 30.0 - 100.0 ng/ml   Vitamin B12   Result Value Ref Range    Vitamin B-12 598 211 - 946 pg/mL   CBC Auto Differential   Result Value Ref Range    WBC 6.12 3.40 - 10.80 10*3/mm3    RBC 4.96 3.77 - 5.28 10*6/mm3    Hemoglobin 15.2 12.0 - 15.9 g/dL    Hematocrit 45.7 34.0 - 46.6 %    MCV 92.1 79.0 - 97.0 fL    MCH 30.6 26.6 - 33.0 pg    MCHC 33.3 31.5 - 35.7 g/dL    RDW 12.7 12.3 - 15.4 %    RDW-SD 42.5 37.0 - 54.0 fl    MPV 10.7 6.0 - 12.0 fL    Platelets 255 140 - 450 10*3/mm3    Neutrophil % 57.1 42.7 - 76.0 %    Lymphocyte % 31.0 19.6 - 45.3 %    Monocyte % 7.5 5.0 - 12.0 %    Eosinophil % 3.4 0.3 - 6.2 %    Basophil % 0.7 0.0 - 1.5 %    Immature Grans % 0.3 0.0 - 0.5 %    Neutrophils, Absolute 3.49 1.70 - 7.00 10*3/mm3    Lymphocytes, Absolute 1.90 0.70 - 3.10 10*3/mm3    Monocytes, Absolute 0.46 0.10 - 0.90 10*3/mm3    Eosinophils, Absolute 0.21 0.00 - 0.40 10*3/mm3    Basophils, Absolute 0.04 0.00 - 0.20 10*3/mm3    Immature Grans, Absolute 0.02 0.00 - 0.05 10*3/mm3    nRBC 0.0 0.0 - 0.2 /100 WBC       Assessment/Plan     Patient's Body mass index is 26.14 kg/m². BMI is above normal  parameters. Recommendations include: exercise counseling and nutrition counseling.   (Normal BMI:  18.5-24.9, OW 25-29.9, Obesity 30 or greater)    Problems Addressed this Visit        Cardiovascular and Mediastinum    Hyperlipidemia       Digestive    Vitamin B 12 deficiency    Vitamin D deficiency       Endocrine    Diabetes mellitus due to underlying condition with diabetic neuropathy, with long-term current use of insulin (CMS/[facility]) - Primary    Relevant Medications    insulin detemir (LEVEMIR FLEXTOUCH) 100 UNIT/ML injection       Other    Memory problem    Anxiety        Findings and recommendations discussed with Sandra. Reviewed the above lab results with her and treatment options. Discussed both her glycemic control and cholesterol levels have worsened and strategies to improve. Emphasized the importance of improving both. She will increase her Levemir to 50 units. She does report she has had diarrhea for two months. Provided some information regarding nutrition and food choices. Discussed this may be due to Metformin as well. Her Triglycerides were elevated and provided her with information regarding this issue. She may need to change statin. She will f/u with me in October for her Medicare Wellness visit. She will then be scheduled for her f/u appointment. Her  is with her today and shares she has tremendous medical bills. Provided resources for them to investigate for assistance.     This document has been electronically signed by ASH Farfan, JORDAN-BC, ENEIDA

## 2019-09-20 NOTE — PATIENT INSTRUCTIONS
Food Choices to Help Relieve Diarrhea, Adult  When you have diarrhea, the foods you eat and your eating habits are very important. Choosing the right foods and drinks can help:  · Relieve diarrhea.  · Replace lost fluids and nutrients.  · Prevent dehydration.  What general guidelines should I follow?    Relieving diarrhea  · Choose foods with less than 2 g or .07 oz. of fiber per serving.  · Limit fats to less than 8 tsp (38 g or 1.34 oz.) a day.  · Avoid the following:  ? Foods and beverages sweetened with high-fructose corn syrup, honey, or sugar alcohols such as xylitol, sorbitol, and mannitol.  ? Foods that contain a lot of fat or sugar.  ? Fried, greasy, or spicy foods.  ? High-fiber grains, breads, and cereals.  ? Raw fruits and vegetables.  · Eat foods that are rich in probiotics. These foods include dairy products such as yogurt and fermented milk products. They help increase healthy bacteria in the stomach and intestines (gastrointestinal tract, or GI tract).  · If you have lactose intolerance, avoid dairy products. These may make your diarrhea worse.  · Take medicine to help stop diarrhea (antidiarrheal medicine) only as told by your health care provider.  Replacing nutrients  · Eat small meals or snacks every 3-4 hours.  · Eat bland foods, such as white rice, toast, or baked potato, until your diarrhea starts to get better. Gradually reintroduce nutrient-rich foods as tolerated or as told by your health care provider. This includes:  ? Well-cooked protein foods.  ? Peeled, seeded, and soft-cooked fruits and vegetables.  ? Low-fat dairy products.  · Take vitamin and mineral supplements as told by your health care provider.  Preventing dehydration  · Start by sipping water or a special solution to prevent dehydration (oral rehydration solution, ORS). Urine that is clear or pale yellow means that you are getting enough fluid.  · Try to drink at least 8-10 cups of fluid each day to help replace lost  fluids.  · You may add other liquids in addition to water, such as clear juice or decaffeinated sports drinks, as tolerated or as told by your health care provider.  · Avoid drinks with caffeine, such as coffee, tea, or soft drinks.  · Avoid alcohol.  What foods are recommended?    The items listed may not be a complete list. Talk with your health care provider about what dietary choices are best for you.  Grains  White rice. White, [place] Islander, or christel breads (fresh or toasted), including plain rolls, buns, or bagels. White pasta. Saltine, soda, or savanna crackers. Pretzels. Low-fiber cereal. Cooked cereals made with water (such as cornmeal, farina, or cream cereals). Plain muffins. Matzo. Catina toast. Zwieback.  Vegetables  Potatoes (without the skin). Most well-cooked and canned vegetables without skins or seeds. Tender lettuce.  Fruits  Apple sauce. Fruits canned in juice. Cooked apricots, cherries, grapefruit, peaches, pears, or plums. Fresh bananas and cantaloupe.  Meats and other protein foods  Baked or boiled chicken. Eggs. Tofu. Fish. Seafood. Smooth nut butters. Ground or well-cooked tender beef, ham, veal, lamb, pork, or poultry.  Dairy  Plain yogurt, kefir, and unsweetened liquid yogurt. Lactose-free milk, buttermilk, skim milk, or soy milk. Low-fat or nonfat hard cheese.  Beverages  Water. Low-calorie sports drinks. Fruit juices without pulp. Strained tomato and vegetable juices. Decaffeinated teas. Sugar-free beverages not sweetened with sugar alcohols. Oral rehydration solutions, if approved by your health care provider.  Seasoning and other foods  Bouillon, broth, or soups made from recommended foods.  What foods are not recommended?  The items listed may not be a complete list. Talk with your health care provider about what dietary choices are best for you.  Grains  Whole grain, whole wheat, bran, or rye breads, rolls, pastas, and crackers. Wild or brown rice. Whole grain or bran cereals. Barley. Oats  and oatmeal. Corn tortillas or taco shells. Granola. Popcorn.  Vegetables  Raw vegetables. Fried vegetables. Cabbage, broccoli, Moore sprouts, artichokes, baked beans, beet greens, corn, kale, legumes, peas, sweet potatoes, and yams. Potato skins. Cooked spinach and cabbage.  Fruits  Dried fruit, including raisins and dates. Raw fruits. Stewed or dried prunes. Canned fruits with syrup.  Meat and other protein foods  Fried or fatty meats. Deli meats. Shepherd nut butters. Nuts and seeds. Beans and lentils. Ascencio. Hot dogs. Sausage.  Dairy  High-fat cheeses. Whole milk, chocolate milk, and beverages made with milk, such as milk shakes. Half-and-half. Cream. sour cream. Ice cream.  Beverages  Caffeinated beverages (such as coffee, tea, soda, or energy drinks). Alcoholic beverages. Fruit juices with pulp. Prune juice. Soft drinks sweetened with high-fructose corn syrup or sugar alcohols. High-calorie sports drinks.  Fats and oils  Butter. Cream sauces. Margarine. Salad oils. Plain salad dressings. Olives. Avocados. Mayonnaise.  Sweets and desserts  Sweet rolls, doughnuts, and sweet breads. Sugar-free desserts sweetened with sugar alcohols such as xylitol and sorbitol.  Seasoning and other foods  Honey. Hot sauce. Chili powder. Gravy. Cream-based or milk-based soups. Pancakes and waffles.  Summary  · When you have diarrhea, the foods you eat and your eating habits are very important.  · Make sure you get at least 8-10 cups of fluid each day, or enough to keep your urine clear or pale yellow.  · Eat bland foods and gradually reintroduce healthy, nutrient-rich foods as tolerated, or as told by your health care provider.  · Avoid high-fiber, fried, greasy, or spicy foods.  This information is not intended to replace advice given to you by your health care provider. Make sure you discuss any questions you have with your health care provider.  Document Released: 03/09/2005 Document Revised: 12/15/2017 Document Reviewed:  12/15/2017  [employer] Interactive Patient Education © 2019 [employer]    INCREASE THE LEVEMIR TO 50 UNITS IN THE MORNING AND CALL ME IN TWO WEEKS TO LET ME KNOW HOW HER MORNING BLOOD SUGARS ARE

## 2019-09-24 VITALS
TEMPERATURE: 97.1 F | SYSTOLIC BLOOD PRESSURE: 120 MMHG | DIASTOLIC BLOOD PRESSURE: 78 MMHG | WEIGHT: 177 LBS | OXYGEN SATURATION: 98 % | BODY MASS INDEX: 26.22 KG/M2 | HEART RATE: 78 BPM | HEIGHT: 69 IN | RESPIRATION RATE: 14 BRPM

## 2019-10-08 ENCOUNTER — OFFICE VISIT (OUTPATIENT)
Dept: PSYCHIATRY | Facility: CLINIC | Age: 54
End: 2019-10-08

## 2019-10-08 VITALS
WEIGHT: 177 LBS | DIASTOLIC BLOOD PRESSURE: 80 MMHG | SYSTOLIC BLOOD PRESSURE: 120 MMHG | BODY MASS INDEX: 26.14 KG/M2 | HEART RATE: 90 BPM

## 2019-10-08 DIAGNOSIS — F41.1 GENERALIZED ANXIETY DISORDER: Primary | ICD-10-CM

## 2019-10-08 DIAGNOSIS — F41.9 ANXIETY: ICD-10-CM

## 2019-10-08 PROCEDURE — 90792 PSYCH DIAG EVAL W/MED SRVCS: CPT | Performed by: NURSE PRACTITIONER

## 2019-10-08 RX ORDER — ESCITALOPRAM OXALATE 20 MG/1
20 TABLET ORAL DAILY
Qty: 30 TABLET | Refills: 0 | Status: SHIPPED | OUTPATIENT
Start: 2019-10-08 | End: 2019-11-04 | Stop reason: SDUPTHER

## 2019-10-08 NOTE — PROGRESS NOTES
Subjective   Sandra Leahy is a 53 y.o. female who is here today for initial appointment to evaluate for medication options.     Chief Complaint:  Anxiety and memory difficulty    HPI:  History of Present Illness  Patient presents today for initial evaluation for medication management with .  Patient states over the last 5 to 6 months her memory has gotten much worse as well as her anxiety.  Patient states she has dealt with anxiety for a few years now however with the traumatic event with her son and grandson her anxiety gotten much worse.  Patient states she feels like she cannot remember nothing and gets aggravated and frustrated when she cannot remember things.  Patient states she does deal with getting angry, frustrated, and irritated when she is unable to remember things that she should know.  Patient denies any physical aggression.  Patient states she does feel very stressed as well as nervous and overwhelmed.  Patient states at home she feels like she bounces all over the place when trying to clean.  Patient states currently her anxiety is at a 5 or 6 on a 0-to-10 scale with 10 being the worst.  Patient reports more days than not she feels nervous and anxious.  Patient reports symptoms of anxiety of feeling overwhelmed, constantly worrying, nervous, and irritable.  Patient states if she is not with her  her anxiety increases a lot.  Patient states there are days where nothing is wrong and everything is fine and then some days where she is a completely different person.  Patient denies any social anxiety.  Patient denies any OCD signs or symptoms.  Patient denies any panic attacks.  Patient states currently she does not feel depressed and rates her depression at a 0 on a 0-to-10 scale with 10 being the worst.  Patient denies any mood swings, rigo, or hypomania.  Patient denies any difficulty with focus or concentration but states she does have difficulty staying on task when she is cleaning at  home.  Patient states part of the irritation is also due to she was the main one caring for the entire family and now she feels like she cannot do that.  Patient states she babysits as well and she feels like some days she holds it together until the baby leaves.  Patient states she is sleeping good and getting at least 8 hours of sleep at night.  Patient denies any nightmares.  Patient denies any flashbacks.  Patient reports appetite is good and eating at least 3 meals per day.  Patient denies any auditory or visual hallucinations.  Patient adamantly denies any SI or HI.  Patient states she is currently on Lexapro 10 mg daily.  Patient reports she has noticed some difference with this medication however not a huge improvement.  Patient denies any side effects to medication    Past Psych History:    Denies any inpatient treatment.  Patient denies any previous psychiatrist or provider.  Patient denies any therapy or counseling.  Denies any suicide attempts or self-harm in the past.  Patient denies any abuse however states approximately 5 years ago there was a traumatic event regarding her grandson and son.  Patient states her son was  and had 4 children in the home.  Patient states 1 of her grandson lived with her frequently right after birth however around the age of 2 the mother came and got the child and she was not allowed to see him.  Patient states the mother would not let her or her  in the home at all and after a complaint the home was evaluated.  Patient states 1 of her grandsons had been severely neglected and was malnourished.  Patient states the children were taken from the home and her son was placed in group home along with his wife.  Patient reports this was very traumatic for her due to her son going to group home as well as seeing her grandson in the state he was in.  Patient states she also had a constant reminder anytime she went to the store due to people talking about it.    Previous Psych  Meds:   Patient states she was started on Cymbalta to help with neuropathy and had a bad reaction.  Patient denies any other medications.    Substance Abuse:    Denies any tobacco use.  Denies any alcohol or illicit drug use.    Social History:    Patient reports she grew up with her mom and dad as well as 8 other siblings.  Patient states she was able to graduate high school and has attended some college.  Patient states she is currently  and has been  for 39 years.  Patient has 3 children and 4 grandchildren.  Patient states they adopted the two grandchildren after son was placed in nursing home.  Patient states she has not worked in 10 to 15 years.  Patient reports her last job was at [employer] where she was working for 10 years however she is now disabled.  Patient reports a good relationship with , children, and grandchildren.  Patient states the son that was placed in nursing home she communicates with frequently.  Patient reports she also babysits the other two grandchildren during the day.     Family Psychiatric History:  family history includes Arthritis in her mother; Asthma in an other family member; Cancer in her sister; Diabetes in her brother, father, mother, sister, and another family member; Heart disease in her father and mother; Hyperlipidemia in her mother; Hypertension in her mother and another family member.    Medical/Surgical History:  Past Medical History:   Diagnosis Date   • Bursitis    • Diabetic neuropathy (CMS/HCC)    • DM (diabetes mellitus screen)    • Fatigue    • Fibromyalgia    • Hyperlipidemia    • Hyperlipidemia    • Obesity    • Peripheral neuropathy    • Postsurgical menopause    • Tetanus toxoid vaccination status unknown    • Type 2 diabetes mellitus (CMS/HCC)    • Visual impairment    • Vitamin B12 deficiency    • Vitamin D deficiency      Past Surgical History:   Procedure Laterality Date   • BREAST BIOPSY Left 7-8 yrs ago    benign   • CHOLECYSTECTOMY     • HYSTERECTOMY       40s   • KNEE ARTHROSCOPY W/ MENISCAL REPAIR Left        Allergies   Allergen Reactions   • Lantus [Insulin Glargine] Confusion   • Codeine Itching   • Exenatide Nausea And Vomiting     Intolerant   • Sitagliptin Other (See Comments)     Unsure           Current Medications:   Current Outpatient Medications   Medication Sig Dispense Refill   • cetirizine (zyrTEC) 10 MG tablet Take 1 tablet by mouth Daily As Needed for Allergies. 30 tablet 5   • escitalopram (LEXAPRO) 20 MG tablet Take 1 tablet by mouth Daily. 30 tablet 0   • ezetimibe (ZETIA) 10 MG tablet Take 1 tablet by mouth Daily. 30 tablet 4   • fluconazole (DIFLUCAN) 150 MG tablet Take 1 tablet by mouth Daily. 3 tablet 1   • insulin detemir (LEVEMIR FLEXTOUCH) 100 UNIT/ML injection Inject 50 Units under the skin into the appropriate area as directed Daily. 15 mL 3   • Insulin Pen Needle (RELION PEN NEEDLE 31G/8MM) 31G X 8 MM misc Inject 1 Device under the skin into the appropriate area as directed 2 (Two) Times a Day. 60 each 3   • meloxicam (MOBIC) 7.5 MG tablet TAKE 1 TABLET BY MOUTH ONCE DAILY WITH FOOD MAY  INCREASE  TO  2  TABLETS  IF  NEEDED  FOR  PAIN  RELIEF 60 tablet 0   • metFORMIN (GLUCOPHAGE) 1000 MG tablet Take 1 tablet by mouth 2 (Two) Times a Day With Meals. 60 tablet 4   • omega-3 acid ethyl esters (LOVAZA) 1 G capsule Take 2 g by mouth 2 (Two) Times a Day.     • pravastatin (PRAVACHOL) 40 MG tablet Take 1 tablet by mouth Daily. 30 tablet 4   • raNITIdine (ZANTAC) 150 MG tablet Take 1 tablet by mouth 2 (Two) Times a Day. 60 tablet 5   • triamcinolone (KENALOG) 0.1 % cream Apply  topically to the appropriate area as directed 3 (Three) Times a Day. 80 g 5   • vitamin D (ERGOCALCIFEROL) 72482 units capsule capsule Take 1 capsule by mouth Every 7 (Seven) Days. 12 capsule 0     No current facility-administered medications for this visit.          Review of Systems   Constitutional: Negative.    Respiratory: Negative.    Cardiovascular: Negative.     Psychiatric/Behavioral: Positive for decreased concentration. The patient is nervous/anxious.     denies HEENT, cardiovascular, respiratory, liver, renal, GI/, endocrine, neuro, DERM, hematology, immunology, musculoskeletal disorders.    Objective   Physical Exam   Constitutional: Vital signs are normal. She appears well-developed and well-nourished. She is cooperative.   Neurological: She is alert.   Psychiatric: Her speech is normal. Judgment and thought content normal. Her mood appears anxious. She is slowed. Cognition and memory are impaired.   Vitals reviewed.    Blood pressure 120/80, pulse 90, weight 80.3 kg (177 lb). Body mass index is 26.14 kg/m².      Mental Status Exam:   Hygiene:   good  Cooperation:  Cooperative  Eye Contact:  Good  Psychomotor Behavior:  Appropriate  Affect:  Appropriate  Hopelessness: Denies  Speech:  Normal  Thought Process:  Goal directed and Linear  Thought Content:  Normal  Suicidal:  None  Homicidal:  None  Hallucinations:  None  Delusion:  None  Memory:  Intact  Orientation:  Person, Place, Time and Situation  Reliability:  fair  Insight:  Fair  Judgement:  Fair  Impulse Control:  Fair  Physical/Medical Issues:  No       Short-term goals: Patient will be compliant with clinic appointments.  Patient will be engaged in therapy, medication compliant with minimal side effects. Patient  will report decrease of symptoms and frequency. Patient will be compliant with appointments every two to four weeks.     Long-term goals: Patient will have minimal symptoms of anxiety and memory difficulty with continued medication management. Patient will be compliant with treatment and appointments. Patient will be stabilized on medication for 6 months to one year.       Problem list: anxiety, memory loss  Strengths: supportive   Weaknesses: stressed             Assessment/Plan   Diagnoses and all orders for this visit:    Generalized anxiety disorder  -     escitalopram (LEXAPRO) 20 MG  tablet; Take 1 tablet by mouth Daily.    Anxiety            Discussed medication options with  and patient.  Increase Lexapro to 20 mg.  Discussed the risks, benefits, and side effects of the medication; client and has been acknowledged and verbally consented.  Patient is aware to contact the office with any worsening of symptoms, questions, or concerns.  Patient is agreeable to go to the ER or call 911 should they begin SI/HI.      Follow up in four weeks.       Errors in dictation may reflect use of voice recognition software and not all errors in transcription may have been detected prior to signing.           This document has been electronically signed by ASH Gasca   October 28, 2019 10:59 AM

## 2019-11-04 DIAGNOSIS — L24.0 IRRITANT CONTACT DERMATITIS DUE TO DETERGENT: ICD-10-CM

## 2019-11-04 DIAGNOSIS — F41.1 GENERALIZED ANXIETY DISORDER: ICD-10-CM

## 2019-11-04 RX ORDER — ESCITALOPRAM OXALATE 20 MG/1
TABLET ORAL
Qty: 30 TABLET | Refills: 0 | Status: SHIPPED | OUTPATIENT
Start: 2019-11-04 | End: 2019-11-06 | Stop reason: SDUPTHER

## 2019-11-04 RX ORDER — CETIRIZINE HYDROCHLORIDE 10 MG/1
TABLET ORAL
Qty: 30 TABLET | Refills: 5 | Status: SHIPPED | OUTPATIENT
Start: 2019-11-04 | End: 2020-05-19 | Stop reason: SDUPTHER

## 2019-11-06 ENCOUNTER — OFFICE VISIT (OUTPATIENT)
Dept: PSYCHIATRY | Facility: CLINIC | Age: 54
End: 2019-11-06

## 2019-11-06 VITALS
HEART RATE: 104 BPM | WEIGHT: 179 LBS | SYSTOLIC BLOOD PRESSURE: 124 MMHG | DIASTOLIC BLOOD PRESSURE: 68 MMHG | BODY MASS INDEX: 26.43 KG/M2

## 2019-11-06 DIAGNOSIS — F41.1 GENERALIZED ANXIETY DISORDER: ICD-10-CM

## 2019-11-06 DIAGNOSIS — F32.0 CURRENT MILD EPISODE OF MAJOR DEPRESSIVE DISORDER WITHOUT PRIOR EPISODE (HCC): Primary | ICD-10-CM

## 2019-11-06 PROCEDURE — 99213 OFFICE O/P EST LOW 20 MIN: CPT | Performed by: NURSE PRACTITIONER

## 2019-11-06 RX ORDER — ESCITALOPRAM OXALATE 20 MG/1
20 TABLET ORAL DAILY
Qty: 30 TABLET | Refills: 0 | Status: SHIPPED | OUTPATIENT
Start: 2019-11-06 | End: 2019-12-03 | Stop reason: SDUPTHER

## 2019-11-06 RX ORDER — BUSPIRONE HYDROCHLORIDE 5 MG/1
5 TABLET ORAL 2 TIMES DAILY
Qty: 60 TABLET | Refills: 0 | Status: SHIPPED | OUTPATIENT
Start: 2019-11-06 | End: 2019-12-03 | Stop reason: SDUPTHER

## 2019-11-06 NOTE — PROGRESS NOTES
Subjective   Sandra Leahy is a 53 y.o. female is here today for medication management follow-up.    Chief Complaint:  Anxiety, mood, and memory     History of Present Illness:   Patient presents today for follow up for medication management for depression and anxiety with . Patient reports memory is still bad and she is struggling to remember simple things such as telling her something and then next minute forget.  states memory is mainly little things and noticed some improvement. Patient reports recently went to [employer] [facility] and stayed inside for a while, but now can't remember what the camper even looks like. Patient reports currently depression is at a 1-2 on a 0-10 scale with 10 being the worst. Patient reports occasionally getting upset when unable to remember things.  states he can tell a difference with medication with mood and anxiety.  reports patient is much less irritable and on edge and he can tell it is working. Patient reports she doesn't feel the anxiety is real bad right now. Patient denies any constant anxiety, but states she gets upset and anxiety when  gets upset with her due to not remembering anything. Patient reports she does have an anxious feeling inside if there is loud noises around and a lot of stimulation around. Patient reports sleeping at least 6-8 hours a night and patient denies any nightmares. Patient reports appetite is good  and eating at least two to three meals a day. Patient denies any auditory or visual hallucinations. Patient adamantly denies any SI or HI. Patient denies any side effects to medications. Patient denies any medical changes since last visit.   The following portions of the patient's history were reviewed and updated as appropriate: allergies, current medications, past family history, past medical history, past social history, past surgical history and problem list.    Review of Systems   Constitutional: Negative.     Respiratory: Negative.    Cardiovascular: Negative.    Gastrointestinal: Negative.    Psychiatric/Behavioral: Positive for decreased concentration and dysphoric mood. The patient is nervous/anxious.        Objective   Physical Exam   Constitutional: Vital signs are normal. She appears well-developed and well-nourished. She is cooperative.   Neurological: She is alert.   Psychiatric: Her speech is normal. Judgment and thought content normal. Her mood appears anxious. She is slowed. She exhibits abnormal recent memory.   Vitals reviewed.    Blood pressure 124/68, pulse 104, weight 81.2 kg (179 lb). Body mass index is 26.43 kg/m².    Allergies   Allergen Reactions   • Lantus [Insulin Glargine] Confusion   • Codeine Itching   • Exenatide Nausea And Vomiting     Intolerant   • Sitagliptin Other (See Comments)     Unsure       Medication List:   Current Outpatient Medications   Medication Sig Dispense Refill   • busPIRone (BUSPAR) 5 MG tablet Take 1 tablet by mouth 2 (Two) Times a Day. 60 tablet 0   • cetirizine (zyrTEC) 10 MG tablet TAKE 1 TABLET BY MOUTH ONCE DAILY AS NEEDED FOR ALLERGIES 30 tablet 5   • escitalopram (LEXAPRO) 20 MG tablet Take 1 tablet by mouth Daily. 30 tablet 0   • ezetimibe (ZETIA) 10 MG tablet Take 1 tablet by mouth Daily. 30 tablet 4   • fluconazole (DIFLUCAN) 150 MG tablet Take 1 tablet by mouth Daily. 3 tablet 1   • insulin detemir (LEVEMIR FLEXTOUCH) 100 UNIT/ML injection Inject 50 Units under the skin into the appropriate area as directed Daily. 15 mL 3   • Insulin Pen Needle (RELION PEN NEEDLE 31G/8MM) 31G X 8 MM misc Inject 1 Device under the skin into the appropriate area as directed 2 (Two) Times a Day. 60 each 3   • meloxicam (MOBIC) 7.5 MG tablet TAKE 1 TABLET BY MOUTH ONCE DAILY WITH FOOD MAY  INCREASE  TO  2  TABLETS  IF  NEEDED  FOR  PAIN  RELIEF 60 tablet 0   • metFORMIN (GLUCOPHAGE) 1000 MG tablet Take 1 tablet by mouth 2 (Two) Times a Day With Meals. 60 tablet 4   • omega-3 acid  ethyl esters (LOVAZA) 1 G capsule Take 2 g by mouth 2 (Two) Times a Day.     • pravastatin (PRAVACHOL) 40 MG tablet Take 1 tablet by mouth Daily. 30 tablet 4   • raNITIdine (ZANTAC) 150 MG tablet Take 1 tablet by mouth 2 (Two) Times a Day. 60 tablet 5   • triamcinolone (KENALOG) 0.1 % cream Apply  topically to the appropriate area as directed 3 (Three) Times a Day. 80 g 5   • vitamin D (ERGOCALCIFEROL) 31244 units capsule capsule Take 1 capsule by mouth Every 7 (Seven) Days. 12 capsule 0     No current facility-administered medications for this visit.        Mental Status Exam:   Hygiene:   good  Cooperation:  Cooperative  Eye Contact:  Good  Psychomotor Behavior:  Appropriate  Affect:  Appropriate  Hopelessness: Denies  Speech:  Normal  Thought Process:  Goal directed and Linear  Thought Content:  Normal  Suicidal:  None  Homicidal:  None  Hallucinations:  None  Delusion:  None  Memory:  Deficit  Orientation:  Person, Place, Time and Situation  Reliability:  fair  Insight:  Fair  Judgement:  Fair  Impulse Control:  Fair  Physical/Medical Issues:  No                Assessment/Plan   Diagnoses and all orders for this visit:    Current mild episode of major depressive disorder without prior episode (CMS/[facility])  -     escitalopram (LEXAPRO) 20 MG tablet; Take 1 tablet by mouth Daily.    Generalized anxiety disorder  -     escitalopram (LEXAPRO) 20 MG tablet; Take 1 tablet by mouth Daily.  -     busPIRone (BUSPAR) 5 MG tablet; Take 1 tablet by mouth 2 (Two) Times a Day.                Discussed medication options with patient and . Start Buspar 5mg twice daily for worsening anxiety. Cont. Lexapro 20mg daily. Reviewed the risks, benefits, and side effects of the medications; patient and  acknowledged and verbally consented.  Patient is agreeable to call the office with concerns, questions, or worsening of symptoms.  Patient is aware to call 911 or go to the nearest ER should begin having SI/HI.        Follow  up in four weeks.       Errors in dictation may reflect use of voice recognition software and not all errors in transcription may have been detected prior to signing.                  This document has been electronically signed by ASH Gasca   November 7, 2019 11:07 AM

## 2019-12-03 ENCOUNTER — OFFICE VISIT (OUTPATIENT)
Dept: PSYCHIATRY | Facility: CLINIC | Age: 54
End: 2019-12-03

## 2019-12-03 VITALS
WEIGHT: 176.8 LBS | SYSTOLIC BLOOD PRESSURE: 120 MMHG | BODY MASS INDEX: 26.19 KG/M2 | HEIGHT: 69 IN | DIASTOLIC BLOOD PRESSURE: 68 MMHG

## 2019-12-03 DIAGNOSIS — F41.1 GENERALIZED ANXIETY DISORDER: ICD-10-CM

## 2019-12-03 DIAGNOSIS — F32.0 CURRENT MILD EPISODE OF MAJOR DEPRESSIVE DISORDER WITHOUT PRIOR EPISODE (HCC): ICD-10-CM

## 2019-12-03 PROCEDURE — 99213 OFFICE O/P EST LOW 20 MIN: CPT | Performed by: NURSE PRACTITIONER

## 2019-12-03 RX ORDER — ESCITALOPRAM OXALATE 20 MG/1
20 TABLET ORAL DAILY
Qty: 30 TABLET | Refills: 1 | Status: SHIPPED | OUTPATIENT
Start: 2019-12-03 | End: 2020-01-21 | Stop reason: SDUPTHER

## 2019-12-03 RX ORDER — BUSPIRONE HYDROCHLORIDE 10 MG/1
TABLET ORAL
Qty: 45 TABLET | Refills: 1 | Status: SHIPPED | OUTPATIENT
Start: 2019-12-03 | End: 2020-01-13

## 2019-12-03 NOTE — PROGRESS NOTES
Subjective   Sandra Leahy is a 53 y.o. female is here today for medication management follow-up.    Chief Complaint:  Anxiety and memory     History of Present Illness:    Patient presents today for follow up for medication management for depression and anxiety. Patient states she feels like she is doing a little better. Patient states still struggling with memory problems but able to comprehend better than she was. Patient reports there are times her  will ask if she remembers something and she will say yes to him just so he doesn't say anything about it though. Patient reports currently depression is at a 1-2 on a 0-10 scale with 10 being the worst. Patient reports symptoms of depression of no interest, no energy, no motivation, and sad mood. Patient denies any mood swings. Patient reports anxiety is at a 6 on a 0-10 scale with 10 being the worst. Patient reports having a few panic attacks about every other day. Patient states they occur randomly and some times she can go two to three days without having one. Patient reports panic attacks last just a few minutes and during an attacks patient has intense panic and shortness of breath. Patient reports sleeping at least 5-7 hours a night and patient denies any nightmares. Patient reports appetite is good and eating at least 3 meals a day. Patient denies any auditory or visual hallucinations. Patient adamantly denies any SI or HI. Patient denies any side effects to medications. Patient denies any medical changes since last visit.   The following portions of the patient's history were reviewed and updated as appropriate: allergies, current medications, past family history, past medical history, past social history, past surgical history and problem list.    Review of Systems   Constitutional: Negative.    Respiratory: Negative.    Cardiovascular: Negative.    Gastrointestinal: Negative.    Psychiatric/Behavioral: Positive for decreased concentration and  "dysphoric mood. The patient is nervous/anxious.        Objective   Physical Exam   Constitutional: Vital signs are normal. She appears well-developed and well-nourished. She is cooperative.   Neurological: She is alert.   Psychiatric: Her speech is normal and behavior is normal. Judgment and thought content normal. Her mood appears anxious. Cognition and memory are impaired.   Vitals reviewed.    Blood pressure 120/68, height 175.3 cm (69\"), weight 80.2 kg (176 lb 12.8 oz). Body mass index is 26.11 kg/m².    Allergies   Allergen Reactions   • Lantus [Insulin Glargine] Confusion   • Codeine Itching   • Exenatide Nausea And Vomiting     Intolerant   • Sitagliptin Other (See Comments)     Unsure       Medication List:   Current Outpatient Medications   Medication Sig Dispense Refill   • busPIRone (BUSPAR) 10 MG tablet Take 1 tablet by mouth Every Morning AND 0.5 tablets Every Evening. 45 tablet 1   • cetirizine (zyrTEC) 10 MG tablet TAKE 1 TABLET BY MOUTH ONCE DAILY AS NEEDED FOR ALLERGIES 30 tablet 5   • escitalopram (LEXAPRO) 20 MG tablet Take 1 tablet by mouth Daily. 30 tablet 1   • ezetimibe (ZETIA) 10 MG tablet Take 1 tablet by mouth Daily. 30 tablet 4   • fluconazole (DIFLUCAN) 150 MG tablet Take 1 tablet by mouth Daily. 3 tablet 1   • insulin detemir (LEVEMIR FLEXTOUCH) 100 UNIT/ML injection Inject 50 Units under the skin into the appropriate area as directed Daily. 15 mL 3   • Insulin Pen Needle (RELION PEN NEEDLE 31G/8MM) 31G X 8 MM misc Inject 1 Device under the skin into the appropriate area as directed 2 (Two) Times a Day. 60 each 3   • meloxicam (MOBIC) 7.5 MG tablet TAKE 1 TABLET BY MOUTH ONCE DAILY WITH FOOD MAY  INCREASE  TO  2  TABLETS  IF  NEEDED  FOR  PAIN  RELIEF 60 tablet 0   • metFORMIN (GLUCOPHAGE) 1000 MG tablet Take 1 tablet by mouth 2 (Two) Times a Day With Meals. 60 tablet 4   • omega-3 acid ethyl esters (LOVAZA) 1 G capsule Take 2 g by mouth 2 (Two) Times a Day.     • pravastatin (PRAVACHOL) " 40 MG tablet Take 1 tablet by mouth Daily. 30 tablet 4   • raNITIdine (ZANTAC) 150 MG tablet Take 1 tablet by mouth 2 (Two) Times a Day. 60 tablet 5   • triamcinolone (KENALOG) 0.1 % cream Apply  topically to the appropriate area as directed 3 (Three) Times a Day. 80 g 5   • vitamin D (ERGOCALCIFEROL) 16897 units capsule capsule Take 1 capsule by mouth Every 7 (Seven) Days. 12 capsule 0     No current facility-administered medications for this visit.        Mental Status Exam:   Hygiene:   good  Cooperation:  Cooperative  Eye Contact:  Good  Psychomotor Behavior:  Appropriate  Affect:  Appropriate  Hopelessness: Denies  Speech:  Normal  Thought Process:  Goal directed and Linear  Thought Content:  Normal  Suicidal:  None  Homicidal:  None  Hallucinations:  None  Delusion:  None  Memory:  Deficits  Orientation:  Person, Place, Time and Situation  Reliability:  fair  Insight:  Fair  Judgement:  Fair  Impulse Control:  Fair  Physical/Medical Issues:  No                  Assessment/Plan   Diagnoses and all orders for this visit:    Generalized anxiety disorder  -     escitalopram (LEXAPRO) 20 MG tablet; Take 1 tablet by mouth Daily.  -     busPIRone (BUSPAR) 10 MG tablet; Take 1 tablet by mouth Every Morning AND 0.5 tablets Every Evening.    Current mild episode of major depressive disorder without prior episode (CMS/HCC)  -     escitalopram (LEXAPRO) 20 MG tablet; Take 1 tablet by mouth Daily.            Discussed medication options with patient and . Increase Buspar 10mg one tablet by mouth in the morning and one-half tablet by mouth every evening for anxiety. Cont. Lexapro 20mg daily for depression.  Reviewed the risks, benefits, and side effects of the medications; patient and  acknowledged and verbally consented.  Patient is agreeable to call the office with any questions, concerns, or worsening of symptoms.  Patient is aware to call 911 or go to the nearest ER should begin having SI/HI.        Follow  up in five weeks      Errors in dictation may reflect use of voice recognition software and not all errors in transcription may have been detected prior to signing.                This document has been electronically signed by ASH Gasca   December 13, 2019 2:18 PM

## 2019-12-30 DIAGNOSIS — E55.9 VITAMIN D DEFICIENCY: ICD-10-CM

## 2019-12-30 RX ORDER — ERGOCALCIFEROL 1.25 MG/1
CAPSULE ORAL
Qty: 12 CAPSULE | Refills: 0 | Status: SHIPPED | OUTPATIENT
Start: 2019-12-30 | End: 2020-02-13 | Stop reason: SDUPTHER

## 2020-01-08 DIAGNOSIS — B37.9 YEAST INFECTION: ICD-10-CM

## 2020-01-08 RX ORDER — FLUCONAZOLE 150 MG/1
150 TABLET ORAL DAILY
Qty: 3 TABLET | Refills: 1 | Status: SHIPPED | OUTPATIENT
Start: 2020-01-08 | End: 2020-01-31 | Stop reason: SDUPTHER

## 2020-01-11 DIAGNOSIS — F41.1 GENERALIZED ANXIETY DISORDER: ICD-10-CM

## 2020-01-13 RX ORDER — BUSPIRONE HYDROCHLORIDE 5 MG/1
TABLET ORAL
Qty: 60 TABLET | Refills: 0 | OUTPATIENT
Start: 2020-01-13

## 2020-01-13 RX ORDER — BUSPIRONE HYDROCHLORIDE 10 MG/1
TABLET ORAL
Qty: 45 TABLET | Refills: 0 | Status: SHIPPED | OUTPATIENT
Start: 2020-01-13 | End: 2020-01-21 | Stop reason: SDUPTHER

## 2020-01-17 DIAGNOSIS — F41.1 GENERALIZED ANXIETY DISORDER: ICD-10-CM

## 2020-01-17 RX ORDER — BUSPIRONE HYDROCHLORIDE 5 MG/1
TABLET ORAL
Qty: 60 TABLET | Refills: 0 | OUTPATIENT
Start: 2020-01-17

## 2020-01-21 DIAGNOSIS — F32.0 CURRENT MILD EPISODE OF MAJOR DEPRESSIVE DISORDER WITHOUT PRIOR EPISODE (HCC): ICD-10-CM

## 2020-01-21 DIAGNOSIS — F41.1 GENERALIZED ANXIETY DISORDER: ICD-10-CM

## 2020-01-21 RX ORDER — BUSPIRONE HYDROCHLORIDE 5 MG/1
5 TABLET ORAL EVERY EVENING
Qty: 30 TABLET | Refills: 0 | Status: SHIPPED | OUTPATIENT
Start: 2020-01-21 | End: 2020-03-19

## 2020-01-21 RX ORDER — ESCITALOPRAM OXALATE 20 MG/1
20 TABLET ORAL DAILY
Qty: 30 TABLET | Refills: 1 | Status: SHIPPED | OUTPATIENT
Start: 2020-01-21 | End: 2020-03-19

## 2020-01-21 RX ORDER — BUSPIRONE HYDROCHLORIDE 10 MG/1
10 TABLET ORAL EVERY MORNING
Qty: 30 TABLET | Refills: 0 | Status: SHIPPED | OUTPATIENT
Start: 2020-01-21 | End: 2020-02-13

## 2020-01-23 DIAGNOSIS — F41.1 GENERALIZED ANXIETY DISORDER: ICD-10-CM

## 2020-01-23 RX ORDER — BUSPIRONE HYDROCHLORIDE 5 MG/1
TABLET ORAL
Qty: 60 TABLET | Refills: 0 | OUTPATIENT
Start: 2020-01-23

## 2020-01-23 RX ORDER — BUSPIRONE HYDROCHLORIDE 5 MG/1
TABLET ORAL
Qty: 30 TABLET | Refills: 0 | Status: CANCELLED | OUTPATIENT
Start: 2020-01-23

## 2020-01-31 DIAGNOSIS — E55.9 VITAMIN D DEFICIENCY: ICD-10-CM

## 2020-01-31 DIAGNOSIS — IMO0002 TYPE 2 DIABETES, UNCONTROLLED, WITH NEUROPATHY: ICD-10-CM

## 2020-01-31 DIAGNOSIS — E53.8 VITAMIN B 12 DEFICIENCY: ICD-10-CM

## 2020-01-31 DIAGNOSIS — K21.9 GASTROESOPHAGEAL REFLUX DISEASE WITHOUT ESOPHAGITIS: ICD-10-CM

## 2020-01-31 DIAGNOSIS — F41.9 ANXIETY: ICD-10-CM

## 2020-01-31 DIAGNOSIS — E78.2 MIXED HYPERLIPIDEMIA: Primary | ICD-10-CM

## 2020-01-31 DIAGNOSIS — B37.9 YEAST INFECTION: ICD-10-CM

## 2020-01-31 RX ORDER — FLUCONAZOLE 150 MG/1
150 TABLET ORAL DAILY
Qty: 3 TABLET | Refills: 1 | Status: SHIPPED | OUTPATIENT
Start: 2020-01-31 | End: 2020-02-03

## 2020-02-13 ENCOUNTER — OFFICE VISIT (OUTPATIENT)
Dept: FAMILY MEDICINE CLINIC | Facility: CLINIC | Age: 55
End: 2020-02-13

## 2020-02-13 VITALS
DIASTOLIC BLOOD PRESSURE: 76 MMHG | SYSTOLIC BLOOD PRESSURE: 120 MMHG | WEIGHT: 179.8 LBS | BODY MASS INDEX: 26.63 KG/M2 | HEIGHT: 69 IN | TEMPERATURE: 97.4 F | HEART RATE: 94 BPM | RESPIRATION RATE: 14 BRPM | OXYGEN SATURATION: 98 %

## 2020-02-13 DIAGNOSIS — F41.9 ANXIETY: Chronic | ICD-10-CM

## 2020-02-13 DIAGNOSIS — E53.8 VITAMIN B 12 DEFICIENCY: ICD-10-CM

## 2020-02-13 DIAGNOSIS — IMO0002 TYPE 2 DIABETES, UNCONTROLLED, WITH NEUROPATHY: Primary | Chronic | ICD-10-CM

## 2020-02-13 DIAGNOSIS — E55.9 VITAMIN D DEFICIENCY: ICD-10-CM

## 2020-02-13 DIAGNOSIS — E78.2 MIXED HYPERLIPIDEMIA: Chronic | ICD-10-CM

## 2020-02-13 DIAGNOSIS — Z23 ENCOUNTER FOR IMMUNIZATION: ICD-10-CM

## 2020-02-13 DIAGNOSIS — R41.3 MEMORY PROBLEM: ICD-10-CM

## 2020-02-13 LAB — GLUCOSE BLDC GLUCOMTR-MCNC: 167 MG/DL (ref 70–130)

## 2020-02-13 PROCEDURE — 36415 COLL VENOUS BLD VENIPUNCTURE: CPT | Performed by: NURSE PRACTITIONER

## 2020-02-13 PROCEDURE — 82962 GLUCOSE BLOOD TEST: CPT | Performed by: NURSE PRACTITIONER

## 2020-02-13 PROCEDURE — 99214 OFFICE O/P EST MOD 30 MIN: CPT | Performed by: NURSE PRACTITIONER

## 2020-02-13 PROCEDURE — G0009 ADMIN PNEUMOCOCCAL VACCINE: HCPCS | Performed by: NURSE PRACTITIONER

## 2020-02-13 PROCEDURE — 90732 PPSV23 VACC 2 YRS+ SUBQ/IM: CPT | Performed by: NURSE PRACTITIONER

## 2020-02-13 RX ORDER — ERGOCALCIFEROL 1.25 MG/1
50000 CAPSULE ORAL WEEKLY
Qty: 12 CAPSULE | Refills: 0 | Status: SHIPPED | OUTPATIENT
Start: 2020-02-13 | End: 2020-03-16

## 2020-02-13 RX ORDER — FLUCONAZOLE 150 MG/1
TABLET ORAL
COMMUNITY
Start: 2020-02-10 | End: 2020-05-19

## 2020-02-13 RX ORDER — EZETIMIBE 10 MG/1
10 TABLET ORAL DAILY
Qty: 30 TABLET | Refills: 4 | Status: SHIPPED | OUTPATIENT
Start: 2020-02-13 | End: 2020-05-19 | Stop reason: SDUPTHER

## 2020-02-13 RX ORDER — PRAVASTATIN SODIUM 40 MG
40 TABLET ORAL DAILY
Qty: 30 TABLET | Refills: 4 | Status: SHIPPED | OUTPATIENT
Start: 2020-02-13 | End: 2020-05-19 | Stop reason: SDUPTHER

## 2020-02-13 NOTE — PROGRESS NOTES
History of Present Illness   Sandra Leahy is a 54 y.o.female who presents to the clinic today for follow up and to review labs which she recently had done related to her  DM, type 2 uncontrolled, Hyperlipidemia, and Vitamin deficiencies.     Diabetes   She has type 2 diabetes mellitus.h was diagnosed over ten years ago. Associated symptoms include stable peripheral neuropathy. Pertinent negatives for diabetes include no blurred vision, no chest pain, no foot ulcerations, no polyphagia, no polyuria .  Diabetic complications include peripheral neuropathy. Risk factors for coronary artery disease include diabetes mellitus, dyslipidemia, family history and post-menopausal. Current diabetic treatment includes insulin injections, oral agent (monotherapy) and diet. She is following a generally unhealthy diet with increase in refined sugars. She has had a previous visit with a dietitian. She has seen a Podiatrist. Her last eye exam  was completed in May 2018 per Dr Lopez.She has yet to keep her appointments for follow up exam.    Lab Results   Component Value Date    HGBA1C 10.40 (H) 02/04/2020     Hyperlipidemia   Pertinent negatives include no chest pain or shortness of breath. Current antihyperlipidemic treatment includes ezetimibe and statins. Risk factors for coronary artery disease include diabetes mellitus, dyslipidemia, family history, post-menopausal and stress.     Lab Results   Component Value Date    CHLPL 160 02/04/2020    TRIG 175 (H) 02/04/2020    HDL 42 02/04/2020    LDL 83 02/04/2020     Anxiety   Anxiety has been gradually improving. Symptoms include excessive worry, confusion, and decreased concentration. Sandra reports no chest pain, nausea,  palpitations, shortness of breath or suicidal ideas. The severity of symptoms at times interfering with daily activities. The symptoms are aggravated by family issues and especially during the late afternoon when the children are fussing with each other. Risk  "factors include a major life event. She is currently seeing Behavioral Health which she reports is helping.  [facility] for additional information.   Vitals:    02/13/20 0927   BP: 120/76   Pulse: 94   Resp: 14   Temp: 97.4 °F (36.3 °C)   TempSrc: Temporal   SpO2: 98%   Weight: 81.6 kg (179 lb 12.8 oz)   Height: 175.3 cm (69\")      The following portions of the patient's history were reviewed and updated as appropriate: allergies, current medications, past family history, past medical history, past social history, past surgical history and problem list.    Review of Systems   Constitutional: Negative for activity change, appetite change, chills, fatigue, fever and unexpected weight change.   HENT: Negative.    Eyes: Negative for visual disturbance.   Respiratory: Negative for cough, chest tightness, shortness of breath and wheezing.    Cardiovascular: Negative for chest pain, palpitations and leg swelling.   Gastrointestinal: Negative for abdominal pain, constipation, diarrhea, nausea and vomiting.   Endocrine: Negative for cold intolerance, heat intolerance, polydipsia, polyphagia and polyuria.   Musculoskeletal: Positive for arthralgias.   Skin: Negative for color change and rash.   Neurological: Positive for numbness. Negative for dizziness, tremors, speech difficulty, weakness, light-headedness and headaches.   Hematological: Negative for adenopathy. Bruises/bleeds easily.   Psychiatric/Behavioral: Positive for confusion (Improving). Negative for decreased concentration and suicidal ideas. The patient is nervous/anxious (Improving).    All other systems reviewed and are negative.    Physical Exam   Constitutional: She is oriented to person, place, and time. She appears well-developed and well-nourished. No distress.   HENT:   Head: Normocephalic.   Nose: Nose normal.   Mouth/Throat: Oropharynx is clear and moist. No oropharyngeal exudate.   Eyes: Pupils are equal, round, and reactive to light. Conjunctivae and " EOM are normal. Right eye exhibits no discharge. Left eye exhibits no discharge. No scleral icterus.   Neck: Neck supple. No JVD present. No thyromegaly present.   Cardiovascular: Normal rate, regular rhythm and normal heart sounds. Exam reveals no friction rub.   No murmur heard.  Pulmonary/Chest: Effort normal and breath sounds normal. No respiratory distress. She has no wheezes. She has no rales.   Abdominal: Soft. Bowel sounds are normal. She exhibits no distension. There is no tenderness. There is no rebound and no guarding.   Musculoskeletal: She exhibits no edema or tenderness.   Lymphadenopathy:     She has no cervical adenopathy.   Neurological: She is alert and oriented to person, place, and time.   Skin: Skin is warm and dry. Capillary refill takes less than 2 seconds. No rash noted. No erythema.   Psychiatric: She has a normal mood and affect. Her speech is normal and behavior is normal. Judgment and thought content normal. Cognition and memory are impaired (Improving).   Nursing note and vitals reviewed.    Results for orders placed or performed in visit on 02/13/20   POC Glucose   Result Value Ref Range    Glucose 167 (A) 70 - 130 mg/dL     Assessment/Plan     Patient's Body mass index is 26.55 kg/m². BMI is above normal parameters. Recommendations include: educational material, exercise counseling and nutrition counseling.   (Normal BMI:  18.5-24.9, OW 25-29.9, Obesity 30 or greater)    Problems Addressed this Visit        Cardiovascular and Mediastinum    Hyperlipidemia    Relevant Medications    ezetimibe (ZETIA) 10 MG tablet    pravastatin (PRAVACHOL) 40 MG tablet    Other Relevant Orders    Comprehensive Metabolic Panel    Lipid Panel       Digestive    Vitamin B 12 deficiency    Relevant Orders    Vitamin B12    Vitamin D deficiency    Relevant Medications    vitamin D (ERGOCALCIFEROL) 1.25 MG (55958 UT) capsule capsule    Other Relevant Orders    Vitamin D 25 Hydroxy       Endocrine    Type 2  diabetes, uncontrolled, with neuropathy (CMS/[facility]) - Primary    Relevant Medications    insulin detemir (LEVEMIR FLEXTOUCH) 100 UNIT/ML injection    Other Relevant Orders    POC Glucose (Completed)    Ambulatory Referral to Ophthalmology    Comprehensive Metabolic Panel    Lipid Panel    TSH    Hemoglobin A1c    MicroAlbumin, Urine, Random - Urine, Clean Catch    Vitamin B12       Other    Memory problem    Relevant Orders    CBC & Differential    Anxiety    Relevant Orders    CBC & Differential    Comprehensive Metabolic Panel    TSH      Other Visit Diagnoses     Encounter for immunization        Pneumonvax 23 given    Relevant Orders    Pneumococcal Polysaccharide Vaccine 23-Valent (PPSV23) Greater Than or Equal To 3yo Subcutaneous / IM (Completed)      Findings and recommendations discussed with Sandra. Reviewed above labs with her which included an increase in her A1C. Treatment options reviewed. She will \:  She will increase her insulin to twice a day if she has only been taking once daily.  If she has only been taking 40 units a day; increase her to 30 units twice a day..like at 9:00am and 9:00pm. fasting blood sugars should be less than 150 mg/dL.  Lipid panel is close to target except Triglycerides.  Lifestyle modifications including nutrition and exercise recommendations reviewed.    She is to continue Behavioral Health on regular basis. She is to continue vitamin supplements. Health Maintenance recommendations reviewed. Pneumovax 23 given today. Influenza vaccination not available at the clinic today. Recommended she have this completed at her pharmacy. Mammogram was ordered in April and she DNKA. Recommended she have this completed. Discussed need for RANJAN and this is to be scheduled. Sandra will f/u in May with routine labs; sooner if problems/concerns occur.         This document has been electronically signed by ASH Farfan, JORDAN-BC, NEEIDA  February 13, 2020 9:37 AM

## 2020-02-13 NOTE — PATIENT INSTRUCTIONS
Type 2 Diabetes Mellitus, Self Care, Adult  When you have type 2 diabetes (type 2 diabetes mellitus), you must make sure your blood sugar (glucose) stays in a healthy range. You can do this with:  · Nutrition.  · Exercise.  · Lifestyle changes.  · Medicines or insulin, if needed.  · Support from your doctors and others.  How to stay aware of blood sugar    · Check your blood sugar level every day, as often as told.  · Have your A1c (hemoglobin A1c) level checked two or more times a year. Have it checked more often if your doctor tells you to.  Your doctor will set personal treatment goals for you. Generally, you should have these blood sugar levels:  · Before meals (preprandial):  mg/dL (4.4-7.2 mmol/L).  · After meals (postprandial): below 180 mg/dL (10 mmol/L).  · A1c level: less than 7%.  How to manage high and low blood sugar  Signs of high blood sugar  High blood sugar is called hyperglycemia. Know the signs of high blood sugar. Signs may include:  · Feeling:  ? Thirsty.  ? Hungry.  ? Very tired.  · Needing to pee (urinate) more than usual.  · Blurry vision.  Signs of low blood sugar  Low blood sugar is called hypoglycemia. This is when blood sugar is at or below 70 mg/dL (3.9 mmol/L). Signs may include:  · Feeling:  ? Hungry.  ? Worried or nervous (anxious).  ? Sweaty and clammy.  ? Confused.  ? Dizzy.  ? Sleepy.  ? Sick to your stomach (nauseous).  · Having:  ? A fast heartbeat.  ? A headache.  ? A change in your vision.  ? Jerky movements that you cannot control (seizure).  ? Tingling or no feeling (numbness) around your mouth, lips, or tongue.  · Having trouble with:  ? Moving (coordination).  ? Sleeping.  ? Passing out (fainting).  ? Getting upset easily (irritability).  Treating low blood sugar  To treat low blood sugar, eat or drink something sugary right away. If you can think clearly and swallow safely, follow the 15:15 rule:  · Take 15 grams of a fast-acting carb (carbohydrate). Talk with your  doctor about how much you should take.  · Some fast-acting carbs are:  ? Sugar tablets (glucose pills). Take 3-4 pills.  ? 6-8 pieces of hard candy.  ? 4-6 oz (120-150 mL) of fruit juice.  ? 4-6 oz (120-150 mL) of regular (not diet) soda.  ? 1 Tbsp (15 mL) honey or sugar.  · Check your blood sugar 15 minutes after you take the carb.  · If your blood sugar is still at or below 70 mg/dL (3.9 mmol/L), take 15 grams of a carb again.  · If your blood sugar does not go above 70 mg/dL (3.9 mmol/L) after 3 tries, get help right away.  · After your blood sugar goes back to normal, eat a meal or a snack within 1 hour.  Treating very low blood sugar  If your blood sugar is at or below 54 mg/dL (3 mmol/L), you have very low blood sugar (severe hypoglycemia). This is an emergency. Do not wait to see if the symptoms will go away. Get medical help right away. Call your local emergency services (911 in the [place]  If you have very low blood sugar and you cannot eat or drink, you may need a glucagon shot (injection). A family member or friend should learn how to check your blood sugar and how to give you a glucagon shot. Ask your doctor if you need to have a glucagon shot kit at home.  Follow these instructions at home:  Medicine  · Take insulin and diabetes medicines as told.  · If your doctor says you should take more or less insulin and medicines, do this exactly as told.  · Do not run out of insulin or medicines.  Having diabetes can raise your risk for other long-term conditions. These include heart disease and kidney disease. Your doctor may prescribe medicines to help you not have these problems.  Food    · Make healthy food choices. These include:  ? Chicken, fish, egg whites, and beans.  ? Oats, whole wheat, bulgur, brown rice, quinoa, and millet.  ? Fresh fruits and vegetables.  ? Low-fat dairy products.  ? Nuts, avocado, olive oil, and canola oil.  · Meet with a  (dietitian). He or she can help you make an  eating plan that is right for you.  · Follow instructions from your doctor about what you cannot eat or drink.  · Drink enough fluid to keep your pee (urine) pale yellow.  · Keep track of carbs that you eat. Do this by reading food labels and learning food serving sizes.  · Follow your sick day plan when you cannot eat or drink normally. Make this plan with your doctor so it is ready to use.  Activity  · Exercise 3 or more times a week.  · Do not go more than 2 days without exercising.  · Talk with your doctor before you start a new exercise. Your doctor may need to tell you to change:  ? How much insulin or medicines you take.  ? How much food you eat.  Lifestyle  · Do not use any tobacco products. These include cigarettes, chewing tobacco, and e-cigarettes. If you need help quitting, ask your doctor.  · Ask your doctor how much alcohol is safe for you.  · Learn to deal with stress. If you need help with this, ask your doctor.  Body care    · Stay up to date with your shots (immunizations).  · Have your eyes and feet checked by a doctor as often as told.  · Check your skin and feet every day. Check for cuts, bruises, redness, blisters, or sores.  · Brush your teeth and gums two times a day. Floss one or more times a day.  · Go to the dentist one or more times every 6 months.  · Stay at a healthy weight.  General instructions  · Take over-the-counter and prescription medicines only as told by your doctor.  · Share your diabetes care plan with:  ? Your work or school.  ? People you live with.  · Carry a card or wear jewelry that says you have diabetes.  · Keep all follow-up visits as told by your doctor. This is important.  Questions to ask your doctor  · Do I need to meet with a diabetes educator?  · Where can I find a support group for people with diabetes?  Where to find more information  To learn more about diabetes, visit:  · American Diabetes Association: www.diabetes.org  · American Association of Diabetes  Educators: www.diabeteseducator.org  Summary  · When you have type 2 diabetes, you must make sure your blood sugar (glucose) stays in a healthy range.  · Check your blood sugar every day, as often as told.  · Having diabetes can raise your risk for other conditions. Your doctor may prescribe medicines to help you not have these problems.  · Keep all follow-up visits as told by your doctor. This is important.  This information is not intended to replace advice given to you by your health care provider. Make sure you discuss any questions you have with your health care provider.  Document Released: 04/10/2017 Document Revised: 06/10/2019 Document Reviewed: 01/20/2017  [employer] Patient Education © 2019 [employer]  If she has been taking the Levemir only once a day, she needs to increase her insulin to twice a day. If she has only been taking 40 units a day; please increase her to 30 units twice a day..like at 9:00am and 9:00pm. Both of your fasting blood sugars should be less than 150 mg/dL. Call me if you have questions.

## 2020-02-22 ENCOUNTER — RESULTS ENCOUNTER (OUTPATIENT)
Dept: FAMILY MEDICINE CLINIC | Facility: CLINIC | Age: 55
End: 2020-02-22

## 2020-02-22 DIAGNOSIS — R41.3 MEMORY PROBLEM: ICD-10-CM

## 2020-02-22 DIAGNOSIS — F41.9 ANXIETY: Chronic | ICD-10-CM

## 2020-03-14 DIAGNOSIS — Z79.4 DIABETES MELLITUS DUE TO UNDERLYING CONDITION WITH DIABETIC NEUROPATHY, WITH LONG-TERM CURRENT USE OF INSULIN (HCC): ICD-10-CM

## 2020-03-14 DIAGNOSIS — E08.40 DIABETES MELLITUS DUE TO UNDERLYING CONDITION WITH DIABETIC NEUROPATHY, WITH LONG-TERM CURRENT USE OF INSULIN (HCC): ICD-10-CM

## 2020-03-16 DIAGNOSIS — K21.9 GASTROESOPHAGEAL REFLUX DISEASE WITHOUT ESOPHAGITIS: ICD-10-CM

## 2020-03-16 DIAGNOSIS — E55.9 VITAMIN D DEFICIENCY: ICD-10-CM

## 2020-03-16 RX ORDER — PEN NEEDLE, DIABETIC 31 GX5/16"
NEEDLE, DISPOSABLE MISCELLANEOUS
Qty: 100 EACH | Refills: 0 | Status: SHIPPED | OUTPATIENT
Start: 2020-03-16 | End: 2020-05-19 | Stop reason: SDUPTHER

## 2020-03-16 RX ORDER — RANITIDINE 150 MG/1
TABLET ORAL
Qty: 180 TABLET | Refills: 0 | Status: SHIPPED | OUTPATIENT
Start: 2020-03-16 | End: 2020-05-19

## 2020-03-16 RX ORDER — ERGOCALCIFEROL 1.25 MG/1
CAPSULE ORAL
Qty: 12 CAPSULE | Refills: 0 | Status: SHIPPED | OUTPATIENT
Start: 2020-03-16 | End: 2020-05-19 | Stop reason: SDUPTHER

## 2020-03-19 ENCOUNTER — OFFICE VISIT (OUTPATIENT)
Dept: PSYCHIATRY | Facility: CLINIC | Age: 55
End: 2020-03-19

## 2020-03-19 VITALS
SYSTOLIC BLOOD PRESSURE: 118 MMHG | WEIGHT: 180.8 LBS | BODY MASS INDEX: 26.7 KG/M2 | HEART RATE: 102 BPM | DIASTOLIC BLOOD PRESSURE: 64 MMHG

## 2020-03-19 DIAGNOSIS — F41.1 GENERALIZED ANXIETY DISORDER: ICD-10-CM

## 2020-03-19 DIAGNOSIS — F32.0 CURRENT MILD EPISODE OF MAJOR DEPRESSIVE DISORDER WITHOUT PRIOR EPISODE (HCC): Primary | ICD-10-CM

## 2020-03-19 PROCEDURE — 99213 OFFICE O/P EST LOW 20 MIN: CPT | Performed by: NURSE PRACTITIONER

## 2020-03-19 NOTE — PROGRESS NOTES
Subjective   Sandra Leahy is a 54 y.o. female is here today for medication management follow-up.    Chief Complaint:  Depression , memory problems, anxiety     History of Present Illness:    Patient presents today for follow up for medication management for memory problems, depression and anxiety with daughter. Daughter states stopped the Buspar first and then quit Lexapro. Patient states she felt like the medicine was making her feel crazy. Patient states having some irritability and being snappy as well. Patient states when by herself she does feel more down and out. Patient states she has been dwelling on things and still having issues with memory. Patient states she still forgetting little things. Patient states she feels like she is dealing with some depression and has the symptoms of decreased concentration/focus, irritable, depressed mood, decreased energy, and dwelling on things. Patient states getting aggravating and overwhelmed with memory and . Patient reports her anxiety has been higher as well and feels on edge. Patient states she gets overwhelmed and irritable especially when trying to remember things. Patient reports having a couple panic attacks. Patient reports panic attacks last 5-10 minutes and during an attacks patient has passing out spells. Patient reports sleeping at least 8 hours a night and patient denies any nightmares. Patient reports appetite is ok and eating at least 2 meals a day. Patient denies any auditory or visual hallucinations. Patient adamantly denies any SI or HI. Patient denies any side effects to medications. Patient denies any medical changes since last visit. Patient states she is going to Neurologist in May.   The following portions of the patient's history were reviewed and updated as appropriate: allergies, current medications, past family history, past medical history, past social history, past surgical history and problem list.    Review of Systems      Constitutional: Negative.    Respiratory: Negative.    Cardiovascular: Negative.    Gastrointestinal: Negative.    Neurological: Negative.    Psychiatric/Behavioral: Positive for agitation, decreased concentration and dysphoric mood. The patient is nervous/anxious.        Objective   Physical Exam   Constitutional: Vital signs are normal. She appears well-developed and well-nourished. She is cooperative.   Neurological: She is alert.   Psychiatric: Her speech is normal and behavior is normal. Judgment and thought content normal. Her mood appears anxious. Cognition and memory are normal.   Vitals reviewed.    Blood pressure 118/64, pulse 102, weight 82 kg (180 lb 12.8 oz). Body mass index is 26.7 kg/m².    Allergies   Allergen Reactions   • Lantus [Insulin Glargine] Confusion   • Codeine Itching   • Exenatide Nausea And Vomiting     Intolerant   • Sitagliptin Other (See Comments)     Unsure       Medication List:   Current Outpatient Medications   Medication Sig Dispense Refill   • B-D ULTRAFINE III SHORT PEN 31G X 8 MM misc USE 1  AS DIRECTED TWICE DAILY 100 each 0   • cetirizine (zyrTEC) 10 MG tablet TAKE 1 TABLET BY MOUTH ONCE DAILY AS NEEDED FOR ALLERGIES 30 tablet 5   • ezetimibe (ZETIA) 10 MG tablet Take 1 tablet by mouth Daily. 30 tablet 4   • fluconazole (DIFLUCAN) 150 MG tablet      • insulin detemir (LEVEMIR FLEXTOUCH) 100 UNIT/ML injection Inject 40 Units under the skin into the appropriate area as directed 2 (Two) Times a Day. 20 mL 3   • metFORMIN (GLUCOPHAGE) 1000 MG tablet TAKE 1 TABLET BY MOUTH TWICE DAILY WITH MEALS 180 tablet 0   • pravastatin (PRAVACHOL) 40 MG tablet Take 1 tablet by mouth Daily. 30 tablet 4   • raNITIdine (ZANTAC) 150 MG tablet Take 1 tablet by mouth twice daily 180 tablet 0   • vitamin D (ERGOCALCIFEROL) 1.25 MG (68410 UT) capsule capsule Take 1 capsule by mouth once a week 12 capsule 0   • Vortioxetine HBr (Trintellix) 5 MG tablet Take 5 mg by mouth Daily With Breakfast. 30  tablet 0     No current facility-administered medications for this visit.        Mental Status Exam:   Hygiene:   good  Cooperation:  Cooperative  Eye Contact:  Good  Psychomotor Behavior:  Restless  Affect:  Appropriate  Hopelessness: Denies  Speech:  Normal  Thought Process:  Goal directed and Linear  Thought Content:  Normal  Suicidal:  None  Homicidal:  None  Hallucinations:  None  Delusion:  None  Memory:  Deficits  Orientation:  Person, Place, Time and Situation  Reliability:  fair  Insight:  Poor  Judgement:  Fair  Impulse Control:  Fair  Physical/Medical Issues:  No               Assessment/Plan   Diagnoses and all orders for this visit:    Current mild episode of major depressive disorder without prior episode (CMS/HCC)  -     Vortioxetine HBr (Trintellix) 5 MG tablet; Take 5 mg by mouth Daily With Breakfast.    Generalized anxiety disorder  -     Vortioxetine HBr (Trintellix) 5 MG tablet; Take 5 mg by mouth Daily With Breakfast.            Discussed medication options with patient and daughter. Discont. Lexapro. Discont. Buspar. Start Trintellix 5mg daily for worsening depression and anxiety.   Reviewed the risks, benefits, and side effects of the medications; patient acknowledged and verbally consented.  Patient is agreeable to call the office with any questions, concerns, or worsening of symptoms. Patient is aware to call 911 or go to the nearest ER should begin having SI/HI.            Follow up in four weeks      Errors in dictation may reflect use of voice recognition software and not all errors in transcription may have been detected prior to signing.              This document has been electronically signed by ASH Gasca   March 24, 2020 09:21

## 2020-04-22 DIAGNOSIS — F32.0 CURRENT MILD EPISODE OF MAJOR DEPRESSIVE DISORDER WITHOUT PRIOR EPISODE (HCC): ICD-10-CM

## 2020-04-22 DIAGNOSIS — F41.1 GENERALIZED ANXIETY DISORDER: ICD-10-CM

## 2020-05-12 ENCOUNTER — LAB (OUTPATIENT)
Dept: FAMILY MEDICINE CLINIC | Facility: CLINIC | Age: 55
End: 2020-05-12

## 2020-05-12 DIAGNOSIS — E53.8 VITAMIN B 12 DEFICIENCY: ICD-10-CM

## 2020-05-12 DIAGNOSIS — IMO0002 TYPE 2 DIABETES, UNCONTROLLED, WITH NEUROPATHY: Chronic | ICD-10-CM

## 2020-05-12 DIAGNOSIS — E55.9 VITAMIN D DEFICIENCY: ICD-10-CM

## 2020-05-12 DIAGNOSIS — F41.9 ANXIETY: Chronic | ICD-10-CM

## 2020-05-12 DIAGNOSIS — E78.2 MIXED HYPERLIPIDEMIA: Chronic | ICD-10-CM

## 2020-05-13 LAB
25(OH)D3+25(OH)D2 SERPL-MCNC: 43 NG/ML (ref 30–100)
ALBUMIN SERPL-MCNC: 4.7 G/DL (ref 3.5–5.2)
ALBUMIN/GLOB SERPL: 1.7 G/DL
ALP SERPL-CCNC: 89 U/L (ref 39–117)
ALT SERPL-CCNC: 27 U/L (ref 1–33)
AST SERPL-CCNC: 27 U/L (ref 1–32)
BASOPHILS # BLD AUTO: 0.04 10*3/MM3 (ref 0–0.2)
BASOPHILS NFR BLD AUTO: 0.7 % (ref 0–1.5)
BILIRUB SERPL-MCNC: 0.5 MG/DL (ref 0.2–1.2)
BUN SERPL-MCNC: 9 MG/DL (ref 6–20)
BUN/CREAT SERPL: 11.5 (ref 7–25)
CALCIUM SERPL-MCNC: 9.5 MG/DL (ref 8.6–10.5)
CHLORIDE SERPL-SCNC: 98 MMOL/L (ref 98–107)
CHOLEST SERPL-MCNC: 174 MG/DL (ref 0–200)
CO2 SERPL-SCNC: 24.7 MMOL/L (ref 22–29)
CREAT SERPL-MCNC: 0.78 MG/DL (ref 0.57–1)
EOSINOPHIL # BLD AUTO: 0.2 10*3/MM3 (ref 0–0.4)
EOSINOPHIL NFR BLD AUTO: 3.3 % (ref 0.3–6.2)
ERYTHROCYTE [DISTWIDTH] IN BLOOD BY AUTOMATED COUNT: 12.6 % (ref 12.3–15.4)
GLOBULIN SER CALC-MCNC: 2.8 GM/DL
GLUCOSE SERPL-MCNC: 369 MG/DL (ref 65–99)
HBA1C MFR BLD: 10.1 % (ref 4.8–5.6)
HCT VFR BLD AUTO: 45.1 % (ref 34–46.6)
HDLC SERPL-MCNC: 41 MG/DL (ref 40–60)
HGB BLD-MCNC: 15.6 G/DL (ref 12–15.9)
IMM GRANULOCYTES # BLD AUTO: 0.05 10*3/MM3 (ref 0–0.05)
IMM GRANULOCYTES NFR BLD AUTO: 0.8 % (ref 0–0.5)
LDLC SERPL CALC-MCNC: 90 MG/DL (ref 0–100)
LYMPHOCYTES # BLD AUTO: 1.68 10*3/MM3 (ref 0.7–3.1)
LYMPHOCYTES NFR BLD AUTO: 27.4 % (ref 19.6–45.3)
MCH RBC QN AUTO: 30.7 PG (ref 26.6–33)
MCHC RBC AUTO-ENTMCNC: 34.6 G/DL (ref 31.5–35.7)
MCV RBC AUTO: 88.8 FL (ref 79–97)
MICROALBUMIN UR-MCNC: 7.2 UG/ML
MONOCYTES # BLD AUTO: 0.52 10*3/MM3 (ref 0.1–0.9)
MONOCYTES NFR BLD AUTO: 8.5 % (ref 5–12)
NEUTROPHILS # BLD AUTO: 3.64 10*3/MM3 (ref 1.7–7)
NEUTROPHILS NFR BLD AUTO: 59.3 % (ref 42.7–76)
NRBC BLD AUTO-RTO: 0 /100 WBC (ref 0–0.2)
PLATELET # BLD AUTO: 262 10*3/MM3 (ref 140–450)
POTASSIUM SERPL-SCNC: 4.8 MMOL/L (ref 3.5–5.2)
PROT SERPL-MCNC: 7.5 G/DL (ref 6–8.5)
RBC # BLD AUTO: 5.08 10*6/MM3 (ref 3.77–5.28)
SODIUM SERPL-SCNC: 137 MMOL/L (ref 136–145)
TRIGL SERPL-MCNC: 213 MG/DL (ref 0–150)
TSH SERPL DL<=0.005 MIU/L-ACNC: 1.64 UIU/ML (ref 0.27–4.2)
VIT B12 SERPL-MCNC: 432 PG/ML (ref 211–946)
VLDLC SERPL CALC-MCNC: 42.6 MG/DL
WBC # BLD AUTO: 6.13 10*3/MM3 (ref 3.4–10.8)

## 2020-05-19 ENCOUNTER — OFFICE VISIT (OUTPATIENT)
Dept: FAMILY MEDICINE CLINIC | Facility: CLINIC | Age: 55
End: 2020-05-19

## 2020-05-19 VITALS
HEART RATE: 72 BPM | WEIGHT: 179 LBS | SYSTOLIC BLOOD PRESSURE: 122 MMHG | BODY MASS INDEX: 26.51 KG/M2 | DIASTOLIC BLOOD PRESSURE: 78 MMHG | TEMPERATURE: 97.1 F | OXYGEN SATURATION: 98 % | HEIGHT: 69 IN | RESPIRATION RATE: 14 BRPM

## 2020-05-19 DIAGNOSIS — Z23 ENCOUNTER FOR IMMUNIZATION: ICD-10-CM

## 2020-05-19 DIAGNOSIS — F41.9 ANXIETY: ICD-10-CM

## 2020-05-19 DIAGNOSIS — J30.9 ALLERGIC RHINITIS, UNSPECIFIED SEASONALITY, UNSPECIFIED TRIGGER: ICD-10-CM

## 2020-05-19 DIAGNOSIS — Z12.11 ENCOUNTER FOR SCREENING FOR MALIGNANT NEOPLASM OF COLON: ICD-10-CM

## 2020-05-19 DIAGNOSIS — R41.3 MEMORY PROBLEM: ICD-10-CM

## 2020-05-19 DIAGNOSIS — E53.8 VITAMIN B 12 DEFICIENCY: ICD-10-CM

## 2020-05-19 DIAGNOSIS — E78.2 MIXED HYPERLIPIDEMIA: Chronic | ICD-10-CM

## 2020-05-19 DIAGNOSIS — E55.9 VITAMIN D DEFICIENCY: ICD-10-CM

## 2020-05-19 DIAGNOSIS — IMO0002 TYPE 2 DIABETES, UNCONTROLLED, WITH NEUROPATHY: Primary | Chronic | ICD-10-CM

## 2020-05-19 PROBLEM — H69.83 DYSFUNCTION OF BOTH EUSTACHIAN TUBES: Status: RESOLVED | Noted: 2017-07-20 | Resolved: 2020-05-19

## 2020-05-19 PROBLEM — H69.93 DYSFUNCTION OF BOTH EUSTACHIAN TUBES: Status: RESOLVED | Noted: 2017-07-20 | Resolved: 2020-05-19

## 2020-05-19 PROCEDURE — G0439 PPPS, SUBSEQ VISIT: HCPCS | Performed by: NURSE PRACTITIONER

## 2020-05-19 PROCEDURE — 96160 PT-FOCUSED HLTH RISK ASSMT: CPT | Performed by: NURSE PRACTITIONER

## 2020-05-19 PROCEDURE — 90471 IMMUNIZATION ADMIN: CPT | Performed by: NURSE PRACTITIONER

## 2020-05-19 PROCEDURE — 90715 TDAP VACCINE 7 YRS/> IM: CPT | Performed by: NURSE PRACTITIONER

## 2020-05-19 RX ORDER — EZETIMIBE 10 MG/1
10 TABLET ORAL DAILY
Qty: 30 TABLET | Refills: 3 | Status: SHIPPED | OUTPATIENT
Start: 2020-05-19 | End: 2020-10-12 | Stop reason: SDUPTHER

## 2020-05-19 RX ORDER — CETIRIZINE HYDROCHLORIDE 10 MG/1
10 TABLET ORAL NIGHTLY PRN
Qty: 30 TABLET | Refills: 2 | Status: SHIPPED | OUTPATIENT
Start: 2020-05-19 | End: 2020-10-05 | Stop reason: SDUPTHER

## 2020-05-19 RX ORDER — PRAVASTATIN SODIUM 40 MG
40 TABLET ORAL DAILY
Qty: 30 TABLET | Refills: 3 | Status: SHIPPED | OUTPATIENT
Start: 2020-05-19 | End: 2021-01-07

## 2020-05-19 RX ORDER — ERGOCALCIFEROL 1.25 MG/1
50000 CAPSULE ORAL WEEKLY
Qty: 12 CAPSULE | Refills: 0 | Status: SHIPPED | OUTPATIENT
Start: 2020-05-19 | End: 2021-01-18

## 2020-05-19 NOTE — PROGRESS NOTES
The ABCs of the Annual Wellness Visit  Subsequent Medicare Wellness Visit    History of Present Illness:  Sandra Leahy is a 54 y.o. female who presents for a Subsequent Medicare Wellness Visit.    HEALTH RISK ASSESSMENT    Recent Hospitalizations:  No hospitalization(s) within the last year.    Current Medical Providers:  Patient Care Team:  John Mueller APRN as PCP - General (Family Medicine)  [facility]     Smoking Status:  Social History     Tobacco Use   Smoking Status Former Smoker   • Types: Cigarettes   • Last attempt to quit: 1996   • Years since quittin.0   Smokeless Tobacco Never Used     Alcohol Consumption:  Social History     Substance and Sexual Activity   Alcohol Use No     Depression Screen:   PHQ-2/PHQ-9 Depression Screening 2020   Little interest or pleasure in doing things 0   Feeling down, depressed, or hopeless 0   Total Score 0     Fall Risk Screen:  BRANDY Fall Risk Assessment has been completed with no risk..    Health Habits and Functional and Cognitive Screening:  Functional & Cognitive Status 2020   Do you have difficulty preparing food and eating? No   Do you have difficulty bathing yourself, getting dressed or grooming yourself? No   Do you have difficulty using the toilet? No   Do you have difficulty moving around from place to place? No   Do you have trouble with steps or getting out of a bed or a chair? No   Current Diet Well Balanced Diet   Dental Exam Not up to date   Eye Exam Not up to date   Exercise (times per week) 7 times per week   Current Exercise Activities Include Walking   Do you need help using the phone?  No   Are you deaf or do you have serious difficulty hearing?  No   Do you need help with transportation? No   Do you need help shopping? No   Do you need help preparing meals?  No   Do you need help with housework?  No   Do you need help with laundry? No   Do you need help taking your medications? No   Do you need  help managing money? No   Do you ever drive or ride in a car without wearing a seat belt? No   Have you felt unusual stress, anger or loneliness in the last month? No   Who do you live with? Spouse   If you need help, do you have trouble finding someone available to you? No   Have you been bothered in the last four weeks by sexual problems? No   Do you have difficulty concentrating, remembering or making decisions? No     Does the patient have evidence of cognitive impairment? Yes    Asprin use counseling:Does not need ASA (and currently is not on it)    Age-appropriate Screening Schedule:  Refer to the list below for future screening recommendations based on patient's age, sex and/or medical conditions. Orders for these recommended tests are listed in the plan section. The patient has been provided with a written plan.    Health Maintenance   Topic Date Due   • TDAP/TD VACCINES (1 - Tdap) 12/18/1974   • ZOSTER VACCINE (1 of 2) 12/18/2013   • COLONOSCOPY  05/18/2016   • MAMMOGRAM  01/31/2019   • DIABETIC EYE EXAM  05/07/2019   • INFLUENZA VACCINE  08/01/2020   • DIABETIC FOOT EXAM  09/24/2020   • HEMOGLOBIN A1C  11/12/2020   • LIPID PANEL  05/12/2021   • URINE MICROALBUMIN  05/12/2021   • PNEUMOCOCCAL VACCINE (19-64 MEDIUM RISK)  Completed          The following portions of the patient's history were reviewed and updated as appropriate:  allergies, current medications, past family history, past medical history, past social history, past surgical history and problem list    Outpatient Medications Prior to Visit   Medication Sig Dispense Refill   • Vortioxetine HBr (Trintellix) 5 MG tablet Take 5 mg by mouth Daily With Breakfast. 30 tablet 0   • B-D ULTRAFINE III SHORT PEN 31G X 8 MM misc USE 1  AS DIRECTED TWICE DAILY 100 each 0   • cetirizine (zyrTEC) 10 MG tablet TAKE 1 TABLET BY MOUTH ONCE DAILY AS NEEDED FOR ALLERGIES 30 tablet 5   • ezetimibe (ZETIA) 10 MG tablet Take 1 tablet by mouth Daily. 30 tablet 4   •  insulin detemir (LEVEMIR FLEXTOUCH) 100 UNIT/ML injection Inject 40 Units under the skin into the appropriate area as directed 2 (Two) Times a Day. 20 mL 3   • metFORMIN (GLUCOPHAGE) 1000 MG tablet TAKE 1 TABLET BY MOUTH TWICE DAILY WITH MEALS 180 tablet 0   • pravastatin (PRAVACHOL) 40 MG tablet Take 1 tablet by mouth Daily. 30 tablet 4   • raNITIdine (ZANTAC) 150 MG tablet Take 1 tablet by mouth twice daily 180 tablet 0   • vitamin D (ERGOCALCIFEROL) 1.25 MG (06224 UT) capsule capsule Take 1 capsule by mouth once a week 12 capsule 0   • fluconazole (DIFLUCAN) 150 MG tablet        No facility-administered medications prior to visit.      Patient Active Problem List   Diagnosis   • Type 2 diabetes, uncontrolled, with neuropathy (CMS/HCC)   • Hyperlipidemia   • Vitamin B 12 deficiency   • Vitamin D deficiency   • S/P KEVIN (total abdominal hysterectomy)   • Memory problem   • Anxiety     Advanced Care Planning:  ACP discussion was held with the patient during this visit. Patient does not have an advance directive, information provided.    Review of Systems   Constitutional: Positive for fatigue. Negative for activity change, appetite change, chills, fever and unexpected weight change.   HENT: Negative.    Eyes: Negative for visual disturbance.   Respiratory: Negative for cough, chest tightness, shortness of breath and wheezing.    Cardiovascular: Negative for chest pain, palpitations and leg swelling.   Gastrointestinal: Negative for abdominal pain, constipation, diarrhea, nausea and vomiting.   Endocrine: Negative for cold intolerance, heat intolerance, polydipsia, polyphagia and polyuria.   Skin: Negative for color change and rash.   Neurological: Positive for numbness. Negative for dizziness, tremors, speech difficulty, weakness, light-headedness and headaches.   Hematological: Negative for adenopathy. Bruises/bleeds easily.   Psychiatric/Behavioral: Negative for confusion, decreased concentration and suicidal  "ideas. The patient is not nervous/anxious.    All other systems reviewed and are negative.    Compared to one year ago, the patient feels her physical health is better.  Compared to one year ago, the patient feels her mental health is better.    Reviewed chart for potential of high risk medication in the elderly: yes  Reviewed chart for potential of harmful drug interactions in the elderly:yes    Vitals:    05/19/20 0820   BP: 122/78   BP Location: Right arm   Patient Position: Sitting   Cuff Size: Adult   Pulse: 72   Resp: 14   Temp: 97.1 °F (36.2 °C)   TempSrc: Temporal   SpO2: 98%   Weight: 81.2 kg (179 lb)   Height: 175.3 cm (69\")   Hearing:   Right ear: >5 feet  Left Ear: > 5 feet    Vision:   Right: 20/40  Left 20/25  Both: 20/25  Body mass index is 26.43 kg/m².  Discussed the patient's BMI with her. The BMI is in the acceptable range.    Physical Exam   Constitutional: She is oriented to person, place, and time. She appears well-developed and well-nourished. No distress.   HENT:   Head: Normocephalic.   Nose: Nose normal.   Eyes: Pupils are equal, round, and reactive to light. Conjunctivae are normal. Right eye exhibits no discharge. Left eye exhibits no discharge. No scleral icterus.   Neck: Neck supple. No JVD present. No thyromegaly present.   Cardiovascular: Normal rate, regular rhythm, normal heart sounds and intact distal pulses. Exam reveals no friction rub.   No murmur heard.  Pulmonary/Chest: Effort normal and breath sounds normal. No respiratory distress. She has no wheezes. She has no rales.   Abdominal: Soft. Bowel sounds are normal. She exhibits no distension. There is no tenderness. There is no rebound and no guarding.   Musculoskeletal: She exhibits no edema.    Sandra had a diabetic foot exam performed today.   During the foot exam she had a monofilament test performed (10/10 bilateral).  Vascular Status -  Her right foot exhibits normal foot vasculature  and no edema. Her left foot exhibits " normal foot vasculature  and no edema.  Skin Integrity  -  Her right foot skin is intact.Her left foot skin is intact..  Lymphadenopathy:     She has no cervical adenopathy.   Neurological: She is alert and oriented to person, place, and time.   Skin: Skin is warm and dry. Capillary refill takes less than 2 seconds. No rash noted. No erythema.   Psychiatric: She has a normal mood and affect. Her behavior is normal.   Nursing note and vitals reviewed.    Lab Results   Component Value Date    HGBA1C 10.10 (H) 05/12/2020     Lab Results   Component Value Date    BUN 9 05/12/2020    CREATININE 0.78 05/12/2020    EGFRIFNONA 76 05/12/2020    EGFRIFAFRI 93 05/12/2020    BCR 11.5 05/12/2020    K 4.8 05/12/2020    CO2 24.7 05/12/2020    CALCIUM 9.5 05/12/2020    PROTENTOTREF 7.5 05/12/2020    ALBUMIN 4.70 05/12/2020    LABIL2 1.7 05/12/2020    AST 27 05/12/2020    ALT 27 05/12/2020     Lab Results   Component Value Date    CHLPL 174 05/12/2020    TRIG 213 (H) 05/12/2020    HDL 41 05/12/2020    LDL 90 05/12/2020     Assessment/Plan   Medicare Risks and Personalized Health Plan  CMS Preventative Services Quick Reference  Advance Directive Discussion  Cardiovascular risk  Colon Cancer Screening  Immunizations Discussed/Encouraged (specific immunizations; adacel Tdap )    The above risks/problems have been discussed with the patient.  Pertinent information has been shared with the patient in the After Visit Summary.  Follow up plans and orders are seen below in the Assessment/Plan Section.    Diagnoses and all orders for this visit:    1. Type 2 diabetes, uncontrolled, with neuropathy (CMS/[facility]) (Primary)  Comments:   Reviewed A1C and CMP. Increase in Levemir. Lifetstyle modifications including nutrition and exercise reinforced  Orders:  -     Discontinue: insulin detemir (Levemir FlexTouch) 100 UNIT/ML injection; Inject 45 Units under the skin into the appropriate area as directed 2 (Two) Times a Day.  Dispense: 20 mL; Refill:  3  -     Ambulatory Referral for Diabetic Eye Exam-Ophthalmology  -     metFORMIN (GLUCOPHAGE) 1000 MG tablet; Take 1 tablet by mouth 2 (Two) Times a Day With Meals.  Dispense: 180 tablet; Refill: 0  -     insulin detemir (Levemir FlexTouch) 100 UNIT/ML injection; Inject 45 Units under the skin into the appropriate area as directed 2 (Two) Times a Day.  Dispense: 20 mL; Refill: 3  -     Insulin Pen Needle (B-D ULTRAFINE III SHORT PEN) 31G X 8 MM misc; 1 Device by Other route 2 (Two) Times a Day.  Dispense: 100 each; Refill: 2    2. Mixed hyperlipidemia  Comments:  Continue Pravastatin and Zetia. Lifetstyle modifications including nutrition and exercise reinforced  Orders:  -     pravastatin (PRAVACHOL) 40 MG tablet; Take 1 tablet by mouth Daily.  Dispense: 30 tablet; Refill: 3  -     ezetimibe (ZETIA) 10 MG tablet; Take 1 tablet by mouth Daily.  Dispense: 30 tablet; Refill: 3    3. Memory problem  Comments:  Will f/u with [facility]    4. Anxiety  Comments:  Will f/u with [facility]    5. Allergic rhinitis, unspecified seasonality, unspecified trigger  Comments:  Zyrtec as needed  Orders:  -     cetirizine (zyrTEC) 10 MG tablet; Take 1 tablet by mouth At Night As Needed for Allergies.  Dispense: 30 tablet; Refill: 2    6. Vitamin D deficiency  Comments:  Continue Weekly Vitamin D.  Orders:  -     vitamin D (ERGOCALCIFEROL) 1.25 MG (83624 UT) capsule capsule; Take 1 capsule by mouth 1 (One) Time Per Week.  Dispense: 12 capsule; Refill: 0    7. Vitamin B 12 deficiency  Comments:  Vit B12 within range    8. Encounter for screening for malignant neoplasm of colon  Comments:  Cologuard reordered  Orders:  -     Cologuard - Stool, Per Rectum; Future    9. Encounter for immunization  Comments:  TDAP given today  Orders:  -     Tdap Vaccine Greater Than or Equal To 6yo IM      Follow Up:  Return in about 6 weeks (around 7/2/2020) for Recheck.     An After Visit Summary and PPPS were given to the patient.

## 2020-05-19 NOTE — PATIENT INSTRUCTIONS
PLEASE INCREASE HER LEVEMIR TO 45 UNITS TWICE DAILY  Medicare Wellness  Personal Prevention Plan of Service     Date of Office Visit:  2020  Encounter Provider:  [facility] Alonso  Place of Service:  [facility]  Patient Name: Sandra Leahy  :  1965    As part of the Medicare Wellness portion of your visit today, we are providing you with this personalized preventive plan of services (PPPS). This plan is based upon recommendations of the [place] Preventive Services Task Force (USPSTF) and the Advisory Committee on Immunization Practices (ACIP).    This lists the preventive care services that should be considered, and provides dates of when you are due. Items listed as completed are up-to-date and do not require any further intervention.    Health Maintenance   Topic Date Due   • TDAP/TD VACCINES (1 - Tdap) 1974   • ZOSTER VACCINE (1 of 2) 2013   • MEDICARE ANNUAL WELLNESS  2016   • COLONOSCOPY  2016   • MAMMOGRAM  2019   • DIABETIC EYE EXAM  2019   • PAP SMEAR  2020   • INFLUENZA VACCINE  2020   • DIABETIC FOOT EXAM  2020   • HEMOGLOBIN A1C  2020   • LIPID PANEL  2021   • URINE MICROALBUMIN  2021   • PNEUMOCOCCAL VACCINE (19-64 MEDIUM RISK)  Completed   • HEPATITIS C SCREENING  Completed       Orders Placed This Encounter   Procedures   • Tdap Vaccine Greater Than or Equal To 6yo IM   • Cologuard - Stool, Per Rectum     Standing Status:   Future     Standing Expiration Date:   2021       No follow-ups on file.      Type 2 Diabetes Mellitus, Self Care, Adult  When you have type 2 diabetes (type 2 diabetes mellitus), you must make sure your blood sugar (glucose) stays in a healthy range. You can do this with:  · Nutrition.  · Exercise.  · Lifestyle changes.  · Medicines or insulin, if needed.  · Support from your doctors and others.  How to stay aware of blood sugar    · Check your  blood sugar level every day, as often as told.  · Have your A1c (hemoglobin A1c) level checked two or more times a year. Have it checked more often if your doctor tells you to.  Your doctor will set personal treatment goals for you. Generally, you should have these blood sugar levels:  · Before meals (preprandial):  mg/dL (4.4-7.2 mmol/L).  · After meals (postprandial): below 180 mg/dL (10 mmol/L).  · A1c level: less than 7%.  How to manage high and low blood sugar  Signs of high blood sugar  High blood sugar is called hyperglycemia. Know the signs of high blood sugar. Signs may include:  · Feeling:  ? Thirsty.  ? Hungry.  ? Very tired.  · Needing to pee (urinate) more than usual.  · Blurry vision.  Signs of low blood sugar  Low blood sugar is called hypoglycemia. This is when blood sugar is at or below 70 mg/dL (3.9 mmol/L). Signs may include:  · Feeling:  ? Hungry.  ? Worried or nervous (anxious).  ? Sweaty and clammy.  ? Confused.  ? Dizzy.  ? Sleepy.  ? Sick to your stomach (nauseous).  · Having:  ? A fast heartbeat.  ? A headache.  ? A change in your vision.  ? Jerky movements that you cannot control (seizure).  ? Tingling or no feeling (numbness) around your mouth, lips, or tongue.  · Having trouble with:  ? Moving (coordination).  ? Sleeping.  ? Passing out (fainting).  ? Getting upset easily (irritability).  Treating low blood sugar  To treat low blood sugar, eat or drink something sugary right away. If you can think clearly and swallow safely, follow the 15:15 rule:  · Take 15 grams of a fast-acting carb (carbohydrate). Talk with your doctor about how much you should take.  · Some fast-acting carbs are:  ? Sugar tablets (glucose pills). Take 3-4 pills.  ? 6-8 pieces of hard candy.  ? 4-6 oz (120-150 mL) of fruit juice.  ? 4-6 oz (120-150 mL) of regular (not diet) soda.  ? 1 Tbsp (15 mL) honey or sugar.  · Check your blood sugar 15 minutes after you take the carb.  · If your blood sugar is still at or  below 70 mg/dL (3.9 mmol/L), take 15 grams of a carb again.  · If your blood sugar does not go above 70 mg/dL (3.9 mmol/L) after 3 tries, get help right away.  · After your blood sugar goes back to normal, eat a meal or a snack within 1 hour.  Treating very low blood sugar  If your blood sugar is at or below 54 mg/dL (3 mmol/L), you have very low blood sugar (severe hypoglycemia). This is an emergency. Do not wait to see if the symptoms will go away. Get medical help right away. Call your local emergency services (911 in the [place]  If you have very low blood sugar and you cannot eat or drink, you may need a glucagon shot (injection). A family member or friend should learn how to check your blood sugar and how to give you a glucagon shot. Ask your doctor if you need to have a glucagon shot kit at home.  Follow these instructions at home:  Medicine  · Take insulin and diabetes medicines as told.  · If your doctor says you should take more or less insulin and medicines, do this exactly as told.  · Do not run out of insulin or medicines.  Having diabetes can raise your risk for other long-term conditions. These include heart disease and kidney disease. Your doctor may prescribe medicines to help you not have these problems.  Food    · Make healthy food choices. These include:  ? Chicken, fish, egg whites, and beans.  ? Oats, whole wheat, bulgur, brown rice, quinoa, and millet.  ? Fresh fruits and vegetables.  ? Low-fat dairy products.  ? Nuts, avocado, olive oil, and canola oil.  · Meet with a  (dietitian). He or she can help you make an eating plan that is right for you.  · Follow instructions from your doctor about what you cannot eat or drink.  · Drink enough fluid to keep your pee (urine) pale yellow.  · Keep track of carbs that you eat. Do this by reading food labels and learning food serving sizes.  · Follow your sick day plan when you cannot eat or drink normally. Make this plan with your doctor  so it is ready to use.  Activity  · Exercise 3 or more times a week.  · Do not go more than 2 days without exercising.  · Talk with your doctor before you start a new exercise. Your doctor may need to tell you to change:  ? How much insulin or medicines you take.  ? How much food you eat.  Lifestyle  · Do not use any tobacco products. These include cigarettes, chewing tobacco, and e-cigarettes. If you need help quitting, ask your doctor.  · Ask your doctor how much alcohol is safe for you.  · Learn to deal with stress. If you need help with this, ask your doctor.  Body care    · Stay up to date with your shots (immunizations).  · Have your eyes and feet checked by a doctor as often as told.  · Check your skin and feet every day. Check for cuts, bruises, redness, blisters, or sores.  · Brush your teeth and gums two times a day. Floss one or more times a day.  · Go to the dentist one or more times every 6 months.  · Stay at a healthy weight.  General instructions  · Take over-the-counter and prescription medicines only as told by your doctor.  · Share your diabetes care plan with:  ? Your work or school.  ? People you live with.  · Carry a card or wear jewelry that says you have diabetes.  · Keep all follow-up visits as told by your doctor. This is important.  Questions to ask your doctor  · Do I need to meet with a diabetes educator?  · Where can I find a support group for people with diabetes?  Where to find more information  To learn more about diabetes, visit:  · American Diabetes Association: www.diabetes.org  · American Association of Diabetes Educators: www.diabeteseducator.org  Summary  · When you have type 2 diabetes, you must make sure your blood sugar (glucose) stays in a healthy range.  · Check your blood sugar every day, as often as told.  · Having diabetes can raise your risk for other conditions. Your doctor may prescribe medicines to help you not have these problems.  · Keep all follow-up visits as  told by your doctor. This is important.  This information is not intended to replace advice given to you by your health care provider. Make sure you discuss any questions you have with your health care provider.  Document Released: 04/10/2017 Document Revised: 06/10/2019 Document Reviewed: 01/20/2017  [employer] Patient Education © 2020 [employer]    Advance Care Planning and Advance Directives     You make decisions on a daily basis - decisions about where you want to live, your career, your home, your life. Perhaps one of the most important decisions you face is your choice for future medical care. Take time to talk with your family and your healthcare team and start planning today.  Advance Care Planning is a process that can help you:  · Understand possible future healthcare decisions in light of your own experiences  · Reflect on those decision in light of your goals and values  · Discuss your decisions with those closest to you and the healthcare professionals that care for you  · Make a plan by creating a document that reflects your wishes    Surrogate Decision Maker  In the event of a medical emergency, which has left you unable to communicate or to make your own decisions, you would need someone to make decisions for you.  It is important to discuss your preferences for medical treatment with this person while you are in good health.     Qualities of a surrogate decision maker:  • Willing to take on this role and responsibility  • Knows what you want for future medical care  • Willing to follow your wishes even if they don't agree with them  • Able to make difficult medical decisions under stressful circumstances    Advance Directives  These are legal documents you can create that will guide your healthcare team and decision maker(s) when needed. These documents can be stored in the electronic medical record.    · Living Will - a legal document to guide your care if you have a terminal  condition or a serious illness and are unable to communicate. States vary by statute in document names/types, but most forms may include one or more of the following:        -  Directions regarding life-prolonging treatments        -  Directions regarding artificially provided nutrition/hydration        -  Choosing a healthcare decision maker        -  Direction regarding organ/tissue donation    · Durable Power of  for Healthcare - this document names an -in-fact to make medical decisions for you, but it may also allow this person to make personal and financial decisions for you. Please seek the advice of an  if you need this type of document.    **Advance Directives are not required and no one may discriminate against you if you do not sign one.    Medical Orders  Many states allow specific forms/orders signed by your physician to record your wishes for medical treatment in your current state of health. This form, signed in personal communication with your physician, addresses resuscitation and other medical interventions that you may or may not want.      For more information or to schedule a time with a [facility] Advance Care Planning Facilitator contact: [email or web address] or call [phone] and someone will contact you directly.

## 2020-05-21 ENCOUNTER — OFFICE VISIT (OUTPATIENT)
Dept: PSYCHIATRY | Facility: CLINIC | Age: 55
End: 2020-05-21

## 2020-05-21 VITALS
WEIGHT: 179.6 LBS | SYSTOLIC BLOOD PRESSURE: 132 MMHG | TEMPERATURE: 97.5 F | BODY MASS INDEX: 26.52 KG/M2 | HEART RATE: 87 BPM | DIASTOLIC BLOOD PRESSURE: 70 MMHG

## 2020-05-21 DIAGNOSIS — F41.1 GENERALIZED ANXIETY DISORDER: ICD-10-CM

## 2020-05-21 DIAGNOSIS — F32.0 CURRENT MILD EPISODE OF MAJOR DEPRESSIVE DISORDER WITHOUT PRIOR EPISODE (HCC): ICD-10-CM

## 2020-05-21 PROCEDURE — 99213 OFFICE O/P EST LOW 20 MIN: CPT | Performed by: NURSE PRACTITIONER

## 2020-05-21 NOTE — PROGRESS NOTES
Subjective   Sandra Leahy is a 54 y.o. female is here today for medication management follow-up.    Chief Complaint:  Depression memory problems    History of Present Illness:   Patient presents today for follow up for medication management for depression and memory problems. Patient states still taking the Trintellix and denies any side effects. Patient states she feels more normal on the medication. Patient states she feels like her mood is better. Patient states she still does have random times in which memory is affected still and will not remember little things such as someone telling her something. Patient states depression is better but still there some. Patient states she feels like her mood is 75% better. Patient denies any crying spells, but states still having time with decreased energy, depressed mood, decreased focus, and a little irritability. Patient states still has on edge feeling on inside when going in places. Patient states only getting on edge feeling when out around people. Patient denies any panic attacks. Patient reports sleeping good and patient denies any nightmares. Patient states getting around 6-8 hours of sleep. Patient reports appetite is good and eating at least 2-3 meals a day. Patient denies any auditory or visual hallucinations. Patient adamantly denies any SI or HI. Patient denies any side effects to medications. Patient denies any medical changes since last visit.   The following portions of the patient's history were reviewed and updated as appropriate: allergies, current medications, past family history, past medical history, past social history, past surgical history and problem list.    Review of Systems   Constitutional: Negative.    Respiratory: Negative.    Cardiovascular: Negative.    Gastrointestinal: Negative.    Neurological: Negative.    Psychiatric/Behavioral: Positive for agitation and dysphoric mood. The patient is nervous/anxious.        Objective   Physical  Exam   Constitutional: Vital signs are normal. She appears well-developed and well-nourished. She is cooperative.   Neurological: She is alert.   Psychiatric: Her speech is normal and behavior is normal. Judgment and thought content normal. Her mood appears anxious. Cognition and memory are normal.   Vitals reviewed.    Blood pressure 132/70, pulse 87, temperature 97.5 °F (36.4 °C), weight 81.5 kg (179 lb 9.6 oz). Body mass index is 26.52 kg/m².    Allergies   Allergen Reactions   • Lantus [Insulin Glargine] Confusion   • Codeine Itching   • Exenatide Nausea And Vomiting     Intolerant   • Sitagliptin Other (See Comments)     Unsure       Medication List:   Current Outpatient Medications   Medication Sig Dispense Refill   • cetirizine (zyrTEC) 10 MG tablet Take 1 tablet by mouth At Night As Needed for Allergies. 30 tablet 2   • ezetimibe (ZETIA) 10 MG tablet Take 1 tablet by mouth Daily. 30 tablet 3   • insulin detemir (Levemir FlexTouch) 100 UNIT/ML injection Inject 45 Units under the skin into the appropriate area as directed 2 (Two) Times a Day. 20 mL 3   • Insulin Pen Needle (B-D ULTRAFINE III SHORT PEN) 31G X 8 MM misc 1 Device by Other route 2 (Two) Times a Day. 100 each 2   • metFORMIN (GLUCOPHAGE) 1000 MG tablet Take 1 tablet by mouth 2 (Two) Times a Day With Meals. 180 tablet 0   • pravastatin (PRAVACHOL) 40 MG tablet Take 1 tablet by mouth Daily. 30 tablet 3   • vitamin D (ERGOCALCIFEROL) 1.25 MG (66204 UT) capsule capsule Take 1 capsule by mouth 1 (One) Time Per Week. 12 capsule 0   • Vortioxetine HBr (Trintellix) 5 MG tablet Take 5 mg by mouth Daily With Breakfast. 30 tablet 0     No current facility-administered medications for this visit.        Mental Status Exam:   Hygiene:   good  Cooperation:  Cooperative  Eye Contact:  Good  Psychomotor Behavior:  Restless  Affect:  Appropriate  Hopelessness: Denies  Speech:  Normal  Thought Process:  Goal directed and Linear  Thought Content:  Normal  Suicidal:   None  Homicidal:  None  Hallucinations:  None  Delusion:  None  Memory:  Intact  Orientation:  Person, Place, Time and Situation  Reliability:  good  Insight:  Poor  Judgement:  Fair  Impulse Control:  Fair  Physical/Medical Issues:  No               Assessment/Plan   Diagnoses and all orders for this visit:    Current mild episode of major depressive disorder without prior episode (CMS/HCC)  -     Vortioxetine HBr (Trintellix) 10 MG tablet; Take 10 mg by mouth Daily.    Generalized anxiety disorder  -     Vortioxetine HBr (Trintellix) 10 MG tablet; Take 10 mg by mouth Daily.            Discussed medication options with patient. Increase Trintellix to 10mg daily for worsening mood and anxiety.  Reviewed the risks, benefits, and side effects of the medications; patient acknowledged and verbally consented.  Patient is agreeable to call the office with any questions, concerns, or worsening of symptoms.  Patient is aware to call 911 or go to the nearest ER should begin having SI/HI.            Follow up in four weeks      Errors in dictation may reflect use of voice recognition software and not all errors in transcription may have been detected prior to signing.              This document has been electronically signed by ASH Gasca   May 21, 2020 08:34

## 2020-05-25 DIAGNOSIS — B37.9 YEAST INFECTION: ICD-10-CM

## 2020-05-26 ENCOUNTER — RESULTS ENCOUNTER (OUTPATIENT)
Dept: FAMILY MEDICINE CLINIC | Facility: CLINIC | Age: 55
End: 2020-05-26

## 2020-05-26 ENCOUNTER — TELEPHONE (OUTPATIENT)
Dept: FAMILY MEDICINE CLINIC | Facility: CLINIC | Age: 55
End: 2020-05-26

## 2020-05-26 DIAGNOSIS — Z12.11 ENCOUNTER FOR SCREENING FOR MALIGNANT NEOPLASM OF COLON: ICD-10-CM

## 2020-05-26 RX ORDER — FLUCONAZOLE 150 MG/1
TABLET ORAL
Qty: 3 TABLET | Refills: 0 | Status: SHIPPED | OUTPATIENT
Start: 2020-05-26 | End: 2020-06-24 | Stop reason: SDUPTHER

## 2020-05-26 NOTE — TELEPHONE ENCOUNTER
----- Message from ASH Chaudhry sent at 5/26/2020  9:40 AM EDT -----  Sent in..  ----- Message -----  From: Diana Guzman MA  Sent: 5/26/2020   8:06 AM EDT  To: ASH Chaudhry    Patient called and wanted to know if you could send her in a prescription for diflucan? She has another yeast infection.

## 2020-06-24 DIAGNOSIS — B37.9 YEAST INFECTION: ICD-10-CM

## 2020-06-24 RX ORDER — FLUCONAZOLE 150 MG/1
150 TABLET ORAL DAILY
Qty: 3 TABLET | Refills: 0 | Status: SHIPPED | OUTPATIENT
Start: 2020-06-24 | End: 2020-08-28 | Stop reason: SDUPTHER

## 2020-07-02 ENCOUNTER — OFFICE VISIT (OUTPATIENT)
Dept: FAMILY MEDICINE CLINIC | Facility: CLINIC | Age: 55
End: 2020-07-02

## 2020-07-02 DIAGNOSIS — K21.9 GASTROESOPHAGEAL REFLUX DISEASE WITHOUT ESOPHAGITIS: ICD-10-CM

## 2020-07-02 DIAGNOSIS — F41.9 ANXIETY AND DEPRESSION: ICD-10-CM

## 2020-07-02 DIAGNOSIS — F32.A ANXIETY AND DEPRESSION: ICD-10-CM

## 2020-07-02 DIAGNOSIS — IMO0002 TYPE 2 DIABETES, UNCONTROLLED, WITH NEUROPATHY: Primary | Chronic | ICD-10-CM

## 2020-07-02 DIAGNOSIS — F03.90 MAJOR NEUROCOGNITIVE DISORDER (HCC): Chronic | ICD-10-CM

## 2020-07-02 DIAGNOSIS — E55.9 VITAMIN D DEFICIENCY: ICD-10-CM

## 2020-07-02 DIAGNOSIS — E78.2 MIXED HYPERLIPIDEMIA: Chronic | ICD-10-CM

## 2020-07-02 DIAGNOSIS — E53.8 VITAMIN B 12 DEFICIENCY: ICD-10-CM

## 2020-07-02 PROCEDURE — 99443 PR PHYS/QHP TELEPHONE EVALUATION 21-30 MIN: CPT | Performed by: NURSE PRACTITIONER

## 2020-07-02 RX ORDER — FAMOTIDINE 40 MG/1
40 TABLET, FILM COATED ORAL DAILY
Qty: 30 TABLET | Refills: 0 | Status: SHIPPED | OUTPATIENT
Start: 2020-07-02 | End: 2020-09-08

## 2020-07-02 NOTE — PROGRESS NOTES
You have chosen to receive care through a telephone visit. Do you consent to use a telephone visit for your medical care today? Yes  History of Present Illness   Sandra Leahy is a 54 y.o.female who presents to the clinic today via telephone visit for follow up  related to her  DM, type 2 uncontrolled, Dyslipidemia, and Vitamin deficiencies. She was recently evaluated by [facility] and felt to have a major neurocognitive disorder of uncertain etiology. Further testing and consult with neuro psych area was recommended. f/u was to be in June, 2020. Today, she is also c/o gastrointestinal symptoms.      Diabetes   She has type 2 diabetes mellitus.which was diagnosed over ten years ago. Associated symptoms include stable peripheral neuropathy. Pertinent negatives for diabetes include no blurred vision, no chest pain, no foot ulcerations, no polyphagia, no polyuria .  Diabetic complications include peripheral neuropathy. Risk factors for coronary artery disease include diabetes mellitus, dyslipidemia, family history and post-menopausal. Current diabetic treatment includes insulin injections, oral agent (monotherapy) and diet. She is following a generally unhealthy diet with increase in refined sugars. She has had a previous visit with a dietitian. She has seen a Podiatrist. Her last eye exam  was completed in May 2018 per Dr Lopez.She has yet to keep her appointments for follow up eye exam. She does reports her blood sugars continue to be above 200 mg/dL most of the time.     Lab Results   Component Value Date    HGBA1C 10.40 (H) 02/04/2020     Dyslipidemia   Pertinent negatives include no chest pain or shortness of breath. Current antihyperlipidemic treatment includes ezetimibe and statins. Risk factors for coronary artery disease include diabetes mellitus, dyslipidemia, family history, post-menopausal and stress.     Lab Results   Component Value Date    CHLPL 160 02/04/2020    TRIG 175 (H) 02/04/2020    HDL 42  02/04/2020    LDL 83 02/04/2020     Anxiety/Depression   Anxiety has been gradually improving. Symptoms include excessive worry, confusion, and decreased concentration. Sandra reports no chest pain, nausea,  palpitations, shortness of breath or suicidal ideas. The severity of symptoms at times interfering with daily activities. The symptoms are aggravated by family issues and especially when the children are fussing.  Risk factors include a major life event. She is currently seeing Behavioral Health which she reports is helping.    GI Problem   The primary symptoms include weight loss and abdominal pain. Primary symptoms do not include fever, nausea, vomiting, hematemesis or hematochezia. The onset was gradual. The illness does not include  dysphagia, bloating or constipation. Associated medical issues do not include GERD. She can relate it to certain foods especially spicy foods.   Refer to ROS for additional information.     There were no vitals filed for this visit.     The following portions of the patient's history were reviewed and updated as appropriate: allergies, current medications, past family history, past medical history, past social history, past surgical history and problem list.    Review of Systems   Constitutional: Positive for appetite change, fatigue (Intermittent) and weight loss. Negative for activity change, chills and fever.   HENT: Negative for sinus pressure.    Eyes: Negative for visual disturbance.   Respiratory: Negative for cough, shortness of breath and wheezing.    Cardiovascular: Negative for chest pain.   Gastrointestinal: Positive for abdominal pain. Negative for anorexia, bloating, constipation, dysphagia, hematemesis, hematochezia, nausea and vomiting.   Genitourinary: Negative for decreased urine volume, difficulty urinating, dysuria, flank pain and frequency.   Skin: Negative for color change and rash.   Hematological: Negative for adenopathy.     Physical Exam      Constitutional: She is oriented to person, place, and time.   Neurological: She is alert and oriented to person, place, and time.       Assessment/Plan     Problems Addressed this Visit        Cardiovascular and Mediastinum    Hyperlipidemia       Digestive    Vitamin B 12 deficiency    Vitamin D deficiency       Endocrine    Type 2 diabetes, uncontrolled, with neuropathy (CMS/HCC) - Primary    Relevant Medications    insulin detemir (Levemir FlexTouch) 100 UNIT/ML injection       Nervous and Auditory    Major neurocognitive disorder (CMS/HCC)       Other    Anxiety      Other Visit Diagnoses     Gastroesophageal reflux disease without esophagitis        Relevant Medications    famotidine (Pepcid) 40 MG tablet      Symptoms and recommendations discussed with Sandra. Will increase her Levemir to 48 units twice daily. Encouraged improvement in her glycemic control to assist with preventing further complications. She does have her Cologuard kit at home and reminded her to complete the home test. She will continue her Pravastatin and Zetia for Cardiovascular risk reduction. She will continue to f/u with [facility] for her Anxiety/Depression. She will continue to f/u with  for her Neurocognitive Disorder. I did leave Nexium 24 hr at the  for her to  if she could not get the Pepcid at her pharmacy. Requested if this did not help in one week or her symptoms worsen for her to seek further medical evaluation sooner.   Annual Medicare Well Visit scheduled for July 23 rd or f/u sooner if the Pepcid does not help with her gastrointestinal symptoms.Routine labs will be completed at next visit including her Vitamin D and B12 levels.   This visit has been scheduled as a phone visit to comply with patient safety concerns in accordance with CDC recommendations. Total time of discussion was 21  minutes.    This document has been electronically signed by ASH Farfan, JORDAN-BC, ENEIDA  July 2, 2020  08:29

## 2020-07-03 DIAGNOSIS — F32.0 CURRENT MILD EPISODE OF MAJOR DEPRESSIVE DISORDER WITHOUT PRIOR EPISODE (HCC): ICD-10-CM

## 2020-07-03 DIAGNOSIS — F41.1 GENERALIZED ANXIETY DISORDER: ICD-10-CM

## 2020-07-05 PROBLEM — F03.90 MAJOR NEUROCOGNITIVE DISORDER: Status: ACTIVE | Noted: 2018-07-08

## 2020-07-06 RX ORDER — VORTIOXETINE 10 MG/1
TABLET, FILM COATED ORAL
Qty: 30 TABLET | Refills: 0 | Status: SHIPPED | OUTPATIENT
Start: 2020-07-06 | End: 2020-07-22 | Stop reason: SDUPTHER

## 2020-07-22 DIAGNOSIS — F32.0 CURRENT MILD EPISODE OF MAJOR DEPRESSIVE DISORDER WITHOUT PRIOR EPISODE (HCC): ICD-10-CM

## 2020-07-22 DIAGNOSIS — F41.1 GENERALIZED ANXIETY DISORDER: ICD-10-CM

## 2020-08-28 ENCOUNTER — DOCUMENTATION (OUTPATIENT)
Dept: FAMILY MEDICINE CLINIC | Facility: CLINIC | Age: 55
End: 2020-08-28

## 2020-08-28 DIAGNOSIS — B37.9 YEAST INFECTION: ICD-10-CM

## 2020-08-28 RX ORDER — FLUCONAZOLE 150 MG/1
150 TABLET ORAL DAILY
Qty: 3 TABLET | Refills: 0 | Status: SHIPPED | OUTPATIENT
Start: 2020-08-28 | End: 2020-09-02 | Stop reason: SDUPTHER

## 2020-09-02 ENCOUNTER — TELEPHONE (OUTPATIENT)
Dept: FAMILY MEDICINE CLINIC | Facility: CLINIC | Age: 55
End: 2020-09-02

## 2020-09-02 DIAGNOSIS — B37.9 YEAST INFECTION: ICD-10-CM

## 2020-09-02 RX ORDER — FLUCONAZOLE 150 MG/1
150 TABLET ORAL DAILY
Qty: 3 TABLET | Refills: 0 | Status: SHIPPED | OUTPATIENT
Start: 2020-09-02 | End: 2020-10-16 | Stop reason: SDUPTHER

## 2020-09-02 NOTE — TELEPHONE ENCOUNTER
Pt is aware of this information.       ----- Message from ASH Chaudhry sent at 9/2/2020  4:15 PM EDT -----  Done  ----- Message -----  From: Diana Guzman MA  Sent: 9/2/2020   3:57 PM EDT  To: ASH Chaudhry    Pt called and said the 3 days of diflucan did not help her yeast infection. She wanted to know if you could send her in a few more days worth.

## 2020-09-05 DIAGNOSIS — K21.9 GASTROESOPHAGEAL REFLUX DISEASE WITHOUT ESOPHAGITIS: ICD-10-CM

## 2020-09-08 RX ORDER — FAMOTIDINE 40 MG/1
TABLET, FILM COATED ORAL
Qty: 30 TABLET | Refills: 0 | Status: SHIPPED | OUTPATIENT
Start: 2020-09-08 | End: 2020-10-05 | Stop reason: SDUPTHER

## 2020-10-03 DIAGNOSIS — K21.9 GASTROESOPHAGEAL REFLUX DISEASE WITHOUT ESOPHAGITIS: ICD-10-CM

## 2020-10-03 DIAGNOSIS — J30.9 ALLERGIC RHINITIS, UNSPECIFIED SEASONALITY, UNSPECIFIED TRIGGER: ICD-10-CM

## 2020-10-03 DIAGNOSIS — IMO0002 TYPE 2 DIABETES, UNCONTROLLED, WITH NEUROPATHY: Chronic | ICD-10-CM

## 2020-10-03 DIAGNOSIS — F41.1 GENERALIZED ANXIETY DISORDER: ICD-10-CM

## 2020-10-03 DIAGNOSIS — F32.0 CURRENT MILD EPISODE OF MAJOR DEPRESSIVE DISORDER WITHOUT PRIOR EPISODE (HCC): ICD-10-CM

## 2020-10-05 RX ORDER — CETIRIZINE HYDROCHLORIDE 10 MG/1
TABLET, FILM COATED ORAL
Qty: 30 TABLET | Refills: 0 | Status: SHIPPED | OUTPATIENT
Start: 2020-10-05 | End: 2020-10-12 | Stop reason: SDUPTHER

## 2020-10-05 RX ORDER — FAMOTIDINE 40 MG/1
TABLET, FILM COATED ORAL
Qty: 30 TABLET | Refills: 0 | Status: SHIPPED | OUTPATIENT
Start: 2020-10-05 | End: 2020-11-19

## 2020-10-05 RX ORDER — VORTIOXETINE 10 MG/1
TABLET, FILM COATED ORAL
Qty: 30 TABLET | Refills: 0 | Status: SHIPPED | OUTPATIENT
Start: 2020-10-05 | End: 2020-10-15 | Stop reason: SDUPTHER

## 2020-10-06 ENCOUNTER — OFFICE VISIT (OUTPATIENT)
Dept: FAMILY MEDICINE CLINIC | Facility: CLINIC | Age: 55
End: 2020-10-06

## 2020-10-06 DIAGNOSIS — E53.8 VITAMIN B 12 DEFICIENCY: ICD-10-CM

## 2020-10-06 DIAGNOSIS — F41.9 ANXIETY AND DEPRESSION: Chronic | ICD-10-CM

## 2020-10-06 DIAGNOSIS — IMO0002 TYPE 2 DIABETES, UNCONTROLLED, WITH NEUROPATHY: Primary | Chronic | ICD-10-CM

## 2020-10-06 DIAGNOSIS — E78.2 MIXED HYPERLIPIDEMIA: Chronic | ICD-10-CM

## 2020-10-06 DIAGNOSIS — M25.50 ARTHRALGIA, UNSPECIFIED JOINT: ICD-10-CM

## 2020-10-06 DIAGNOSIS — E55.9 VITAMIN D DEFICIENCY: ICD-10-CM

## 2020-10-06 DIAGNOSIS — F32.A ANXIETY AND DEPRESSION: Chronic | ICD-10-CM

## 2020-10-06 PROCEDURE — 99443 PR PHYS/QHP TELEPHONE EVALUATION 21-30 MIN: CPT | Performed by: NURSE PRACTITIONER

## 2020-10-06 NOTE — PROGRESS NOTES
".You have chosen to receive care through a telephone visit. Do you consent to use a telephone visit for your medical care today? Yes    History of Present Illness   Sandra Leahy is a 54 y.o.female who presents to the clinic today via telephone visit for follow up  related to her  DM, type 2 uncontrolled, Dyslipidemia, and Vitamin deficiencies. She is currently being evaluated by [facility] for a major neurocognitive disorder of uncertain etiology. She had a PET scan on Oct 2nd in [place] with f/u in December. She is c/o mild generalized joint/muscle pain which has started several weeks ago.      Diabetes   She has type 2 diabetes mellitus.which was diagnosed over ten years ago. Associated symptoms include stable peripheral neuropathy. Pertinent negatives for diabetes include no blurred vision, no chest pain, no foot ulcerations, no polyphagia, no polyuria .  Diabetic complications include peripheral neuropathy. Risk factors for coronary artery disease include diabetes mellitus, dyslipidemia, family history and post-menopausal. Current diabetic treatment includes insulin injections, oral agent (monotherapy) and diet. She is following a generally healthier diet with decrease in fatty foods. She has had a previous visit with a dietitian. She has seen a Podiatrist. Her last eye exam  was completed in May 2018 per Dr Lopez.She has yet to keep her appointments for follow up eye exam due to COVID. She does reports her blood sugars have been \"running very good with a blood sugar of 80 mg/dL the day of her PET scan.      Lab Results   Component Value Date    HGBA1C 10.10 (H) 05/12/2020     Dyslipidemia   Pertinent negatives include no chest pain or shortness of breath. Current antihyperlipidemic treatment includes ezetimibe and statins. Risk factors for coronary artery disease include diabetes mellitus, dyslipidemia, family history, post-menopausal and stress.     Lab Results   Component Value Date    CHLPL 174 " 05/12/2020    TRIG 213 (H) 05/12/2020    HDL 41 05/12/2020    LDL 90 05/12/2020       Anxiety/Depression   Anxiety has been gradually improving. Symptoms include excessive worry, confusion, and decreased concentration. Sandra reports no chest pain, nausea,  palpitations, shortness of breath or suicidal ideas. The severity of symptoms at times interfering with daily activities. The symptoms are aggravated by family issues and especially when the children are fussing.  Risk factors include a major life event. She is currently seeing Behavioral Health which she reports is helping.    GI Problem   The primary symptoms include diarrhea with spicy and greasy foods.  Primary symptoms do not include fever, nausea, vomiting, hematemesis or hematochezia. The illness does not include  dysphagia, bloating or constipation. Associated medical issues do not include GERD. She has tried Pepcid which did not help.     There were no vitals filed for this visit.     The following portions of the patient's history were reviewed and updated as appropriate: allergies, current medications, past family history, past medical history, past social history, past surgical history and problem list.    Review of Systems   Constitutional: Negative for activity change, appetite change, chills, diaphoresis, fatigue and fever.   Eyes: Negative for blurred vision and visual disturbance.   Respiratory: Negative for cough, shortness of breath and wheezing.    Cardiovascular: Negative for leg swelling.   Gastrointestinal: Positive for diarrhea and GERD. Negative for constipation, nausea and vomiting.   Endocrine: Negative for polydipsia, polyphagia and polyuria.   Musculoskeletal: Positive for arthralgias and myalgias. Negative for gait problem and joint swelling.   Skin: Negative for color change and rash.   Neurological: Positive for confusion. Negative for speech difficulty.   Hematological: Does not bruise/bleed easily.   Psychiatric/Behavioral:  Negative for sleep disturbance.      Physical Exam  Constitutional:       Comments: Pleasant; in good spirits.    Psychiatric:         Mood and Affect: Mood and affect normal.         Speech: Speech normal.         Behavior: Behavior is cooperative.         Cognition and Memory: Cognition is impaired.       Assessment/Plan     Problems Addressed this Visit        Cardiovascular and Mediastinum    Hyperlipidemia    Relevant Orders    Lipid Panel       Digestive    Vitamin B 12 deficiency    Relevant Orders    Vitamin B12    Vitamin D deficiency    Relevant Orders    Vitamin D 25 Hydroxy       Endocrine    Type 2 diabetes, uncontrolled, with neuropathy (CMS/HCC) - Primary    Relevant Orders    Comprehensive Metabolic Panel    Lipid Panel    TSH    Hemoglobin A1c      Other Visit Diagnoses     Anxiety and depression  (Chronic)       Continue to follow Behavioral Health    Relevant Orders    Comprehensive Metabolic Panel    TSH    Arthralgia, unspecified joint        Counseled regarding supportive care measures.     Relevant Orders    CBC & Differential    Sedimentation Rate    CLEMENTE    CK      Diagnoses       Codes Comments    Type 2 diabetes, uncontrolled, with neuropathy (CMS/HCC)    -  Primary ICD-10-CM: E11.40, E11.65  ICD-9-CM: 250.62, 357.2 Labs ordered for her to have this week     Mixed hyperlipidemia     ICD-10-CM: E78.2  ICD-9-CM: 272.2 Continue Pravastatin. Will check CK     Anxiety and depression     ICD-10-CM: F41.9, F32.9  ICD-9-CM: 300.00, 311 Continue to follow Behavioral Health    Vitamin B 12 deficiency     ICD-10-CM: E53.8  ICD-9-CM: 266.2 Level ordered    Vitamin D deficiency     ICD-10-CM: E55.9  ICD-9-CM: 268.9 Vit D level ordered    Arthralgia, unspecified joint     ICD-10-CM: M25.50  ICD-9-CM: 719.40 Counseled regarding supportive care measures.         Symptoms discussed with Sandra. Praise given for lifestyle modifications she has made. Labs scheduled for her to come in this week. Follow up will  be determined by the results. She does report she had her Influenza vaccine at [facility] this year. Health Maintenance recommendations discussed including Eye exam and Mammogram which have both been scheduled but she has not kept her appointments.Has not returned her Cologuard test.  Will continue to encourage.   This visit has been scheduled as a phone visit to comply with patient safety concerns in accordance with CDC recommendations. Total time of discussion was 22  minutes.      This document has been electronically signed by ASH Farfan, JORDAN-BC, ENEIDA  October 6, 2020 09:59 EDT

## 2020-10-09 ENCOUNTER — LAB (OUTPATIENT)
Dept: FAMILY MEDICINE CLINIC | Facility: CLINIC | Age: 55
End: 2020-10-09

## 2020-10-09 DIAGNOSIS — M25.50 ARTHRALGIA, UNSPECIFIED JOINT: ICD-10-CM

## 2020-10-09 DIAGNOSIS — E55.9 VITAMIN D DEFICIENCY: ICD-10-CM

## 2020-10-09 DIAGNOSIS — E53.8 VITAMIN B 12 DEFICIENCY: ICD-10-CM

## 2020-10-09 DIAGNOSIS — E78.2 MIXED HYPERLIPIDEMIA: Chronic | ICD-10-CM

## 2020-10-09 DIAGNOSIS — IMO0002 TYPE 2 DIABETES, UNCONTROLLED, WITH NEUROPATHY: Chronic | ICD-10-CM

## 2020-10-09 DIAGNOSIS — F32.A ANXIETY AND DEPRESSION: Chronic | ICD-10-CM

## 2020-10-09 DIAGNOSIS — F41.9 ANXIETY AND DEPRESSION: Chronic | ICD-10-CM

## 2020-10-09 PROCEDURE — 36415 COLL VENOUS BLD VENIPUNCTURE: CPT | Performed by: NURSE PRACTITIONER

## 2020-10-12 DIAGNOSIS — E78.2 MIXED HYPERLIPIDEMIA: Chronic | ICD-10-CM

## 2020-10-12 DIAGNOSIS — J30.9 ALLERGIC RHINITIS, UNSPECIFIED SEASONALITY, UNSPECIFIED TRIGGER: ICD-10-CM

## 2020-10-12 LAB
25(OH)D3+25(OH)D2 SERPL-MCNC: 44 NG/ML (ref 30–100)
ALBUMIN SERPL-MCNC: 4.6 G/DL (ref 3.5–5.2)
ALBUMIN/GLOB SERPL: 2 G/DL
ALP SERPL-CCNC: 95 U/L (ref 39–117)
ALT SERPL-CCNC: 33 U/L (ref 1–33)
ANA SER QL: NEGATIVE
AST SERPL-CCNC: 25 U/L (ref 1–32)
BASOPHILS # BLD AUTO: 0.04 10*3/MM3 (ref 0–0.2)
BASOPHILS NFR BLD AUTO: 0.7 % (ref 0–1.5)
BILIRUB SERPL-MCNC: 0.3 MG/DL (ref 0–1.2)
BUN SERPL-MCNC: 15 MG/DL (ref 6–20)
BUN/CREAT SERPL: 21.1 (ref 7–25)
CALCIUM SERPL-MCNC: 9 MG/DL (ref 8.6–10.5)
CHLORIDE SERPL-SCNC: 106 MMOL/L (ref 98–107)
CHOLEST SERPL-MCNC: 155 MG/DL (ref 0–200)
CK SERPL-CCNC: 78 U/L (ref 20–180)
CO2 SERPL-SCNC: 24.7 MMOL/L (ref 22–29)
CREAT SERPL-MCNC: 0.71 MG/DL (ref 0.57–1)
EOSINOPHIL # BLD AUTO: 0.23 10*3/MM3 (ref 0–0.4)
EOSINOPHIL NFR BLD AUTO: 4 % (ref 0.3–6.2)
ERYTHROCYTE [DISTWIDTH] IN BLOOD BY AUTOMATED COUNT: 12.9 % (ref 12.3–15.4)
ERYTHROCYTE [SEDIMENTATION RATE] IN BLOOD BY WESTERGREN METHOD: 9 MM/HR (ref 0–30)
GLOBULIN SER CALC-MCNC: 2.3 GM/DL
GLUCOSE SERPL-MCNC: 293 MG/DL (ref 65–99)
HBA1C MFR BLD: 9.4 % (ref 4.8–5.6)
HCT VFR BLD AUTO: 44.2 % (ref 34–46.6)
HDLC SERPL-MCNC: 39 MG/DL (ref 40–60)
HGB BLD-MCNC: 14.8 G/DL (ref 12–15.9)
IMM GRANULOCYTES # BLD AUTO: 0.03 10*3/MM3 (ref 0–0.05)
IMM GRANULOCYTES NFR BLD AUTO: 0.5 % (ref 0–0.5)
LDLC SERPL CALC-MCNC: 75 MG/DL (ref 0–100)
LYMPHOCYTES # BLD AUTO: 2.09 10*3/MM3 (ref 0.7–3.1)
LYMPHOCYTES NFR BLD AUTO: 36.4 % (ref 19.6–45.3)
MCH RBC QN AUTO: 30 PG (ref 26.6–33)
MCHC RBC AUTO-ENTMCNC: 33.5 G/DL (ref 31.5–35.7)
MCV RBC AUTO: 89.5 FL (ref 79–97)
MONOCYTES # BLD AUTO: 0.52 10*3/MM3 (ref 0.1–0.9)
MONOCYTES NFR BLD AUTO: 9.1 % (ref 5–12)
NEUTROPHILS # BLD AUTO: 2.83 10*3/MM3 (ref 1.7–7)
NEUTROPHILS NFR BLD AUTO: 49.3 % (ref 42.7–76)
NRBC BLD AUTO-RTO: 0.2 /100 WBC (ref 0–0.2)
PLATELET # BLD AUTO: 262 10*3/MM3 (ref 140–450)
POTASSIUM SERPL-SCNC: 4.4 MMOL/L (ref 3.5–5.2)
PROT SERPL-MCNC: 6.9 G/DL (ref 6–8.5)
RBC # BLD AUTO: 4.94 10*6/MM3 (ref 3.77–5.28)
SODIUM SERPL-SCNC: 140 MMOL/L (ref 136–145)
TRIGL SERPL-MCNC: 250 MG/DL (ref 0–150)
TSH SERPL DL<=0.005 MIU/L-ACNC: 2.62 UIU/ML (ref 0.27–4.2)
VIT B12 SERPL-MCNC: 337 PG/ML (ref 211–946)
VLDLC SERPL CALC-MCNC: 41 MG/DL (ref 5–40)
WBC # BLD AUTO: 5.74 10*3/MM3 (ref 3.4–10.8)

## 2020-10-12 RX ORDER — CETIRIZINE HYDROCHLORIDE 10 MG/1
TABLET, FILM COATED ORAL
Qty: 30 TABLET | Refills: 0 | Status: SHIPPED | OUTPATIENT
Start: 2020-10-12 | End: 2021-01-07

## 2020-10-12 RX ORDER — EZETIMIBE 10 MG/1
TABLET ORAL
Qty: 90 TABLET | Refills: 0 | Status: SHIPPED | OUTPATIENT
Start: 2020-10-12 | End: 2021-01-18 | Stop reason: SDUPTHER

## 2020-10-15 ENCOUNTER — OFFICE VISIT (OUTPATIENT)
Dept: PSYCHIATRY | Facility: CLINIC | Age: 55
End: 2020-10-15

## 2020-10-15 DIAGNOSIS — F41.1 GENERALIZED ANXIETY DISORDER: ICD-10-CM

## 2020-10-15 DIAGNOSIS — F32.0 CURRENT MILD EPISODE OF MAJOR DEPRESSIVE DISORDER WITHOUT PRIOR EPISODE (HCC): ICD-10-CM

## 2020-10-15 PROCEDURE — 99441 PR PHYS/QHP TELEPHONE EVALUATION 5-10 MIN: CPT | Performed by: NURSE PRACTITIONER

## 2020-10-15 RX ORDER — VORTIOXETINE 5 MG/1
5 TABLET, FILM COATED ORAL DAILY
Qty: 30 TABLET | Refills: 0 | Status: SHIPPED | OUTPATIENT
Start: 2020-10-15 | End: 2020-11-06 | Stop reason: SDUPTHER

## 2020-10-15 RX ORDER — VORTIOXETINE 10 MG/1
1 TABLET, FILM COATED ORAL DAILY
Qty: 30 TABLET | Refills: 0 | Status: SHIPPED | OUTPATIENT
Start: 2020-10-15 | End: 2020-11-06 | Stop reason: SDUPTHER

## 2020-10-15 NOTE — PROGRESS NOTES
"  You have chosen to receive care through a telephone visit. Do you consent to use a telephone visit for your medical care today? Yes      Subjective   Sandra Leahy is a 54 y.o. female is here today for medication management follow-up.    This provider is located at [facility] at [address] [place] [address]. The provider identified herself as well as her credentials. The Patient is at/in home by herself/himself, using her/his phone because problems with video connection. The patient's condition being diagnosed/treated is appropriate for telemedicine. The patient gave consent to be seen remotely, and when consent is given they understand that the consent allows for patient identifiable information to be sent to a third party as needed. They may refuse to be seen remotely at any time. The electronic data is encrypted and password protected, and the patient has been advised of the potential risks to privacy not withstanding such measures.      Chief Complaint:  Anxiety depression memory    History of Present Illness:   Patient presents today for follow up for medication management for memory, depression and anxiety. Patient states doing alright but still having trouble with memory. Patient states two weeks ago had a PET scan in [place] and goes back in December.  states had a CAT scan about three months. Patient states it showed she did have a stroke at some time in the past.  states when she goes back in December going to do more tests regarding questions and physical components. Patient states having times in which getting \"short\" with . Patient states she has been more on edge. Patient states still taking the Trintellix. Patient states feeling down related to not being able to remember things when trying to talk or in general will forget what people have said to her. Patient states she does get upset when can't remember things.  states the med is doing good but still a " little on edge. Patient unable to rate depression on 0-10 scale. Patient reports anxiety is at a 8-9 on a 0-10 scale with 10 being the worst. Patient denies any panic attacks. Patient reports sleeping 6-8 hours a night and patient denies any nightmares. Patient reports appetite is good and eating 2-3 meals a day. Patient states she has been taking the med at night due to some nausea. Patient denies any auditory or visual hallucinations. Patient adamantly denies any SI or HI. Patient denies any side effects to medications. Patient denies any medical changes since last visit.   The following portions of the patient's history were reviewed and updated as appropriate: allergies, current medications, past family history, past medical history, past social history, past surgical history and problem list.    Review of Systems   Constitutional: Negative.    Respiratory: Negative.    Cardiovascular: Negative.    Gastrointestinal: Negative.    Neurological: Negative.    Psychiatric/Behavioral: Positive for agitation, decreased concentration and dysphoric mood. The patient is nervous/anxious.        Objective   Physical Exam  Neurological:      Mental Status: She is alert.   Psychiatric:         Speech: Speech normal.         Behavior: Behavior is cooperative.         Cognition and Memory: Memory is impaired.       There were no vitals taken for this visit. There is no height or weight on file to calculate BMI.   Unable to obtain vitals due to telephone visit.     Allergies   Allergen Reactions   • Lantus [Insulin Glargine] Confusion   • Codeine Itching   • Exenatide Nausea And Vomiting     Intolerant   • Sitagliptin Other (See Comments)     Unsure       Medication List:   Current Outpatient Medications   Medication Sig Dispense Refill   • EQ Allergy Relief, Cetirizine, 10 MG tablet TAKE 1 TABLET BY MOUTH ONCE DAILY AS NEEDED FOR ALLERGIES 30 tablet 0   • ezetimibe (ZETIA) 10 MG tablet Take 1 tablet by mouth once daily 90  tablet 0   • famotidine (PEPCID) 40 MG tablet Take 1 tablet by mouth once daily 30 tablet 0   • fluconazole (DIFLUCAN) 150 MG tablet Take 1 tablet by mouth Daily. 3 tablet 0   • insulin detemir (Levemir FlexTouch) 100 UNIT/ML injection Inject 48 Units under the skin into the appropriate area as directed 2 (Two) Times a Day. 20 mL 3   • Insulin Pen Needle (B-D ULTRAFINE III SHORT PEN) 31G X 8 MM misc 1 Device by Other route 2 (Two) Times a Day. 100 each 2   • metFORMIN (GLUCOPHAGE) 1000 MG tablet TAKE 1 TABLET BY MOUTH TWICE DAILY WITH MEALS 180 tablet 0   • pravastatin (PRAVACHOL) 40 MG tablet Take 1 tablet by mouth Daily. 30 tablet 3   • Trintellix 10 MG tablet Take 1 tablet by mouth once daily 30 tablet 0   • vitamin D (ERGOCALCIFEROL) 1.25 MG (75201 UT) capsule capsule Take 1 capsule by mouth 1 (One) Time Per Week. 12 capsule 0     No current facility-administered medications for this visit.        Mental Status Exam:   Hygiene:   [place]  Cooperation:  Cooperative  Eye Contact:  [place]  Psychomotor Behavior:  [place]  Affect:  [place]  Hopelessness: Denies  Speech:  Normal  Thought Process:  Goal directed and Linear  Thought Content:  Normal  Suicidal:  None  Homicidal:  None  Hallucinations:  None  Delusion:  None  Memory:  Deficits  Orientation:  Person, Place, Time and Situation  Reliability:  fair  Insight:  Poor  Judgement:  Fair  Impulse Control:  Fair  Physical/Medical Issues:  No               Assessment/Plan   Diagnoses and all orders for this visit:    1. Current mild episode of major depressive disorder without prior episode (CMS/HCC)  -     Vortioxetine HBr (Trintellix) 10 MG tablet; Take 10 mg by mouth Daily. Take with 5mg for a total dose of 15mg daily  Dispense: 30 tablet; Refill: 0  -     Vortioxetine HBr (Trintellix) 5 MG tablet; Take 5 mg by mouth Daily. Take with 10mg for a total dose of 15mg daily  Dispense: 30 tablet; Refill: 0    2. Generalized anxiety disorder  -     Vortioxetine HBr (Trintellix) 10 MG  tablet; Take 10 mg by mouth Daily. Take with 5mg for a total dose of 15mg daily  Dispense: 30 tablet; Refill: 0  -     Vortioxetine HBr (Trintellix) 5 MG tablet; Take 5 mg by mouth Daily. Take with 10mg for a total dose of 15mg daily  Dispense: 30 tablet; Refill: 0            Discussed medication options with patient.  Increase Trintellix to 15 mg daily for worsening depression and anxiety. Reviewed the risks, benefits, and side effects of the medications; patient and  acknowledged and verbally consented.  Patient and  is agreeable to call the office with any questions, concerns, or worsening of symptoms.  Patient is aware to call 911 or go to the nearest ER should begin having SI/HI.        Follow up in four weeks      Errors in dictation may reflect use of voice recognition software and not all errors in transcription may have been detected prior to signing.        This visit has been rescheduled as a phone visit to comply with patient safety concerns in accordance with CDC recommendations. Total time of discussion was 9 minutes.              This document has been electronically signed by ASH Gasca   October 15, 2020 09:12 EDT

## 2020-10-16 DIAGNOSIS — B37.9 YEAST INFECTION: ICD-10-CM

## 2020-10-16 RX ORDER — FLUCONAZOLE 150 MG/1
150 TABLET ORAL DAILY
Qty: 3 TABLET | Refills: 1 | Status: SHIPPED | OUTPATIENT
Start: 2020-10-16 | End: 2021-01-18

## 2020-10-31 DIAGNOSIS — IMO0002 TYPE 2 DIABETES, UNCONTROLLED, WITH NEUROPATHY: Chronic | ICD-10-CM

## 2020-11-01 DIAGNOSIS — IMO0002 TYPE 2 DIABETES, UNCONTROLLED, WITH NEUROPATHY: Chronic | ICD-10-CM

## 2020-11-01 RX ORDER — INSULIN DETEMIR 100 [IU]/ML
50 INJECTION, SOLUTION SUBCUTANEOUS 2 TIMES DAILY
Qty: 10 PEN | Refills: 3 | Status: SHIPPED | OUTPATIENT
Start: 2020-11-01 | End: 2021-01-18 | Stop reason: SDUPTHER

## 2020-11-02 RX ORDER — INSULIN DETEMIR 100 [IU]/ML
INJECTION, SOLUTION SUBCUTANEOUS
Qty: 60 ML | Refills: 0 | OUTPATIENT
Start: 2020-11-02

## 2020-11-06 DIAGNOSIS — F32.0 CURRENT MILD EPISODE OF MAJOR DEPRESSIVE DISORDER WITHOUT PRIOR EPISODE (HCC): ICD-10-CM

## 2020-11-06 DIAGNOSIS — F41.1 GENERALIZED ANXIETY DISORDER: ICD-10-CM

## 2020-11-09 RX ORDER — VORTIOXETINE 10 MG/1
1 TABLET, FILM COATED ORAL DAILY
Qty: 30 TABLET | Refills: 5 | Status: SHIPPED | OUTPATIENT
Start: 2020-11-09 | End: 2021-04-19 | Stop reason: SDUPTHER

## 2020-11-09 RX ORDER — VORTIOXETINE 5 MG/1
5 TABLET, FILM COATED ORAL DAILY
Qty: 30 TABLET | Refills: 5 | Status: SHIPPED | OUTPATIENT
Start: 2020-11-09 | End: 2021-01-18

## 2020-11-19 DIAGNOSIS — K21.9 GASTROESOPHAGEAL REFLUX DISEASE WITHOUT ESOPHAGITIS: ICD-10-CM

## 2020-11-19 RX ORDER — FAMOTIDINE 40 MG/1
TABLET, FILM COATED ORAL
Qty: 30 TABLET | Refills: 0 | Status: SHIPPED | OUTPATIENT
Start: 2020-11-19 | End: 2021-01-18

## 2021-01-07 DIAGNOSIS — J30.9 ALLERGIC RHINITIS, UNSPECIFIED SEASONALITY, UNSPECIFIED TRIGGER: ICD-10-CM

## 2021-01-07 DIAGNOSIS — E78.2 MIXED HYPERLIPIDEMIA: Chronic | ICD-10-CM

## 2021-01-07 RX ORDER — PRAVASTATIN SODIUM 40 MG
TABLET ORAL
Qty: 90 TABLET | Refills: 0 | Status: SHIPPED | OUTPATIENT
Start: 2021-01-07 | End: 2021-01-18 | Stop reason: SDUPTHER

## 2021-01-07 RX ORDER — CETIRIZINE HYDROCHLORIDE 10 MG/1
TABLET ORAL
Qty: 30 TABLET | Refills: 0 | Status: SHIPPED | OUTPATIENT
Start: 2021-01-07 | End: 2021-01-18 | Stop reason: SDUPTHER

## 2021-01-18 ENCOUNTER — OFFICE VISIT (OUTPATIENT)
Dept: FAMILY MEDICINE CLINIC | Facility: CLINIC | Age: 56
End: 2021-01-18

## 2021-01-18 ENCOUNTER — TELEPHONE (OUTPATIENT)
Dept: FAMILY MEDICINE CLINIC | Facility: CLINIC | Age: 56
End: 2021-01-18

## 2021-01-18 VITALS — BODY MASS INDEX: 22.96 KG/M2 | WEIGHT: 155 LBS | HEIGHT: 69 IN

## 2021-01-18 DIAGNOSIS — IMO0002 TYPE 2 DIABETES, UNCONTROLLED, WITH NEUROPATHY: Chronic | ICD-10-CM

## 2021-01-18 DIAGNOSIS — J30.9 ALLERGIC RHINITIS, UNSPECIFIED SEASONALITY, UNSPECIFIED TRIGGER: ICD-10-CM

## 2021-01-18 DIAGNOSIS — E55.9 VITAMIN D DEFICIENCY: ICD-10-CM

## 2021-01-18 DIAGNOSIS — E78.2 MIXED HYPERLIPIDEMIA: Chronic | ICD-10-CM

## 2021-01-18 PROCEDURE — 99442 PR PHYS/QHP TELEPHONE EVALUATION 11-20 MIN: CPT | Performed by: PHYSICIAN ASSISTANT

## 2021-01-18 RX ORDER — CETIRIZINE HYDROCHLORIDE 10 MG/1
10 TABLET ORAL DAILY
Qty: 90 TABLET | Refills: 1 | Status: SHIPPED | OUTPATIENT
Start: 2021-01-18 | End: 2021-02-18 | Stop reason: SDUPTHER

## 2021-01-18 RX ORDER — EZETIMIBE 10 MG/1
10 TABLET ORAL DAILY
Qty: 90 TABLET | Refills: 1 | Status: SHIPPED | OUTPATIENT
Start: 2021-01-18 | End: 2021-02-18 | Stop reason: SDUPTHER

## 2021-01-18 RX ORDER — PRAVASTATIN SODIUM 40 MG
40 TABLET ORAL DAILY
Qty: 90 TABLET | Refills: 1 | Status: SHIPPED | OUTPATIENT
Start: 2021-01-18 | End: 2021-02-18 | Stop reason: SDUPTHER

## 2021-01-18 RX ORDER — INSULIN DETEMIR 100 [IU]/ML
45 INJECTION, SOLUTION SUBCUTANEOUS 2 TIMES DAILY
Qty: 10 PEN | Refills: 3 | Status: SHIPPED | OUTPATIENT
Start: 2021-01-18 | End: 2021-02-18 | Stop reason: SDUPTHER

## 2021-01-18 RX ORDER — FLUCONAZOLE 150 MG/1
150 TABLET ORAL DAILY
Qty: 3 TABLET | Refills: 0 | Status: SHIPPED | OUTPATIENT
Start: 2021-01-18 | End: 2021-01-25 | Stop reason: SDUPTHER

## 2021-01-18 NOTE — PATIENT INSTRUCTIONS
"Carbohydrate Counting for Diabetes Mellitus, Adult    Carbohydrate counting is a method of keeping track of how many carbohydrates you eat. Eating carbohydrates naturally increases the amount of sugar (glucose) in the blood. Counting how many carbohydrates you eat helps keep your blood glucose within normal limits, which helps you manage your diabetes (diabetes mellitus).  It is important to know how many carbohydrates you can safely have in each meal. This is different for every person. A diet and nutrition specialist (registered dietitian) can help you make a meal plan and calculate how many carbohydrates you should have at each meal and snack.  Carbohydrates are found in the following foods:  · Grains, such as breads and cereals.  · Dried beans and soy products.  · Starchy vegetables, such as potatoes, peas, and corn.  · Fruit and fruit juices.  · Milk and yogurt.  · Sweets and snack foods, such as cake, cookies, candy, chips, and soft drinks.  How do I count carbohydrates?  There are two ways to count carbohydrates in food. You can use either of the methods or a combination of both.  Reading \"Nutrition Facts\" on packaged food  The \"Nutrition Facts\" list is included on the labels of almost all packaged foods and beverages in the [place] It includes:  · The serving size.  · Information about nutrients in each serving, including the grams (g) of carbohydrate per serving.  To use the “Nutrition Facts\":  · Decide how many servings you will have.  · Multiply the number of servings by the number of carbohydrates per serving.  · The resulting number is the total amount of carbohydrates that you will be having.  Learning standard serving sizes of other foods  When you eat carbohydrate foods that are not packaged or do not include \"Nutrition Facts\" on the label, you need to measure the servings in order to count the amount of carbohydrates:  · Measure the foods that you will eat with a food scale or measuring cup, if " needed.  · Decide how many standard-size servings you will eat.  · Multiply the number of servings by 15. Most carbohydrate-rich foods have about 15 g of carbohydrates per serving.  ? For example, if you eat 8 oz (170 g) of strawberries, you will have eaten 2 servings and 30 g of carbohydrates (2 servings x 15 g = 30 g).  · For foods that have more than one food mixed, such as soups and casseroles, you must count the carbohydrates in each food that is included.  The following list contains standard serving sizes of common carbohydrate-rich foods. Each of these servings has about 15 g of carbohydrates:  · ½ hamburger bun or ½ English muffin.  · ½ oz (15 mL) syrup.  · ½ oz (14 g) jelly.  · 1 slice of bread.  · 1 six-inch tortilla.  · 3 oz (85 g) cooked rice or pasta.  · 4 oz (113 g) cooked dried beans.  · 4 oz (113 g) starchy vegetable, such as peas, corn, or potatoes.  · 4 oz (113 g) hot cereal.  · 4 oz (113 g) mashed potatoes or ¼ of a large baked potato.  · 4 oz (113 g) canned or frozen fruit.  · 4 oz (120 mL) fruit juice.  · 4-6 crackers.  · 6 chicken nuggets.  · 6 oz (170 g) unsweetened dry cereal.  · 6 oz (170 g) plain fat-free yogurt or yogurt sweetened with artificial sweeteners.  · 8 oz (240 mL) milk.  · 8 oz (170 g) fresh fruit or one small piece of fruit.  · 24 oz (680 g) popped popcorn.  Example of carbohydrate counting  Sample meal  · 3 oz (85 g) chicken breast.  · 6 oz (170 g) brown rice.  · 4 oz (113 g) corn.  · 8 oz (240 mL) milk.  · 8 oz (170 g) strawberries with sugar-free whipped topping.  Carbohydrate calculation  1. Identify the foods that contain carbohydrates:  ? Rice.  ? Corn.  ? Milk.  ? Strawberries.  2. Calculate how many servings you have of each food:  ? 2 servings rice.  ? 1 serving corn.  ? 1 serving milk.  ? 1 serving strawberries.  3. Multiply each number of servings by 15 g:  ? 2 servings rice x 15 g = 30 g.  ? 1 serving corn x 15 g = 15 g.  ? 1 serving milk x 15 g = 15 g.  ? 1  serving strawberries x 15 g = 15 g.  4. Add together all of the amounts to find the total grams of carbohydrates eaten:  ? 30 g + 15 g + 15 g + 15 g = 75 g of carbohydrates total.  Summary  · Carbohydrate counting is a method of keeping track of how many carbohydrates you eat.  · Eating carbohydrates naturally increases the amount of sugar (glucose) in the blood.  · Counting how many carbohydrates you eat helps keep your blood glucose within normal limits, which helps you manage your diabetes.  · A diet and nutrition specialist (registered dietitian) can help you make a meal plan and calculate how many carbohydrates you should have at each meal and snack.  This information is not intended to replace advice given to you by your health care provider. Make sure you discuss any questions you have with your health care provider.  Document Revised: 07/12/2018 Document Reviewed: 05/31/2017  [employer] Patient Education © 2020 [employer]      Fat and Cholesterol Restricted Eating Plan  Getting too much fat and cholesterol in your diet may cause health problems. Choosing the right foods helps keep your fat and cholesterol at normal levels. This can keep you from getting certain diseases.  Your doctor may recommend an eating plan that includes:  · Total fat: ______% or less of total calories a day.  · Saturated fat: ______% or less of total calories a day.  · Cholesterol: less than _________mg a day.  · Fiber: ______g a day.  What are tips for following this plan?  Meal planning  · At meals, divide your plate into four equal parts:  ? Fill one-half of your plate with vegetables and green salads.  ? Fill one-fourth of your plate with whole grains.  ? Fill one-fourth of your plate with low-fat (lean) protein foods.  · Eat fish that is high in omega-3 fats at least two times a week. This includes mackerel, tuna, sardines, and salmon.  · Eat foods that are high in fiber, such as whole grains, beans, apples, broccoli, carrots,  "peas, and barley.  General tips    · Work with your doctor to lose weight if you need to.  · Avoid:  ? Foods with added sugar.  ? Fried foods.  ? Foods with partially hydrogenated oils.  · Limit alcohol intake to no more than 1 drink a day for nonpregnant women and 2 drinks a day for men. One drink equals 12 oz of beer, 5 oz of wine, or 1½ oz of hard liquor.  Reading food labels  · Check food labels for:  ? Trans fats.  ? Partially hydrogenated oils.  ? Saturated fat (g) in each serving.  ? Cholesterol (mg) in each serving.  ? Fiber (g) in each serving.  · Choose foods with healthy fats, such as:  ? Monounsaturated fats.  ? Polyunsaturated fats.  ? Omega-3 fats.  · Choose grain products that have whole grains. Look for the word \"whole\" as the first word in the ingredient list.  Cooking  · Cook foods using low-fat methods. These include baking, boiling, grilling, and broiling.  · Eat more home-cooked foods. Eat at restaurants and buffets less often.  · Avoid cooking using saturated fats, such as butter, cream, palm oil, palm kernel oil, and coconut oil.  Recommended foods    Fruits  · All fresh, canned (in natural juice), or frozen fruits.  Vegetables  · Fresh or frozen vegetables (raw, steamed, roasted, or grilled). Green salads.  Grains  · Whole grains, such as whole wheat or whole grain breads, crackers, cereals, and pasta. Unsweetened oatmeal, bulgur, barley, quinoa, or brown rice. Corn or whole wheat flour tortillas.  Meats and other protein foods  · Ground beef (85% or leaner), grass-fed beef, or beef trimmed of fat. Skinless chicken or turkey. Ground chicken or turkey. Pork trimmed of fat. All fish and seafood. Egg whites. Dried beans, peas, or lentils. Unsalted nuts or seeds. Unsalted canned beans. Nut butters without added sugar or oil.  Dairy  · Low-fat or nonfat dairy products, such as skim or 1% milk, 2% or reduced-fat cheeses, low-fat and fat-free ricotta or cottage cheese, or plain low-fat and nonfat " yogurt.  Fats and oils  · Tub margarine without trans fats. Light or reduced-fat mayonnaise and salad dressings. Avocado. Olive, canola, sesame, or safflower oils.  The items listed above may not be a complete list of foods and beverages you can eat. Contact a dietitian for more information.  Foods to avoid  Fruits  · Canned fruit in heavy syrup. Fruit in cream or butter sauce. Fried fruit.  Vegetables  · Vegetables cooked in cheese, cream, or butter sauce. Fried vegetables.  Grains  · White bread. White pasta. White rice. Cornbread. Bagels, pastries, and croissants. Crackers and snack foods that contain trans fat and hydrogenated oils.  Meats and other protein foods  · Fatty cuts of meat. Ribs, chicken wings, alicea, sausage, bologna, salami, chitterlings, fatback, hot dogs, bratwurst, and packaged lunch meats. Liver and organ meats. Whole eggs and egg yolks. Chicken and turkey with skin. Fried meat.  Dairy  · Whole or 2% milk, cream, half-and-half, and cream cheese. Whole milk cheeses. Whole-fat or sweetened yogurt. Full-fat cheeses. Nondairy creamers and whipped toppings. Processed cheese, cheese spreads, and cheese curds.  Beverages  · Alcohol. Sugar-sweetened drinks such as sodas, lemonade, and fruit drinks.  Fats and oils  · Butter, stick margarine, lard, shortening, ghee, or alicea fat. Coconut, palm kernel, and palm oils.  Sweets and desserts  · Corn syrup, sugars, honey, and molasses. Candy. Jam and jelly. Syrup. Sweetened cereals. Cookies, pies, cakes, donuts, muffins, and ice cream.  The items listed above may not be a complete list of foods and beverages you should avoid. Contact a dietitian for more information.  Summary  · Choosing the right foods helps keep your fat and cholesterol at normal levels. This can keep you from getting certain diseases.  · At meals, fill one-half of your plate with vegetables and green salads.  · Eat high-fiber foods, like whole grains, beans, apples, carrots, peas, and  barley.  · Limit added sugar, saturated fats, alcohol, and fried foods.  This information is not intended to replace advice given to you by your health care provider. Make sure you discuss any questions you have with your health care provider.  Document Revised: 08/21/2019 Document Reviewed: 09/04/2018  [employer] Patient Education © 2020 [employer]

## 2021-01-18 NOTE — TELEPHONE ENCOUNTER
Caller: Charles Leahy    Relationship: Emergency Contact    Best call back number: [phone]    What medication are you requesting:   fluconazole (DIFLUCAN) 150 MG tablet  150 mg, Daily 1 ordered         Summary: Take 1 tablet by mouth Daily., Starting Fri 10/16/2020, Normal   Dose, Route, Frequency: 150 mg, Oral, Daily          What are your current symptoms: POSSIBLE YEAST INFECTION    How long have you been experiencing symptoms: OFF AND ON FOR ABOUT 3 MONTHS    Have you had these symptoms before:    [x] Yes  [] No    Have you been treated for these symptoms before:   [x] Yes  [] No    If a prescription is needed, what is your preferred pharmacy and phone number:      [facility] [address] [place] [address] [phone] [facility] [phone]

## 2021-01-18 NOTE — PROGRESS NOTES
Subjective   Sandra Leahy is a 55 y.o. female.     Telephone Visit    You have chosen to receive care through a telephone visit. Do you consent to use a telephone visit for your medical care today? Yes    This visit has been rescheduled as a phone visit to comply with patient safety concerns in accordance with CDC recommendations. Total time of discussion was 15 minutes.    Chief Complaint -hyperglycemia    History of Present Illness -      Hyperglycemia-  She complains of hyperglycemia due to uncontrolled diabetes mellitus with Levemir 45 units twice daily and Metformin 1000 mg twice daily.  Patient last hemoglobin A1c on 10/9/2020 was 9.4.  Patient states that her random blood glucose usually runs around 200.  She states that she does not check her glucose regularly.  She does have associated diabetic peripheral neuropathy.    Allergic rhinitis-stable with Zyrtec    Hyperlipidemia-stable with pravastatin and Zetia  Lab Results   Component Value Date    CHOL 154 09/17/2019    CHOL 166 07/11/2019    CHOL 141 04/05/2019    CHLPL 155 10/09/2020    CHLPL 174 05/12/2020    CHLPL 160 02/04/2020     Lab Results   Component Value Date    TRIG 250 (H) 10/09/2020    TRIG 213 (H) 05/12/2020    TRIG 175 (H) 02/04/2020     Lab Results   Component Value Date    HDL 39 (L) 10/09/2020    HDL 41 05/12/2020    HDL 42 02/04/2020     Lab Results   Component Value Date    LDL 75 10/09/2020    LDL 90 05/12/2020    LDL 83 02/04/2020     Vitamin D deficiency-stable with current diet.  Patient states that they do use vitamin D supplementation over-the-counter as it is cheaper than buying the vitamin D prescription weekly    The following portions of the patient's history were reviewed and updated as appropriate: allergies, current medications, past family history, past medical history, past social history, past surgical history and problem list.    Review of Systems   Constitutional: Negative for activity change, appetite change, fatigue  "and fever.   HENT: Negative for ear pain, sinus pressure and sore throat.    Eyes: Negative for pain and visual disturbance.   Respiratory: Negative for cough and chest tightness.    Cardiovascular: Negative for chest pain and palpitations.   Gastrointestinal: Negative for abdominal pain, constipation, diarrhea, nausea and vomiting.   Endocrine: Negative for polydipsia and polyuria.   Genitourinary: Negative for dysuria and frequency.   Musculoskeletal: Negative for back pain and myalgias.   Skin: Negative for color change and rash.   Allergic/Immunologic: Negative for food allergies and immunocompromised state.   Neurological: Negative for dizziness, syncope and headaches.   Hematological: Negative for adenopathy. Does not bruise/bleed easily.   Psychiatric/Behavioral: Negative for hallucinations and suicidal ideas. The patient is not nervous/anxious.        Ht 175.3 cm (69.02\")   Wt 70.3 kg (155 lb)   BMI 22.88 kg/m²   Lab on 10/09/2020   Component Date Value Ref Range Status   • Creatine Kinase 10/09/2020 78  20 - 180 U/L Final   • CLEMENTE Direct 10/09/2020 Negative  Negative Final   • Sed Rate 10/09/2020 9  0 - 30 mm/hr Final   • Vitamin B-12 10/09/2020 337  211 - 946 pg/mL Final   • 25 Hydroxy, Vitamin D 10/09/2020 44.0  30.0 - 100.0 ng/ml Final   • Hemoglobin A1C 10/09/2020 9.40* 4.80 - 5.60 % Final   • TSH 10/09/2020 2.620  0.270 - 4.200 uIU/mL Final   • Total Cholesterol 10/09/2020 155  0 - 200 mg/dL Final   • Triglycerides 10/09/2020 250* 0 - 150 mg/dL Final   • HDL Cholesterol 10/09/2020 39* 40 - 60 mg/dL Final   • VLDL Cholesterol Mal 10/09/2020 41* 5 - 40 mg/dL Final   • LDL Chol Calc ([facility]) 10/09/2020 75  0 - 100 mg/dL Final   • Glucose 10/09/2020 293* 65 - 99 mg/dL Final   • BUN 10/09/2020 15  6 - 20 mg/dL Final   • Creatinine 10/09/2020 0.71  0.57 - 1.00 mg/dL Final   • eGFR Non African Am 10/09/2020 85  >60 mL/min/1.73 Final   • eGFR African Am 10/09/2020 103  >60 mL/min/1.73 Final   • BUN/Creatinine " Ratio 10/09/2020 21.1  7.0 - 25.0 Final   • Sodium 10/09/2020 140  136 - 145 mmol/L Final   • Potassium 10/09/2020 4.4  3.5 - 5.2 mmol/L Final   • Chloride 10/09/2020 106  98 - 107 mmol/L Final   • Total CO2 10/09/2020 24.7  22.0 - 29.0 mmol/L Final   • Calcium 10/09/2020 9.0  8.6 - 10.5 mg/dL Final   • Total Protein 10/09/2020 6.9  6.0 - 8.5 g/dL Final   • Albumin 10/09/2020 4.60  3.50 - 5.20 g/dL Final   • Globulin 10/09/2020 2.3  gm/dL Final   • A/G Ratio 10/09/2020 2.0  g/dL Final   • Total Bilirubin 10/09/2020 0.3  0.0 - 1.2 mg/dL Final   • Alkaline Phosphatase 10/09/2020 95  39 - 117 U/L Final   • AST (SGOT) 10/09/2020 25  1 - 32 U/L Final   • ALT (SGPT) 10/09/2020 33  1 - 33 U/L Final   • WBC 10/09/2020 5.74  3.40 - 10.80 10*3/mm3 Final   • RBC 10/09/2020 4.94  3.77 - 5.28 10*6/mm3 Final   • Hemoglobin 10/09/2020 14.8  12.0 - 15.9 g/dL Final   • Hematocrit 10/09/2020 44.2  34.0 - 46.6 % Final   • MCV 10/09/2020 89.5  79.0 - 97.0 fL Final   • MCH 10/09/2020 30.0  26.6 - 33.0 pg Final   • MCHC 10/09/2020 33.5  31.5 - 35.7 g/dL Final   • RDW 10/09/2020 12.9  12.3 - 15.4 % Final   • Platelets 10/09/2020 262  140 - 450 10*3/mm3 Final   • Neutrophil Rel % 10/09/2020 49.3  42.7 - 76.0 % Final   • Lymphocyte Rel % 10/09/2020 36.4  19.6 - 45.3 % Final   • Monocyte Rel % 10/09/2020 9.1  5.0 - 12.0 % Final   • Eosinophil Rel % 10/09/2020 4.0  0.3 - 6.2 % Final   • Basophil Rel % 10/09/2020 0.7  0.0 - 1.5 % Final   • Neutrophils Absolute 10/09/2020 2.83  1.70 - 7.00 10*3/mm3 Final   • Lymphocytes Absolute 10/09/2020 2.09  0.70 - 3.10 10*3/mm3 Final   • Monocytes Absolute 10/09/2020 0.52  0.10 - 0.90 10*3/mm3 Final   • Eosinophils Absolute 10/09/2020 0.23  0.00 - 0.40 10*3/mm3 Final   • Basophils Absolute 10/09/2020 0.04  0.00 - 0.20 10*3/mm3 Final   • Immature Granulocyte Rel % 10/09/2020 0.5  0.0 - 0.5 % Final   • Immature Grans Absolute 10/09/2020 0.03  0.00 - 0.05 10*3/mm3 Final   • nRBC 10/09/2020 0.2  0.0 - 0.2 /100  WBC Final       Physical Exam  Vitals signs and nursing note reviewed.   Neurological:      Mental Status: She is alert and oriented to person, place, and time.   Psychiatric:         Mood and Affect: Mood normal.         Thought Content: Thought content normal.         Assessment/Plan     Diagnoses and all orders for this visit:    1. Type 2 diabetes, uncontrolled, with neuropathy (CMS/[facility])  Comments:  Reviewed 10/2020 labs with patient  Increase in Levemir 45u bid.   Lifetstyle modifications including nutrition and exercise reinforced  Orders:  -     metFORMIN (GLUCOPHAGE) 1000 MG tablet; Take 1 tablet by mouth 2 (Two) Times a Day With Meals.  Dispense: 180 tablet; Refill: 1  -     insulin detemir (Levemir FlexTouch) 100 UNIT/ML injection; Inject 45 Units under the skin into the appropriate area as directed 2 (Two) Times a Day for 30 days.  Dispense: 10 pen; Refill: 3  -     MicroAlbumin, Urine, Random - Urine, Clean Catch; Future  -     Comprehensive Metabolic Panel; Future  -     Hemoglobin A1c; Future    2. Type 2 diabetes, uncontrolled, with neuropathy (CMS/[facility])  Comments:  Advised to check fasting and 2-hour PP blood glucose levels daily  Orders:  -     metFORMIN (GLUCOPHAGE) 1000 MG tablet; Take 1 tablet by mouth 2 (Two) Times a Day With Meals.  Dispense: 180 tablet; Refill: 1  -     insulin detemir (Levemir FlexTouch) 100 UNIT/ML injection; Inject 45 Units under the skin into the appropriate area as directed 2 (Two) Times a Day for 30 days.  Dispense: 10 pen; Refill: 3  -     MicroAlbumin, Urine, Random - Urine, Clean Catch; Future  -     Comprehensive Metabolic Panel; Future  -     Hemoglobin A1c; Future    3. Allergic rhinitis, unspecified seasonality, unspecified trigger  Comments:  Continue Zyrtec as needed  Orders:  -     cetirizine (zyrTEC) 10 MG tablet; Take 1 tablet by mouth Daily.  Dispense: 90 tablet; Refill: 1    4. Mixed hyperlipidemia  Comments:  Continue Pravastatin and Zetia.  Advise  low-cholesterol diet  Orders:  -     ezetimibe (ZETIA) 10 MG tablet; Take 1 tablet by mouth Daily.  Dispense: 90 tablet; Refill: 1  -     pravastatin (PRAVACHOL) 40 MG tablet; Take 1 tablet by mouth Daily.  Dispense: 90 tablet; Refill: 1  -     Lipid Panel; Future    5. Vitamin D deficiency  Comments:  Recheck vitamin D lab work for further evaluation and treatment    Orders:  -     Vitamin D 25 Hydroxy; Future            This document has been electronically signed by:  Jazmine Mathews PA-C

## 2021-01-25 ENCOUNTER — TELEPHONE (OUTPATIENT)
Dept: FAMILY MEDICINE CLINIC | Facility: CLINIC | Age: 56
End: 2021-01-25

## 2021-01-25 RX ORDER — FLUCONAZOLE 150 MG/1
150 TABLET ORAL DAILY
Qty: 5 TABLET | Refills: 0 | Status: SHIPPED | OUTPATIENT
Start: 2021-01-25 | End: 2021-02-18

## 2021-01-25 NOTE — TELEPHONE ENCOUNTER
Pt is aware of this information.       ----- Message from ASH Bolanos sent at 1/25/2021  4:15 PM EST -----  I have sent 5  ----- Message -----  From: Diana Elkins MA  Sent: 1/25/2021   3:24 PM EST  To: ASH Bolanos    Pt of [facility]. Needs aboout 3 or 4 more days of diflucan. The last round almost cleared it up but not completely.

## 2021-02-18 ENCOUNTER — OFFICE VISIT (OUTPATIENT)
Dept: FAMILY MEDICINE CLINIC | Facility: CLINIC | Age: 56
End: 2021-02-18

## 2021-02-18 VITALS — WEIGHT: 156 LBS | BODY MASS INDEX: 23.11 KG/M2 | HEIGHT: 69 IN

## 2021-02-18 DIAGNOSIS — Z12.31 ENCOUNTER FOR SCREENING MAMMOGRAM FOR MALIGNANT NEOPLASM OF BREAST: ICD-10-CM

## 2021-02-18 DIAGNOSIS — Z12.11 SCREENING FOR COLON CANCER: ICD-10-CM

## 2021-02-18 DIAGNOSIS — E53.8 VITAMIN B 12 DEFICIENCY: Chronic | ICD-10-CM

## 2021-02-18 DIAGNOSIS — IMO0002 TYPE 2 DIABETES, UNCONTROLLED, WITH NEUROPATHY: Chronic | ICD-10-CM

## 2021-02-18 DIAGNOSIS — E55.9 VITAMIN D DEFICIENCY: Chronic | ICD-10-CM

## 2021-02-18 DIAGNOSIS — J30.9 ALLERGIC RHINITIS, UNSPECIFIED SEASONALITY, UNSPECIFIED TRIGGER: Chronic | ICD-10-CM

## 2021-02-18 DIAGNOSIS — E78.2 MIXED HYPERLIPIDEMIA: Chronic | ICD-10-CM

## 2021-02-18 DIAGNOSIS — IMO0002 TYPE 2 DIABETES, UNCONTROLLED, WITH NEUROPATHY: Primary | Chronic | ICD-10-CM

## 2021-02-18 PROCEDURE — 99442 PR PHYS/QHP TELEPHONE EVALUATION 11-20 MIN: CPT | Performed by: PHYSICIAN ASSISTANT

## 2021-02-18 RX ORDER — PRAVASTATIN SODIUM 40 MG
40 TABLET ORAL DAILY
Qty: 90 TABLET | Refills: 1 | Status: SHIPPED | OUTPATIENT
Start: 2021-02-18 | End: 2021-09-09 | Stop reason: SDUPTHER

## 2021-02-18 RX ORDER — EZETIMIBE 10 MG/1
10 TABLET ORAL DAILY
Qty: 90 TABLET | Refills: 1 | Status: SHIPPED | OUTPATIENT
Start: 2021-02-18 | End: 2021-09-09 | Stop reason: SDUPTHER

## 2021-02-18 RX ORDER — CETIRIZINE HYDROCHLORIDE 10 MG/1
10 TABLET ORAL DAILY
Qty: 90 TABLET | Refills: 1 | Status: SHIPPED | OUTPATIENT
Start: 2021-02-18 | End: 2021-07-31

## 2021-02-18 RX ORDER — INSULIN DETEMIR 100 [IU]/ML
45 INJECTION, SOLUTION SUBCUTANEOUS 2 TIMES DAILY
Qty: 10 PEN | Refills: 3 | Status: SHIPPED | OUTPATIENT
Start: 2021-02-18 | End: 2021-07-31

## 2021-02-18 RX ORDER — DONEPEZIL HYDROCHLORIDE 10 MG/1
TABLET, FILM COATED ORAL
COMMUNITY
Start: 2021-01-20 | End: 2021-02-18

## 2021-02-18 RX ORDER — PEN NEEDLE, DIABETIC 31 GX5/16"
5 NEEDLE, DISPOSABLE MISCELLANEOUS 2 TIMES DAILY
Qty: 100 EACH | Refills: 5 | Status: SHIPPED | OUTPATIENT
Start: 2021-02-18 | End: 2021-07-31

## 2021-02-18 NOTE — PATIENT INSTRUCTIONS
"Fat and Cholesterol Restricted Eating Plan  Getting too much fat and cholesterol in your diet may cause health problems. Choosing the right foods helps keep your fat and cholesterol at normal levels. This can keep you from getting certain diseases.  Your doctor may recommend an eating plan that includes:  · Total fat: ______% or less of total calories a day.  · Saturated fat: ______% or less of total calories a day.  · Cholesterol: less than _________mg a day.  · Fiber: ______g a day.  What are tips for following this plan?  Meal planning  · At meals, divide your plate into four equal parts:  ? Fill one-half of your plate with vegetables and green salads.  ? Fill one-fourth of your plate with whole grains.  ? Fill one-fourth of your plate with low-fat (lean) protein foods.  · Eat fish that is high in omega-3 fats at least two times a week. This includes mackerel, tuna, sardines, and salmon.  · Eat foods that are high in fiber, such as whole grains, beans, apples, broccoli, carrots, peas, and barley.  General tips    · Work with your doctor to lose weight if you need to.  · Avoid:  ? Foods with added sugar.  ? Fried foods.  ? Foods with partially hydrogenated oils.  · Limit alcohol intake to no more than 1 drink a day for nonpregnant women and 2 drinks a day for men. One drink equals 12 oz of beer, 5 oz of wine, or 1½ oz of hard liquor.  Reading food labels  · Check food labels for:  ? Trans fats.  ? Partially hydrogenated oils.  ? Saturated fat (g) in each serving.  ? Cholesterol (mg) in each serving.  ? Fiber (g) in each serving.  · Choose foods with healthy fats, such as:  ? Monounsaturated fats.  ? Polyunsaturated fats.  ? Omega-3 fats.  · Choose grain products that have whole grains. Look for the word \"whole\" as the first word in the ingredient list.  Cooking  · Cook foods using low-fat methods. These include baking, boiling, grilling, and broiling.  · Eat more home-cooked foods. Eat at restaurants and buffets " less often.  · Avoid cooking using saturated fats, such as butter, cream, palm oil, palm kernel oil, and coconut oil.  Recommended foods    Fruits  · All fresh, canned (in natural juice), or frozen fruits.  Vegetables  · Fresh or frozen vegetables (raw, steamed, roasted, or grilled). Green salads.  Grains  · Whole grains, such as whole wheat or whole grain breads, crackers, cereals, and pasta. Unsweetened oatmeal, bulgur, barley, quinoa, or brown rice. Corn or whole wheat flour tortillas.  Meats and other protein foods  · Ground beef (85% or leaner), grass-fed beef, or beef trimmed of fat. Skinless chicken or turkey. Ground chicken or turkey. Pork trimmed of fat. All fish and seafood. Egg whites. Dried beans, peas, or lentils. Unsalted nuts or seeds. Unsalted canned beans. Nut butters without added sugar or oil.  Dairy  · Low-fat or nonfat dairy products, such as skim or 1% milk, 2% or reduced-fat cheeses, low-fat and fat-free ricotta or cottage cheese, or plain low-fat and nonfat yogurt.  Fats and oils  · Tub margarine without trans fats. Light or reduced-fat mayonnaise and salad dressings. Avocado. Olive, canola, sesame, or safflower oils.  The items listed above may not be a complete list of foods and beverages you can eat. Contact a dietitian for more information.  Foods to avoid  Fruits  · Canned fruit in heavy syrup. Fruit in cream or butter sauce. Fried fruit.  Vegetables  · Vegetables cooked in cheese, cream, or butter sauce. Fried vegetables.  Grains  · White bread. White pasta. White rice. Cornbread. Bagels, pastries, and croissants. Crackers and snack foods that contain trans fat and hydrogenated oils.  Meats and other protein foods  · Fatty cuts of meat. Ribs, chicken wings, alicea, sausage, bologna, salami, chitterlings, fatback, hot dogs, bratwurst, and packaged lunch meats. Liver and organ meats. Whole eggs and egg yolks. Chicken and turkey with skin. Fried meat.  Dairy  · Whole or 2% milk, cream,  half-and-half, and cream cheese. Whole milk cheeses. Whole-fat or sweetened yogurt. Full-fat cheeses. Nondairy creamers and whipped toppings. Processed cheese, cheese spreads, and cheese curds.  Beverages  · Alcohol. Sugar-sweetened drinks such as sodas, lemonade, and fruit drinks.  Fats and oils  · Butter, stick margarine, lard, shortening, ghee, or alicea fat. Coconut, palm kernel, and palm oils.  Sweets and desserts  · Corn syrup, sugars, honey, and molasses. Candy. Jam and jelly. Syrup. Sweetened cereals. Cookies, pies, cakes, donuts, muffins, and ice cream.  The items listed above may not be a complete list of foods and beverages you should avoid. Contact a dietitian for more information.  Summary  · Choosing the right foods helps keep your fat and cholesterol at normal levels. This can keep you from getting certain diseases.  · At meals, fill one-half of your plate with vegetables and green salads.  · Eat high-fiber foods, like whole grains, beans, apples, carrots, peas, and barley.  · Limit added sugar, saturated fats, alcohol, and fried foods.  This information is not intended to replace advice given to you by your health care provider. Make sure you discuss any questions you have with your health care provider.  Document Revised: 08/21/2019 Document Reviewed: 09/04/2018  [employer] Patient Education © 2020 [employer]      Carbohydrate Counting for Diabetes Mellitus, Adult    Carbohydrate counting is a method of keeping track of how many carbohydrates you eat. Eating carbohydrates naturally increases the amount of sugar (glucose) in the blood. Counting how many carbohydrates you eat helps keep your blood glucose within normal limits, which helps you manage your diabetes (diabetes mellitus).  It is important to know how many carbohydrates you can safely have in each meal. This is different for every person. A diet and nutrition specialist (registered dietitian) can help you make a meal plan and calculate how  "many carbohydrates you should have at each meal and snack.  Carbohydrates are found in the following foods:  · Grains, such as breads and cereals.  · Dried beans and soy products.  · Starchy vegetables, such as potatoes, peas, and corn.  · Fruit and fruit juices.  · Milk and yogurt.  · Sweets and snack foods, such as cake, cookies, candy, chips, and soft drinks.  How do I count carbohydrates?  There are two ways to count carbohydrates in food. You can use either of the methods or a combination of both.  Reading \"Nutrition Facts\" on packaged food  The \"Nutrition Facts\" list is included on the labels of almost all packaged foods and beverages in the [place] It includes:  · The serving size.  · Information about nutrients in each serving, including the grams (g) of carbohydrate per serving.  To use the “Nutrition Facts\":  · Decide how many servings you will have.  · Multiply the number of servings by the number of carbohydrates per serving.  · The resulting number is the total amount of carbohydrates that you will be having.  Learning standard serving sizes of other foods  When you eat carbohydrate foods that are not packaged or do not include \"Nutrition Facts\" on the label, you need to measure the servings in order to count the amount of carbohydrates:  · Measure the foods that you will eat with a food scale or measuring cup, if needed.  · Decide how many standard-size servings you will eat.  · Multiply the number of servings by 15. Most carbohydrate-rich foods have about 15 g of carbohydrates per serving.  ? For example, if you eat 8 oz (170 g) of strawberries, you will have eaten 2 servings and 30 g of carbohydrates (2 servings x 15 g = 30 g).  · For foods that have more than one food mixed, such as soups and casseroles, you must count the carbohydrates in each food that is included.  The following list contains standard serving sizes of common carbohydrate-rich foods. Each of these servings has about 15 g of " carbohydrates:  · ½ hamburger bun or ½ English muffin.  · ½ oz (15 mL) syrup.  · ½ oz (14 g) jelly.  · 1 slice of bread.  · 1 six-inch tortilla.  · 3 oz (85 g) cooked rice or pasta.  · 4 oz (113 g) cooked dried beans.  · 4 oz (113 g) starchy vegetable, such as peas, corn, or potatoes.  · 4 oz (113 g) hot cereal.  · 4 oz (113 g) mashed potatoes or ¼ of a large baked potato.  · 4 oz (113 g) canned or frozen fruit.  · 4 oz (120 mL) fruit juice.  · 4-6 crackers.  · 6 chicken nuggets.  · 6 oz (170 g) unsweetened dry cereal.  · 6 oz (170 g) plain fat-free yogurt or yogurt sweetened with artificial sweeteners.  · 8 oz (240 mL) milk.  · 8 oz (170 g) fresh fruit or one small piece of fruit.  · 24 oz (680 g) popped popcorn.  Example of carbohydrate counting  Sample meal  · 3 oz (85 g) chicken breast.  · 6 oz (170 g) brown rice.  · 4 oz (113 g) corn.  · 8 oz (240 mL) milk.  · 8 oz (170 g) strawberries with sugar-free whipped topping.  Carbohydrate calculation  1. Identify the foods that contain carbohydrates:  ? Rice.  ? Corn.  ? Milk.  ? Strawberries.  2. Calculate how many servings you have of each food:  ? 2 servings rice.  ? 1 serving corn.  ? 1 serving milk.  ? 1 serving strawberries.  3. Multiply each number of servings by 15 g:  ? 2 servings rice x 15 g = 30 g.  ? 1 serving corn x 15 g = 15 g.  ? 1 serving milk x 15 g = 15 g.  ? 1 serving strawberries x 15 g = 15 g.  4. Add together all of the amounts to find the total grams of carbohydrates eaten:  ? 30 g + 15 g + 15 g + 15 g = 75 g of carbohydrates total.  Summary  · Carbohydrate counting is a method of keeping track of how many carbohydrates you eat.  · Eating carbohydrates naturally increases the amount of sugar (glucose) in the blood.  · Counting how many carbohydrates you eat helps keep your blood glucose within normal limits, which helps you manage your diabetes.  · A diet and nutrition specialist (registered dietitian) can help you make a meal plan and calculate  how many carbohydrates you should have at each meal and snack.  This information is not intended to replace advice given to you by your health care provider. Make sure you discuss any questions you have with your health care provider.  Document Revised: 07/12/2018 Document Reviewed: 05/31/2017  [employer] Patient Education © 2020 [employer]

## 2021-02-18 NOTE — PROGRESS NOTES
Subjective   Sandra Leahy is a 55 y.o. female.     Telephone Visit    You have chosen to receive care through a telephone visit. Do you consent to use a telephone visit for your medical care today? Yes    This visit has been rescheduled as a phone visit to comply with patient safety concerns in accordance with CDC recommendations. Total time of discussion was 15 minutes.    Chief Complaint -diabetes    History of Present Illness -      Diabetes-  Uncontrolled with most recent A1c elevated at 9.4 in October 2020.  Since that time her Levemir was increased to 45 units twice daily.  Patient states that her fasting blood sugar usually runs 130-160 with her highest blood glucose reading in the past month being 180.  She also continues to follow a low carbohydrate diet and take metformin.    Allergic rhinitis-stable with Zyrtec    Hyperlipidemia-stable with pravastatin and Zetia and diet    B12 deficiency-stable with OTC B12 supplementation    Vitamin D deficiency-stable with OTC vitamin D supplementation    Health maintenance-  She is due for colon cancer screening and breast cancer screening.    The following portions of the patient's history were reviewed and updated as appropriate: allergies, current medications, past family history, past medical history, past social history, past surgical history and problem list.    Review of Systems   Constitutional: Negative for activity change, appetite change, fatigue and fever.   HENT: Negative for ear pain, sinus pressure and sore throat.    Eyes: Negative for pain and visual disturbance.   Respiratory: Negative for cough and chest tightness.    Cardiovascular: Negative for chest pain and palpitations.   Gastrointestinal: Negative for abdominal pain, constipation, diarrhea, nausea and vomiting.   Endocrine: Negative for polydipsia and polyuria.   Genitourinary: Negative for dysuria and frequency.   Musculoskeletal: Negative for back pain and myalgias.   Skin: Negative for  "color change and rash.   Allergic/Immunologic: Negative for food allergies and immunocompromised state.   Neurological: Negative for dizziness, syncope and headaches.   Hematological: Negative for adenopathy. Does not bruise/bleed easily.   Psychiatric/Behavioral: Negative for hallucinations and suicidal ideas. The patient is not nervous/anxious.        Ht 175.3 cm (69\")   Wt 70.8 kg (156 lb) Comment: pt reported all vitals today  BMI 23.04 kg/m²   Lab on 10/09/2020   Component Date Value Ref Range Status   • Creatine Kinase 10/09/2020 78  20 - 180 U/L Final   • CLEMENTE Direct 10/09/2020 Negative  Negative Final   • Sed Rate 10/09/2020 9  0 - 30 mm/hr Final   • Vitamin B-12 10/09/2020 337  211 - 946 pg/mL Final   • 25 Hydroxy, Vitamin D 10/09/2020 44.0  30.0 - 100.0 ng/ml Final   • Hemoglobin A1C 10/09/2020 9.40* 4.80 - 5.60 % Final   • TSH 10/09/2020 2.620  0.270 - 4.200 uIU/mL Final   • Total Cholesterol 10/09/2020 155  0 - 200 mg/dL Final   • Triglycerides 10/09/2020 250* 0 - 150 mg/dL Final   • HDL Cholesterol 10/09/2020 39* 40 - 60 mg/dL Final   • VLDL Cholesterol Mal 10/09/2020 41* 5 - 40 mg/dL Final   • LDL Chol Calc ([facility]) 10/09/2020 75  0 - 100 mg/dL Final   • Glucose 10/09/2020 293* 65 - 99 mg/dL Final   • BUN 10/09/2020 15  6 - 20 mg/dL Final   • Creatinine 10/09/2020 0.71  0.57 - 1.00 mg/dL Final   • eGFR Non African Am 10/09/2020 85  >60 mL/min/1.73 Final   • eGFR African Am 10/09/2020 103  >60 mL/min/1.73 Final   • BUN/Creatinine Ratio 10/09/2020 21.1  7.0 - 25.0 Final   • Sodium 10/09/2020 140  136 - 145 mmol/L Final   • Potassium 10/09/2020 4.4  3.5 - 5.2 mmol/L Final   • Chloride 10/09/2020 106  98 - 107 mmol/L Final   • Total CO2 10/09/2020 24.7  22.0 - 29.0 mmol/L Final   • Calcium 10/09/2020 9.0  8.6 - 10.5 mg/dL Final   • Total Protein 10/09/2020 6.9  6.0 - 8.5 g/dL Final   • Albumin 10/09/2020 4.60  3.50 - 5.20 g/dL Final   • Globulin 10/09/2020 2.3  gm/dL Final   • A/G Ratio 10/09/2020 2.0  g/dL " Final   • Total Bilirubin 10/09/2020 0.3  0.0 - 1.2 mg/dL Final   • Alkaline Phosphatase 10/09/2020 95  39 - 117 U/L Final   • AST (SGOT) 10/09/2020 25  1 - 32 U/L Final   • ALT (SGPT) 10/09/2020 33  1 - 33 U/L Final   • WBC 10/09/2020 5.74  3.40 - 10.80 10*3/mm3 Final   • RBC 10/09/2020 4.94  3.77 - 5.28 10*6/mm3 Final   • Hemoglobin 10/09/2020 14.8  12.0 - 15.9 g/dL Final   • Hematocrit 10/09/2020 44.2  34.0 - 46.6 % Final   • MCV 10/09/2020 89.5  79.0 - 97.0 fL Final   • MCH 10/09/2020 30.0  26.6 - 33.0 pg Final   • MCHC 10/09/2020 33.5  31.5 - 35.7 g/dL Final   • RDW 10/09/2020 12.9  12.3 - 15.4 % Final   • Platelets 10/09/2020 262  140 - 450 10*3/mm3 Final   • Neutrophil Rel % 10/09/2020 49.3  42.7 - 76.0 % Final   • Lymphocyte Rel % 10/09/2020 36.4  19.6 - 45.3 % Final   • Monocyte Rel % 10/09/2020 9.1  5.0 - 12.0 % Final   • Eosinophil Rel % 10/09/2020 4.0  0.3 - 6.2 % Final   • Basophil Rel % 10/09/2020 0.7  0.0 - 1.5 % Final   • Neutrophils Absolute 10/09/2020 2.83  1.70 - 7.00 10*3/mm3 Final   • Lymphocytes Absolute 10/09/2020 2.09  0.70 - 3.10 10*3/mm3 Final   • Monocytes Absolute 10/09/2020 0.52  0.10 - 0.90 10*3/mm3 Final   • Eosinophils Absolute 10/09/2020 0.23  0.00 - 0.40 10*3/mm3 Final   • Basophils Absolute 10/09/2020 0.04  0.00 - 0.20 10*3/mm3 Final   • Immature Granulocyte Rel % 10/09/2020 0.5  0.0 - 0.5 % Final   • Immature Grans Absolute 10/09/2020 0.03  0.00 - 0.05 10*3/mm3 Final   • nRBC 10/09/2020 0.2  0.0 - 0.2 /100 WBC Final       Physical Exam  Vitals signs and nursing note reviewed.   Pulmonary:      Effort: Pulmonary effort is normal.      Breath sounds: Normal breath sounds. No wheezing.      Comments: No wheezing or heavy breathing noted on phone today  Neurological:      Mental Status: She is alert and oriented to person, place, and time.   Psychiatric:         Mood and Affect: Mood normal.         Thought Content: Thought content normal.         Assessment/Plan     Diagnoses and all  orders for this visit:    1. Type 2 diabetes, uncontrolled, with neuropathy (CMS/HCC) (Primary)  Comments:  Reviewed 10/2020 labs with patient  Continue Levemir 45u bid and Metformin  Lifetstyle modifications including nutrition and exercise reinforced  Orders:  -     insulin detemir (Levemir FlexTouch) 100 UNIT/ML injection; Inject 45 Units under the skin into the appropriate area as directed 2 (Two) Times a Day for 30 days.  Dispense: 10 pen; Refill: 3  -     Insulin Pen Needle (B-D ULTRAFINE III SHORT PEN) 31G X 8 MM misc; 5 Devices by Other route 2 (Two) Times a Day.  Dispense: 100 each; Refill: 5  -     metFORMIN (GLUCOPHAGE) 1000 MG tablet; Take 1 tablet by mouth 2 (Two) Times a Day With Meals.  Dispense: 180 tablet; Refill: 1  -     glucose blood test strip; Use as instructed  Dispense: 100 each; Refill: 11  -     Lancets 30G misc; 1 each Daily.  Dispense: 100 each; Refill: 11  -     Comprehensive Metabolic Panel; Future  -     Hemoglobin A1c; Future  -     MicroAlbumin, Urine, Random - Urine, Clean Catch; Future  -     Lipid Panel; Future    2. Allergic rhinitis, unspecified seasonality, unspecified trigger  Comments:  Continue Zyrtec as needed  Orders:  -     cetirizine (zyrTEC) 10 MG tablet; Take 1 tablet by mouth Daily.  Dispense: 90 tablet; Refill: 1    3. Mixed hyperlipidemia  Comments:  Continue Pravastatin and Zetia.  Advise low-cholesterol diet  Orders:  -     ezetimibe (ZETIA) 10 MG tablet; Take 1 tablet by mouth Daily.  Dispense: 90 tablet; Refill: 1  -     pravastatin (PRAVACHOL) 40 MG tablet; Take 1 tablet by mouth Daily.  Dispense: 90 tablet; Refill: 1  -     Comprehensive Metabolic Panel; Future  -     Lipid Panel; Future    4. Type 2 diabetes, uncontrolled, with neuropathy (CMS/HCC)  Comments:  Advise low carbohydrate diabetic diet  Orders:  -     insulin detemir (Levemir FlexTouch) 100 UNIT/ML injection; Inject 45 Units under the skin into the appropriate area as directed 2 (Two) Times a Day  for 30 days.  Dispense: 10 pen; Refill: 3  -     Insulin Pen Needle (B-D ULTRAFINE III SHORT PEN) 31G X 8 MM misc; 5 Devices by Other route 2 (Two) Times a Day.  Dispense: 100 each; Refill: 5  -     metFORMIN (GLUCOPHAGE) 1000 MG tablet; Take 1 tablet by mouth 2 (Two) Times a Day With Meals.  Dispense: 180 tablet; Refill: 1  -     glucose blood test strip; Use as instructed  Dispense: 100 each; Refill: 11  -     Lancets 30G misc; 1 each Daily.  Dispense: 100 each; Refill: 11  -     Comprehensive Metabolic Panel; Future  -     Hemoglobin A1c; Future  -     MicroAlbumin, Urine, Random - Urine, Clean Catch; Future  -     Lipid Panel; Future    5. Vitamin B 12 deficiency  Comments:  Continue to monitor  Orders:  -     CBC & Differential; Future  -     Comprehensive Metabolic Panel; Future  -     Methylmalonic Acid, Serum; Future    6. Vitamin D deficiency  Comments:  Continue to monitor  Orders:  -     Vitamin D 25 Hydroxy; Future    7. Encounter for screening mammogram for malignant neoplasm of breast  -     Mammo Screening Digital Tomosynthesis Bilateral With CAD; Future    8. Screening for colon cancer  -     Cologuard - Stool, Per Rectum; Future            This document has been electronically signed by:  Jazmine Mathews PA-C

## 2021-02-24 ENCOUNTER — TELEPHONE (OUTPATIENT)
Dept: FAMILY MEDICINE CLINIC | Facility: CLINIC | Age: 56
End: 2021-02-24

## 2021-02-24 NOTE — TELEPHONE ENCOUNTER
Patient  called, states that patient will not be doing cologuard test, he states that she does not need it.

## 2021-02-24 NOTE — TELEPHONE ENCOUNTER
Tried to reach patient, [employer] called and states that there are two orders for cologuard that are active.   They state that they shipped her a box back in may to have cologuard done and never received it back. Then another one was placed on 2/19.     They state that if patient still has the box from may she can do the cologuard and send it back in that box.   If she does not have the box, she will need to call patient support at [phone] to request another box be sent to her home.     Reference number is [id number]

## 2021-02-24 NOTE — TELEPHONE ENCOUNTER
Patient notified, patient states that she does not have box from May, patient notified to call number below and request a new box to be sent to her house.   Patient states verbal understanding.

## 2021-03-04 ENCOUNTER — TELEPHONE (OUTPATIENT)
Dept: FAMILY MEDICINE CLINIC | Facility: CLINIC | Age: 56
End: 2021-03-04

## 2021-03-04 DIAGNOSIS — B37.9 YEAST INFECTION: Primary | ICD-10-CM

## 2021-03-04 RX ORDER — FLUCONAZOLE 150 MG/1
150 TABLET ORAL DAILY
Qty: 3 TABLET | Refills: 1 | Status: SHIPPED | OUTPATIENT
Start: 2021-03-04 | End: 2021-03-07

## 2021-03-04 NOTE — TELEPHONE ENCOUNTER
Caller: Charles Leahy    Relationship: Emergency Contact    Best call back number:     [phone]     Medication needed:     DIFLUCAN     When do you need the refill by:     TODAY    What details did the patient provide when requesting the medication:     PATIENT NEEDS (6) OF THESE TABLETS. PATIENT STATED SHE HAS A YEAST INFECTION, AND CYNTHIA USUALLY HELPS WITH FILLING THIS MEDICATION    Does the patient have less than a 3 day supply:  [x] Yes  [] No    What is the patient's preferred pharmacy:      [facility] - [place], [place]    TELEPHONE CONTACT:    [phone]    FAX:    [phone]    ASH NELSON

## 2021-03-09 ENCOUNTER — OFFICE VISIT (OUTPATIENT)
Dept: FAMILY MEDICINE CLINIC | Facility: CLINIC | Age: 56
End: 2021-03-09

## 2021-03-09 VITALS — BODY MASS INDEX: 22.96 KG/M2 | WEIGHT: 155 LBS | HEIGHT: 69 IN

## 2021-03-09 DIAGNOSIS — F03.90 MAJOR NEUROCOGNITIVE DISORDER (HCC): Chronic | ICD-10-CM

## 2021-03-09 DIAGNOSIS — F41.9 ANXIETY: Chronic | ICD-10-CM

## 2021-03-09 DIAGNOSIS — E55.9 VITAMIN D DEFICIENCY: ICD-10-CM

## 2021-03-09 DIAGNOSIS — E53.8 VITAMIN B 12 DEFICIENCY: ICD-10-CM

## 2021-03-09 DIAGNOSIS — IMO0002 TYPE 2 DIABETES, UNCONTROLLED, WITH NEUROPATHY: Primary | Chronic | ICD-10-CM

## 2021-03-09 DIAGNOSIS — E78.2 MIXED HYPERLIPIDEMIA: Chronic | ICD-10-CM

## 2021-03-09 PROCEDURE — 99442 PR PHYS/QHP TELEPHONE EVALUATION 11-20 MIN: CPT | Performed by: NURSE PRACTITIONER

## 2021-03-09 RX ORDER — RIVASTIGMINE 4.6 MG/24H
PATCH, EXTENDED RELEASE TRANSDERMAL
COMMUNITY
Start: 2021-03-01 | End: 2021-11-09 | Stop reason: SDUPTHER

## 2021-03-09 NOTE — PROGRESS NOTES
You have chosen to receive care through a telephone visit. Do you consent to use a telephone visit for your medical care today? Yes  History of Present Illness   Sandra Leahy is a 55 y.o.female who presents to the clinic today via telephone visit for follow up  related to her  DM, type 2 uncontrolled, Dyslipidemia, and Vitamin deficiencies. She is currently being evaluated by   Neuroscience for a major neurocognitive disorder of uncertain etiology.  She was started on Exelon per   in January which she is responding to very well. Her December [facility] visit has been reviewed but her January visit is not available.      Diabetes   She has type 2 diabetes mellitus.which was diagnosed over ten years ago. Associated symptoms include stable peripheral neuropathy. Pertinent negatives for diabetes include no blurred vision, no chest pain, no foot ulcerations, no polyphagia, no polyuria .  Diabetic complications include peripheral neuropathy. Risk factors for coronary artery disease include diabetes mellitus, dyslipidemia, family history and post-menopausal. Current diabetic treatment includes insulin injections, oral agent (monotherapy) and diet. She is following a generally healthier diet with decrease in fatty foods. She has had a previous visit with a dietitian. She has seen a Podiatrist. Her last eye exam  was completed in May 2018 per Dr Lopez.She has yet to keep her appointments for follow up eye exam due to COVID.    Lab Results   Component Value Date    HGBA1C 10.10 (H) 05/12/2020     Lab Results   Component Value Date    HGBA1C 9.40 (H) 10/09/2020     Dyslipidemia   Pertinent negatives include no chest pain or shortness of breath. Current antihyperlipidemic treatment includes ezetimibe and statins. Risk factors for coronary artery disease include diabetes mellitus, dyslipidemia, family history, post-menopausal and stress.     Lab Results   Component Value Date    CHLPL 174 05/12/2020    TRIG 213 (H) 05/12/2020     "HDL 41 05/12/2020    LDL 90 05/12/2020     Lab Results   Component Value Date    CHLPL 155 10/09/2020    TRIG 250 (H) 10/09/2020    HDL 39 (L) 10/09/2020    LDL 75 10/09/2020     Anxiety/Depression   Anxiety has been gradually improving. Symptoms include excessive worry, confusion, and decreased concentration. Sandra reports no chest pain, nausea,  palpitations, shortness of breath or suicidal ideas. The severity of symptoms at times interfering with daily activities. The symptoms are aggravated by family issues and especially when the children are fussing.  Risk factors include a major life event. She is currently seeing Behavioral Health which she reports is helping.  Lab Results   Component Value Date    TSH 2.620 10/09/2020     GI Problem   Symptoms have improved since she discontinued one of the medications they put her on in the Fall.   Lab Results   Component Value Date    WBC 5.74 10/09/2020    HGB 14.8 10/09/2020    HCT 44.2 10/09/2020    MCV 89.5 10/09/2020     10/09/2020     Vitals:    03/09/21 0801   Weight: 64.4 kg (142 lb)   Height: 175.3 cm (69.02\")        The following portions of the patient's history were reviewed and updated as appropriate: allergies, current medications, past family history, past medical history, past social history, past surgical history and problem list.    Review of Systems   Constitutional: Negative for activity change, appetite change, chills, diaphoresis, fatigue and fever.   Eyes: Negative for blurred vision and visual disturbance.   Respiratory: Negative for cough, shortness of breath and wheezing.    Cardiovascular: Negative for leg swelling.   Gastrointestinal: Negative for constipation, diarrhea, nausea, vomiting and GERD.   Endocrine: Negative for polydipsia, polyphagia and polyuria.   Musculoskeletal: Positive for arthralgias and myalgias. Negative for gait problem and joint swelling.   Skin: Negative for color change and rash.   Allergic/Immunologic: Positive " for environmental allergies.   Neurological: Positive for numbness and memory problem (Impoving). Negative for speech difficulty, light-headedness, headache and confusion.   Hematological: Does not bruise/bleed easily.   Psychiatric/Behavioral: Positive for stress. Negative for sleep disturbance.      Physical Exam  Constitutional:       General: She is not in acute distress.  Pulmonary:      Effort: No respiratory distress.   Neurological:      Mental Status: She is alert.   Psychiatric:         Mood and Affect: Mood and affect normal.         Speech: Speech normal.         Behavior: Behavior is cooperative.         Cognition and Memory: Memory is impaired (Improving).       Assessment/Plan     Problems Addressed this Visit        Cardiac and Vasculature    Hyperlipidemia       Endocrine and Metabolic    Type 2 diabetes, uncontrolled, with neuropathy (CMS/HCC) - Primary    Vitamin B 12 deficiency    Vitamin D deficiency       Mental Health    Anxiety       Neuro    Major neurocognitive disorder (CMS/HCC)      Diagnoses       Codes Comments    Type 2 diabetes, uncontrolled, with neuropathy (CMS/HCC)    -  Primary ICD-10-CM: E11.40, E11.65  ICD-9-CM: 250.62, 357.2 Reports blood sugar yesterday morning was 180 mg/dL. She is taking 45 units twice daily. They will monitor for next three days and call the office.     Mixed hyperlipidemia     ICD-10-CM: E78.2  ICD-9-CM: 272.2 Continue cardiovascular risk reduction modifications including nutrition and exercise    Anxiety     ICD-10-CM: F41.9  ICD-9-CM: 300.00 Continue to see Behavioral Health    Major neurocognitive disorder (CMS/HCC)     ICD-10-CM: F01.50  ICD-9-CM: 294.20 Continue f/u with      Vitamin B 12 deficiency     ICD-10-CM: E53.8  ICD-9-CM: 266.2 Labs will be done in May    Vitamin D deficiency     ICD-10-CM: E55.9  ICD-9-CM: 268.9 Labs in May        Reports blood sugar yesterday morning was 180 mg/dL. She is taking 45 units twice daily. They will monitor for  next three days and call results to me . Continue cardiovascular risk reduction modifications including nutrition and exercise. She continues to decline coming in for labs due to fear of COVID. She did agree to come in May for labs which have been ordered with f/u May 18th sooner if problems/concerns occur.   This visit has been scheduled as a phone visit to comply with patient safety concerns in accordance with CDC recommendations. Total time of discussion was 20 minutes.      This document has been electronically signed by ASH Farfan, JORDAN-BC, ENEIDA  March 9, 2021 09:11 EST

## 2021-04-19 ENCOUNTER — OFFICE VISIT (OUTPATIENT)
Dept: PSYCHIATRY | Facility: CLINIC | Age: 56
End: 2021-04-19

## 2021-04-19 VITALS
DIASTOLIC BLOOD PRESSURE: 66 MMHG | HEART RATE: 74 BPM | BODY MASS INDEX: 25.71 KG/M2 | WEIGHT: 174.2 LBS | SYSTOLIC BLOOD PRESSURE: 128 MMHG

## 2021-04-19 DIAGNOSIS — F32.0 CURRENT MILD EPISODE OF MAJOR DEPRESSIVE DISORDER WITHOUT PRIOR EPISODE (HCC): ICD-10-CM

## 2021-04-19 DIAGNOSIS — F41.1 GENERALIZED ANXIETY DISORDER: ICD-10-CM

## 2021-04-19 PROCEDURE — 99213 OFFICE O/P EST LOW 20 MIN: CPT | Performed by: NURSE PRACTITIONER

## 2021-04-19 RX ORDER — VORTIOXETINE 5 MG/1
5 TABLET, FILM COATED ORAL
Qty: 30 TABLET | Refills: 2 | Status: SHIPPED | OUTPATIENT
Start: 2021-04-19 | End: 2021-06-01

## 2021-04-19 RX ORDER — VORTIOXETINE 10 MG/1
1 TABLET, FILM COATED ORAL DAILY
Qty: 30 TABLET | Refills: 2 | Status: SHIPPED | OUTPATIENT
Start: 2021-04-19 | End: 2021-06-01

## 2021-06-01 ENCOUNTER — TELEPHONE (OUTPATIENT)
Dept: FAMILY MEDICINE CLINIC | Facility: CLINIC | Age: 56
End: 2021-06-01

## 2021-06-01 ENCOUNTER — LAB (OUTPATIENT)
Dept: FAMILY MEDICINE CLINIC | Facility: CLINIC | Age: 56
End: 2021-06-01

## 2021-06-01 DIAGNOSIS — E55.9 VITAMIN D DEFICIENCY: ICD-10-CM

## 2021-06-01 DIAGNOSIS — IMO0002 TYPE 2 DIABETES, UNCONTROLLED, WITH NEUROPATHY: Chronic | ICD-10-CM

## 2021-06-01 DIAGNOSIS — E53.8 VITAMIN B 12 DEFICIENCY: Chronic | ICD-10-CM

## 2021-06-01 DIAGNOSIS — B37.9 YEAST INFECTION: Primary | ICD-10-CM

## 2021-06-01 DIAGNOSIS — F41.1 GENERALIZED ANXIETY DISORDER: ICD-10-CM

## 2021-06-01 DIAGNOSIS — E55.9 VITAMIN D DEFICIENCY: Chronic | ICD-10-CM

## 2021-06-01 DIAGNOSIS — F32.0 CURRENT MILD EPISODE OF MAJOR DEPRESSIVE DISORDER WITHOUT PRIOR EPISODE (HCC): ICD-10-CM

## 2021-06-01 DIAGNOSIS — E78.2 MIXED HYPERLIPIDEMIA: Chronic | ICD-10-CM

## 2021-06-01 RX ORDER — FLUCONAZOLE 150 MG/1
150 TABLET ORAL DAILY
Qty: 3 TABLET | Refills: 0 | Status: SHIPPED | OUTPATIENT
Start: 2021-06-01 | End: 2021-06-04

## 2021-06-01 RX ORDER — VORTIOXETINE 20 MG/1
20 TABLET, FILM COATED ORAL DAILY
Qty: 30 TABLET | Refills: 0 | Status: SHIPPED | OUTPATIENT
Start: 2021-06-01 | End: 2021-07-08 | Stop reason: SDUPTHER

## 2021-06-01 NOTE — PROGRESS NOTES
"06/01/21 at 3:38pm Returned call to Charles regarding Sandra medication. Charles states Sandra has been having more periods of feeling \"edgy and nervous\".  states she has been having a lot of nervous spells.  states she has been taking the 15mg of Trintellix. Discussed with patient will increase Trintellix to 20mg daily for worsening anxiety and mood. Discussed with patient and  risks, benefits, and side effects of medications. Patient and  acknowledged and agreed. Discussed with patient if begin having any SI or HI to call 911 or go to nearest ER.   "

## 2021-06-01 NOTE — TELEPHONE ENCOUNTER
----- Message from ASH Chaudhry sent at 6/1/2021  9:24 AM EDT -----  Sent in   ----- Message -----  From: Diana Elkins MA  Sent: 6/1/2021   8:14 AM EDT  To: ASH Chaudhry    Patient came by the office today and stated that she needs some diflucan. She stated that she would need a couple days worth due to the yeast infection being so bad.

## 2021-06-02 LAB
ALBUMIN SERPL-MCNC: 5.1 G/DL (ref 3.5–5.2)
ALBUMIN/GLOB SERPL: 2 G/DL
ALP SERPL-CCNC: 82 U/L (ref 39–117)
ALT SERPL-CCNC: 20 U/L (ref 1–33)
AST SERPL-CCNC: 18 U/L (ref 1–32)
BILIRUB SERPL-MCNC: 0.7 MG/DL (ref 0–1.2)
BUN SERPL-MCNC: 7 MG/DL (ref 6–20)
BUN/CREAT SERPL: 10.3 (ref 7–25)
CALCIUM SERPL-MCNC: 10.2 MG/DL (ref 8.6–10.5)
CHLORIDE SERPL-SCNC: 102 MMOL/L (ref 98–107)
CO2 SERPL-SCNC: 27.2 MMOL/L (ref 22–29)
CREAT SERPL-MCNC: 0.68 MG/DL (ref 0.57–1)
GLOBULIN SER CALC-MCNC: 2.6 GM/DL
GLUCOSE SERPL-MCNC: 110 MG/DL (ref 65–99)
MICROALBUMIN UR-MCNC: 18 UG/ML
POTASSIUM SERPL-SCNC: 3.8 MMOL/L (ref 3.5–5.2)
PROT SERPL-MCNC: 7.7 G/DL (ref 6–8.5)
SODIUM SERPL-SCNC: 140 MMOL/L (ref 136–145)

## 2021-06-07 DIAGNOSIS — E55.9 VITAMIN D DEFICIENCY: Primary | ICD-10-CM

## 2021-06-07 LAB
25(OH)D3+25(OH)D2 SERPL-MCNC: 26.4 NG/ML (ref 30–100)
ALBUMIN SERPL-MCNC: 4.8 G/DL (ref 3.5–5.2)
ALBUMIN/GLOB SERPL: 1.8 G/DL
ALP SERPL-CCNC: 81 U/L (ref 39–117)
ALT SERPL-CCNC: 19 U/L (ref 1–33)
AST SERPL-CCNC: 19 U/L (ref 1–32)
BASOPHILS # BLD AUTO: 0.04 10*3/MM3 (ref 0–0.2)
BASOPHILS NFR BLD AUTO: 0.7 % (ref 0–1.5)
BILIRUB SERPL-MCNC: 0.7 MG/DL (ref 0–1.2)
BUN SERPL-MCNC: 8 MG/DL (ref 6–20)
BUN/CREAT SERPL: 12.1 (ref 7–25)
CALCIUM SERPL-MCNC: 10 MG/DL (ref 8.6–10.5)
CHLORIDE SERPL-SCNC: 103 MMOL/L (ref 98–107)
CHOLEST SERPL-MCNC: 144 MG/DL (ref 0–200)
CO2 SERPL-SCNC: 25.8 MMOL/L (ref 22–29)
CREAT SERPL-MCNC: 0.66 MG/DL (ref 0.57–1)
EOSINOPHIL # BLD AUTO: 0.18 10*3/MM3 (ref 0–0.4)
EOSINOPHIL NFR BLD AUTO: 2.9 % (ref 0.3–6.2)
ERYTHROCYTE [DISTWIDTH] IN BLOOD BY AUTOMATED COUNT: 12.9 % (ref 12.3–15.4)
GLOBULIN SER CALC-MCNC: 2.7 GM/DL
GLUCOSE SERPL-MCNC: 109 MG/DL (ref 65–99)
HBA1C MFR BLD: 8.9 % (ref 4.8–5.6)
HCT VFR BLD AUTO: 47.1 % (ref 34–46.6)
HDLC SERPL-MCNC: 42 MG/DL (ref 40–60)
HGB BLD-MCNC: 16.1 G/DL (ref 12–15.9)
IMM GRANULOCYTES # BLD AUTO: 0.03 10*3/MM3 (ref 0–0.05)
IMM GRANULOCYTES NFR BLD AUTO: 0.5 % (ref 0–0.5)
LDLC SERPL CALC-MCNC: 80 MG/DL (ref 0–100)
LYMPHOCYTES # BLD AUTO: 2.2 10*3/MM3 (ref 0.7–3.1)
LYMPHOCYTES NFR BLD AUTO: 35.9 % (ref 19.6–45.3)
Lab: NORMAL
MCH RBC QN AUTO: 30.9 PG (ref 26.6–33)
MCHC RBC AUTO-ENTMCNC: 34.2 G/DL (ref 31.5–35.7)
MCV RBC AUTO: 90.4 FL (ref 79–97)
METHYLMALONATE SERPL-SCNC: 162 NMOL/L (ref 0–378)
MONOCYTES # BLD AUTO: 0.45 10*3/MM3 (ref 0.1–0.9)
MONOCYTES NFR BLD AUTO: 7.3 % (ref 5–12)
NEUTROPHILS # BLD AUTO: 3.23 10*3/MM3 (ref 1.7–7)
NEUTROPHILS NFR BLD AUTO: 52.7 % (ref 42.7–76)
NRBC BLD AUTO-RTO: 0 /100 WBC (ref 0–0.2)
PLATELET # BLD AUTO: 321 10*3/MM3 (ref 140–450)
POTASSIUM SERPL-SCNC: 3.8 MMOL/L (ref 3.5–5.2)
PROT SERPL-MCNC: 7.5 G/DL (ref 6–8.5)
RBC # BLD AUTO: 5.21 10*6/MM3 (ref 3.77–5.28)
SODIUM SERPL-SCNC: 141 MMOL/L (ref 136–145)
TRIGL SERPL-MCNC: 120 MG/DL (ref 0–150)
VLDLC SERPL CALC-MCNC: 22 MG/DL (ref 5–40)
WBC # BLD AUTO: 6.13 10*3/MM3 (ref 3.4–10.8)

## 2021-06-07 RX ORDER — ERGOCALCIFEROL 1.25 MG/1
50000 CAPSULE ORAL WEEKLY
Qty: 5 CAPSULE | Refills: 5 | Status: SHIPPED | OUTPATIENT
Start: 2021-06-07 | End: 2021-06-09 | Stop reason: SDUPTHER

## 2021-06-09 ENCOUNTER — OFFICE VISIT (OUTPATIENT)
Dept: FAMILY MEDICINE CLINIC | Facility: CLINIC | Age: 56
End: 2021-06-09

## 2021-06-09 VITALS
BODY MASS INDEX: 24.14 KG/M2 | OXYGEN SATURATION: 100 % | SYSTOLIC BLOOD PRESSURE: 112 MMHG | HEART RATE: 98 BPM | TEMPERATURE: 96.8 F | DIASTOLIC BLOOD PRESSURE: 62 MMHG | WEIGHT: 163 LBS | HEIGHT: 69 IN

## 2021-06-09 DIAGNOSIS — E55.9 VITAMIN D DEFICIENCY: ICD-10-CM

## 2021-06-09 DIAGNOSIS — Z12.31 ENCOUNTER FOR SCREENING MAMMOGRAM FOR MALIGNANT NEOPLASM OF BREAST: ICD-10-CM

## 2021-06-09 DIAGNOSIS — E78.5 DYSLIPIDEMIA: Chronic | ICD-10-CM

## 2021-06-09 DIAGNOSIS — E53.8 VITAMIN B 12 DEFICIENCY: ICD-10-CM

## 2021-06-09 DIAGNOSIS — F03.90 MAJOR NEUROCOGNITIVE DISORDER (HCC): Chronic | ICD-10-CM

## 2021-06-09 DIAGNOSIS — K21.9 GASTROESOPHAGEAL REFLUX DISEASE WITHOUT ESOPHAGITIS: ICD-10-CM

## 2021-06-09 DIAGNOSIS — E89.40 POSTSURGICAL MENOPAUSE: ICD-10-CM

## 2021-06-09 DIAGNOSIS — E78.5 DYSLIPIDEMIA: Primary | ICD-10-CM

## 2021-06-09 DIAGNOSIS — IMO0002 TYPE 2 DIABETES, UNCONTROLLED, WITH NEUROPATHY: Primary | Chronic | ICD-10-CM

## 2021-06-09 DIAGNOSIS — IMO0002 TYPE 2 DIABETES, UNCONTROLLED, WITH NEUROPATHY: ICD-10-CM

## 2021-06-09 PROCEDURE — 99214 OFFICE O/P EST MOD 30 MIN: CPT | Performed by: NURSE PRACTITIONER

## 2021-06-09 RX ORDER — ERGOCALCIFEROL 1.25 MG/1
50000 CAPSULE ORAL WEEKLY
Qty: 5 CAPSULE | Refills: 5 | Status: SHIPPED | OUTPATIENT
Start: 2021-06-09 | End: 2021-07-31

## 2021-06-09 NOTE — PATIENT INSTRUCTIONS
"https://www.nhlbi.nih.gov/files/docs/public/heart/dash_brief.pdf\">   DASH Eating Plan  DASH stands for Dietary Approaches to Stop Hypertension. The DASH eating plan is a healthy eating plan that has been shown to:  · Reduce high blood pressure (hypertension).  · Reduce your risk for type 2 diabetes, heart disease, and stroke.  · Help with weight loss.  What are tips for following this plan?  Reading food labels  · Check food labels for the amount of salt (sodium) per serving. Choose foods with less than 5 percent of the Daily Value of sodium. Generally, foods with less than 300 milligrams (mg) of sodium per serving fit into this eating plan.  · To find whole grains, look for the word \"whole\" as the first word in the ingredient list.  Shopping  · Buy products labeled as \"low-sodium\" or \"no salt added.\"  · Buy fresh foods. Avoid canned foods and pre-made or frozen meals.  Cooking  · Avoid adding salt when cooking. Use salt-free seasonings or herbs instead of table salt or sea salt. Check with your health care provider or pharmacist before using salt substitutes.  · Do not garcía foods. Cook foods using healthy methods such as baking, boiling, grilling, roasting, and broiling instead.  · Cook with heart-healthy oils, such as olive, canola, avocado, soybean, or sunflower oil.  Meal planning    · Eat a balanced diet that includes:  ? 4 or more servings of fruits and 4 or more servings of vegetables each day. Try to fill one-half of your plate with fruits and vegetables.  ? 6-8 servings of whole grains each day.  ? Less than 6 oz (170 g) of lean meat, poultry, or fish each day. A 3-oz (85-g) serving of meat is about the same size as a deck of cards. One egg equals 1 oz (28 g).  ? 2-3 servings of low-fat dairy each day. One serving is 1 cup (237 mL).  ? 1 serving of nuts, seeds, or beans 5 times each week.  ? 2-3 servings of heart-healthy fats. Healthy fats called omega-3 fatty acids are found in foods such as walnuts, " flaxseeds, fortified milks, and eggs. These fats are also found in cold-water fish, such as sardines, salmon, and mackerel.  · Limit how much you eat of:  ? Canned or prepackaged foods.  ? Food that is high in trans fat, such as some fried foods.  ? Food that is high in saturated fat, such as fatty meat.  ? Desserts and other sweets, sugary drinks, and other foods with added sugar.  ? Full-fat dairy products.  · Do not salt foods before eating.  · Do not eat more than 4 egg yolks a week.  · Try to eat at least 2 vegetarian meals a week.  · Eat more home-cooked food and less restaurant, buffet, and fast food.  Lifestyle  · When eating at a restaurant, ask that your food be prepared with less salt or no salt, if possible.  · If you drink alcohol:  ? Limit how much you use to:  § 0-1 drink a day for women who are not pregnant.  § 0-2 drinks a day for men.  ? Be aware of how much alcohol is in your drink. In the [place] one drink equals one 12 oz bottle of beer (355 mL), one 5 oz glass of wine (148 mL), or one 1½ oz glass of hard liquor (44 mL).  General information  · Avoid eating more than 2,300 mg of salt a day. If you have hypertension, you may need to reduce your sodium intake to 1,500 mg a day.  · Work with your health care provider to maintain a healthy body weight or to lose weight. Ask what an ideal weight is for you.  · Get at least 30 minutes of exercise that causes your heart to beat faster (aerobic exercise) most days of the week. Activities may include walking, swimming, or biking.  · Work with your health care provider or dietitian to adjust your eating plan to your individual calorie needs.  What foods should I eat?  Fruits  All fresh, dried, or frozen fruit. Canned fruit in natural juice (without added sugar).  Vegetables  Fresh or frozen vegetables (raw, steamed, roasted, or grilled). Low-sodium or reduced-sodium tomato and vegetable juice. Low-sodium or reduced-sodium tomato sauce and tomato paste.  Low-sodium or reduced-sodium canned vegetables.  Grains  Whole-grain or whole-wheat bread. Whole-grain or whole-wheat pasta. Brown rice. Oatmeal. Quinoa. Bulgur. Whole-grain and low-sodium cereals. Kristine bread. Low-fat, low-sodium crackers. Whole-wheat flour tortillas.  Meats and other proteins  Skinless chicken or turkey. Ground chicken or turkey. Pork with fat trimmed off. Fish and seafood. Egg whites. Dried beans, peas, or lentils. Unsalted nuts, nut butters, and seeds. Unsalted canned beans. Lean cuts of beef with fat trimmed off. Low-sodium, lean precooked or cured meat, such as sausages or meat loaves.  Dairy  Low-fat (1%) or fat-free (skim) milk. Reduced-fat, low-fat, or fat-free cheeses. Nonfat, low-sodium ricotta or cottage cheese. Low-fat or nonfat yogurt. Low-fat, low-sodium cheese.  Fats and oils  Soft margarine without trans fats. Vegetable oil. Reduced-fat, low-fat, or light mayonnaise and salad dressings (reduced-sodium). Canola, safflower, olive, avocado, soybean, and sunflower oils. Avocado.  Seasonings and condiments  Herbs. Spices. Seasoning mixes without salt.  Other foods  Unsalted popcorn and pretzels. Fat-free sweets.  The items listed above may not be a complete list of foods and beverages you can eat. Contact a dietitian for more information.  What foods should I avoid?  Fruits  Canned fruit in a light or heavy syrup. Fried fruit. Fruit in cream or butter sauce.  Vegetables  Creamed or fried vegetables. Vegetables in a cheese sauce. Regular canned vegetables (not low-sodium or reduced-sodium). Regular canned tomato sauce and paste (not low-sodium or reduced-sodium). Regular tomato and vegetable juice (not low-sodium or reduced-sodium). Pickles. Olives.  Grains  Baked goods made with fat, such as croissants, muffins, or some breads. Dry pasta or rice meal packs.  Meats and other proteins  Fatty cuts of meat. Ribs. Fried meat. Ascencio. Bologna, salami, and other precooked or cured meats, such as  sausages or meat loaves. Fat from the back of a pig (fatback). Bratwurst. Salted nuts and seeds. Canned beans with added salt. Canned or smoked fish. Whole eggs or egg yolks. Chicken or turkey with skin.  Dairy  Whole or 2% milk, cream, and half-and-half. Whole or full-fat cream cheese. Whole-fat or sweetened yogurt. Full-fat cheese. Nondairy creamers. Whipped toppings. Processed cheese and cheese spreads.  Fats and oils  Butter. Stick margarine. Lard. Shortening. Ghee. Ascencio fat. Tropical oils, such as coconut, palm kernel, or palm oil.  Seasonings and condiments  Onion salt, garlic salt, seasoned salt, table salt, and sea salt. Worcestershire sauce. Tartar sauce. Barbecue sauce. Teriyaki sauce. Soy sauce, including reduced-sodium. Steak sauce. Canned and packaged gravies. Fish sauce. Oyster sauce. Cocktail sauce. Store-bought horseradish. Ketchup. Mustard. Meat flavorings and tenderizers. Bouillon cubes. Hot sauces. Pre-made or packaged marinades. Pre-made or packaged taco seasonings. Relishes. Regular salad dressings.  Other foods  Salted popcorn and pretzels.  The items listed above may not be a complete list of foods and beverages you should avoid. Contact a dietitian for more information.  Where to find more information  · National Heart, Lung, and Blood [place]: www.nhlbi.nih.gov  · American Heart Association: www.heart.org  · Academy of Nutrition and Dietetics: www.eatright.org  · National Kidney Foundation: www.kidney.org  Summary  · The DASH eating plan is a healthy eating plan that has been shown to reduce high blood pressure (hypertension). It may also reduce your risk for type 2 diabetes, heart disease, and stroke.  · When on the DASH eating plan, aim to eat more fresh fruits and vegetables, whole grains, lean proteins, low-fat dairy, and heart-healthy fats.  · With the DASH eating plan, you should limit salt (sodium) intake to 2,300 mg a day. If you have hypertension, you may need to reduce your  sodium intake to 1,500 mg a day.  · Work with your health care provider or dietitian to adjust your eating plan to your individual calorie needs.  This information is not intended to replace advice given to you by your health care provider. Make sure you discuss any questions you have with your health care provider.  Document Revised: 11/20/2020 Document Reviewed: 11/20/2020  [employer] Patient Education © 2021 [employer]  Type 2 Diabetes Mellitus, Self Care, Adult  Caring for yourself after you have been diagnosed with type 2 diabetes (type 2 diabetes mellitus) means keeping your blood sugar (glucose) under control with a balance of:  · Nutrition.  · Exercise.  · Lifestyle changes.  · Medicines or insulin, if necessary.  · Support from your team of health care providers and others.  The following information explains what you need to know to manage your diabetes at home.  What are the risks?  Having diabetes can put you at risk for other long-term (chronic) conditions, such as heart disease and kidney disease. Your health care provider may prescribe medicines to help prevent complications from diabetes. These medicines may include:  · Aspirin.  · Medicine to lower cholesterol.  · Medicine to control blood pressure.  How to monitor blood glucose    · Check your blood glucose every day, as often as told by your health care provider.  · Have your A1c (hemoglobin A1c) level checked two or more times a year, or as often as told by your health care provider.  Your health care provider will set individualized treatment goals for you. Generally, the goal of treatment is to maintain the following blood glucose levels:  · Before meals (preprandial):  mg/dL (4.4-7.2 mmol/L).  · After meals (postprandial): below 180 mg/dL (10 mmol/L).  · A1c level: less than 7%.  How to manage hyperglycemia and hypoglycemia  Hyperglycemia symptoms  Hyperglycemia, also called high blood glucose, occurs when blood glucose is too high.  Make sure you know the early signs of hyperglycemia, such as:  · Increased thirst.  · Hunger.  · Feeling very tired.  · Needing to urinate more often than usual.  · Blurry vision.  Hypoglycemia symptoms  Hypoglycemia, also called low blood glucose, occurs with a blood glucose level at or below 70 mg/dL (3.9 mmol/L). The risk for hypoglycemia increases during or after exercise, during sleep, during illness, and when skipping meals or not eating for a long time (fasting).  It is important to know the symptoms of hypoglycemia and treat it right away. Always have a 15-gram rapid-acting carbohydrate snack with you to treat low blood glucose. Family members and close friends should also know the symptoms and should understand how to treat hypoglycemia, in case you are not able to treat yourself. Symptoms may include:  · Hunger.  · Anxiety.  · Sweating and feeling clammy.  · Confusion.  · Dizziness or feeling light-headed.  · Sleepiness.  · Nausea.  · Increased heart rate.  · Headache.  · Blurry vision.  · Irritability.  · A change in coordination.  · Tingling or numbness around the mouth, lips, or tongue.  · Restless sleep.  · Fainting.  · Seizure.  Treating hypoglycemia  If you are alert and able to swallow safely, follow the 15:15 rule:  · Take 15 grams of a rapid-acting carbohydrate. Talk with your health care provider about how much you should take.  · Rapid-acting options include:  ? Glucose pills (take 15 grams).  ? 6-8 pieces of hard candy.  ? 4-6 oz (120-150 mL) of fruit juice.  ? 4-6 oz (120-150 mL) of regular (not diet) soda.  ? 1 Tbsp (15 mL) honey or sugar.  · Check your blood glucose 15 minutes after you take the carbohydrate.  · If the repeat blood glucose level is still at or below 70 mg/dL (3.9 mmol/L), take 15 grams of a carbohydrate again.  · If your blood glucose level does not increase above 70 mg/dL (3.9 mmol/L) after 3 tries, seek emergency medical care.  · After your blood glucose level returns to  normal, eat a meal or a snack within 1 hour.  Treating severe hypoglycemia  Severe hypoglycemia is when your blood glucose level is at or below 54 mg/dL (3 mmol/L). Severe hypoglycemia is an emergency. Do not wait to see if the symptoms will go away. Get medical help right away. Call your local emergency services (911 in the [place]  If you have severe hypoglycemia and you cannot eat or drink, you may need an injection of glucagon. A family member or close friend should learn how to check your blood glucose and how to give you a glucagon injection. Ask your health care provider if you need to have an emergency glucagon injection kit available.  Severe hypoglycemia may need to be treated in a hospital. The treatment may include getting glucose through an IV. You may also need treatment for the cause of your hypoglycemia.  Follow these instructions at home:  Take diabetes medicines as told  · If your health care provider prescribed insulin or diabetes medicines, take them every day.  · Do not run out of insulin or other diabetes medicines that you take. Plan ahead so you always have these available.  · If you use insulin, adjust your dosage based on how physically active you are and what foods you eat. Your health care provider will tell you how to adjust your dosage.  Make healthy food choices    The things that you eat and drink affect your blood glucose and your insulin dosage. Making good choices helps to control your diabetes and prevent other health problems. A healthy meal plan includes eating lean proteins, complex carbohydrates, fresh fruits and vegetables, low-fat dairy products, and healthy fats.  Make an appointment to see a diet and nutrition specialist (registered dietitian) to help you create an eating plan that is right for you. Make sure that you:  · Follow instructions from your health care provider about eating or drinking restrictions.  · Drink enough fluid to keep your urine pale yellow.  · Keep a  record of the carbohydrates that you eat. Do this by reading food labels and learning the standard serving sizes of foods.  · Follow your sick day plan whenever you cannot eat or drink as usual. Make this plan in advance with your health care provider.    Stay active  Exercise regularly, as told by your health care provider. This may include:  · Stretching and doing strength exercises, such as yoga or weightlifting, 2 or more times a week.  · Doing 150 minutes or more of moderate-intensity or vigorous-intensity exercise each week. This could be brisk walking, biking, or water aerobics.  ? Spread out your activity over 3 or more days of the week.  ? Do not go more than 2 days in a row without doing some kind of physical activity.  When you start a new exercise or activity, work with your health care provider to adjust your insulin, medicines, or food intake as needed.  Make healthy lifestyle choices  · Do not use any tobacco products, such as cigarettes, chewing tobacco, and e-cigarettes. If you need help quitting, ask your health care provider.  · If your health care provider says that alcohol is safe for you, limit alcohol intake to no more than 1 drink per day for nonpregnant women and 2 drinks per day for men. One drink equals 12 oz of beer (355 mL), 5 oz of wine (148 mL), or 1½ oz of hard liquor (44 mL).  · Learn to manage stress. If you need help with this, ask your health care provider.  Care for your body    · Keep your immunizations up to date. In addition to getting vaccinations as told by your health care provider, it is recommended that you get vaccinated against the following illnesses:  ? The flu (influenza). Get a flu shot every year.  ? Pneumonia.  ? Hepatitis B.  · Schedule an eye exam soon after your diagnosis, and then one time every year after that.  · Check your skin and feet every day for cuts, bruises, redness, blisters, or sores. Schedule a foot exam with your health care provider once every  year.  · Brush your teeth and gums two times a day, and floss one or more times a day. Visit your dentist one or more times every 6 months.  · Maintain a healthy weight.  General instructions  · Take over-the-counter and prescription medicines only as told by your health care provider.  · Share your diabetes management plan with people in your workplace, school, and household.  · Carry a medical alert card or wear medical alert jewelry.  · Keep all follow-up visits as told by your health care provider. This is important.  Questions to ask your health care provider  · Do I need to meet with a diabetes educator?  · Where can I find a support group for people with diabetes?  Where to find more information  For more information about diabetes, visit:  · American Diabetes Association (ADA): www.diabetes.org  · American Association of Diabetes Educators (AADE): www.diabeteseducator.org  Summary  · Caring for yourself after you have been diagnosed with (type 2 diabetes mellitus) means keeping your blood sugar (glucose) under control with a balance of nutrition, exercise, lifestyle changes, and medicine.  · Check your blood glucose every day, as often as told by your health care provider.  · Having diabetes can put you at risk for other long-term (chronic) conditions, such as heart disease and kidney disease. Your health care provider may prescribe medicines to help prevent complications from diabetes.  · Keep all follow-up visits as told by your health care provider. This is important.  This information is not intended to replace advice given to you by your health care provider. Make sure you discuss any questions you have with your health care provider.  Document Revised: 06/10/2019 Document Reviewed: 01/20/2017  [employer] Patient Education © 2020 [employer]

## 2021-06-09 NOTE — PROGRESS NOTES
History of Present Illness  Sandra Leahy is a 55 y.o.female who presents to the clinic today for follow up  related to her  DM, type 2 not at goal, Dyslipidemia, and Vitamin deficiencies. She has been evaluated by   Neuroscience for a major neurocognitive disorder of uncertain etiology.      Diabetes   She has type 2 diabetes mellitus.which was diagnosed over ten years ago. Associated symptoms include stable peripheral neuropathy. Pertinent negatives for diabetes include no blurred vision, no chest pain, no foot ulcerations, no polyphagia, no polyuria .  Diabetic complications include peripheral neuropathy. Risk factors for coronary artery disease include diabetes mellitus, dyslipidemia, family history and post-menopausal. Current diabetic treatment includes insulin injections, oral agent (monotherapy) and diet. She is following a generally healthier diet with decrease in fatty foods. She has had a previous visit with a dietitian. She has seen a Podiatrist. Her last eye exam  was completed in May 2018 per Dr Lopez.She has yet to keep her appointments for follow up eye exam due to COVID.    Lab Results   Component Value Date    HGBA1C 9.40 (H) 10/09/2020     Lab Results   Component Value Date    HGBA1C 8.90 (H) 06/01/2021     Dyslipidemia   Pertinent negatives include no chest pain or shortness of breath. Current antihyperlipidemic treatment includes ezetimibe and statins. Risk factors for coronary artery disease include diabetes mellitus, dyslipidemia, family history, post-menopausal and stress.     Lab Results   Component Value Date    CHLPL 144 06/01/2021    CHLPL 155 10/09/2020    CHLPL 174 05/12/2020     Lab Results   Component Value Date    TRIG 120 06/01/2021    TRIG 250 (H) 10/09/2020    TRIG 213 (H) 05/12/2020     Lab Results   Component Value Date    HDL 42 06/01/2021    HDL 39 (L) 10/09/2020    HDL 41 05/12/2020     Lab Results   Component Value Date    LDL 80 06/01/2021    LDL 75 10/09/2020    LDL 90  05/12/2020     Anxiety/Depression   Anxiety has been gradually improving. Symptoms include excessive worry, confusion, and decreased concentration. Sandra reports no chest pain, nausea,  palpitations, shortness of breath or suicidal ideas. The severity of symptoms at times interfering with daily activities. The symptoms are aggravated by family issues and especially when the children are fussing.  Risk factors include a major life event. She is currently seeing Behavioral Health which she reports is helping.  Lab Results   Component Value Date    TSH 2.620 10/09/2020     GI Problem   Symptoms have improved  Lab Results   Component Value Date    WBC 6.13 06/01/2021    HGB 16.1 (H) 06/01/2021    HCT 47.1 (H) 06/01/2021    MCV 90.4 06/01/2021     06/01/2021     The following portions of the patient's history were reviewed and updated as appropriate: allergies, current medications, past family history, past medical history, past social history, past surgical history and problem list.    Review of Systems   Constitutional: Positive for fatigue. Negative for activity change, appetite change, chills, fever and unexpected weight change.   HENT: Negative.    Eyes: Negative for visual disturbance.   Respiratory: Negative for cough, shortness of breath and wheezing.    Cardiovascular: Negative for chest pain, palpitations and leg swelling.   Gastrointestinal: Negative for abdominal pain, constipation, diarrhea, nausea and vomiting.   Endocrine: Negative for cold intolerance, heat intolerance, polydipsia, polyphagia and polyuria.   Musculoskeletal: Positive for arthralgias.   Skin: Negative for color change and rash.   Neurological: Positive for numbness. Negative for dizziness, tremors, speech difficulty, weakness, light-headedness and headaches.   Hematological: Negative for adenopathy.   Psychiatric/Behavioral: Positive for confusion. Negative for decreased concentration and suicidal ideas. The patient is not  "nervous/anxious.    All other systems reviewed and are negative.    Vital signs:  /62 (BP Location: Right arm, Patient Position: Sitting, Cuff Size: Adult)   Pulse 98   Temp 96.8 °F (36 °C) (Temporal)   Ht 175.3 cm (69.02\")   Wt 73.9 kg (163 lb)   SpO2 100%   BMI 24.06 kg/m²     Physical Exam  Vitals and nursing note reviewed.   Constitutional:       General: She is not in acute distress.     Appearance: She is well-developed.      Comments: Pleasant;    HENT:      Head: Normocephalic.      Nose: Nose normal.   Eyes:      General: No scleral icterus.        Right eye: No discharge.         Left eye: No discharge.      Conjunctiva/sclera: Conjunctivae normal.   Neck:      Thyroid: No thyromegaly.      Vascular: No JVD.   Cardiovascular:      Rate and Rhythm: Normal rate and regular rhythm.      Heart sounds: Normal heart sounds. No murmur heard.   No friction rub.   Pulmonary:      Effort: Pulmonary effort is normal. No respiratory distress.      Breath sounds: Normal breath sounds. No wheezing or rales.   Abdominal:      General: Bowel sounds are normal. There is no distension.      Palpations: Abdomen is soft.      Tenderness: There is no abdominal tenderness. There is no guarding or rebound.   Musculoskeletal:      Cervical back: Neck supple.      Right lower leg: No edema.      Left lower leg: No edema.   Feet:      Right foot:      Protective Sensation: 10 sites tested. 10 sites sensed.      Skin integrity: Dry skin present.      Left foot:      Protective Sensation: 10 sites tested. 10 sites sensed.      Skin integrity: Dry skin present.      Comments: Toenails are polished  Lymphadenopathy:      Cervical: No cervical adenopathy.   Skin:     General: Skin is warm and dry.      Capillary Refill: Capillary refill takes less than 2 seconds.      Findings: No erythema or rash.   Neurological:      Mental Status: She is alert and oriented to person, place, and time.      Cranial Nerves: Cranial nerves " are intact.      Gait: Gait is intact.   Psychiatric:         Mood and Affect: Mood and affect normal.         Behavior: Behavior is cooperative.         Thought Content: Thought content normal.         Cognition and Memory: Memory is impaired.       Results for orders placed or performed in visit on 06/01/21   Vitamin D 25 Hydroxy    Specimen: Arm, Right; Blood    BLOOD  RELEASE TO NELDA   Result Value Ref Range    25 Hydroxy, Vitamin D 26.4 (L) 30.0 - 100.0 ng/ml   Hemoglobin A1c    Specimen: Arm, Right; Blood    BLOOD  RELEASE TO NELDA   Result Value Ref Range    Hemoglobin A1C 8.90 (H) 4.80 - 5.60 %   Comprehensive Metabolic Panel    Specimen: Arm, Right; Blood    BLOOD  RELEASE TO NELDA   Result Value Ref Range    Glucose 109 (H) 65 - 99 mg/dL    BUN 8 6 - 20 mg/dL    Creatinine 0.66 0.57 - 1.00 mg/dL    eGFR Non African Am 92 >60 mL/min/1.73    eGFR African Am 112 >60 mL/min/1.73    BUN/Creatinine Ratio 12.1 7.0 - 25.0    Sodium 141 136 - 145 mmol/L    Potassium 3.8 3.5 - 5.2 mmol/L    Chloride 103 98 - 107 mmol/L    Total CO2 25.8 22.0 - 29.0 mmol/L    Calcium 10.0 8.6 - 10.5 mg/dL    Total Protein 7.5 6.0 - 8.5 g/dL    Albumin 4.80 3.50 - 5.20 g/dL    Globulin 2.7 gm/dL    A/G Ratio 1.8 g/dL    Total Bilirubin 0.7 0.0 - 1.2 mg/dL    Alkaline Phosphatase 81 39 - 117 U/L    AST (SGOT) 19 1 - 32 U/L    ALT (SGPT) 19 1 - 33 U/L   Comprehensive Metabolic Panel    Specimen: Arm, Right; Blood    BLOOD   Result Value Ref Range    Glucose 110 (H) 65 - 99 mg/dL    BUN 7 6 - 20 mg/dL    Creatinine 0.68 0.57 - 1.00 mg/dL    eGFR Non African Am 89 >60 mL/min/1.73    eGFR African Am 108 >60 mL/min/1.73    BUN/Creatinine Ratio 10.3 7.0 - 25.0    Sodium 140 136 - 145 mmol/L    Potassium 3.8 3.5 - 5.2 mmol/L    Chloride 102 98 - 107 mmol/L    Total CO2 27.2 22.0 - 29.0 mmol/L    Calcium 10.2 8.6 - 10.5 mg/dL    Total Protein 7.7 6.0 - 8.5 g/dL    Albumin 5.10 3.50 - 5.20 g/dL    Globulin 2.6 gm/dL    A/G Ratio 2.0 g/dL    Total  Bilirubin 0.7 0.0 - 1.2 mg/dL    Alkaline Phosphatase 82 39 - 117 U/L    AST (SGOT) 18 1 - 32 U/L    ALT (SGPT) 20 1 - 33 U/L   MicroAlbumin, Urine, Random - Urine, Clean Catch    Specimen: Urine, Clean Catch    URINE  RELEASE TO NELDA   Result Value Ref Range    Microalbumin, Urine 18.0 Not Estab. ug/mL   Methylmalonic Acid, Serum    Specimen: Arm, Right; Blood    BLOOD  RELEASE TO NELDA   Result Value Ref Range    Methylmalonic Acid 162 0 - 378 nmol/L    Disclaimer: Comment    Lipid Panel    BLOOD  RELEASE TO NELDA   Result Value Ref Range    Total Cholesterol 144 0 - 200 mg/dL    Triglycerides 120 0 - 150 mg/dL    HDL Cholesterol 42 40 - 60 mg/dL    VLDL Cholesterol Mal 22 5 - 40 mg/dL    LDL Chol Calc (NIH) 80 0 - 100 mg/dL   CBC & Differential    BLOOD  RELEASE TO NELDA   Result Value Ref Range    WBC 6.13 3.40 - 10.80 10*3/mm3    RBC 5.21 3.77 - 5.28 10*6/mm3    Hemoglobin 16.1 (H) 12.0 - 15.9 g/dL    Hematocrit 47.1 (H) 34.0 - 46.6 %    MCV 90.4 79.0 - 97.0 fL    MCH 30.9 26.6 - 33.0 pg    MCHC 34.2 31.5 - 35.7 g/dL    RDW 12.9 12.3 - 15.4 %    Platelets 321 140 - 450 10*3/mm3    Neutrophil Rel % 52.7 42.7 - 76.0 %    Lymphocyte Rel % 35.9 19.6 - 45.3 %    Monocyte Rel % 7.3 5.0 - 12.0 %    Eosinophil Rel % 2.9 0.3 - 6.2 %    Basophil Rel % 0.7 0.0 - 1.5 %    Neutrophils Absolute 3.23 1.70 - 7.00 10*3/mm3    Lymphocytes Absolute 2.20 0.70 - 3.10 10*3/mm3    Monocytes Absolute 0.45 0.10 - 0.90 10*3/mm3    Eosinophils Absolute 0.18 0.00 - 0.40 10*3/mm3    Basophils Absolute 0.04 0.00 - 0.20 10*3/mm3    Immature Granulocyte Rel % 0.5 0.0 - 0.5 %    Immature Grans Absolute 0.03 0.00 - 0.05 10*3/mm3    nRBC 0.0 0.0 - 0.2 /100 WBC     Patient's Body mass index is 24.06 kg/m². indicating that she is within normal range (BMI 18.5-24.9). No BMI management plan needed..     Assessment/Plan     Diagnoses and all orders for this visit:    1. Type 2 diabetes, uncontrolled, with neuropathy (CMS/HCC)  (Primary)  Comments:  Reviewed labs with noted improved glycemic control which I encouraged her to continue    2. Dyslipidemia  Comments:  Lipid panel reviewed. Continue Pravastatin    3. Vitamin D deficiency  Comments:  Weekly Vit D  Orders:  -     vitamin D (ERGOCALCIFEROL) 1.25 MG (54708 UT) capsule capsule; Take 1 capsule by mouth 1 (One) Time Per Week.  Dispense: 5 capsule; Refill: 5    4. Encounter for screening mammogram for malignant neoplasm of breast  Comments:  Mammorgram scheduled again  Orders:  -     Mammo Screening Bilateral With CAD; Future    5. Postsurgical menopause  Comments:  DEXA scheduled  Orders:  -     DEXA Bone Density Axial; Future    6. Major neurocognitive disorder (CMS/HCC)  Comments:  F/U with  [facility]      Follow Up September for AMV  Findings and recommendations discussed with Sandra.  Reviewed test results. Lifestyle modifications reinforced including nutrition and activity recommendations. Will follow up in September sooner if problems/concerns occur.  Sandra was given instructions and counseling regarding her condition or for health maintenance advice. Please see specific information pulled into the AVS if appropriate      This document has been electronically signed by:

## 2021-07-08 DIAGNOSIS — F41.1 GENERALIZED ANXIETY DISORDER: ICD-10-CM

## 2021-07-08 DIAGNOSIS — F32.0 CURRENT MILD EPISODE OF MAJOR DEPRESSIVE DISORDER WITHOUT PRIOR EPISODE (HCC): ICD-10-CM

## 2021-07-08 RX ORDER — VORTIOXETINE 20 MG/1
20 TABLET, FILM COATED ORAL DAILY
Qty: 30 TABLET | Refills: 0 | Status: SHIPPED | OUTPATIENT
Start: 2021-07-08 | End: 2021-07-30 | Stop reason: SDUPTHER

## 2021-07-29 DIAGNOSIS — B37.9 YEAST INFECTION: Primary | ICD-10-CM

## 2021-07-29 DIAGNOSIS — B37.9 YEAST INFECTION: ICD-10-CM

## 2021-07-29 RX ORDER — FLUCONAZOLE 150 MG/1
150 TABLET ORAL DAILY
Qty: 3 TABLET | Refills: 0 | Status: SHIPPED | OUTPATIENT
Start: 2021-07-29 | End: 2021-07-29 | Stop reason: SDUPTHER

## 2021-07-29 RX ORDER — FLUCONAZOLE 150 MG/1
150 TABLET ORAL DAILY
Qty: 7 TABLET | Refills: 1 | Status: SHIPPED | OUTPATIENT
Start: 2021-07-29 | End: 2021-07-31

## 2021-07-30 ENCOUNTER — TELEMEDICINE (OUTPATIENT)
Dept: PSYCHIATRY | Facility: CLINIC | Age: 56
End: 2021-07-30

## 2021-07-30 DIAGNOSIS — F32.0 CURRENT MILD EPISODE OF MAJOR DEPRESSIVE DISORDER WITHOUT PRIOR EPISODE (HCC): ICD-10-CM

## 2021-07-30 DIAGNOSIS — F41.1 GENERALIZED ANXIETY DISORDER: ICD-10-CM

## 2021-07-30 PROCEDURE — 99213 OFFICE O/P EST LOW 20 MIN: CPT | Performed by: NURSE PRACTITIONER

## 2021-07-30 RX ORDER — VORTIOXETINE 20 MG/1
20 TABLET, FILM COATED ORAL DAILY
Qty: 30 TABLET | Refills: 1 | Status: SHIPPED | OUTPATIENT
Start: 2021-07-30 | End: 2021-09-09 | Stop reason: SDUPTHER

## 2021-07-31 ENCOUNTER — APPOINTMENT (OUTPATIENT)
Dept: CT IMAGING | Facility: HOSPITAL | Age: 56
End: 2021-07-31

## 2021-07-31 ENCOUNTER — HOSPITAL ENCOUNTER (INPATIENT)
Facility: HOSPITAL | Age: 56
LOS: 3 days | Discharge: HOME | End: 2021-08-03
Attending: EMERGENCY MEDICINE | Admitting: INTERNAL MEDICINE

## 2021-07-31 DIAGNOSIS — A41.9 SEPSIS, DUE TO UNSPECIFIED ORGANISM, UNSPECIFIED WHETHER ACUTE ORGAN DYSFUNCTION PRESENT (HCC): Primary | ICD-10-CM

## 2021-07-31 DIAGNOSIS — N12 PYELONEPHRITIS: ICD-10-CM

## 2021-07-31 LAB
ACETONE BLD QL: NEGATIVE
ALBUMIN SERPL-MCNC: 3.81 G/DL (ref 3.5–5.2)
ALBUMIN/GLOB SERPL: 0.9 G/DL
ALP SERPL-CCNC: 130 U/L (ref 39–117)
ALT SERPL W P-5'-P-CCNC: 21 U/L (ref 1–33)
ANION GAP SERPL CALCULATED.3IONS-SCNC: 18.5 MMOL/L (ref 5–15)
AST SERPL-CCNC: 17 U/L (ref 1–32)
BACTERIA UR QL AUTO: ABNORMAL /HPF
BASOPHILS # BLD AUTO: 0.03 10*3/MM3 (ref 0–0.2)
BASOPHILS NFR BLD AUTO: 0.2 % (ref 0–1.5)
BILIRUB SERPL-MCNC: 1 MG/DL (ref 0–1.2)
BILIRUB UR QL STRIP: NEGATIVE
BUN SERPL-MCNC: 13 MG/DL (ref 6–20)
BUN/CREAT SERPL: 16.7 (ref 7–25)
CALCIUM SPEC-SCNC: 10 MG/DL (ref 8.6–10.5)
CHLORIDE SERPL-SCNC: 89 MMOL/L (ref 98–107)
CLARITY UR: ABNORMAL
CO2 SERPL-SCNC: 20.5 MMOL/L (ref 22–29)
COLOR UR: YELLOW
CREAT SERPL-MCNC: 0.78 MG/DL (ref 0.57–1)
CRP SERPL-MCNC: 24.14 MG/DL (ref 0–0.5)
D-LACTATE SERPL-SCNC: 2.5 MMOL/L (ref 0.5–2)
D-LACTATE SERPL-SCNC: 3.2 MMOL/L (ref 0.5–2)
DEPRECATED RDW RBC AUTO: 37.9 FL (ref 37–54)
EOSINOPHIL # BLD AUTO: 0.02 10*3/MM3 (ref 0–0.4)
EOSINOPHIL NFR BLD AUTO: 0.1 % (ref 0.3–6.2)
ERYTHROCYTE [DISTWIDTH] IN BLOOD BY AUTOMATED COUNT: 11.9 % (ref 12.3–15.4)
FINE GRAN CASTS URNS QL MICRO: ABNORMAL /LPF
FLUAV SUBTYP SPEC NAA+PROBE: NOT DETECTED
FLUBV RNA ISLT QL NAA+PROBE: NOT DETECTED
GFR SERPL CREATININE-BSD FRML MDRD: 76 ML/MIN/1.73
GLOBULIN UR ELPH-MCNC: 4.4 GM/DL
GLUCOSE BLDC GLUCOMTR-MCNC: 373 MG/DL (ref 70–130)
GLUCOSE SERPL-MCNC: 436 MG/DL (ref 65–99)
GLUCOSE UR STRIP-MCNC: ABNORMAL MG/DL
HCT VFR BLD AUTO: 43.3 % (ref 34–46.6)
HGB BLD-MCNC: 15.4 G/DL (ref 12–15.9)
HGB UR QL STRIP.AUTO: ABNORMAL
HYALINE CASTS UR QL AUTO: ABNORMAL /LPF
IMM GRANULOCYTES # BLD AUTO: 0.12 10*3/MM3 (ref 0–0.05)
IMM GRANULOCYTES NFR BLD AUTO: 0.8 % (ref 0–0.5)
KETONES UR QL STRIP: ABNORMAL
LEUKOCYTE ESTERASE UR QL STRIP.AUTO: NEGATIVE
LIPASE SERPL-CCNC: 62 U/L (ref 13–60)
LYMPHOCYTES # BLD AUTO: 0.61 10*3/MM3 (ref 0.7–3.1)
LYMPHOCYTES NFR BLD AUTO: 4.1 % (ref 19.6–45.3)
MAGNESIUM SERPL-MCNC: 1.1 MG/DL (ref 1.6–2.6)
MCH RBC QN AUTO: 30.9 PG (ref 26.6–33)
MCHC RBC AUTO-ENTMCNC: 35.6 G/DL (ref 31.5–35.7)
MCV RBC AUTO: 86.8 FL (ref 79–97)
MONOCYTES # BLD AUTO: 1.09 10*3/MM3 (ref 0.1–0.9)
MONOCYTES NFR BLD AUTO: 7.3 % (ref 5–12)
NEUTROPHILS NFR BLD AUTO: 13.04 10*3/MM3 (ref 1.7–7)
NEUTROPHILS NFR BLD AUTO: 87.5 % (ref 42.7–76)
NITRITE UR QL STRIP: NEGATIVE
NRBC BLD AUTO-RTO: 0 /100 WBC (ref 0–0.2)
PH UR STRIP.AUTO: 5.5 [PH] (ref 5–8)
PLATELET # BLD AUTO: 238 10*3/MM3 (ref 140–450)
PMV BLD AUTO: 10 FL (ref 6–12)
POTASSIUM SERPL-SCNC: 3.9 MMOL/L (ref 3.5–5.2)
PROT SERPL-MCNC: 8.2 G/DL (ref 6–8.5)
PROT UR QL STRIP: ABNORMAL
RBC # BLD AUTO: 4.99 10*6/MM3 (ref 3.77–5.28)
RBC # UR: ABNORMAL /HPF
REF LAB TEST METHOD: ABNORMAL
SARS-COV-2 RNA PNL SPEC NAA+PROBE: NOT DETECTED
SODIUM SERPL-SCNC: 128 MMOL/L (ref 136–145)
SP GR UR STRIP: >1.03 (ref 1–1.03)
SQUAMOUS #/AREA URNS HPF: ABNORMAL /HPF
TROPONIN T SERPL-MCNC: <0.01 NG/ML (ref 0–0.03)
UROBILINOGEN UR QL STRIP: ABNORMAL
WBC # BLD AUTO: 14.91 10*3/MM3 (ref 3.4–10.8)
WBC UR QL AUTO: ABNORMAL /HPF

## 2021-07-31 PROCEDURE — 25010000002 MAGNESIUM SULFATE 2 GM/50ML SOLUTION: Performed by: EMERGENCY MEDICINE

## 2021-07-31 PROCEDURE — 82962 GLUCOSE BLOOD TEST: CPT

## 2021-07-31 PROCEDURE — 84484 ASSAY OF TROPONIN QUANT: CPT | Performed by: EMERGENCY MEDICINE

## 2021-07-31 PROCEDURE — 87636 SARSCOV2 & INF A&B AMP PRB: CPT | Performed by: EMERGENCY MEDICINE

## 2021-07-31 PROCEDURE — 99223 1ST HOSP IP/OBS HIGH 75: CPT | Performed by: INTERNAL MEDICINE

## 2021-07-31 PROCEDURE — 74177 CT ABD & PELVIS W/CONTRAST: CPT

## 2021-07-31 PROCEDURE — 25010000002 ONDANSETRON PER 1 MG: Performed by: EMERGENCY MEDICINE

## 2021-07-31 PROCEDURE — 87150 DNA/RNA AMPLIFIED PROBE: CPT | Performed by: EMERGENCY MEDICINE

## 2021-07-31 PROCEDURE — 82009 KETONE BODYS QUAL: CPT | Performed by: EMERGENCY MEDICINE

## 2021-07-31 PROCEDURE — 70450 CT HEAD/BRAIN W/O DYE: CPT

## 2021-07-31 PROCEDURE — 99284 EMERGENCY DEPT VISIT MOD MDM: CPT

## 2021-07-31 PROCEDURE — 63710000001 INSULIN REGULAR HUMAN PER 5 UNITS: Performed by: EMERGENCY MEDICINE

## 2021-07-31 PROCEDURE — 83605 ASSAY OF LACTIC ACID: CPT | Performed by: EMERGENCY MEDICINE

## 2021-07-31 PROCEDURE — 93005 ELECTROCARDIOGRAM TRACING: CPT | Performed by: EMERGENCY MEDICINE

## 2021-07-31 PROCEDURE — 87086 URINE CULTURE/COLONY COUNT: CPT | Performed by: EMERGENCY MEDICINE

## 2021-07-31 PROCEDURE — 93010 ELECTROCARDIOGRAM REPORT: CPT | Performed by: INTERNAL MEDICINE

## 2021-07-31 PROCEDURE — 83735 ASSAY OF MAGNESIUM: CPT | Performed by: EMERGENCY MEDICINE

## 2021-07-31 PROCEDURE — 87186 SC STD MICRODIL/AGAR DIL: CPT | Performed by: EMERGENCY MEDICINE

## 2021-07-31 PROCEDURE — 87040 BLOOD CULTURE FOR BACTERIA: CPT | Performed by: EMERGENCY MEDICINE

## 2021-07-31 PROCEDURE — 25010000002 CEFTRIAXONE PER 250 MG: Performed by: EMERGENCY MEDICINE

## 2021-07-31 PROCEDURE — 85025 COMPLETE CBC W/AUTO DIFF WBC: CPT | Performed by: EMERGENCY MEDICINE

## 2021-07-31 PROCEDURE — 83690 ASSAY OF LIPASE: CPT | Performed by: EMERGENCY MEDICINE

## 2021-07-31 PROCEDURE — 81001 URINALYSIS AUTO W/SCOPE: CPT | Performed by: EMERGENCY MEDICINE

## 2021-07-31 PROCEDURE — 25010000002 IOPAMIDOL 61 % SOLUTION: Performed by: EMERGENCY MEDICINE

## 2021-07-31 PROCEDURE — 80053 COMPREHEN METABOLIC PANEL: CPT | Performed by: EMERGENCY MEDICINE

## 2021-07-31 PROCEDURE — 36415 COLL VENOUS BLD VENIPUNCTURE: CPT

## 2021-07-31 PROCEDURE — 86140 C-REACTIVE PROTEIN: CPT | Performed by: EMERGENCY MEDICINE

## 2021-07-31 PROCEDURE — 87077 CULTURE AEROBIC IDENTIFY: CPT | Performed by: EMERGENCY MEDICINE

## 2021-07-31 RX ORDER — DEXTROSE MONOHYDRATE 25 G/50ML
25 INJECTION, SOLUTION INTRAVENOUS
Status: DISCONTINUED | OUTPATIENT
Start: 2021-07-31 | End: 2021-08-03 | Stop reason: HOSPADM

## 2021-07-31 RX ORDER — NICOTINE POLACRILEX 4 MG
15 LOZENGE BUCCAL
Status: DISCONTINUED | OUTPATIENT
Start: 2021-07-31 | End: 2021-08-03 | Stop reason: HOSPADM

## 2021-07-31 RX ORDER — HEPARIN SODIUM 5000 [USP'U]/ML
5000 INJECTION, SOLUTION INTRAVENOUS; SUBCUTANEOUS EVERY 12 HOURS SCHEDULED
Status: DISCONTINUED | OUTPATIENT
Start: 2021-07-31 | End: 2021-08-03 | Stop reason: HOSPADM

## 2021-07-31 RX ORDER — MAGNESIUM SULFATE HEPTAHYDRATE 40 MG/ML
2 INJECTION, SOLUTION INTRAVENOUS ONCE
Status: COMPLETED | OUTPATIENT
Start: 2021-07-31 | End: 2021-07-31

## 2021-07-31 RX ORDER — ONDANSETRON 2 MG/ML
4 INJECTION INTRAMUSCULAR; INTRAVENOUS ONCE
Status: COMPLETED | OUTPATIENT
Start: 2021-07-31 | End: 2021-07-31

## 2021-07-31 RX ORDER — SODIUM CHLORIDE 0.9 % (FLUSH) 0.9 %
10 SYRINGE (ML) INJECTION AS NEEDED
Status: DISCONTINUED | OUTPATIENT
Start: 2021-07-31 | End: 2021-08-03 | Stop reason: HOSPADM

## 2021-07-31 RX ORDER — NITROGLYCERIN 0.4 MG/1
0.4 TABLET SUBLINGUAL
Status: DISCONTINUED | OUTPATIENT
Start: 2021-07-31 | End: 2021-08-03 | Stop reason: HOSPADM

## 2021-07-31 RX ORDER — SODIUM CHLORIDE 0.9 % (FLUSH) 0.9 %
10 SYRINGE (ML) INJECTION EVERY 12 HOURS SCHEDULED
Status: DISCONTINUED | OUTPATIENT
Start: 2021-07-31 | End: 2021-08-03 | Stop reason: HOSPADM

## 2021-07-31 RX ORDER — SODIUM CHLORIDE 9 MG/ML
75 INJECTION, SOLUTION INTRAVENOUS CONTINUOUS
Status: DISCONTINUED | OUTPATIENT
Start: 2021-07-31 | End: 2021-08-03 | Stop reason: HOSPADM

## 2021-07-31 RX ADMIN — SODIUM CHLORIDE, POTASSIUM CHLORIDE, SODIUM LACTATE AND CALCIUM CHLORIDE 1000 ML: 600; 310; 30; 20 INJECTION, SOLUTION INTRAVENOUS at 20:04

## 2021-07-31 RX ADMIN — ONDANSETRON 4 MG: 2 INJECTION INTRAMUSCULAR; INTRAVENOUS at 20:02

## 2021-07-31 RX ADMIN — IOPAMIDOL 80 ML: 612 INJECTION, SOLUTION INTRAVENOUS at 21:04

## 2021-07-31 RX ADMIN — SODIUM CHLORIDE, POTASSIUM CHLORIDE, SODIUM LACTATE AND CALCIUM CHLORIDE 1000 ML: 600; 310; 30; 20 INJECTION, SOLUTION INTRAVENOUS at 21:36

## 2021-07-31 RX ADMIN — MAGNESIUM SULFATE HEPTAHYDRATE 2 G: 40 INJECTION, SOLUTION INTRAVENOUS at 21:37

## 2021-07-31 RX ADMIN — HUMAN INSULIN 8 UNITS: 100 INJECTION, SOLUTION SUBCUTANEOUS at 22:35

## 2021-07-31 RX ADMIN — CEFTRIAXONE 1 G: 1 INJECTION, POWDER, FOR SOLUTION INTRAMUSCULAR; INTRAVENOUS at 22:31

## 2021-08-01 LAB
ALBUMIN SERPL-MCNC: 2.87 G/DL (ref 3.5–5.2)
ALBUMIN/GLOB SERPL: 0.9 G/DL
ALP SERPL-CCNC: 97 U/L (ref 39–117)
ALT SERPL W P-5'-P-CCNC: 15 U/L (ref 1–33)
ANION GAP SERPL CALCULATED.3IONS-SCNC: 10.1 MMOL/L (ref 5–15)
AST SERPL-CCNC: 12 U/L (ref 1–32)
BACTERIA BLD CULT: ABNORMAL
BILIRUB SERPL-MCNC: 0.5 MG/DL (ref 0–1.2)
BOTTLE TYPE: ABNORMAL
BUN SERPL-MCNC: 13 MG/DL (ref 6–20)
BUN/CREAT SERPL: 17.3 (ref 7–25)
CALCIUM SPEC-SCNC: 8.7 MG/DL (ref 8.6–10.5)
CHLORIDE SERPL-SCNC: 99 MMOL/L (ref 98–107)
CO2 SERPL-SCNC: 21.9 MMOL/L (ref 22–29)
CREAT SERPL-MCNC: 0.75 MG/DL (ref 0.57–1)
D-LACTATE SERPL-SCNC: 1.2 MMOL/L (ref 0.5–2)
GFR SERPL CREATININE-BSD FRML MDRD: 80 ML/MIN/1.73
GLOBULIN UR ELPH-MCNC: 3.3 GM/DL
GLUCOSE BLDC GLUCOMTR-MCNC: 110 MG/DL (ref 70–130)
GLUCOSE BLDC GLUCOMTR-MCNC: 174 MG/DL (ref 70–130)
GLUCOSE BLDC GLUCOMTR-MCNC: 258 MG/DL (ref 70–130)
GLUCOSE BLDC GLUCOMTR-MCNC: 286 MG/DL (ref 70–130)
GLUCOSE BLDC GLUCOMTR-MCNC: 325 MG/DL (ref 70–130)
GLUCOSE SERPL-MCNC: 178 MG/DL (ref 65–99)
MAGNESIUM SERPL-MCNC: 1.5 MG/DL (ref 1.6–2.6)
POTASSIUM SERPL-SCNC: 2.8 MMOL/L (ref 3.5–5.2)
POTASSIUM SERPL-SCNC: 4.4 MMOL/L (ref 3.5–5.2)
PROT SERPL-MCNC: 6.2 G/DL (ref 6–8.5)
QT INTERVAL: 316 MS
QT INTERVAL: 408 MS
QTC INTERVAL: 470 MS
QTC INTERVAL: 470 MS
SODIUM SERPL-SCNC: 131 MMOL/L (ref 136–145)

## 2021-08-01 PROCEDURE — 80053 COMPREHEN METABOLIC PANEL: CPT | Performed by: INTERNAL MEDICINE

## 2021-08-01 PROCEDURE — 63710000001 INSULIN ASPART PER 5 UNITS: Performed by: INTERNAL MEDICINE

## 2021-08-01 PROCEDURE — 82962 GLUCOSE BLOOD TEST: CPT

## 2021-08-01 PROCEDURE — 83605 ASSAY OF LACTIC ACID: CPT | Performed by: INTERNAL MEDICINE

## 2021-08-01 PROCEDURE — 87040 BLOOD CULTURE FOR BACTERIA: CPT | Performed by: INTERNAL MEDICINE

## 2021-08-01 PROCEDURE — 25010000002 CEFTRIAXONE PER 250 MG: Performed by: INTERNAL MEDICINE

## 2021-08-01 PROCEDURE — 84132 ASSAY OF SERUM POTASSIUM: CPT | Performed by: INTERNAL MEDICINE

## 2021-08-01 PROCEDURE — 93010 ELECTROCARDIOGRAM REPORT: CPT | Performed by: INTERNAL MEDICINE

## 2021-08-01 PROCEDURE — 25010000002 HEPARIN (PORCINE) PER 1000 UNITS: Performed by: INTERNAL MEDICINE

## 2021-08-01 PROCEDURE — 63710000001 INSULIN DETEMIR PER 5 UNITS: Performed by: INTERNAL MEDICINE

## 2021-08-01 PROCEDURE — 93005 ELECTROCARDIOGRAM TRACING: CPT | Performed by: INTERNAL MEDICINE

## 2021-08-01 PROCEDURE — 25010000002 KETOROLAC TROMETHAMINE PER 15 MG: Performed by: INTERNAL MEDICINE

## 2021-08-01 PROCEDURE — 99233 SBSQ HOSP IP/OBS HIGH 50: CPT | Performed by: INTERNAL MEDICINE

## 2021-08-01 PROCEDURE — 25010000002 MAGNESIUM SULFATE 2 GM/50ML SOLUTION: Performed by: INTERNAL MEDICINE

## 2021-08-01 PROCEDURE — 94799 UNLISTED PULMONARY SVC/PX: CPT

## 2021-08-01 PROCEDURE — 83735 ASSAY OF MAGNESIUM: CPT | Performed by: INTERNAL MEDICINE

## 2021-08-01 RX ORDER — RIVASTIGMINE 4.6 MG/24H
1 PATCH, EXTENDED RELEASE TRANSDERMAL DAILY
Status: DISCONTINUED | OUTPATIENT
Start: 2021-08-01 | End: 2021-08-03 | Stop reason: HOSPADM

## 2021-08-01 RX ORDER — POTASSIUM CHLORIDE 750 MG/1
40 CAPSULE, EXTENDED RELEASE ORAL AS NEEDED
Status: DISCONTINUED | OUTPATIENT
Start: 2021-08-01 | End: 2021-08-03 | Stop reason: HOSPADM

## 2021-08-01 RX ORDER — MAGNESIUM SULFATE HEPTAHYDRATE 40 MG/ML
4 INJECTION, SOLUTION INTRAVENOUS AS NEEDED
Status: DISCONTINUED | OUTPATIENT
Start: 2021-08-01 | End: 2021-08-03 | Stop reason: HOSPADM

## 2021-08-01 RX ORDER — MAGNESIUM SULFATE HEPTAHYDRATE 40 MG/ML
2 INJECTION, SOLUTION INTRAVENOUS AS NEEDED
Status: DISCONTINUED | OUTPATIENT
Start: 2021-08-01 | End: 2021-08-03 | Stop reason: HOSPADM

## 2021-08-01 RX ORDER — MAGNESIUM SULFATE HEPTAHYDRATE 40 MG/ML
2 INJECTION, SOLUTION INTRAVENOUS
Status: COMPLETED | OUTPATIENT
Start: 2021-08-01 | End: 2021-08-02

## 2021-08-01 RX ORDER — POTASSIUM CHLORIDE 7.45 MG/ML
10 INJECTION INTRAVENOUS
Status: DISCONTINUED | OUTPATIENT
Start: 2021-08-01 | End: 2021-08-03 | Stop reason: HOSPADM

## 2021-08-01 RX ORDER — RIVASTIGMINE 4.6 MG/24H
1 PATCH, EXTENDED RELEASE TRANSDERMAL DAILY
Status: CANCELLED | OUTPATIENT
Start: 2021-08-01

## 2021-08-01 RX ORDER — ACETAMINOPHEN 500 MG
1000 TABLET ORAL ONCE
Status: COMPLETED | OUTPATIENT
Start: 2021-08-01 | End: 2021-08-01

## 2021-08-01 RX ORDER — PROCHLORPERAZINE EDISYLATE 5 MG/ML
5 INJECTION INTRAMUSCULAR; INTRAVENOUS EVERY 6 HOURS PRN
Status: DISCONTINUED | OUTPATIENT
Start: 2021-08-01 | End: 2021-08-03 | Stop reason: HOSPADM

## 2021-08-01 RX ORDER — MAGNESIUM SULFATE HEPTAHYDRATE 40 MG/ML
2 INJECTION, SOLUTION INTRAVENOUS AS NEEDED
Status: DISCONTINUED | OUTPATIENT
Start: 2021-08-01 | End: 2021-08-01

## 2021-08-01 RX ORDER — ACETAMINOPHEN 650 MG/1
650 SUPPOSITORY RECTAL EVERY 6 HOURS PRN
Status: DISCONTINUED | OUTPATIENT
Start: 2021-08-01 | End: 2021-08-03 | Stop reason: HOSPADM

## 2021-08-01 RX ORDER — PRAVASTATIN SODIUM 40 MG
40 TABLET ORAL DAILY
Status: DISCONTINUED | OUTPATIENT
Start: 2021-08-01 | End: 2021-08-03 | Stop reason: HOSPADM

## 2021-08-01 RX ORDER — POTASSIUM CHLORIDE 1.5 G/1.77G
40 POWDER, FOR SOLUTION ORAL AS NEEDED
Status: DISCONTINUED | OUTPATIENT
Start: 2021-08-01 | End: 2021-08-03 | Stop reason: HOSPADM

## 2021-08-01 RX ORDER — POTASSIUM CHLORIDE 20 MEQ/1
40 TABLET, EXTENDED RELEASE ORAL EVERY 4 HOURS
Status: COMPLETED | OUTPATIENT
Start: 2021-08-01 | End: 2021-08-01

## 2021-08-01 RX ORDER — KETOROLAC TROMETHAMINE 30 MG/ML
30 INJECTION, SOLUTION INTRAMUSCULAR; INTRAVENOUS ONCE
Status: COMPLETED | OUTPATIENT
Start: 2021-08-01 | End: 2021-08-01

## 2021-08-01 RX ADMIN — SODIUM CHLORIDE 2 G: 9 INJECTION, SOLUTION INTRAVENOUS at 08:34

## 2021-08-01 RX ADMIN — HEPARIN SODIUM 5000 UNITS: 5000 INJECTION INTRAVENOUS; SUBCUTANEOUS at 01:31

## 2021-08-01 RX ADMIN — ACETAMINOPHEN 1000 MG: 500 TABLET ORAL at 02:13

## 2021-08-01 RX ADMIN — MAGNESIUM SULFATE HEPTAHYDRATE 2 G: 40 INJECTION, SOLUTION INTRAVENOUS at 22:27

## 2021-08-01 RX ADMIN — SODIUM CHLORIDE 125 ML/HR: 9 INJECTION, SOLUTION INTRAVENOUS at 20:40

## 2021-08-01 RX ADMIN — MAGNESIUM SULFATE HEPTAHYDRATE 2 G: 40 INJECTION, SOLUTION INTRAVENOUS at 20:20

## 2021-08-01 RX ADMIN — PRAVASTATIN SODIUM 40 MG: 40 TABLET ORAL at 10:40

## 2021-08-01 RX ADMIN — KETOROLAC TROMETHAMINE 30 MG: 30 INJECTION, SOLUTION INTRAMUSCULAR; INTRAVENOUS at 02:12

## 2021-08-01 RX ADMIN — HEPARIN SODIUM 5000 UNITS: 5000 INJECTION INTRAVENOUS; SUBCUTANEOUS at 20:19

## 2021-08-01 RX ADMIN — INSULIN ASPART 4 UNITS: 100 INJECTION, SOLUTION INTRAVENOUS; SUBCUTANEOUS at 16:48

## 2021-08-01 RX ADMIN — POTASSIUM CHLORIDE 40 MEQ: 20 TABLET, EXTENDED RELEASE ORAL at 10:40

## 2021-08-01 RX ADMIN — CEFTRIAXONE 1 G: 1 INJECTION, POWDER, FOR SOLUTION INTRAMUSCULAR; INTRAVENOUS at 01:24

## 2021-08-01 RX ADMIN — POTASSIUM CHLORIDE 40 MEQ: 20 TABLET, EXTENDED RELEASE ORAL at 16:49

## 2021-08-01 RX ADMIN — POTASSIUM CHLORIDE 40 MEQ: 20 TABLET, EXTENDED RELEASE ORAL at 14:02

## 2021-08-01 RX ADMIN — SODIUM CHLORIDE 125 ML/HR: 9 INJECTION, SOLUTION INTRAVENOUS at 01:15

## 2021-08-01 RX ADMIN — INSULIN ASPART 4 UNITS: 100 INJECTION, SOLUTION INTRAVENOUS; SUBCUTANEOUS at 01:35

## 2021-08-01 RX ADMIN — RIVASTIGMINE 1 PATCH: 4.6 PATCH TRANSDERMAL at 10:40

## 2021-08-01 RX ADMIN — SODIUM CHLORIDE, PRESERVATIVE FREE 10 ML: 5 INJECTION INTRAVENOUS at 08:35

## 2021-08-01 RX ADMIN — SODIUM CHLORIDE 1000 ML: 9 INJECTION, SOLUTION INTRAVENOUS at 01:14

## 2021-08-01 RX ADMIN — INSULIN ASPART 5 UNITS: 100 INJECTION, SOLUTION INTRAVENOUS; SUBCUTANEOUS at 20:19

## 2021-08-01 RX ADMIN — SODIUM CHLORIDE, PRESERVATIVE FREE 10 ML: 5 INJECTION INTRAVENOUS at 20:20

## 2021-08-01 RX ADMIN — MAGNESIUM SULFATE HEPTAHYDRATE 2 G: 40 INJECTION, SOLUTION INTRAVENOUS at 17:24

## 2021-08-01 RX ADMIN — HEPARIN SODIUM 5000 UNITS: 5000 INJECTION INTRAVENOUS; SUBCUTANEOUS at 08:33

## 2021-08-01 RX ADMIN — ACETAMINOPHEN 650 MG: 650 SUPPOSITORY RECTAL at 21:18

## 2021-08-01 RX ADMIN — INSULIN DETEMIR 45 UNITS: 100 INJECTION, SOLUTION SUBCUTANEOUS at 20:19

## 2021-08-01 NOTE — PLAN OF CARE
Problem: Adult Inpatient Plan of Care  Goal: Plan of Care Review  8/1/2021 0924 by Danyel Norton RN  Outcome: Ongoing, Progressing  8/1/2021 0924 by Danyel Norton RN  Outcome: Ongoing, Progressing  Goal: Patient-Specific Goal (Individualized)  8/1/2021 0924 by Danyel Norton RN  Outcome: Ongoing, Progressing  8/1/2021 0924 by Danyel Norton RN  Outcome: Ongoing, Progressing  Goal: Absence of Hospital-Acquired Illness or Injury  8/1/2021 0924 by Danyel Norton RN  Outcome: Ongoing, Progressing  8/1/2021 0924 by Danyel Norton RN  Outcome: Ongoing, Progressing  Intervention: Identify and Manage Fall Risk  Recent Flowsheet Documentation  Taken 8/1/2021 0833 by Danyel Norton RN  Safety Promotion/Fall Prevention:   safety round/check completed   fall prevention program maintained  Intervention: Prevent Skin Injury  Recent Flowsheet Documentation  Taken 8/1/2021 0833 by Danyel Norton RN  Body Position: position changed independently  Skin Protection:   adhesive use limited   pulse oximeter probe site changed  Goal: Optimal Comfort and Wellbeing  8/1/2021 0924 by Danyel Norton RN  Outcome: Ongoing, Progressing  8/1/2021 0924 by Danyel Norton RN  Outcome: Ongoing, Progressing  Intervention: Provide Person-Centered Care  Recent Flowsheet Documentation  Taken 8/1/2021 0833 by Danyel Norton RN  Trust Relationship/Rapport:   care explained   thoughts/feelings acknowledged  Goal: Readiness for Transition of Care  8/1/2021 0924 by Danyel Norton RN  Outcome: Ongoing, Progressing  8/1/2021 0924 by Danyel Norton RN  Outcome: Ongoing, Progressing     Problem: Balance Impairment (Functional Deficit)  Goal: Improved Balance and Postural Control  8/1/2021 0924 by Danyel Norton RN  Outcome: Ongoing, Progressing  8/1/2021 0924 by Danyel Norton RN  Outcome: Ongoing, Progressing  Intervention: Optimize Balance and Safe Activity  Recent Flowsheet Documentation  Taken 8/1/2021 0833 by Danyel Norton  R, RN  Safety Promotion/Fall Prevention:   safety round/check completed   fall prevention program maintained     Problem: Cognitive Impairment (Functional Deficit)  Goal: Optimal Functional [place]  8/1/2021 0924 by Danyel Norton RN  Outcome: Ongoing, Progressing  8/1/2021 0924 by Danyel Norton RN  Outcome: Ongoing, Progressing  Intervention: Optimize Cognitive Function  Recent Flowsheet Documentation  Taken 8/1/2021 0833 by Danyel Norton RN  Reorientation Measures:   clock in view   reorientation provided     Problem: Coordination Impairment (Functional Deficit)  Goal: Optimal Coordination  8/1/2021 0924 by Danyel Norton RN  Outcome: Ongoing, Progressing  8/1/2021 0924 by Danyel Norton RN  Outcome: Ongoing, Progressing     Problem: Muscle Strength Impairment (Functional Deficit)  Goal: Improved Muscle Strength  8/1/2021 0924 by Danyel Norton RN  Outcome: Ongoing, Progressing  8/1/2021 0924 by Danyel Norton RN  Outcome: Ongoing, Progressing     Problem: Muscle Tone Impairment (Functional Deficit)  Goal: Improved Muscle Tone  8/1/2021 0924 by Danyel Norton RN  Outcome: Ongoing, Progressing  8/1/2021 0924 by Danyel Norton RN  Outcome: Ongoing, Progressing     Problem: Range of Motion Impairment (Functional Deficit)  Goal: Optimal Range of Motion  8/1/2021 0924 by Danyel Norton RN  Outcome: Ongoing, Progressing  8/1/2021 0924 by Danyel Norton RN  Outcome: Ongoing, Progressing  Intervention: Maintain Functional Joint Range and Position  Recent Flowsheet Documentation  Taken 8/1/2021 0833 by Danyel Norton RN  Positioning/Transfer Devices: pillows     Problem: Sensory Impairment (Functional Deficit)  Goal: Compensation for Sensory Deficit  8/1/2021 0924 by Danyel Norton RN  Outcome: Ongoing, Progressing  8/1/2021 0924 by Danyel Norton RN  Outcome: Ongoing, Progressing  Intervention: Prevent Injury Related to Sensory Impairment  Recent Flowsheet Documentation  Taken 8/1/2021  0833 by Danyel Norton RN  Pressure Reduction Devices: pressure-redistributing mattress utilized  Skin Protection:   adhesive use limited   pulse oximeter probe site changed     Problem: Activity Intolerance  Goal: Enhanced Capacity and Energy  8/1/2021 0924 by Danyel Norton RN  Outcome: Ongoing, Progressing  8/1/2021 0924 by Danyel Norton RN  Outcome: Ongoing, Progressing  Intervention: Optimize Activity Tolerance  Recent Flowsheet Documentation  Taken 8/1/2021 0833 by Danyel Norton RN  Activity Management: activity adjusted per tolerance  Environmental Support:   calm environment promoted   rest periods encouraged     Problem: Fall Injury Risk  Goal: Absence of Fall and Fall-Related Injury  8/1/2021 0924 by Danyel Norton RN  Outcome: Ongoing, Progressing  8/1/2021 0924 by Danyel Norton RN  Outcome: Ongoing, Progressing  Intervention: Identify and Manage Contributors to Fall Injury Risk  Recent Flowsheet Documentation  Taken 8/1/2021 0833 by Danyel Norton RN  Medication Review/Management: medications reviewed  Intervention: Promote Injury-Free Environment  Recent Flowsheet Documentation  Taken 8/1/2021 0833 by Danyel Norton RN  Safety Promotion/Fall Prevention:   safety round/check completed   fall prevention program maintained     Problem: UTI (Urinary Tract Infection)  Goal: Improved Infection Symptoms  Outcome: Ongoing, Progressing   Goal Outcome Evaluation:

## 2021-08-01 NOTE — PLAN OF CARE
Goal Outcome Evaluation:  Plan of Care Reviewed With: patient        Progress: no change   Pt in no distress, no complaints voiced, temp elevated and treated, will continue with current plan of care

## 2021-08-01 NOTE — H&P
"Hospitalist History and Physical    Patient Identification:  Name: Sandra Leahy  Age/Sex: 55 y.o. female  :  1965  MRN: [id number]        Admit Date: 2021   Primary Care Physician: John Mueller APRN    Chief Complaint   Patient presents with   • Nausea   • Weakness - Generalized   • Vomiting   • Dizziness       History of Present Illness  Patient is a 55 y.o. female presents with the following: Confusion, nausea and vomiting    The patient is a 54 yo female with PMHx significant for diabetes mellitus, peripheral/diabetic neuropathy, early onset dementia of her family and fibromyalgia who presents to the emergency department complaining of fatigue, confusion and nausea/vomiting.    Please note that the patient is currently unable to provide any history of presenting illness.  I contacted the patient's  Charles Leahy who provides history over the phone.  The patient's  states that she has had 2 to 3 days of intermittent chills with nausea and vomiting.  She has also had lethargy and confusion.  He states that she has had a recent \"yeast infection\" and has had 2-3 in the past few months.  He reports that her usual blood glucose levels are less than 200.  He states that she drinks 10-12 diet Pepsi's per day.  She has not reported any dysuria or hematuria per patient's 's report.  He also states that she has not mentioned any right flank pain.  No reports of back pain.  No known reports of shortness of air or chest pains.    The patient's  states that at baseline she is able to take care of her activities of daily living.  He states that she cooks all meals for the family.  He states that he does manage her medicines.  He states that at baseline she does repeat questions and has some short-term memory loss.    In the emergency department, her temperature was 98 °F, heart rate 135, respirations 16, blood pressure 131/82 mmHg and her room air oxygen saturation was 97%.  CMP " was remarkable for a glucose of 436, sodium falsely low at 128, chloride 89, anion gap 18.5.  Lactate 3.2, C-RP 24.14 and magnesium 1.1.  Lactate 3.2.  CBC revealed white blood cell count of 14.91 with 77.5% neutrophils.  Urinalysis with too numerous to count white blood cells, 4+ bacteria, negative nitrites and negative leukocytes.  CT scan of the abdomen pelvis with contrast revealed abnormal enhancement involving the posterior aspect of the right kidney suggesting acute pyelonephritis.  Patient with perinephric stranding noted.  Patient with minimal atelectatic change in the right lung base.  CT head without contrast revealed mild cerebral atrophy with periventricular hypodensities which are thought to represent white matter microvascular change.    The patient has been admitted to the telemetry unit for further evaluation and management.    Present during visit: GRISELDA Wright    Past History:  Past Medical History:   Diagnosis Date   • Bursitis    • Diabetic neuropathy (CMS/HCC)    • Fatigue    • Fibromyalgia    • Peripheral neuropathy    • Postsurgical menopause    • Tetanus toxoid vaccination status unknown    • Visual impairment    • Vitamin D deficiency      Past Surgical History:   Procedure Laterality Date   • BREAST BIOPSY Left 7-8 yrs ago    benign   • CHOLECYSTECTOMY     • HYSTERECTOMY      40s   • KNEE ARTHROSCOPY W/ MENISCAL REPAIR Left      Family History   Problem Relation Age of Onset   • Diabetes Other    • Asthma Other    • Hypertension Other    • Diabetes Mother    • Heart disease Mother    • Hypertension Mother    • Hyperlipidemia Mother    • Arthritis Mother    • Diabetes Father    • Heart disease Father    • Diabetes Sister    • Cancer Sister    • Diabetes Brother      Social History     Tobacco Use   • Smoking status: Former Smoker     Types: Cigarettes     Quit date: 1996     Years since quittin.2   • Smokeless tobacco: Never Used   Vaping Use   • Vaping Use: Never used   Substance  Use Topics   • Alcohol use: No   • Drug use: No     (Not in a hospital admission)    Allergies: Lantus [insulin glargine], Codeine, Exenatide, and Sitagliptin    Review of Systems:  Review of Systems   Unable to perform ROS: Acuity of condition      Vital Signs  Temp:  [98 °F (36.7 °C)] 98 °F (36.7 °C)  Heart Rate:  [135] 135  Resp:  [16] 16  BP: (131)/(82) 131/82  Body mass index is 23.63 kg/m².    Physical Exam:  Physical Exam  Constitutional:       General: She is in acute distress.      Appearance: She is well-developed. She is ill-appearing and toxic-appearing.   HENT:      Head: Normocephalic and atraumatic.      Mouth/Throat:      Mouth: Mucous membranes are dry.   Eyes:      Conjunctiva/sclera: Conjunctivae normal.   Neck:      Trachea: No tracheal deviation.   Cardiovascular:      Rate and Rhythm: Regular rhythm. Tachycardia present.      Pulses:           Posterior tibial pulses are 2+ on the right side and 2+ on the left side.      Heart sounds: No murmur heard.   No friction rub. No gallop.    Pulmonary:      Effort: No respiratory distress.      Breath sounds: Examination of the right-lower field reveals decreased breath sounds. Decreased breath sounds present. No wheezing or rales.   Abdominal:      General: Bowel sounds are normal. There is no distension.      Palpations: Abdomen is soft.      Tenderness: There is no abdominal tenderness. There is no guarding.   Musculoskeletal:      Right lower leg: No edema.      Left lower leg: No edema.   Skin:     General: Skin is warm and dry.      Findings: No erythema or rash.   Neurological:      Mental Status: She is lethargic.      Comments: Unable to assess due to confusion         Results Review:    Results from last 7 days   Lab Units 07/31/21 1935   WBC 10*3/mm3 14.91*   HEMOGLOBIN g/dL 15.4   HEMATOCRIT % 43.3   PLATELETS 10*3/mm3 238     Results from last 7 days   Lab Units 07/31/21 1935   SODIUM mmol/L 128*   POTASSIUM mmol/L 3.9   CHLORIDE mmol/L  89*   CO2 mmol/L 20.5*   BUN mg/dL 13   CREATININE mg/dL 0.78   CALCIUM mg/dL 10.0   GLUCOSE mg/dL 436*     Results from last 7 days   Lab Units 07/31/21 1935   BILIRUBIN mg/dL 1.0   ALK PHOS U/L 130*   AST (SGOT) U/L 17   ALT (SGPT) U/L 21     Results from last 7 days   Lab Units 07/31/21 1935   CRP mg/dL 24.14*     Results from last 7 days   Lab Units 07/31/21 1935   MAGNESIUM mg/dL 1.1*     Results from last 7 days   Lab Units 07/31/21 1935   TROPONIN T ng/mL <0.010     Imaging:    I have personally reviewed the EKG; pain official cardiology interpretation, however, per my view the patient has a sinus tachycardia with no acute ST-T wave changes; QTc is 470 ms    CT scan of the abdomen pelvis with contrast images have been reviewed and per my view the patient does have enhancement of the right kidney with perinephric stranding suggestive of pyelonephritis; patient with probable right lower lobe atelectasis    Imaging Results (Most Recent)     Procedure Component Value Units Date/Time    CT Abdomen Pelvis With Contrast [[id number]] Collected: 07/31/21 2118     Updated: 07/31/21 2120    Narrative:      CT Abdomen Pelvis W    INDICATION:   Acute onset of vomiting and dizziness    TECHNIQUE:   CT of the abdomen and pelvis with contrast. Coronal and sagittal reconstructions were obtained.  Radiation dose reduction techniques included automated exposure control or exposure modulation based on body size. Radiation audit for number of CT and  nuclear cardiology exams performed in the last year: 0.      COMPARISON:   No pertinent prior study    FINDINGS:  Included lung bases demonstrate minimal atelectasis in the right lung base posteriorly.    Abdomen: The liver shows evidence of hepatic steatosis. No focal hepatic lesions. The gallbladder is surgically absent. The spleen and pancreas appear unremarkable. No adrenal abnormalities.    The left kidney shows normal enhancement. The right kidney demonstrates patchy areas  of abnormal enhancement posteriorly which can be seen in acute pyelonephritis. There is perinephric stranding on the right side.    The aorta shows atherosclerotic change without evidence of aneurysmal dilatation. No threshold retroperitoneal adenopathy.    Pelvis: The bowel pattern is nonobstructive. Normal appendix. The urinary bladder appears unremarkable. Status post hysterectomy. No threshold pelvic or inguinal adenopathy.    Regional osseous structures show no acute or aggressive bone lesions.      Impression:        1.  There is abnormal enhancement involving the posterior aspect of the right kidney suggesting acute pyelonephritis. Perinephric stranding is also demonstrated the right side.    2.  Minimal atelectatic changes in right lung base.    3.  Hepatic steatosis.          Signer Name: Houston Wei MD   Signed: 7/31/2021 9:18 PM   Workstation Name: [id number]    [facility]    CT Head Without Contrast [[id number]] Collected: 07/31/21 2112     Updated: 07/31/21 2115    Narrative:      CT Head WO    HISTORY:   Acute onset of vomiting and dizziness    TECHNIQUE:   Axial unenhanced head CT. Radiation dose reduction techniques included automated exposure control or exposure modulation based on body size. Count of known CT and cardiac nuc med studies performed in previous 12 months: 0.     Time of scan: 8:54 PM    COMPARISON:   MR of the brain dated 3/18/2019    FINDINGS:     The ventricles are mildly generous in size. Cortical sulci are correspondingly prominent. No extraaxial fluid collections are seen.    No parenchymal or subarachnoid hemorrhage is present. Periventricular hypodensities are demonstrated which are nonspecific but likely represent white matter microvascular change in this patient. No CT evidence of mass or acute infarct.    The mastoid air cells are clear. The visualized paranasal sinuses are clear.  Orbital structures demonstrate no acute findings.      Impression:       Impression:  1. No clearly acute intracranial pathology demonstrated.  2. Mild cerebral atrophy and nonspecific periventricular hypodensities which are thought to represent white matter microvascular change.    Signer Name: Houston Wei MD   Signed: 7/31/2021 9:12 PM   Workstation Name: [id number]    [facility]          Assessment/Plan     -Severe sepsis, present upon admission and secondary to a complicated urinary tract infection/right-sided pyelonephritis  -Diabetes mellitus type 2 with hyperglycemia  -Anion gap metabolic acidosis  -Acute hypomagnesemia  -Prolonged QTC, 470 ms and likely due to hypomagnesemia  -Diabetic neuropathy  -Early onset dementia    Patient has been admitted to the telemetry unit.  Upon arrival to the telemetry unit, patient was found to be febrile with a temperature of 103.4 °F.  Patient to receive Tylenol suppositories as needed and will apply a cooling blanket if possible.  Patient received a 1 L lactated Ringer bolus in the emergency department.  Will give another 1 L bolus at this time and then placed on maintenance fluids at 125 mL/hour.  Patient received 1 g of IV Rocephin in the emergency department; will give another 1 g now for sepsis dose/pyelonephritis dose of Rocephin and will resume with 2 g IV daily meeting in the morning.  Follow final blood and urine culture results.    Patient will be nothing by mouth at this time.  Patient has been started on Accu-Cheks and low-dose sliding scale insulin as needed.  We also have the hypoglycemia protocol if needed.    Patient has received 2 g of intravenous magnesium sulfate in the emergency department.  I have ordered a repeat magnesium level in a.m. in addition to an EKG.  Continue to monitor the patient on telemetry.  I am currently awaiting her home medication list and will review for any potential offending agents and plan to hold them at this time.    Repeat patient's CBC and chemistry panel in  a.m.    DVT prophylaxis: Subcutaneous heparin    Estimated Length of Stay: > 2 MNs    Patient is considered to be a high risk patient due to: Severe sepsis, uncontrolled diabetes mellitus, severe hypomagnesemia     Disposition Home when medically stable    I discussed the patients findings and my recommendations with family and nursing staff          Symone Zambrano DO  07/31/21  23:14 EDT

## 2021-08-01 NOTE — ED NOTES
Pt returned to ED room 110 at this time. Pt on V/S monitoring at this time. Pt tolerated well.     Elaine Read RN  07/31/21 1338

## 2021-08-01 NOTE — PAYOR COMM NOTE
"CONTACT:  ELIZA OSBORNE MSN, APRN  UTILIZATION MANAGEMENT DEPT.  [facility]  [address] [facility], [address]  PHONE:  [phone]  FAX: [phone]    CLINICAL PER REQUEST.    REFERENCE # [id number]    Sandra Leahy (55 y.o. Female)     Date of Birth Social Security Number Address Home Phone MRN    1965  [address]   [place] [id number] [phone] [id number]    Buddhism Marital Status          Unknown        Admission Date Admission Type Admitting Provider Attending Provider Department, Room/Bed    21 Emergency Symone Zambrano DO Troxell, Christopher A, DO [facility] 3 [facility], 3316/2S    Discharge Date Discharge Disposition Discharge Destination                       Attending Provider: Win Wen DO    Allergies: Lantus [Insulin Glargine], Codeine, Exenatide, Sitagliptin    Isolation: None   Infection: None   Code Status: CPR    Ht: 170.2 cm (67\")   Wt: 74.1 kg (163 lb 6.4 oz)    Admission Cmt: None   Principal Problem: None                Active Insurance as of 2021     Primary Coverage     Payor Plan Insurance Group Employer/Plan Group    HUMANA MEDICARE REPLACEMENT HUMANA MEDICARE REPLACEMENT [id number]     Payor Plan Address Payor Plan Phone Number Payor Plan Fax Number Effective Dates    [address] [address] [phone]  2020 - None Entered    [facility] [id number]       Subscriber Name Subscriber Birth Date Member ID       SANDRA LEAHY 1965 [id number]                 Emergency Contacts      (Rel.) Home Phone Work Phone Mobile Phone    Charles Leahy (Spouse) [phone] -- --               History & Physical      Symone Zambrano DO at 21 2314          Hospitalist History and Physical    Patient Identification:  Name: Sandra Leahy  Age/Sex: 55 y.o. female  :  1965  MRN: [id number]        Admit Date: 2021   Primary Care Physician: John Mueller, APRN    Chief Complaint   Patient presents with " "  • Nausea   • Weakness - Generalized   • Vomiting   • Dizziness       History of Present Illness  Patient is a 55 y.o. female presents with the following: Confusion, nausea and vomiting    The patient is a 54 yo female with PMHx significant for diabetes mellitus, peripheral/diabetic neuropathy, early onset dementia of her family and fibromyalgia who presents to the emergency department complaining of fatigue, confusion and nausea/vomiting.    Please note that the patient is currently unable to provide any history of presenting illness.  I contacted the patient's  Charles Leahy who provides history over the phone.  The patient's  states that she has had 2 to 3 days of intermittent chills with nausea and vomiting.  She has also had lethargy and confusion.  He states that she has had a recent \"yeast infection\" and has had 2-3 in the past few months.  He reports that her usual blood glucose levels are less than 200.  He states that she drinks 10-12 diet Pepsi's per day.  She has not reported any dysuria or hematuria per patient's 's report.  He also states that she has not mentioned any right flank pain.  No reports of back pain.  No known reports of shortness of air or chest pains.    The patient's  states that at baseline she is able to take care of her activities of daily living.  He states that she cooks all meals for the family.  He states that he does manage her medicines.  He states that at baseline she does repeat questions and has some short-term memory loss.    In the emergency department, her temperature was 98 °F, heart rate 135, respirations 16, blood pressure 131/82 mmHg and her room air oxygen saturation was 97%.  CMP was remarkable for a glucose of 436, sodium falsely low at 128, chloride 89, anion gap 18.5.  Lactate 3.2, C-RP 24.14 and magnesium 1.1.  Lactate 3.2.  CBC revealed white blood cell count of 14.91 with 77.5% neutrophils.  Urinalysis with too numerous to count white " blood cells, 4+ bacteria, negative nitrites and negative leukocytes.  CT scan of the abdomen pelvis with contrast revealed abnormal enhancement involving the posterior aspect of the right kidney suggesting acute pyelonephritis.  Patient with perinephric stranding noted.  Patient with minimal atelectatic change in the right lung base.  CT head without contrast revealed mild cerebral atrophy with periventricular hypodensities which are thought to represent white matter microvascular change.    The patient has been admitted to the telemetry unit for further evaluation and management.    Present during visit: GRISELDA Wright    Past History:  Past Medical History:   Diagnosis Date   • Bursitis    • Diabetic neuropathy (CMS/HCC)    • Fatigue    • Fibromyalgia    • Peripheral neuropathy    • Postsurgical menopause    • Tetanus toxoid vaccination status unknown    • Visual impairment    • Vitamin D deficiency      Past Surgical History:   Procedure Laterality Date   • BREAST BIOPSY Left 7-8 yrs ago    benign   • CHOLECYSTECTOMY     • HYSTERECTOMY      40s   • KNEE ARTHROSCOPY W/ MENISCAL REPAIR Left      Family History   Problem Relation Age of Onset   • Diabetes Other    • Asthma Other    • Hypertension Other    • Diabetes Mother    • Heart disease Mother    • Hypertension Mother    • Hyperlipidemia Mother    • Arthritis Mother    • Diabetes Father    • Heart disease Father    • Diabetes Sister    • Cancer Sister    • Diabetes Brother      Social History     Tobacco Use   • Smoking status: Former Smoker     Types: Cigarettes     Quit date: 1996     Years since quittin.2   • Smokeless tobacco: Never Used   Vaping Use   • Vaping Use: Never used   Substance Use Topics   • Alcohol use: No   • Drug use: No     (Not in a hospital admission)    Allergies: Lantus [insulin glargine], Codeine, Exenatide, and Sitagliptin    Review of Systems:  Review of Systems   Unable to perform ROS: Acuity of condition      Vital  Signs  Temp:  [98 °F (36.7 °C)] 98 °F (36.7 °C)  Heart Rate:  [135] 135  Resp:  [16] 16  BP: (131)/(82) 131/82  Body mass index is 23.63 kg/m².    Physical Exam:  Physical Exam  Constitutional:       General: She is in acute distress.      Appearance: She is well-developed. She is ill-appearing and toxic-appearing.   HENT:      Head: Normocephalic and atraumatic.      Mouth/Throat:      Mouth: Mucous membranes are dry.   Eyes:      Conjunctiva/sclera: Conjunctivae normal.   Neck:      Trachea: No tracheal deviation.   Cardiovascular:      Rate and Rhythm: Regular rhythm. Tachycardia present.      Pulses:           Posterior tibial pulses are 2+ on the right side and 2+ on the left side.      Heart sounds: No murmur heard.   No friction rub. No gallop.    Pulmonary:      Effort: No respiratory distress.      Breath sounds: Examination of the right-lower field reveals decreased breath sounds. Decreased breath sounds present. No wheezing or rales.   Abdominal:      General: Bowel sounds are normal. There is no distension.      Palpations: Abdomen is soft.      Tenderness: There is no abdominal tenderness. There is no guarding.   Musculoskeletal:      Right lower leg: No edema.      Left lower leg: No edema.   Skin:     General: Skin is warm and dry.      Findings: No erythema or rash.   Neurological:      Mental Status: She is lethargic.      Comments: Unable to assess due to confusion         Assessment/Plan     -Severe sepsis, present upon admission and secondary to a complicated urinary tract infection/right-sided pyelonephritis  -Diabetes mellitus type 2 with hyperglycemia  -Anion gap metabolic acidosis  -Acute hypomagnesemia  -Prolonged QTC, 470 ms and likely due to hypomagnesemia  -Diabetic neuropathy  -Early onset dementia    Patient has been admitted to the telemetry unit.  Upon arrival to the telemetry unit, patient was found to be febrile with a temperature of 103.4 °F.  Patient to receive Tylenol  suppositories as needed and will apply a cooling blanket if possible.  Patient received a 1 L lactated Ringer bolus in the emergency department.  Will give another 1 L bolus at this time and then placed on maintenance fluids at 125 mL/hour.  Patient received 1 g of IV Rocephin in the emergency department; will give another 1 g now for sepsis dose/pyelonephritis dose of Rocephin and will resume with 2 g IV daily meeting in the morning.  Follow final blood and urine culture results.    Patient will be nothing by mouth at this time.  Patient has been started on Accu-Cheks and low-dose sliding scale insulin as needed.  We also have the hypoglycemia protocol if needed.    Patient has received 2 g of intravenous magnesium sulfate in the emergency department.  I have ordered a repeat magnesium level in a.m. in addition to an EKG.  Continue to monitor the patient on telemetry.  I am currently awaiting her home medication list and will review for any potential offending agents and plan to hold them at this time.    Repeat patient's CBC and chemistry panel in a.m.    DVT prophylaxis: Subcutaneous heparin    Estimated Length of Stay: > 2 MNs    Patient is considered to be a high risk patient due to: Severe sepsis, uncontrolled diabetes mellitus, severe hypomagnesemia     Disposition Home when medically stable    I discussed the patients findings and my recommendations with family and nursing staff          Symone Zambrano DO  07/31/21  23:14 EDT    Electronically signed by Symone Zambrano DO at 08/01/21 0029          Emergency Department Notes      Larry Mendoza MD at 07/31/21 1920          Subjective   55-year-old female with history of early onset dementia presenting with nausea, vomiting, lightheadedness and dizziness.  She has been sick for several days but really got confused today as well.  She has been able to keep anything down.  No fevers at home.  No falls or trauma.  No complaints of pain  anywhere.  No prior episodes like this.  She is diabetic and her blood glucose has been running high.      History provided by:  Patient  History limited by:  Mental status change   used: No        Review of Systems   Constitutional: Positive for fatigue. Negative for fever.   HENT: Negative.    Eyes: Negative.    Respiratory: Negative.    Cardiovascular: Negative.  Negative for chest pain.   Gastrointestinal: Positive for nausea and vomiting. Negative for abdominal pain and diarrhea.   Genitourinary: Negative.  Negative for dysuria.   Skin: Negative.    Neurological: Positive for dizziness and weakness (Generalized). Negative for numbness.   Psychiatric/Behavioral: Negative.    All other systems reviewed and are negative.      Objective   Physical Exam  Vitals and nursing note reviewed.   Constitutional:       General: She is not in acute distress.     Appearance: She is well-developed. She is not diaphoretic.   HENT:      Head: Normocephalic and atraumatic.      Right Ear: External ear normal.      Left Ear: External ear normal.      Nose: Nose normal.      Mouth/Throat:      Mouth: Mucous membranes are dry.   Eyes:      Extraocular Movements: Extraocular movements intact.      Conjunctiva/sclera: Conjunctivae normal.      Pupils: Pupils are equal, round, and reactive to light.   Neck:      Vascular: No JVD.      Trachea: No tracheal deviation.   Cardiovascular:      Rate and Rhythm: Normal rate and regular rhythm.      Heart sounds: Normal heart sounds. No murmur heard.     Pulmonary:      Effort: Pulmonary effort is normal. No respiratory distress.      Breath sounds: Normal breath sounds. No wheezing.   Abdominal:      General: Bowel sounds are normal.      Palpations: Abdomen is soft.      Tenderness: There is no abdominal tenderness.   Musculoskeletal:         General: No deformity. Normal range of motion.      Cervical back: Normal range of motion and neck supple.   Skin:     General:  Skin is warm and dry.      Capillary Refill: Capillary refill takes less than 2 seconds.      Coloration: Skin is not pale.      Findings: No erythema or rash.   Neurological:      General: No focal deficit present.      Mental Status: She is alert and oriented to person, place, and time.      Cranial Nerves: No cranial nerve deficit.      Sensory: Sensation is intact.      Motor: Motor function is intact.      Coordination: Coordination is intact.   Psychiatric:         Behavior: Behavior normal.         Thought Content: Thought content normal.         Procedures      CT Head Without Contrast   Final Result   Impression:   1. No clearly acute intracranial pathology demonstrated.   2. Mild cerebral atrophy and nonspecific periventricular hypodensities which are thought to represent white matter microvascular change.      Signer Name: Houston Wei MD    Signed: 7/31/2021 9:12 PM    Workstation Name: [facility]     Radiology [facility]      CT Abdomen Pelvis With Contrast   Final Result      1.  There is abnormal enhancement involving the posterior aspect of the right kidney suggesting acute pyelonephritis. Perinephric stranding is also demonstrated the right side.      2.  Minimal atelectatic changes in right lung base.      3.  Hepatic steatosis.               Signer Name: Houston Wei MD    Signed: 7/31/2021 9:18 PM    Workstation Name: [facility]     Radiology [facility]            ED Course  ED Course as of Aug 01 0807   Sat Jul 31, 2021 1955 EKG: Sinus tachycardia, rate 133, , QRS 88, QTc 470, no STEMI.    [DH]      ED Course User Index  [DH] Larry Mendoza MD                                           MDM  Number of Diagnoses or Management Options  Pyelonephritis  Sepsis, due to unspecified organism, unspecified whether acute organ dysfunction present (CMS/[facility])  Diagnosis management comments: 55-year-old female found to be septic from pyelonephritis.  She  has improved with IV fluids and antibiotics.  Plan for admission.  Discussed with Dr. Ibarra.       Amount and/or Complexity of Data Reviewed  Clinical lab tests: reviewed and ordered  Tests in the radiology section of CPT®: ordered and reviewed  Discuss the patient with other providers: yes  Independent visualization of images, tracings, or specimens: yes        Final diagnoses:   Sepsis, due to unspecified organism, unspecified whether acute organ dysfunction present (CMS/[facility])   Pyelonephritis       ED Disposition  ED Disposition     ED Disposition Condition Comment    Decision to Admit  Level of Care: Telemetry [5]   Diagnosis: Sepsis, due to unspecified organism, unspecified whether acute organ dysfunction present (CMS/[facility]) [[id number]]   Admitting Physician: FRED IBARRA [[id number]]   Certification: I Certify That Inpatient Hospital Services Are Medically Necessary For Greater Than 2 Midnights            No follow-up provider specified.       Medication List      ASK your doctor about these medications    insulin detemir 100 UNIT/ML injection  Commonly known as: LEVEMIR  Ask about: Which instructions should I use?             Larry Mendoza MD  08/01/21 0807      Electronically signed by Larry Mendoza MD at 08/01/21 0807         Vital Signs (last day)     Date/Time   Temp   Temp src   Pulse   Resp   BP   Patient Position   SpO2    08/01/21 0959   97.7 (36.5)   Oral   91   18   96/55   Lying   99    08/01/21 0624   97.5 (36.4)   Oral   83   18   96/55   Lying   96    08/01/21 0504   97.9 (36.6)   Rectal   --   --   --   --   --    08/01/21 0307   (!) 102.8 (39.3)   Rectal   --   --   --   --   --    08/01/21 0250   (!) 103.3 (39.6)   Rectal   (!) 124   18   108/64   Lying   95    08/01/21 0145   (!) 103.8 (39.9)   Rectal   --   --   --   --   --    08/01/21 0042   --   --   --   --   --   --   98    08/01/21 0002   (!) 103.1 (39.5)   Oral   (!) 138   18   150/89   Lying   98    07/31/21  2318   --   --   (!) 132   --   147/97   --   98    07/31/21 1931   --   --   (!) 135   --   --   --   97    07/31/21 1911   98 (36.7)   Oral   --   16   131/82   Lying   --              Oxygen Therapy (since admission)     Date/Time   SpO2   Device (Oxygen Therapy)   Flow (L/min)   Oxygen Concentration (%)   ETCO2 (mmHg)    08/01/21 0959   99   room air   --   --   --    08/01/21 0833   --   room air   --   --   --    08/01/21 0624   96   room air   --   --   --    08/01/21 0250   95   --   --   --   --    08/01/21 0042   98   room air   --   --   --    08/01/21 0002   98   room air   --   --   --    07/31/21 2318   98   --   --   --   --    07/31/21 1931   97   room air   --   --   --            Intake & Output (last day)       07/31 0701 - 08/01 0700 08/01 0701 - 08/02 0700    P.O.  0    I.V. (mL/kg) 50 (0.7)     IV Piggyback 2100     Total Intake(mL/kg) 2150 (29) 0 (0)    Net +2150 0          Urine Unmeasured Occurrence 1 x 1 x          Medication Administration Report for Sandra Leahy as of 08/01/21 1223   Medications 07/31/21 08/01/21    cefTRIAXone (ROCEPHIN) 2 g in sodium chloride 0.9 % 100 mL IVPB-VTB  Dose: 2 g  Freq: Every 24 Hours Route: IV  Indications of Use: SEPSIS  Start: 08/01/21 1030   End: 08/08/21 1029    Admin Instructions:   Caution: Look alike/sound alike drug alert      0834                   heparin (porcine) 5000 UNIT/ML injection 5,000 Units  Dose: 5,000 Units  Freq: Every 12 Hours Scheduled Route: SC  Indications Comment: Prophylaxis of Venous Thromboembolism  Start: 07/31/21 2345     0131     0833     2100             insulin aspart (novoLOG) injection 0-7 Units  Dose: 0-7 Units  Freq: 4 Times Daily Before Meals & Nightly Route: SC  Start: 07/31/21 2345    Admin Instructions:   Correction - Low Dose.  Less than 40 units/day total insulin dose or lean, elderly, renal patients    Blood glucose 150-199 mg/dL - 2 units  Blood glucose 200-249 mg/dL - 3 units  Blood glucose 250-299 mg/dL - 4  units  Blood glucose 300-349 mg/dL - 5 units  Blood glucose 350-400 mg/dL - 6 units  Blood glucose greater than 400 mg/dL - 7 units and call provider        0136 (1124) [phone]           insulin detemir (LEVEMIR) injection 45 Units  Dose: 45 Units  Freq: 2 Times Daily Route: SC  Start: 08/01/21 1100    Admin Instructions:         (6946) [C]     2100              potassium chloride (K-DUR,KLOR-CON) CR tablet 40 mEq  Dose: 40 mEq  Freq: Every 4 Hours Route: PO  Start: 08/01/21 0945   End: 08/01/21 2144    Admin Instructions:   Potassium 3.1 or Less Give KCl 40 mEq q4h x3 Doses      1040     1343     1748             pravastatin (PRAVACHOL) tablet 40 mg  Dose: 40 mg  Freq: Daily Route: PO  Start: 08/01/21 1100    Admin Instructions:   Avoid grapefruit juice.      1040               rivastigmine (EXELON) 4.6 MG/24HR patch 1 patch  Dose: 1 patch  Freq: Daily Route: TD  Start: 08/01/21 1100    Admin Instructions:   Apply patch to clean, dry, intact hairless skin on upper or lower back, upper arm or chest. Do not apply to same site more than once every 14 days.      1040               sodium chloride 0.9 % flush 10 mL  Dose: 10 mL  Freq: Every 12 Hours Scheduled Route: IV  Start: 07/31/21 9434 (0418) [C]     1692     2100             Vortioxetine HBr tablet 20 mg  Dose: 20 mg  Freq: Daily Route: PO  Start: 08/01/21 1400    Admin Instructions:   PATIENT SUPPLIED      1400              Completed Medications  Medications 07/31/21 08/01/21       acetaminophen (TYLENOL) tablet 1,000 mg  Dose: 1,000 mg  Freq: Once Route: PO  Start: 08/01/21 0230   End: 08/01/21 0213    Admin Instructions:   Do not exceed 4 grams of acetaminophen in a 24 hr period. Max dose of 2gm for AST/ALT greater than 120 units/L      If given for pain, use the following pain scale:   Mild Pain = Pain Score of 1-3, CPOT 1-2  Moderate Pain = Pain Score of 4-6, CPOT 3-4  Severe Pain = Pain Score of 7-10, CPOT 5-8      0213                cefTRIAXone (ROCEPHIN) 1 g in sodium chloride 0.9 % 100 mL IVPB-VTB  Dose: 1 g  Freq: Once Route: IV  Indications of Use: SEPSIS,URINARY TRACT INFECTION  Start: 08/01/21 0100   End: 08/01/21 0154    Admin Instructions:   Caution: Look alike/sound alike drug alert      0124               cefTRIAXone (ROCEPHIN) 1 g in sodium chloride 0.9 % 100 mL IVPB-VTB  Dose: 1 g  Freq: Once Route: IV  Indications of Use: URINARY TRACT INFECTION  Start: 07/31/21 2103   End: 07/31/21 2330    Admin Instructions:   Caution: Look alike/sound alike drug alert     2231     2330               insulin regular (humuLIN R,novoLIN R) injection 8 Units  Dose: 8 Units  Freq: Once Route: IV  Start: 07/31/21 2045   End: 07/31/21 2235    Admin Instructions:    Caution: Look alike/sound alike drug alert     2235                iopamidol (ISOVUE-300) 61 % injection 100 mL  Dose: 100 mL  Freq: Once in Imaging Route: IV  Start: 07/31/21 2106   End: 07/31/21 2104 2104                ketorolac (TORADOL) injection 30 mg  Dose: 30 mg  Freq: Once Route: IV  Start: 08/01/21 0230   End: 08/01/21 0212    Admin Instructions:       If given for pain, use the following pain scale:  Mild Pain = Pain Score of 1-3, CPOT 1-2  Moderate Pain = Pain Score of 4-6, CPOT 3-4  Severe Pain = Pain Score of 7-10, CPOT 5-8      0212               lactated ringers bolus 1,000 mL  Dose: 1,000 mL  Freq: Once Route: IV  Start: 07/31/21 2021   End: 07/31/21 2330 2136 2330               lactated ringers bolus 1,000 mL  Dose: 1,000 mL  Freq: Once Route: IV  Start: 07/31/21 1925   End: 07/31/21 2330 2004 2136 2330              magnesium sulfate 2g/50 mL (PREMIX) infusion  Dose: 2 g  Freq: Once Route: IV  Start: 07/31/21 2045   End: 07/31/21 2333 2137 2333               ondansetron (ZOFRAN) injection 4 mg  Dose: 4 mg  Freq: Once Route: IV  Start: 07/31/21 1925   End: 07/31/21 2002 2002                sodium chloride 0.9 % bolus 1,000 mL  Dose: 1,000  mL  Freq: Once Route: IV  Start: 08/01/21 0100   End: 08/01/21 0144     0114                     and   Medication Administration Report for Sandra Leahy as of 08/01/21 1223   Medications 07/31/21 08/01/21    sodium chloride 0.9 % infusion  Rate: 125 mL/hr Dose: 125 mL/hr  Freq: Continuous Route: IV  Start: 07/31/21 2345     0115                      Lab Results (all)     Procedure Component Value Units Date/Time    Blood Culture - Blood, Arm, Right [[id number]]  (Abnormal) Collected: 07/31/21 2120    Specimen: Blood from Arm, Right Updated: 08/01/21 1215     Blood Culture Abnormal Stain     Gram Stain Anaerobic Bottle Gram negative bacilli      Aerobic Bottle Gram negative bacilli    Narrative:      No BCID performed, refer to previous blood culture specimen collected on 07/31/2021 20:47.      Blood Culture - Blood, Arm, Left [[id number]]  (Abnormal) Collected: 07/31/21 2047    Specimen: Blood from Arm, Left Updated: 08/01/21 1052     Blood Culture Abnormal Stain     Gram Stain Anaerobic Bottle Gram negative bacilli      Aerobic Bottle Gram negative bacilli    Blood Culture ID, PCR - Blood, Arm, Left [[id number]]  (Abnormal) Collected: 07/31/21 2047    Specimen: Blood from Arm, Left Updated: 08/01/21 1052     BCID, PCR Escherichia coli. Identification by BCID PCR.     BOTTLE TYPE Anaerobic Bottle    POC Glucose Once [[id number]]  (Normal) Collected: 08/01/21 1003    Specimen: Blood Updated: 08/01/21 1009     Glucose 110 mg/dL      Comment: Meter: [id number] : [id number] [place]       Comprehensive Metabolic Panel [[id number]]  (Abnormal) Collected: 08/01/21 0525    Specimen: Blood Updated: 08/01/21 0649     Glucose 178 mg/dL      BUN 13 mg/dL      Creatinine 0.75 mg/dL      Sodium 131 mmol/L      Potassium 2.8 mmol/L      Chloride 99 mmol/L      CO2 21.9 mmol/L      Calcium 8.7 mg/dL      Total Protein 6.2 g/dL      Albumin 2.87 g/dL      ALT (SGPT) 15 U/L      AST (SGOT) 12 U/L      Alkaline Phosphatase 97  U/L      Total Bilirubin 0.5 mg/dL      eGFR Non African Amer 80 mL/min/1.73      Globulin 3.3 gm/dL      A/G Ratio 0.9 g/dL      BUN/Creatinine Ratio 17.3     Anion Gap 10.1 mmol/L     Narrative:      GFR Normal >60  Chronic Kidney Disease <60  Kidney Failure <15      Magnesium [[id number]]  (Abnormal) Collected: 08/01/21 0525    Specimen: Blood Updated: 08/01/21 0649     Magnesium 1.5 mg/dL     Lactic Acid, Plasma [[id number]]  (Normal) Collected: 08/01/21 0525    Specimen: Blood Updated: 08/01/21 0642     Lactate 1.2 mmol/L     POC Glucose Once [[id number]]  (Abnormal) Collected: 08/01/21 0631    Specimen: Blood Updated: 08/01/21 0638     Glucose 174 mg/dL      Comment: Meter: [id number] : [id number] [place]       POC Glucose Once [[id number]]  (Abnormal) Collected: 08/01/21 0132    Specimen: Blood Updated: 08/01/21 0139     Glucose 258 mg/dL      Comment: Meter: [id number] : [id number] TREVOR ARLENE       STAT Lactic Acid, Reflex [[id number]]  (Abnormal) Collected: 07/31/21 2214    Specimen: Blood from Arm, Right Updated: 07/31/21 2247     Lactate 2.5 mmol/L     POC Glucose Once [[id number]]  (Abnormal) Collected: 07/31/21 2204    Specimen: Blood Updated: 07/31/21 2212     Glucose 373 mg/dL      Comment: Meter: [id number] : [id number] KAREL TRISTAN       Ketone Bodies, Serum (Not performed at [facility]) [[id number]]  (Normal) Collected: 07/31/21 2047    Specimen: Blood from Arm, Left Updated: 07/31/21 2104    Narrative:      The following orders were created for panel order Ketone Bodies, Serum (Not performed at [facility]).  Procedure                               Abnormality         Status                     ---------                               -----------         ------                     Acetone[[id number]]                      Normal              Final result                 Please view results for these tests on the individual orders.    Acetone [[id number]]  (Normal) Collected: 07/31/21 2047     Specimen: Blood from Arm, Left Updated: 07/31/21 2104     Acetone Negative    C-reactive Protein [[id number]]  (Abnormal) Collected: 07/31/21 1935    Specimen: Blood Updated: 07/31/21 2101     C-Reactive Protein 24.14 mg/dL     Urinalysis With Culture If Indicated - Urine, Clean Catch [[id number]]  (Abnormal) Collected: 07/31/21 2009    Specimen: Urine, Clean Catch Updated: 07/31/21 2043     Color, UA Yellow     Appearance, UA Cloudy     pH, UA 5.5     Specific Gravity, UA >1.030     Glucose, UA >=1000 mg/dL (3+)     Ketones, UA 80 mg/dL (3+)     Bilirubin, UA Negative     Blood, UA Moderate (2+)     Protein, UA >=300 mg/dL (3+)     Leuk Esterase, UA Negative     Nitrite, UA Negative     Urobilinogen, UA 1.0 E.U./dL    Urinalysis, Microscopic Only - Urine, Clean Catch [[id number]]  (Abnormal) Collected: 07/31/21 2009    Specimen: Urine, Clean Catch Updated: 07/31/21 2043     RBC, UA 3-5 /HPF      WBC, UA Too Numerous to Count /HPF      Bacteria, UA 4+ /HPF      Squamous Epithelial Cells, UA 3-6 /HPF      Hyaline Casts, UA 7-12 /LPF      Fine Granular Casts, UA 3-6 /LPF      Methodology Manual Light Microscopy    Urine Culture - Urine, Urine, Clean Catch [[id number]] Collected: 07/31/21 2009    Specimen: Urine, Clean Catch Updated: 07/31/21 2043    Lactic Acid, Plasma [[id number]]  (Abnormal) Collected: 07/31/21 1935    Specimen: Blood Updated: 07/31/21 2030     Lactate 3.2 mmol/L     Comprehensive Metabolic Panel [[id number]]  (Abnormal) Collected: 07/31/21 1935    Specimen: Blood Updated: 07/31/21 2030     Glucose 436 mg/dL      BUN 13 mg/dL      Creatinine 0.78 mg/dL      Sodium 128 mmol/L      Potassium 3.9 mmol/L      Comment: Slight hemolysis detected by analyzer. Results may be affected.        Chloride 89 mmol/L      CO2 20.5 mmol/L      Calcium 10.0 mg/dL      Total Protein 8.2 g/dL      Albumin 3.81 g/dL      ALT (SGPT) 21 U/L      AST (SGOT) 17 U/L      Alkaline Phosphatase 130 U/L      Total Bilirubin 1.0  mg/dL      eGFR Non African Amer 76 mL/min/1.73      Globulin 4.4 gm/dL      A/G Ratio 0.9 g/dL      BUN/Creatinine Ratio 16.7     Anion Gap 18.5 mmol/L     Narrative:      GFR Normal >60  Chronic Kidney Disease <60  Kidney Failure <15      COVID-19 and FLU A/B PCR - Swab, Nasopharynx [[id number]]  (Normal) Collected: 07/31/21 1948    Specimen: Swab from Nasopharynx Updated: 07/31/21 2029     COVID19 Not Detected     Influenza A PCR Not Detected     Influenza B PCR Not Detected    Narrative:      Fact sheet for providers: https://www.fda.gov/media/308803/download    Fact sheet for patients: https://www.fda.gov/media/168719/download    Test performed by PCR.    Lipase [[id number]]  (Abnormal) Collected: 07/31/21 1935    Specimen: Blood Updated: 07/31/21 2023     Lipase 62 U/L     Troponin [[id number]]  (Normal) Collected: 07/31/21 1935    Specimen: Blood Updated: 07/31/21 2023     Troponin T <0.010 ng/mL     Narrative:      Troponin T Reference Range:  <= 0.03 ng/mL-   Negative for AMI  >0.03 ng/mL-     Abnormal for myocardial necrosis.  Clinicians would have to utilize clinical acumen, EKG, Troponin and serial changes to determine if it is an Acute Myocardial Infarction or myocardial injury due to an underlying chronic condition.       Results may be falsely decreased if patient taking Biotin.      Magnesium [[id number]]  (Abnormal) Collected: 07/31/21 1935    Specimen: Blood Updated: 07/31/21 2023     Magnesium 1.1 mg/dL     CBC & Differential [[id number]]  (Abnormal) Collected: 07/31/21 1935    Specimen: Blood Updated: 07/31/21 1948    Narrative:      The following orders were created for panel order CBC & Differential.  Procedure                               Abnormality         Status                     ---------                               -----------         ------                     CBC Auto Differential[[id number]]        Abnormal            Final result               Scan Slide[[id number]]                                                                     Please view results for these tests on the individual orders.    CBC Auto Differential [[id number]]  (Abnormal) Collected: 07/31/21 1935    Specimen: Blood Updated: 07/31/21 1948     WBC 14.91 10*3/mm3      RBC 4.99 10*6/mm3      Hemoglobin 15.4 g/dL      Hematocrit 43.3 %      MCV 86.8 fL      MCH 30.9 pg      MCHC 35.6 g/dL      RDW 11.9 %      RDW-SD 37.9 fl      MPV 10.0 fL      Platelets 238 10*3/mm3      Neutrophil % 87.5 %      Lymphocyte % 4.1 %      Monocyte % 7.3 %      Eosinophil % 0.1 %      Basophil % 0.2 %      Immature Grans % 0.8 %      Neutrophils, Absolute 13.04 10*3/mm3      Lymphocytes, Absolute 0.61 10*3/mm3      Monocytes, Absolute 1.09 10*3/mm3      Eosinophils, Absolute 0.02 10*3/mm3      Basophils, Absolute 0.03 10*3/mm3      Immature Grans, Absolute 0.12 10*3/mm3      nRBC 0.0 /100 WBC           Imaging Results (All)     Procedure Component Value Units Date/Time    CT Abdomen Pelvis With Contrast [[id number]] Collected: 07/31/21 2118     Updated: 07/31/21 2120    Narrative:      CT Abdomen Pelvis W    INDICATION:   Acute onset of vomiting and dizziness    TECHNIQUE:   CT of the abdomen and pelvis with contrast. Coronal and sagittal reconstructions were obtained.  Radiation dose reduction techniques included automated exposure control or exposure modulation based on body size. Radiation audit for number of CT and  nuclear cardiology exams performed in the last year: 0.      COMPARISON:   No pertinent prior study    FINDINGS:  Included lung bases demonstrate minimal atelectasis in the right lung base posteriorly.    Abdomen: The liver shows evidence of hepatic steatosis. No focal hepatic lesions. The gallbladder is surgically absent. The spleen and pancreas appear unremarkable. No adrenal abnormalities.    The left kidney shows normal enhancement. The right kidney demonstrates patchy areas of abnormal enhancement posteriorly which can be seen in  acute pyelonephritis. There is perinephric stranding on the right side.    The aorta shows atherosclerotic change without evidence of aneurysmal dilatation. No threshold retroperitoneal adenopathy.    Pelvis: The bowel pattern is nonobstructive. Normal appendix. The urinary bladder appears unremarkable. Status post hysterectomy. No threshold pelvic or inguinal adenopathy.    Regional osseous structures show no acute or aggressive bone lesions.      Impression:        1.  There is abnormal enhancement involving the posterior aspect of the right kidney suggesting acute pyelonephritis. Perinephric stranding is also demonstrated the right side.    2.  Minimal atelectatic changes in right lung base.    3.  Hepatic steatosis.          Signer Name: Houston Wei MD   Signed: 7/31/2021 9:18 PM   Workstation Name: [id number]    [facility]    CT Head Without Contrast [[id number]] Collected: 07/31/21 2112     Updated: 07/31/21 2115    Narrative:      CT Head WO    HISTORY:   Acute onset of vomiting and dizziness    TECHNIQUE:   Axial unenhanced head CT. Radiation dose reduction techniques included automated exposure control or exposure modulation based on body size. Count of known CT and cardiac nuc med studies performed in previous 12 months: 0.     Time of scan: 8:54 PM    COMPARISON:   MR of the brain dated 3/18/2019    FINDINGS:     The ventricles are mildly generous in size. Cortical sulci are correspondingly prominent. No extraaxial fluid collections are seen.    No parenchymal or subarachnoid hemorrhage is present. Periventricular hypodensities are demonstrated which are nonspecific but likely represent white matter microvascular change in this patient. No CT evidence of mass or acute infarct.    The mastoid air cells are clear. The visualized paranasal sinuses are clear.  Orbital structures demonstrate no acute findings.      Impression:      Impression:  1. No clearly acute intracranial  pathology demonstrated.  2. Mild cerebral atrophy and nonspecific periventricular hypodensities which are thought to represent white matter microvascular change.    Signer Name: Houston Wei MD   Signed: 7/31/2021 9:12 PM   Workstation Name: [id number]    [facility]          Orders (all)      Start     Ordered    08/01/21 1037  Diet Regular; Consistent Carbohydrate  Diet Effective Now      08/01/21 1036    08/01/21 0003  Apply Cooling [place]  Once      08/01/21 0002    08/01/21 0000  Vital Signs  Every 4 Hours     Comments: Per per hospital policy    07/31/21 2343    07/31/21 2358  Inpatient Case Management  Consult  Once     Provider:  (Not yet assigned)    07/31/21 2357    07/31/21 2344  Intake & Output  Every Shift      07/31/21 2343    07/31/21 2344  Weigh patient  Once      07/31/21 2343    07/31/21 2344  Oxygen Therapy- Nasal Cannula; Titrate for SPO2: 90% - 95%  Continuous      07/31/21 2343    07/31/21 2344  Telemetry - Maintain IV Access  Continuous      07/31/21 2343    07/31/21 2344  Continuous Cardiac Monitoring  Continuous      07/31/21 2343    07/31/21 2344  May Be Off Telemetry for Tests  Continuous      07/31/21 2343    07/31/21 2344  ACLS Protocol For Life Threatening Dysrhythmias (Unless Code Status Indicates Otherwise)  Continuous      07/31/21 2343    07/31/21 2344  Notify Provider if ACLS Protocol Activated  Until Discontinued      07/31/21 2343    07/31/21 2344  Diet Regular; Consistent Carbohydrate  Diet Effective Now,   Status:  Canceled      07/31/21 2343    07/31/21 2256  Inpatient Admission  Once      07/31/21 2257    07/31/21 2256  Code Status and Medical Interventions:  Continuous      07/31/21 2257

## 2021-08-01 NOTE — PROGRESS NOTES
Subjective     History:   Sandra Leahy is a 55 y.o. female admitted on 7/31/2021 secondary to <principal problem not specified>     Procedures: None    CC: Follow up pyelonephritis     Patient seen and examined with GRISELDA Montaño. Awake and alert with her daughter present at bedside. Pleasantly confused. No further episodes of vomiting reported. No reported CP, dyspnea or palpitations. No acute events overnight per RN.     History taken from: patient, patient's daughter, chart, and RN.      Objective     Vital Signs  Temp:  [97.5 °F (36.4 °C)-103.8 °F (39.9 °C)] 98.1 °F (36.7 °C)  Heart Rate:  [] 125  Resp:  [16-18] 18  BP: ()/(55-97) 122/68    Intake/Output Summary (Last 24 hours) at 8/1/2021 1658  Last data filed at 8/1/2021 1200  Gross per 24 hour   Intake 2390 ml   Output --   Net 2390 ml         Physical Exam:  General:    Awake, alert, pleasantly confused, in no acute distress   Heart:      Normal S1 and S2. Tachycardic. No significant murmur, rubs or gallops appreciated.   Lungs:     Respirations regular, even and unlabored. Lungs clear to auscultation B/L. No wheezes, rales or rhonchi.   Abdomen:   Soft and nontender. No guarding, rebound tenderness or  organomegaly noted. Bowel sounds present x 4.   Extremities:  No clubbing, cyanosis or edema noted. Moves UE and LE equally B/L.     Results Review:    Results from last 7 days   Lab Units 07/31/21 1935   WBC 10*3/mm3 14.91*   HEMOGLOBIN g/dL 15.4   PLATELETS 10*3/mm3 238     Results from last 7 days   Lab Units 08/01/21  0525 07/31/21 1935   SODIUM mmol/L 131* 128*   POTASSIUM mmol/L 2.8* 3.9   CHLORIDE mmol/L 99 89*   CO2 mmol/L 21.9* 20.5*   BUN mg/dL 13 13   CREATININE mg/dL 0.75 0.78   CALCIUM mg/dL 8.7 10.0   GLUCOSE mg/dL 178* 436*     Results from last 7 days   Lab Units 08/01/21  0525 07/31/21  1935   BILIRUBIN mg/dL 0.5 1.0   ALK PHOS U/L 97 130*   AST (SGOT) U/L 12 17   ALT (SGPT) U/L 15 21     Results from last 7 days   Lab Units  08/01/21  0525 07/31/21 1935   MAGNESIUM mg/dL 1.5* 1.1*         Results from last 7 days   Lab Units 07/31/21 1935   TROPONIN T ng/mL <0.010       Imaging Results (Last 24 Hours)     Procedure Component Value Units Date/Time    CT Abdomen Pelvis With Contrast [[id number]] Collected: 07/31/21 2118     Updated: 07/31/21 2120    Narrative:      CT Abdomen Pelvis W    INDICATION:   Acute onset of vomiting and dizziness    TECHNIQUE:   CT of the abdomen and pelvis with contrast. Coronal and sagittal reconstructions were obtained.  Radiation dose reduction techniques included automated exposure control or exposure modulation based on body size. Radiation audit for number of CT and  nuclear cardiology exams performed in the last year: 0.      COMPARISON:   No pertinent prior study    FINDINGS:  Included lung bases demonstrate minimal atelectasis in the right lung base posteriorly.    Abdomen: The liver shows evidence of hepatic steatosis. No focal hepatic lesions. The gallbladder is surgically absent. The spleen and pancreas appear unremarkable. No adrenal abnormalities.    The left kidney shows normal enhancement. The right kidney demonstrates patchy areas of abnormal enhancement posteriorly which can be seen in acute pyelonephritis. There is perinephric stranding on the right side.    The aorta shows atherosclerotic change without evidence of aneurysmal dilatation. No threshold retroperitoneal adenopathy.    Pelvis: The bowel pattern is nonobstructive. Normal appendix. The urinary bladder appears unremarkable. Status post hysterectomy. No threshold pelvic or inguinal adenopathy.    Regional osseous structures show no acute or aggressive bone lesions.      Impression:        1.  There is abnormal enhancement involving the posterior aspect of the right kidney suggesting acute pyelonephritis. Perinephric stranding is also demonstrated the right side.    2.  Minimal atelectatic changes in right lung base.    3.  Hepatic  steatosis.          Signer Name: Houston Wei MD   Signed: 7/31/2021 9:18 PM   Workstation Name: [facility]    [facility]    CT Head Without Contrast [[id number]] Collected: 07/31/21 2112     Updated: 07/31/21 2115    Narrative:      CT Head WO    HISTORY:   Acute onset of vomiting and dizziness    TECHNIQUE:   Axial unenhanced head CT. Radiation dose reduction techniques included automated exposure control or exposure modulation based on body size. Count of known CT and cardiac nuc med studies performed in previous 12 months: 0.     Time of scan: 8:54 PM    COMPARISON:   MR of the brain dated 3/18/2019    FINDINGS:     The ventricles are mildly generous in size. Cortical sulci are correspondingly prominent. No extraaxial fluid collections are seen.    No parenchymal or subarachnoid hemorrhage is present. Periventricular hypodensities are demonstrated which are nonspecific but likely represent white matter microvascular change in this patient. No CT evidence of mass or acute infarct.    The mastoid air cells are clear. The visualized paranasal sinuses are clear.  Orbital structures demonstrate no acute findings.      Impression:      Impression:  1. No clearly acute intracranial pathology demonstrated.  2. Mild cerebral atrophy and nonspecific periventricular hypodensities which are thought to represent white matter microvascular change.    Signer Name: Houston Wei MD   Signed: 7/31/2021 9:12 PM   Workstation Name: [facility]    [facility]            Medications:  cefTRIAXone, 2 g, Intravenous, Q24H  heparin (porcine), 5,000 Units, Subcutaneous, Q12H  insulin aspart, 0-7 Units, Subcutaneous, 4x Daily AC & at Bedtime  insulin detemir, 45 Units, Subcutaneous, BID  magnesium sulfate, 2 g, Intravenous, Q2H  pravastatin, 40 mg, Oral, Daily  rivastigmine, 1 patch, Transdermal, Daily  sodium chloride, 10 mL, Intravenous, Q12H  Vortioxetine HBr, 20 mg, Oral,  Daily      sodium chloride, 125 mL/hr, Last Rate: 125 mL/hr (08/01/21 0115)            Assessment/Plan   Severe sepsis: Likely 2/2 UTI/right-sided pyelonephritis and bacteremia. Febrile. WBC and CRP elevated. Lactate has normalized. 2/2 initial blood cultures revealing E coli per BCID. Urine culture pending. Cont high dose Rocephin. Cont IV fluids. Follow cultures and repeat labs in the AM.     UTI/right-sided pyelonephritis: Cont Rocephin as above pending final culture results.     E coli bacteremia: Likely 2/2 above. Cont Rocephin. Follow cultures.     Electrolyte abnormalities: Hyponatremia improving with IV fluids. Replace K+ and Mg per protocol. Cont to monitor.     Prolonged QTc: Replace electrolytes and monitor.     DM II, insulin dependent with hyperglycemia: Restart home basal insulin. Cont SSI with Accuchecks.     Anion gap metabolic acidosis: Improved with IV fluids. Repeat labs in the AM.     Early onset dementia: Cont home medication regimen and supportive treatment.     DVT PPX: SQ heparin    Pt is at high risk 2/2 severe sepsis, pyelonephritis, bacteremia, electrolyte abnormalities.       Disposition: Likely home when medically stable.     Win Wen DO  08/01/21  16:58 EDT

## 2021-08-01 NOTE — ED NOTES
Novolin R not tested to be compatible with Lactated Ringers. Attempting to obtain secondary IV access at this time.     Elaine Read, RN  07/31/21 9057

## 2021-08-02 LAB
ALBUMIN SERPL-MCNC: 2.7 G/DL (ref 3.5–5.2)
ALBUMIN/GLOB SERPL: 0.8 G/DL
ALP SERPL-CCNC: 100 U/L (ref 39–117)
ALT SERPL W P-5'-P-CCNC: 26 U/L (ref 1–33)
ANION GAP SERPL CALCULATED.3IONS-SCNC: 9.7 MMOL/L (ref 5–15)
AST SERPL-CCNC: 40 U/L (ref 1–32)
BASOPHILS # BLD AUTO: 0.03 10*3/MM3 (ref 0–0.2)
BASOPHILS NFR BLD AUTO: 0.3 % (ref 0–1.5)
BILIRUB SERPL-MCNC: 0.6 MG/DL (ref 0–1.2)
BUN SERPL-MCNC: 10 MG/DL (ref 6–20)
BUN/CREAT SERPL: 15.6 (ref 7–25)
CALCIUM SPEC-SCNC: 7.9 MG/DL (ref 8.6–10.5)
CHLORIDE SERPL-SCNC: 103 MMOL/L (ref 98–107)
CO2 SERPL-SCNC: 20.3 MMOL/L (ref 22–29)
CREAT SERPL-MCNC: 0.64 MG/DL (ref 0.57–1)
CRP SERPL-MCNC: 16.93 MG/DL (ref 0–0.5)
DEPRECATED RDW RBC AUTO: 38.4 FL (ref 37–54)
EOSINOPHIL # BLD AUTO: 0.07 10*3/MM3 (ref 0–0.4)
EOSINOPHIL NFR BLD AUTO: 0.8 % (ref 0.3–6.2)
ERYTHROCYTE [DISTWIDTH] IN BLOOD BY AUTOMATED COUNT: 11.9 % (ref 12.3–15.4)
GFR SERPL CREATININE-BSD FRML MDRD: 96 ML/MIN/1.73
GLOBULIN UR ELPH-MCNC: 3.3 GM/DL
GLUCOSE BLDC GLUCOMTR-MCNC: 111 MG/DL (ref 70–130)
GLUCOSE BLDC GLUCOMTR-MCNC: 160 MG/DL (ref 70–130)
GLUCOSE BLDC GLUCOMTR-MCNC: 187 MG/DL (ref 70–130)
GLUCOSE BLDC GLUCOMTR-MCNC: 193 MG/DL (ref 70–130)
GLUCOSE BLDC GLUCOMTR-MCNC: 86 MG/DL (ref 70–130)
GLUCOSE BLDC GLUCOMTR-MCNC: 88 MG/DL (ref 70–130)
GLUCOSE SERPL-MCNC: 103 MG/DL (ref 65–99)
HCT VFR BLD AUTO: 33.2 % (ref 34–46.6)
HGB BLD-MCNC: 11.1 G/DL (ref 12–15.9)
IMM GRANULOCYTES # BLD AUTO: 0.05 10*3/MM3 (ref 0–0.05)
IMM GRANULOCYTES NFR BLD AUTO: 0.6 % (ref 0–0.5)
LYMPHOCYTES # BLD AUTO: 0.8 10*3/MM3 (ref 0.7–3.1)
LYMPHOCYTES NFR BLD AUTO: 9 % (ref 19.6–45.3)
MAGNESIUM SERPL-MCNC: 2 MG/DL (ref 1.6–2.6)
MCH RBC QN AUTO: 29.8 PG (ref 26.6–33)
MCHC RBC AUTO-ENTMCNC: 33.4 G/DL (ref 31.5–35.7)
MCV RBC AUTO: 89 FL (ref 79–97)
MONOCYTES # BLD AUTO: 0.93 10*3/MM3 (ref 0.1–0.9)
MONOCYTES NFR BLD AUTO: 10.5 % (ref 5–12)
NEUTROPHILS NFR BLD AUTO: 7.01 10*3/MM3 (ref 1.7–7)
NEUTROPHILS NFR BLD AUTO: 78.8 % (ref 42.7–76)
NRBC BLD AUTO-RTO: 0 /100 WBC (ref 0–0.2)
PHOSPHATE SERPL-MCNC: 0.9 MG/DL (ref 2.5–4.5)
PHOSPHATE SERPL-MCNC: 2 MG/DL (ref 2.5–4.5)
PLATELET # BLD AUTO: 170 10*3/MM3 (ref 140–450)
PMV BLD AUTO: 9.7 FL (ref 6–12)
POTASSIUM SERPL-SCNC: 3.7 MMOL/L (ref 3.5–5.2)
PROT SERPL-MCNC: 6 G/DL (ref 6–8.5)
RBC # BLD AUTO: 3.73 10*6/MM3 (ref 3.77–5.28)
SODIUM SERPL-SCNC: 133 MMOL/L (ref 136–145)
WBC # BLD AUTO: 8.89 10*3/MM3 (ref 3.4–10.8)

## 2021-08-02 PROCEDURE — 63710000001 INSULIN DETEMIR PER 5 UNITS: Performed by: INTERNAL MEDICINE

## 2021-08-02 PROCEDURE — 82962 GLUCOSE BLOOD TEST: CPT

## 2021-08-02 PROCEDURE — 85025 COMPLETE CBC W/AUTO DIFF WBC: CPT | Performed by: INTERNAL MEDICINE

## 2021-08-02 PROCEDURE — 84100 ASSAY OF PHOSPHORUS: CPT | Performed by: INTERNAL MEDICINE

## 2021-08-02 PROCEDURE — 83735 ASSAY OF MAGNESIUM: CPT | Performed by: INTERNAL MEDICINE

## 2021-08-02 PROCEDURE — 86140 C-REACTIVE PROTEIN: CPT | Performed by: INTERNAL MEDICINE

## 2021-08-02 PROCEDURE — 99233 SBSQ HOSP IP/OBS HIGH 50: CPT | Performed by: INTERNAL MEDICINE

## 2021-08-02 PROCEDURE — 25010000002 CEFTRIAXONE PER 250 MG: Performed by: INTERNAL MEDICINE

## 2021-08-02 PROCEDURE — 25010000002 GENTAMICIN PER 80 MG: Performed by: INTERNAL MEDICINE

## 2021-08-02 PROCEDURE — 25010000002 HEPARIN (PORCINE) PER 1000 UNITS: Performed by: INTERNAL MEDICINE

## 2021-08-02 PROCEDURE — 80053 COMPREHEN METABOLIC PANEL: CPT | Performed by: INTERNAL MEDICINE

## 2021-08-02 PROCEDURE — 63710000001 INSULIN ASPART PER 5 UNITS: Performed by: INTERNAL MEDICINE

## 2021-08-02 RX ORDER — CHOLECALCIFEROL (VITAMIN D3) 125 MCG
5 CAPSULE ORAL NIGHTLY PRN
Status: DISCONTINUED | OUTPATIENT
Start: 2021-08-02 | End: 2021-08-03 | Stop reason: HOSPADM

## 2021-08-02 RX ORDER — L.ACID,PARA/B.BIFIDUM/S.THERM 8B CELL
1 CAPSULE ORAL DAILY
Status: DISCONTINUED | OUTPATIENT
Start: 2021-08-02 | End: 2021-08-03 | Stop reason: HOSPADM

## 2021-08-02 RX ADMIN — INSULIN DETEMIR 45 UNITS: 100 INJECTION, SOLUTION SUBCUTANEOUS at 23:59

## 2021-08-02 RX ADMIN — POTASSIUM & SODIUM PHOSPHATES POWDER PACK 280-160-250 MG 2 PACKET: 280-160-250 PACK at 08:25

## 2021-08-02 RX ADMIN — VORTIOXETINE 20 MG: 20 TABLET, FILM COATED ORAL at 16:44

## 2021-08-02 RX ADMIN — POTASSIUM & SODIUM PHOSPHATES POWDER PACK 280-160-250 MG 2 PACKET: 280-160-250 PACK at 14:05

## 2021-08-02 RX ADMIN — SODIUM CHLORIDE 125 ML/HR: 9 INJECTION, SOLUTION INTRAVENOUS at 06:09

## 2021-08-02 RX ADMIN — SODIUM CHLORIDE, PRESERVATIVE FREE 10 ML: 5 INJECTION INTRAVENOUS at 08:27

## 2021-08-02 RX ADMIN — ACETAMINOPHEN 650 MG: 650 SUPPOSITORY RECTAL at 03:34

## 2021-08-02 RX ADMIN — SODIUM CHLORIDE 2 G: 9 INJECTION, SOLUTION INTRAVENOUS at 08:25

## 2021-08-02 RX ADMIN — GENTAMICIN SULFATE 340 MG: 40 INJECTION, SOLUTION INTRAMUSCULAR; INTRAVENOUS at 11:09

## 2021-08-02 RX ADMIN — SODIUM CHLORIDE 75 ML/HR: 9 INJECTION, SOLUTION INTRAVENOUS at 20:00

## 2021-08-02 RX ADMIN — HEPARIN SODIUM 5000 UNITS: 5000 INJECTION INTRAVENOUS; SUBCUTANEOUS at 08:25

## 2021-08-02 RX ADMIN — POTASSIUM & SODIUM PHOSPHATES POWDER PACK 280-160-250 MG 2 PACKET: 280-160-250 PACK at 22:26

## 2021-08-02 RX ADMIN — Medication 1 CAPSULE: at 14:05

## 2021-08-02 RX ADMIN — SODIUM CHLORIDE, PRESERVATIVE FREE 10 ML: 5 INJECTION INTRAVENOUS at 20:01

## 2021-08-02 RX ADMIN — Medication 5 MG: at 22:26

## 2021-08-02 RX ADMIN — RIVASTIGMINE 1 PATCH: 4.6 PATCH TRANSDERMAL at 08:27

## 2021-08-02 RX ADMIN — HEPARIN SODIUM 5000 UNITS: 5000 INJECTION INTRAVENOUS; SUBCUTANEOUS at 20:00

## 2021-08-02 RX ADMIN — INSULIN ASPART 2 UNITS: 100 INJECTION, SOLUTION INTRAVENOUS; SUBCUTANEOUS at 20:01

## 2021-08-02 RX ADMIN — PRAVASTATIN SODIUM 40 MG: 40 TABLET ORAL at 08:25

## 2021-08-02 NOTE — PROGRESS NOTES
Subjective     History:   Sandra Leahy is a 55 y.o. female admitted on 7/31/2021 secondary to <principal problem not specified>     Procedures: None    CC: Follow up pyelonephritis     Patient seen and examined with GRISELDA Garcia. Awake and alert with her daughter present at bedside. Pleasantly confused. Tolerating PO intake. No further episodes of vomiting reported. No reported CP, dyspnea or palpitations. Febrile overnight. No acute events overnight per RN.     History taken from: patient, patient's daughter, chart, and RN.      Objective     Vital Signs  Temp:  [98.1 °F (36.7 °C)-103.1 °F (39.5 °C)] 98.6 °F (37 °C)  Heart Rate:  [91-96] 94  Resp:  [18] 18  BP: (112-139)/(60-67) 132/67    Intake/Output Summary (Last 24 hours) at 8/2/2021 1909  Last data filed at 8/2/2021 1741  Gross per 24 hour   Intake 1910 ml   Output --   Net 1910 ml         Physical Exam:  General:    Awake, alert, pleasantly confused, in no acute distress   Heart:      Normal S1 and S2. Regular rate and rhythm. No significant murmur, rubs or gallops appreciated.   Lungs:     Respirations regular, even and unlabored. Lungs clear to auscultation B/L. No wheezes, rales or rhonchi.   Abdomen:   Soft and nontender. No guarding, rebound tenderness or  organomegaly noted. Bowel sounds present x 4.   Extremities:  No clubbing, cyanosis or edema noted. Moves UE and LE equally B/L.     Results Review:    Results from last 7 days   Lab Units 08/02/21 0546 07/31/21 1935   WBC 10*3/mm3 8.89 14.91*   HEMOGLOBIN g/dL 11.1* 15.4   PLATELETS 10*3/mm3 170 238     Results from last 7 days   Lab Units 08/02/21  0545 08/01/21 2102 08/01/21 0525 07/31/21 1935   SODIUM mmol/L 133*  --  131* 128*   POTASSIUM mmol/L 3.7 4.4 2.8* 3.9   CHLORIDE mmol/L 103  --  99 89*   CO2 mmol/L 20.3*  --  21.9* 20.5*   BUN mg/dL 10  --  13 13   CREATININE mg/dL 0.64  --  0.75 0.78   CALCIUM mg/dL 7.9*  --  8.7 10.0   GLUCOSE mg/dL 103*  --  178* 436*     Results from last 7 days    Lab Units 08/02/21  0545 08/01/21  0525 07/31/21  1935   BILIRUBIN mg/dL 0.6 0.5 1.0   ALK PHOS U/L 100 97 130*   AST (SGOT) U/L 40* 12 17   ALT (SGPT) U/L 26 15 21     Results from last 7 days   Lab Units 08/02/21  0545 08/01/21  0525 07/31/21  1935   MAGNESIUM mg/dL 2.0 1.5* 1.1*         Results from last 7 days   Lab Units 07/31/21  1935   TROPONIN T ng/mL <0.010       Imaging Results (Last 24 Hours)     ** No results found for the last 24 hours. **            Medications:  cefTRIAXone, 2 g, Intravenous, Q24H  heparin (porcine), 5,000 Units, Subcutaneous, Q12H  insulin aspart, 0-7 Units, Subcutaneous, 4x Daily AC & at Bedtime  insulin detemir, 45 Units, Subcutaneous, BID  lactobacillus acidophilus, 1 capsule, Oral, Daily  pravastatin, 40 mg, Oral, Daily  rivastigmine, 1 patch, Transdermal, Daily  sodium chloride, 10 mL, Intravenous, Q12H  Vortioxetine HBr, 20 mg, Oral, Daily      sodium chloride, 75 mL/hr, Last Rate: 75 mL/hr (08/02/21 0826)            Assessment/Plan   Severe sepsis: Likely 2/2 UTI/right-sided pyelonephritis and bacteremia. Febrile again overnight. Leukocytosis resolved today. CRP elevated but improved. Lactate has normalized. 2/2 initial blood cultures revealing E coli per BCID. Urine culture revealing >100,000 GNR. Cont high dose Rocephin. Order a dose of gentamicin for synergism in the setting of persistent fever. Cont IV fluids. Follow cultures and repeat labs in the AM.     UTI/right-sided pyelonephritis: Cont antibiotics as above pending final culture results.     E coli bacteremia: Likely 2/2 above. Antibiotics as above. Repeat cultures with NGTD. Follow cultures.      Electrolyte abnormalities: Hyponatremia improving with IV fluids. K+ improved with supplementation. Mg initially <2 but improved with supplementation. PO4 low and replaced. Cont to monitor.     Prolonged QTc: Replace electrolytes and monitor.     DM II, insulin dependent with hyperglycemia: Improved with restarting home  basal insulin. Cont SSI with Accuchecks.     Anion gap metabolic acidosis: Improved with IV fluids. Reduce rate of fluids. Repeat labs in the AM.     Early onset dementia: Cont home medication regimen and supportive treatment.     DVT PPX: SQ heparin    Pt is at high risk 2/2 severe sepsis, pyelonephritis, bacteremia, electrolyte abnormalities.       Disposition: Likely home when medically stable.     Win Wen,   08/02/21  19:09 EDT

## 2021-08-02 NOTE — CASE MANAGEMENT/SOCIAL WORK
Discharge Planning Assessment   [place]     Patient Name: Sandra Leahy  MRN: [id number]  Today's Date: 8/2/2021    Admit Date: 7/31/2021    Discharge Needs Assessment     Row Name 08/02/21 1558       Living Environment    Lives With  spouse    Name(s) of Who Lives With Patient  Lives w/ spouse, Charles.    Current Living Arrangements  home/apartment/condo    Primary Care Provided by  self    Provides Primary Care For  no one    Family Caregiver if Needed  spouse    Family Caregiver Names  Charles    Quality of Family Relationships  helpful;involved;supportive    Able to Return to Prior Arrangements  yes       Resource/Environmental Concerns    Resource/Environmental Concerns  none       Transition Planning    Patient/Family Anticipates Transition to  home with family    Patient/Family Anticipated Services at Transition  none    Transportation Anticipated  family or friend will provide       Discharge Needs Assessment    Equipment Currently Used at Home  none    Concerns to be Addressed  no discharge needs identified    Anticipated Changes Related to Illness  none    Equipment Needed After Discharge  none        Discharge Plan     Row Name 08/02/21 1558       Plan    Plan Pt lives at home with spouse, Charles. Pt does not utilize HH or DME at this time. PCP is John Mueller. Pt does not have a POA or living will. Pt's daughter will provide transportation home at discharge. SS to follow and assist.    Patient/Family in Agreement with Plan  yes        Demographic Summary     Row Name 08/02/21 4497       General Information    Referral Source  nursing    Reason for Consult  -- discharge planning        VERONICA Viveros

## 2021-08-02 NOTE — PLAN OF CARE
Goal Outcome Evaluation:      Patient has not rested well tonight. While giving night time medicines patient stated she was very cold and appeared to be shivering. Oral temperature showed 103.1. Tylenol rectal suppository was given and cooling blanket was applied. Patient temperature gradually went down to last reading of 98.9 at approximately midnight. She states that she feels a lot better. Her other vital signs remain stable. She has not had any complaints of shortness of breath or pain. Will continue to monitor and follow plan of care.

## 2021-08-03 ENCOUNTER — READMISSION MANAGEMENT (OUTPATIENT)
Dept: CALL CENTER | Facility: HOSPITAL | Age: 56
End: 2021-08-03

## 2021-08-03 VITALS
BODY MASS INDEX: 26.18 KG/M2 | DIASTOLIC BLOOD PRESSURE: 65 MMHG | HEIGHT: 67 IN | TEMPERATURE: 98.6 F | SYSTOLIC BLOOD PRESSURE: 122 MMHG | RESPIRATION RATE: 20 BRPM | HEART RATE: 85 BPM | OXYGEN SATURATION: 98 % | WEIGHT: 166.8 LBS

## 2021-08-03 LAB
ALBUMIN SERPL-MCNC: 3.01 G/DL (ref 3.5–5.2)
ALBUMIN/GLOB SERPL: 0.8 G/DL
ALP SERPL-CCNC: 150 U/L (ref 39–117)
ALT SERPL W P-5'-P-CCNC: 43 U/L (ref 1–33)
ANION GAP SERPL CALCULATED.3IONS-SCNC: 11.6 MMOL/L (ref 5–15)
AST SERPL-CCNC: 62 U/L (ref 1–32)
BACTERIA SPEC AEROBE CULT: ABNORMAL
BASOPHILS # BLD AUTO: 0.03 10*3/MM3 (ref 0–0.2)
BASOPHILS NFR BLD AUTO: 0.4 % (ref 0–1.5)
BILIRUB SERPL-MCNC: 0.3 MG/DL (ref 0–1.2)
BUN SERPL-MCNC: 5 MG/DL (ref 6–20)
BUN/CREAT SERPL: 7.4 (ref 7–25)
CALCIUM SPEC-SCNC: 8.7 MG/DL (ref 8.6–10.5)
CHLORIDE SERPL-SCNC: 102 MMOL/L (ref 98–107)
CO2 SERPL-SCNC: 22.4 MMOL/L (ref 22–29)
CREAT SERPL-MCNC: 0.68 MG/DL (ref 0.57–1)
CRP SERPL-MCNC: 9.85 MG/DL (ref 0–0.5)
DEPRECATED RDW RBC AUTO: 39.1 FL (ref 37–54)
EOSINOPHIL # BLD AUTO: 0.06 10*3/MM3 (ref 0–0.4)
EOSINOPHIL NFR BLD AUTO: 0.8 % (ref 0.3–6.2)
ERYTHROCYTE [DISTWIDTH] IN BLOOD BY AUTOMATED COUNT: 12.1 % (ref 12.3–15.4)
GFR SERPL CREATININE-BSD FRML MDRD: 89 ML/MIN/1.73
GLOBULIN UR ELPH-MCNC: 3.7 GM/DL
GLUCOSE BLDC GLUCOMTR-MCNC: 122 MG/DL (ref 70–130)
GLUCOSE BLDC GLUCOMTR-MCNC: 316 MG/DL (ref 70–130)
GLUCOSE BLDC GLUCOMTR-MCNC: 84 MG/DL (ref 70–130)
GLUCOSE SERPL-MCNC: 168 MG/DL (ref 65–99)
HCT VFR BLD AUTO: 35.7 % (ref 34–46.6)
HGB BLD-MCNC: 12.2 G/DL (ref 12–15.9)
IMM GRANULOCYTES # BLD AUTO: 0.05 10*3/MM3 (ref 0–0.05)
IMM GRANULOCYTES NFR BLD AUTO: 0.7 % (ref 0–0.5)
LYMPHOCYTES # BLD AUTO: 1.29 10*3/MM3 (ref 0.7–3.1)
LYMPHOCYTES NFR BLD AUTO: 17.4 % (ref 19.6–45.3)
MAGNESIUM SERPL-MCNC: 1.6 MG/DL (ref 1.6–2.6)
MCH RBC QN AUTO: 30.7 PG (ref 26.6–33)
MCHC RBC AUTO-ENTMCNC: 34.2 G/DL (ref 31.5–35.7)
MCV RBC AUTO: 89.7 FL (ref 79–97)
MONOCYTES # BLD AUTO: 0.84 10*3/MM3 (ref 0.1–0.9)
MONOCYTES NFR BLD AUTO: 11.4 % (ref 5–12)
NEUTROPHILS NFR BLD AUTO: 5.13 10*3/MM3 (ref 1.7–7)
NEUTROPHILS NFR BLD AUTO: 69.3 % (ref 42.7–76)
NRBC BLD AUTO-RTO: 0 /100 WBC (ref 0–0.2)
PHOSPHATE SERPL-MCNC: 2.8 MG/DL (ref 2.5–4.5)
PLATELET # BLD AUTO: 204 10*3/MM3 (ref 140–450)
PMV BLD AUTO: 10 FL (ref 6–12)
POTASSIUM SERPL-SCNC: 3.5 MMOL/L (ref 3.5–5.2)
PROT SERPL-MCNC: 6.7 G/DL (ref 6–8.5)
RBC # BLD AUTO: 3.98 10*6/MM3 (ref 3.77–5.28)
SODIUM SERPL-SCNC: 136 MMOL/L (ref 136–145)
WBC # BLD AUTO: 7.4 10*3/MM3 (ref 3.4–10.8)

## 2021-08-03 PROCEDURE — 86140 C-REACTIVE PROTEIN: CPT | Performed by: INTERNAL MEDICINE

## 2021-08-03 PROCEDURE — 82962 GLUCOSE BLOOD TEST: CPT

## 2021-08-03 PROCEDURE — 84100 ASSAY OF PHOSPHORUS: CPT | Performed by: HOSPITALIST

## 2021-08-03 PROCEDURE — 63710000001 INSULIN DETEMIR PER 5 UNITS: Performed by: INTERNAL MEDICINE

## 2021-08-03 PROCEDURE — 99239 HOSP IP/OBS DSCHRG MGMT >30: CPT | Performed by: INTERNAL MEDICINE

## 2021-08-03 PROCEDURE — 83735 ASSAY OF MAGNESIUM: CPT | Performed by: INTERNAL MEDICINE

## 2021-08-03 PROCEDURE — 25010000002 HEPARIN (PORCINE) PER 1000 UNITS: Performed by: INTERNAL MEDICINE

## 2021-08-03 PROCEDURE — 85025 COMPLETE CBC W/AUTO DIFF WBC: CPT | Performed by: INTERNAL MEDICINE

## 2021-08-03 PROCEDURE — 80053 COMPREHEN METABOLIC PANEL: CPT | Performed by: INTERNAL MEDICINE

## 2021-08-03 PROCEDURE — 25010000003 MAGNESIUM SULFATE 4 GM/100ML SOLUTION: Performed by: INTERNAL MEDICINE

## 2021-08-03 RX ORDER — POTASSIUM CHLORIDE 20 MEQ/1
40 TABLET, EXTENDED RELEASE ORAL EVERY 4 HOURS
Status: DISCONTINUED | OUTPATIENT
Start: 2021-08-03 | End: 2021-08-03 | Stop reason: HOSPADM

## 2021-08-03 RX ORDER — MAGNESIUM SULFATE HEPTAHYDRATE 40 MG/ML
4 INJECTION, SOLUTION INTRAVENOUS ONCE
Status: COMPLETED | OUTPATIENT
Start: 2021-08-03 | End: 2021-08-03

## 2021-08-03 RX ORDER — L.ACID,PARA/B.BIFIDUM/S.THERM 8B CELL
1 CAPSULE ORAL DAILY
Qty: 11 CAPSULE | Refills: 0 | Status: SHIPPED | OUTPATIENT
Start: 2021-08-03 | End: 2021-08-14

## 2021-08-03 RX ORDER — CEFDINIR 300 MG/1
300 CAPSULE ORAL 2 TIMES DAILY
Qty: 22 CAPSULE | Refills: 0 | Status: SHIPPED | OUTPATIENT
Start: 2021-08-03 | End: 2021-08-14

## 2021-08-03 RX ADMIN — HEPARIN SODIUM 5000 UNITS: 5000 INJECTION INTRAVENOUS; SUBCUTANEOUS at 08:29

## 2021-08-03 RX ADMIN — Medication 1 CAPSULE: at 08:29

## 2021-08-03 RX ADMIN — VORTIOXETINE 20 MG: 20 TABLET, FILM COATED ORAL at 08:30

## 2021-08-03 RX ADMIN — PRAVASTATIN SODIUM 40 MG: 40 TABLET ORAL at 08:29

## 2021-08-03 RX ADMIN — RIVASTIGMINE 1 PATCH: 4.6 PATCH TRANSDERMAL at 08:29

## 2021-08-03 RX ADMIN — INSULIN DETEMIR 45 UNITS: 100 INJECTION, SOLUTION SUBCUTANEOUS at 08:29

## 2021-08-03 RX ADMIN — POTASSIUM CHLORIDE 40 MEQ: 20 TABLET, EXTENDED RELEASE ORAL at 05:53

## 2021-08-03 RX ADMIN — MAGNESIUM SULFATE HEPTAHYDRATE 4 G: 40 INJECTION, SOLUTION INTRAVENOUS at 05:53

## 2021-08-03 NOTE — OUTREACH NOTE
Prep Survey      Responses   [facility] patient discharged from?  [facility]   Is LACE score < 7 ?  No   Emergency Room discharge w/ pulse ox?  No   Eligibility  [facility]   Date of Admission  07/31/21   Date of Discharge  08/03/21   Discharge Disposition  Home or Self Care   Discharge diagnosis  Severe sepsis,    UTI/right-sided pyelonephritis   Does the patient have one of the following disease processes/diagnoses(primary or secondary)?  Sepsis   Does the patient have Home health ordered?  No   Is there a DME ordered?  No   Prep survey completed?  Yes          Cassia Augustine RN

## 2021-08-03 NOTE — PLAN OF CARE
Goal Outcome Evaluation:      Patient has not rested well again tonight. Her daughter has stayed with her at the bedside which seems to be helping some with her confusion. Her daughter requested something to help pt sleep. Selina SIMMS was notified and melatonin was ordered and given. Her vital signs have remained stable. She has not had any complaints or shortness of breath or pain. Will continue to monitor and follow plan of care.

## 2021-08-03 NOTE — CASE MANAGEMENT/SOCIAL WORK
Case Management Discharge Note      Final Note: Patient to be discharged home on this date 8/3/21.  No needs identified.         Selected Continued Care - Admitted Since 7/31/2021                  Final Discharge Disposition Code: 01 - home or self-care

## 2021-08-03 NOTE — DISCHARGE SUMMARY
Date of Admission: 7/31/2021    Date of Discharge: 8/3/2021     PCP: John Mueller APRN    Admission Diagnosis:   Please see admission H&P    Discharge Diagnosis:   Severe sepsis  UTI/right-sided pyelonephritis  E coli bacteremia  Hyponatremia  Hypokalemia  Hypomagnesemia  Hypophosphatemia  Prolonged QTc  DM II, insulin dependent with hyperglycemia  Anion gap metabolic acidosis  Elevated liver enzymes   Early onset dementia     Procedures Performed: None     Consults:   Consults     Date and Time Order Name Status Description    7/31/2021  9:58 PM Hospitalist (on-call MD unless specified)              History of Present Illness:  Sandra Leahy is a 55 y.o. female who presented to [facility] ED for evaluation of worsening confusion, fatigue, nausea and vomiting. Please see admission H&P for complete details.     In the ED, CT head revealed mild cerebral atrophy and nonspecific periventricular hypodensities thought to represent white matter microvascular change with no acute intracranial abnormalities. Workup revealed severe sepsis, UTI, right-sided pyelonephritis, anion gap metabolic acidosis, hypomagnesemia and prolonged QTc. Cultures were obtained and IV antibiotics initiated. IV fluids were given as well.        Hospital Course  Sandra Leahy was admitted to the telemetry floor for further evaluation and treatment. She was continued on IV Rocephin empirically in addition to maintenance IV fluids. The hospital's electrolyte replacement protocols were initiated as well.     Soon upon arrival to the floor she became febrile. A cooling blanket and Tylenol suppositories were ordered. Repeat labs revealed hypokalemia and hypophosphatemia in addition to hypomagnesemia, all of which were replaced per protocol. Her hyponatremia was thought to be hypovolemic and improved with IV fluids.     BCID on initial blood cultures revealed E coli and repeat cultures were obtained. She was continued on high dose Rocephin and later  given a dose of gentamicin for synergism as she remained intermittently febrile. Repeat blood cultures remained negative. Her fever trend improved and inflammatory markers improved on follow up labs. She began showing clinical improvement as well with no further episodes of nausea/vomiting and improving confusion.     Mrs. Leahy was seen and examined with GRISELDA White on 8/3/21. She remained afebrile and hemodynamically stable with improving AM labs. She felt much improved from upon admission and was eager to go home. She was deemed medically stable for discharge and transitioned to [facility] to complete a course of treatment. Instructions were given for follow up with her PCP in 1 week.       Condition on Discharge:  Stable    Vital Signs  Vitals:    08/03/21 0612   BP: 122/65   Pulse: 85   Resp: 20   Temp: 98.6 °F (37 °C)   SpO2: 98%       Physical Exam:  General:    Awake, alert, pleasantly confused, in no acute distress   Heart:      Normal S1 and S2. Regular rate and rhythm. No significant murmur, rubs or gallops appreciated.   Lungs:     Respirations regular, even and unlabored. Lungs clear to auscultation B/L. No wheezes, rales or rhonchi.   Abdomen:   Soft and nontender. No guarding, rebound tenderness or  organomegaly noted. Bowel sounds present x 4.   Extremities:  No clubbing, cyanosis or edema noted. Moves UE and LE equally B/L.     Discharge Disposition:   home      Discharge Medications:     Discharge Medications      New Medications      Instructions Start Date   cefdinir 300 MG capsule  Commonly known as: OMNICEF   300 mg, Oral, 2 Times Daily      lactobacillus acidophilus capsule capsule   1 capsule, Oral, Daily         Continue These Medications      Instructions Start Date   ezetimibe 10 MG tablet  Commonly known as: ZETIA   10 mg, Oral, Daily      insulin detemir 100 UNIT/ML injection  Commonly known as: LEVEMIR   45 Units, Subcutaneous, 2 Times Daily      metFORMIN 1000 MG tablet  Commonly known  as: GLUCOPHAGE   1,000 mg, Oral, 2 Times Daily With Meals      pravastatin 40 MG tablet  Commonly known as: PRAVACHOL   40 mg, Oral, Daily      rivastigmine 4.6 MG/24HR patch  Commonly known as: EXELON   APPLY 1 PATCH TOPICALLY ONCE DAILY AS DIRECTED      Trintellix 20 MG tablet  Generic drug: Vortioxetine HBr   20 mg, Oral, Daily               Discharge Diet:   Dietary Orders (From admission, onward)     Start     Ordered    08/01/21 1037  Diet Regular; Consistent Carbohydrate  Diet Effective Now     Question Answer Comment   Diet Texture / Consistency Regular    Common Modifiers Consistent Carbohydrate        08/01/21 1036                Activity at Discharge:  activity as tolerated    Follow-up Appointments:  Additional Instructions for the Follow-ups that You Need to Schedule     Discharge Follow-up with PCP   As directed       Currently Documented PCP:    John Mueller APRN    PCP Phone Number:    [phone]     Follow Up Details: Follow up with ASH Farfan in 1 week.           Follow-up Information     John Mueller APRN .    Specialty: Family Medicine  Why: Follow up with ASH Farfan in 1 week.  Contact information:  [address] [place] [id number] [phone]                 Your Scheduled Appointments    Sep 02, 2021  8:40 AM  Lab with [facility] [address] [place] [id number] [phone]      Sep 09, 2021  8:30 AM  Subsequent Medicare Wellness with ASH Chaudhry  [facility] [address] [place] [id number] [phone]      Sep 24, 2021 10:15 AM  Medicine Check with ASH Gasca  [facility] (--) [address] [place] [id number] [phone]               Test Results Pending at Discharge:  Final culture results      Win Wen DO  08/03/21  08:20  EDT      Time: Greater than 30 minutes spent on this discharge.

## 2021-08-04 ENCOUNTER — TRANSITIONAL CARE MANAGEMENT TELEPHONE ENCOUNTER (OUTPATIENT)
Dept: CALL CENTER | Facility: HOSPITAL | Age: 56
End: 2021-08-04

## 2021-08-04 LAB
BACTERIA SPEC AEROBE CULT: ABNORMAL
BACTERIA SPEC AEROBE CULT: ABNORMAL
GRAM STN SPEC: ABNORMAL
ISOLATED FROM: ABNORMAL
ISOLATED FROM: ABNORMAL

## 2021-08-04 NOTE — OUTREACH NOTE
Call Center TCM Note      Responses   [facility] [place] patient discharged from?  [facility]   Does the patient have one of the following disease processes/diagnoses(primary or secondary)?  Sepsis   TCM attempt successful?  Yes   Call start time  1651   Call end time  1656   Discharge diagnosis  Severe sepsis,    UTI/right-sided pyelonephritis   Meds reviewed with patient/caregiver?  Yes   Is the patient having any side effects they believe may be caused by any medication additions or changes?  No   Does the patient have all medications related to this admission filled (includes all antibiotics, inhalers, nebulizers,steroids,etc.)  Yes   Is the patient taking all medications as directed (includes completed medication regime)?  Yes   Medication comments  Encouraged to complete antibiotics as ordered   Does the patient have a primary care provider?   Yes   Does the patient have an appointment with their PCP within 7 days of discharge?  Yes [Hospital D/C follow-up scheduled for 8/6/21     ]   Has the patient kept scheduled appointments due by today?  N/A   Has home health visited the patient within 72 hours of discharge?  N/A   Psychosocial issues?  No   Did the patient receive a copy of their discharge instructions?  Yes   Nursing interventions  Reviewed instructions with patient   What is the patient's perception of their health status since discharge?  Improving [Pt reports no concerns or issue but  questions Magnesium supplements--enc'd to ask at f/u on 8/6/21]   Nursing interventions  Nurse provided patient education   Is the patient/caregiver able to teach back Sepsis?  S - Shivering,fever or very cold, P - Pale or discolored skin, S - Sleepy, difficult to arouse,confused, S - Short of breath   Nursing interventions  Nurse provided patient education   Is patient/caregiver able to teach back steps to recovery at home?  Set small, achievable goals for return to baseline health, Rest and regain strength   Is the  patient/caregiver able to teach back signs and symptoms of worsening condition:  Fever, Rapid heart rate (>90), Shortness of breath/rapid respiratory rate, Altered mental status(confusion/coma)   If the patient is a current smoker, are they able to teach back resources for cessation?  Not a smoker   Is the patient/caregiver able to teach back the hierarchy of who to call/visit for symptoms/problems? PCP, Specialist, Home health nurse, Urgent Care, ED, 911  Yes   TCM call completed?  Yes          Caitlyn Ballard RN    8/4/2021, 16:58 EDT

## 2021-08-04 NOTE — OUTREACH NOTE
Call Center TCM Note      Responses   [facility] patient discharged from?  [facility]   Does the patient have one of the following disease processes/diagnoses(primary or secondary)?  Sepsis   TCM attempt successful?  No [Called both cell and home numbers]   Unsuccessful attempts  Attempt 1          Caitlyn Ballard RN    8/4/2021, 14:49 EDT

## 2021-08-06 LAB
BACTERIA SPEC AEROBE CULT: NORMAL
BACTERIA SPEC AEROBE CULT: NORMAL

## 2021-08-10 ENCOUNTER — OFFICE VISIT (OUTPATIENT)
Dept: FAMILY MEDICINE CLINIC | Facility: CLINIC | Age: 56
End: 2021-08-10

## 2021-08-10 VITALS
WEIGHT: 160 LBS | OXYGEN SATURATION: 100 % | SYSTOLIC BLOOD PRESSURE: 124 MMHG | DIASTOLIC BLOOD PRESSURE: 72 MMHG | HEIGHT: 67 IN | BODY MASS INDEX: 25.11 KG/M2 | TEMPERATURE: 97.2 F | HEART RATE: 92 BPM

## 2021-08-10 DIAGNOSIS — E89.40 SURGICAL MENOPAUSE: ICD-10-CM

## 2021-08-10 DIAGNOSIS — Z09 HOSPITAL DISCHARGE FOLLOW-UP: Primary | ICD-10-CM

## 2021-08-10 DIAGNOSIS — E55.9 VITAMIN D DEFICIENCY: ICD-10-CM

## 2021-08-10 DIAGNOSIS — E87.6 HYPOKALEMIA: ICD-10-CM

## 2021-08-10 DIAGNOSIS — E83.42 HYPOMAGNESEMIA: ICD-10-CM

## 2021-08-10 DIAGNOSIS — F03.90 MAJOR NEUROCOGNITIVE DISORDER (HCC): ICD-10-CM

## 2021-08-10 DIAGNOSIS — E83.39 HYPOPHOSPHATEMIA: ICD-10-CM

## 2021-08-10 DIAGNOSIS — R39.9 LOWER URINARY TRACT SYMPTOMS (LUTS): ICD-10-CM

## 2021-08-10 DIAGNOSIS — Z12.31 ENCOUNTER FOR SCREENING MAMMOGRAM FOR BREAST CANCER: ICD-10-CM

## 2021-08-10 DIAGNOSIS — IMO0002 TYPE 2 DIABETES, UNCONTROLLED, WITH NEUROPATHY: Chronic | ICD-10-CM

## 2021-08-10 DIAGNOSIS — K21.9 GASTROESOPHAGEAL REFLUX DISEASE WITHOUT ESOPHAGITIS: Chronic | ICD-10-CM

## 2021-08-10 DIAGNOSIS — E78.5 DYSLIPIDEMIA: Chronic | ICD-10-CM

## 2021-08-10 DIAGNOSIS — M25.50 ARTHRALGIA, UNSPECIFIED JOINT: ICD-10-CM

## 2021-08-10 LAB
BILIRUB BLD-MCNC: NEGATIVE MG/DL
CLARITY, POC: CLEAR
COLOR UR: YELLOW
GLUCOSE UR STRIP-MCNC: ABNORMAL MG/DL
KETONES UR QL: NEGATIVE
LEUKOCYTE EST, POC: NEGATIVE
NITRITE UR-MCNC: NEGATIVE MG/ML
PH UR: 6 [PH] (ref 5–8)
PROT UR STRIP-MCNC: NEGATIVE MG/DL
RBC # UR STRIP: NEGATIVE /UL
SP GR UR: 1.03 (ref 1–1.03)
UROBILINOGEN UR QL: NORMAL

## 2021-08-10 PROCEDURE — 36415 COLL VENOUS BLD VENIPUNCTURE: CPT | Performed by: NURSE PRACTITIONER

## 2021-08-10 PROCEDURE — 81003 URINALYSIS AUTO W/O SCOPE: CPT | Performed by: NURSE PRACTITIONER

## 2021-08-10 PROCEDURE — 99496 TRANSJ CARE MGMT HIGH F2F 7D: CPT | Performed by: NURSE PRACTITIONER

## 2021-08-10 PROCEDURE — 1111F DSCHRG MED/CURRENT MED MERGE: CPT | Performed by: NURSE PRACTITIONER

## 2021-08-10 NOTE — PROGRESS NOTES
Transitional Care Follow Up Visit    Sandra Leahy is a 55 y.o. female who presents for a transitional care management visit. Her daughter, Emma, accompanies her today.     Within 48 business hours after discharge she was contacted by [facility] via telephone to coordinate her care and needs.      I reviewed and discussed the details of that call along with the discharge summary, hospital problems, inpatient lab results, inpatient diagnostic studies, and consultation reports with Sandra.     Current outpatient and discharge medications have been reconciled for the patient.  Reviewed by: ASH Chaudhry      Date of TCM Phone Call 8/3/2021   [facility]   Date of Admission 7/31/2021   Date of Discharge 8/3/2021   Discharge Disposition Home or Self Care     Risk for Readmission (LACE) Score: 12 (8/3/2021  6:02 AM)      History of Present Illness   Course During Hospital Stay:   Armond is a 55 y.o. female who presented to [facility] ED on July 31st  for evaluation of worsening confusion, fatigue, nausea and vomiting.      In the ED, CT head revealed mild cerebral atrophy and nonspecific periventricular hypodensities thought to represent white matter microvascular change with no acute intracranial abnormalities. Workup revealed severe sepsis, UTI, right-sided pyelonephritis, anion gap metabolic acidosis, hypomagnesemia and prolonged QTc. Cultures were obtained and IV antibiotics initiated. IV fluids were given as well.   Sandra Leahy was admitted to the telemetry floor for further evaluation and treatment. She was continued on IV Rocephin empirically in addition to maintenance IV fluids. The hospital's electrolyte replacement protocols were initiated as well.      Soon upon arrival to the floor she became febrile. A cooling blanket and Tylenol suppositories were ordered. Repeat labs revealed hypokalemia and hypophosphatemia in addition to hypomagnesemia, all of which were replaced per protocol. Her  hyponatremia was thought to be hypovolemic and improved with IV fluids.      BCID on initial blood cultures revealed E coli and repeat cultures were obtained. She was continued on high dose Rocephin and later given a dose of gentamicin for synergism as she remained intermittently febrile. Repeat blood cultures remained negative. Her fever trend improved and inflammatory markers improved on follow up labs. She began showing clinical improvement as well with no further episodes of nausea/vomiting and improving confusion.      Mrs. Leahy was seen and examined with GRISELDA White on 8/3/21. She remained afebrile and hemodynamically stable with improving AM labs. She felt much improved from upon admission and was eager to go home. She was deemed medically stable for discharge and transitioned to [facility] to complete a course of treatment. Instructions were given for follow up with her PCP in 1 week.     The following portions of the patient's history were reviewed and updated as appropriate: allergies, current medications, past family history, past medical history, past social history, past surgical history and problem list.    Review of Systems   Constitutional: Negative for activity change, appetite change, chills, fatigue, fever and unexpected weight change.   HENT: Negative.    Eyes: Negative for visual disturbance.   Respiratory: Negative for cough, shortness of breath and wheezing.    Cardiovascular: Negative for chest pain, palpitations and leg swelling.   Gastrointestinal: Negative for constipation, diarrhea, nausea and vomiting.   Endocrine: Negative for cold intolerance, heat intolerance, polydipsia, polyphagia and polyuria.   Skin: Negative for color change and rash.   Neurological: Positive for numbness. Negative for dizziness, tremors, speech difficulty, weakness, light-headedness and headaches.   Hematological: Negative for adenopathy.   Psychiatric/Behavioral: Positive for confusion. Negative for decreased  concentration. The patient is not nervous/anxious.    All other systems reviewed and are negative.    Physical Exam  Vitals and nursing note reviewed.   Constitutional:       General: She is not in acute distress.     Appearance: She is well-developed.      Comments: Pleasant; feeling much better. In good spirits.    HENT:      Head: Normocephalic.      Nose: Nose normal.   Eyes:      General: No scleral icterus.        Right eye: No discharge.         Left eye: No discharge.      Conjunctiva/sclera: Conjunctivae normal.   Neck:      Thyroid: No thyromegaly.      Vascular: No JVD.   Cardiovascular:      Rate and Rhythm: Normal rate and regular rhythm.      Heart sounds: Normal heart sounds. No murmur heard.   No friction rub.   Pulmonary:      Effort: Pulmonary effort is normal. No respiratory distress.      Breath sounds: Normal breath sounds. No wheezing or rales.   Abdominal:      General: Bowel sounds are normal. There is no distension.      Palpations: Abdomen is soft.      Tenderness: There is no abdominal tenderness. There is no guarding.   Musculoskeletal:      Cervical back: Neck supple.      Right lower leg: No edema.      Left lower leg: No edema.   Lymphadenopathy:      Cervical: No cervical adenopathy.   Skin:     General: Skin is warm and dry.      Capillary Refill: Capillary refill takes less than 2 seconds.   Neurological:      Mental Status: She is alert and oriented to person, place, and time.      Cranial Nerves: Cranial nerves are intact.      Gait: Gait is intact.   Psychiatric:         Mood and Affect: Mood and affect normal.         Speech: Speech normal.         Behavior: Behavior is cooperative.         Thought Content: Thought content normal.         Cognition and Memory: Memory is impaired.         Judgment: Judgment normal.       Assessment/Plan   Diagnoses and all orders for this visit:    1. Hospital discharge follow-up (Primary)  Comments:  Findings and recommendations discussed with  Sandra and Emma. Reviewed hospital encounter and results with them     2. Lower urinary tract symptoms (LUTS)  Comments:  Rechecked Urinalysis   Orders:  -     POC Urinalysis Dipstick, Automated    3. Hypomagnesemia  Comments:  Repeat Magnesemia level   Orders:  -     Magnesium; Future    4. Hypokalemia  Comments:  CMP ordered to evaluate  Orders:  -     Comprehensive metabolic panel; Future    5. Hypophosphatemia  Comments:  Level repeated   Orders:    -     Comprehensive metabolic panel; Future  -     Phosphorus; Future    6. Type 2 diabetes, uncontrolled, with neuropathy (CMS/HCC)  Comments:  CMG ordered    7. Dyslipidemia  Comments:  Continue Pravastatin     8. Gastroesophageal reflux disease without esophagitis  Comments:  Continue Pepcid   Orders:  -     Cancel: CBC & Differential  -     Magnesium; Future    9. Major neurocognitive disorder (CMS/[facility])  Comments:  Continue to f/u with [facility]    10. Encounter for screening mammogram for breast cancer  -     Mammo Screening Bilateral With CAD; Future    11. Surgical menopause  -     DEXA Bone Density Axial; Future    Findings and recommendations discussed with Sandra and her daughter. Reviewed hospital records and results. Cardiovascular risk reduction  modifications reinforced including nutrition and activity recommendations. She will follow up in days sooner if problems/concerns occur.

## 2021-08-10 NOTE — PATIENT INSTRUCTIONS
Type 2 Diabetes Mellitus, Self-Care, Adult  When you have type 2 diabetes (type 2 diabetes mellitus), you must make sure your blood sugar (glucose) stays in a healthy range. You can do this with:  · Nutrition.  · Exercise.  · Lifestyle changes.  · Medicines or insulin, if needed.  · Support from your doctors and others.  What are the risks?  Having diabetes can raise your risk for other long-term (chronic) health problems. You may get medicines to help prevent these problems.  How to stay aware of blood sugar    · Check your blood sugar level every day, as often as told.  · Have your A1C (hemoglobin A1C) level checked two or more times a year. Have it checked more often if told.  · Your doctor will set personal treatment goals for you. In general, you should have these blood sugar levels:  ? Before meals:  mg/dL (4.4-7.2 mmol/L).  ? After meals: below 180 mg/dL (10 mmol/L).  ? A1C: less than 7%.  How to manage high and low blood sugar  Symptoms of high blood sugar  High blood sugar is also called hyperglycemia. Know the symptoms of high blood sugar. These may include:  · More thirst.  · Hunger.  · Feeling very tired.  · Needing to pee (urinate) more often than normal.  · Seeing things blurry.  Symptoms of low blood sugar  Low blood sugar is also called hypoglycemia. This is when blood sugar is at or below 70 mg/dL (3.9 mmol/L). Symptoms may include:  · Hunger.  · Feeling worried or nervous (anxious).  · Feeling sweaty and cold to the touch (clammy).  · Being dizzy or light-headed.  · Feeling sleepy.  · A fast heartbeat.  · Feeling grouchy (irritable).  · Tingling or loss of feeling (numbness) around your mouth, lips, or tongue.  · Restless sleep.  Diabetes medicines can cause low blood sugar. You are more at risk:  · While you exercise.  · After exercise.  · During sleep.  · When you are sick.  · When you skip meals or do not eat for a long time.  Treating low blood sugar  If you think you have low blood  sugar, eat or drink something sugary right away. Keep 15 grams of a fast-acting carb (carbohydrate) with you all the time. Make sure your family and friends know how to treat you if you cannot treat yourself.  Treating very low blood sugar  Severe hypoglycemia is when your blood sugar is at or below 54 mg/dL (3 mmol/L). Severe hypoglycemia is an emergency. Do not wait to see if the symptoms will go away. Get medical help right away. Call your local emergency services (911 in the [place] Do not drive yourself to the hospital.  You may need a glucagon shot if you have very low blood sugar and you cannot eat or drink. Have a family member or friend learn how to check your blood sugar and how to give you a glucagon shot. Ask your doctor if you should have a kit for glucagon shots.  Follow these instructions at home:  Medicines  · Take diabetes medicines as told. If your doctor prescribed insulin or diabetes medicines, take them each day.  · Do not run out of insulin or other medicines. Plan ahead.  · If you use insulin, change the amount you take based on how active you are and what foods you eat. Your doctor will tell you how to do this.  · Take over-the-counter and prescription medicines only as told by your doctor.  Eating and drinking    · Eat healthy foods. These include:  ? Low-fat (lean) proteins.  ? Complex carbs, such as whole grains.  ? Fresh fruits and vegetables.  ? Low-fat dairy products.  ? Healthy fats.  · Meet with a food expert (dietitian) to make an eating plan.  · Follow instructions from your doctor about what you cannot eat or drink.  · Drink enough fluid to keep your pee (urine) pale yellow.  · Keep track of carbs that you eat. Read food labels and learn serving sizes of foods.  · Follow your sick-day plan when you cannot eat or drink as normal. Make this plan with your doctor so it is ready to use.  Activity  · Exercise as told by your doctor. You may need to:  ? Do stretching and strength  exercises 2 or more times a week.  ? Do 150 minutes or more of exercise each week that makes your heart beat faster and makes you sweat.  § Spread out your exercise over 3 or more days a week.  § Do not go more than 2 days in a row without exercise.  · Talk with your doctor before you start a new exercise. Your doctor may tell you to change:  ? How much insulin or medicines you take.  ? How much food you eat.  Lifestyle  · Do not use any products that contain nicotine or tobacco, such as cigarettes, e-cigarettes, and chewing tobacco. If you need help quitting, ask your doctor.  · If you drink alcohol and your doctor says alcohol is safe for you:  ? Limit how much you use to:  § 0-1 drink a day for women who are not pregnant.  § 0-2 drinks a day for men.  ? Be aware of how much alcohol is in your drink. In the [place] one drink equals one 12 oz bottle of beer (355 mL), one 5 oz glass of wine (148 mL), or one 1½ oz glass of hard liquor (44 mL).  · Learn to deal with stress. If you need help, ask your doctor.  Body care    · Stay up to date with your shots (immunizations).  · Have your eyes and feet checked by a doctor as often as told.  · Check your skin and feet every day. Check for cuts, bruises, redness, blisters, or sores.  · Brush your teeth and gums two times a day. Floss one or more times a day.  · Go to the dentist one or more times every 6 months.  · Stay at a healthy weight.  General instructions  · Share your diabetes care plan with:  ? Your work or school.  ? People you live with.  · Carry a card or wear jewelry that says you have diabetes.  · Keep all follow-up visits as told by your doctor. This is important.  Questions to ask your doctor  · Do I need to meet with a certified expert in diabetes education and care?  · Where can I find a support group?  Where to find more information  · American Diabetes Association: www.diabetes.org  · American Association of Diabetes Care and Education Specialists:  www.diabeteseducator.org  · International Diabetes Federation: www.idf.org  Summary  · When you have type 2 diabetes, you must make sure your blood sugar (glucose) stays in a healthy range. You can do this with nutrition, exercise, medicines and insulin, and support from doctors and others.  · Check your blood sugar every day, as often as told.  · Having diabetes can raise your risk for other long-term health problems. You may get medicines to help prevent these problems.  · Share your diabetes management plan with people at work, school, and home.  · Keep all follow-up visits as told by your doctor. This is important.  This information is not intended to replace advice given to you by your health care provider. Make sure you discuss any questions you have with your health care provider.  Document Revised: 01/26/2021 Document Reviewed: 01/26/2021  Elsevier Patient Education © 2021 [employer]

## 2021-08-11 LAB
ALBUMIN SERPL-MCNC: 4 G/DL (ref 3.5–5.2)
ALBUMIN/GLOB SERPL: 1.2 G/DL
ALP SERPL-CCNC: 138 U/L (ref 39–117)
ALT SERPL-CCNC: 28 U/L (ref 1–33)
AST SERPL-CCNC: 23 U/L (ref 1–32)
BASOPHILS # BLD AUTO: 0.06 10*3/MM3 (ref 0–0.2)
BASOPHILS NFR BLD AUTO: 0.6 % (ref 0–1.5)
BILIRUB SERPL-MCNC: 0.2 MG/DL (ref 0–1.2)
BUN SERPL-MCNC: 10 MG/DL (ref 6–20)
BUN/CREAT SERPL: 15.4 (ref 7–25)
CALCIUM SERPL-MCNC: 9.3 MG/DL (ref 8.6–10.5)
CHLORIDE SERPL-SCNC: 98 MMOL/L (ref 98–107)
CO2 SERPL-SCNC: 25.7 MMOL/L (ref 22–29)
CREAT SERPL-MCNC: 0.65 MG/DL (ref 0.57–1)
EOSINOPHIL # BLD AUTO: 0.13 10*3/MM3 (ref 0–0.4)
EOSINOPHIL NFR BLD AUTO: 1.3 % (ref 0.3–6.2)
ERYTHROCYTE [DISTWIDTH] IN BLOOD BY AUTOMATED COUNT: 12.8 % (ref 12.3–15.4)
GLOBULIN SER CALC-MCNC: 3.3 GM/DL
GLUCOSE SERPL-MCNC: 246 MG/DL (ref 65–99)
HCT VFR BLD AUTO: 40.2 % (ref 34–46.6)
HGB BLD-MCNC: 13.3 G/DL (ref 12–15.9)
IMM GRANULOCYTES # BLD AUTO: 0.31 10*3/MM3 (ref 0–0.05)
IMM GRANULOCYTES NFR BLD AUTO: 3.1 % (ref 0–0.5)
LYMPHOCYTES # BLD AUTO: 1.91 10*3/MM3 (ref 0.7–3.1)
LYMPHOCYTES NFR BLD AUTO: 18.9 % (ref 19.6–45.3)
MAGNESIUM SERPL-MCNC: 1.6 MG/DL (ref 1.6–2.6)
MCH RBC QN AUTO: 30.7 PG (ref 26.6–33)
MCHC RBC AUTO-ENTMCNC: 33.1 G/DL (ref 31.5–35.7)
MCV RBC AUTO: 92.8 FL (ref 79–97)
MONOCYTES # BLD AUTO: 0.57 10*3/MM3 (ref 0.1–0.9)
MONOCYTES NFR BLD AUTO: 5.6 % (ref 5–12)
NEUTROPHILS # BLD AUTO: 7.15 10*3/MM3 (ref 1.7–7)
NEUTROPHILS NFR BLD AUTO: 70.5 % (ref 42.7–76)
NRBC BLD AUTO-RTO: 0.1 /100 WBC (ref 0–0.2)
PHOSPHATE SERPL-MCNC: 4.5 MG/DL (ref 2.5–4.5)
PLATELET # BLD AUTO: 525 10*3/MM3 (ref 140–450)
POTASSIUM SERPL-SCNC: 4.4 MMOL/L (ref 3.5–5.2)
PROT SERPL-MCNC: 7.3 G/DL (ref 6–8.5)
RBC # BLD AUTO: 4.33 10*6/MM3 (ref 3.77–5.28)
SODIUM SERPL-SCNC: 136 MMOL/L (ref 136–145)
WBC # BLD AUTO: 10.13 10*3/MM3 (ref 3.4–10.8)

## 2021-08-12 RX ORDER — PROCHLORPERAZINE 25 MG/1
SUPPOSITORY RECTAL
Qty: 9 EACH | Refills: 3 | Status: SHIPPED | OUTPATIENT
Start: 2021-08-12 | End: 2023-02-07

## 2021-08-12 RX ORDER — PROCHLORPERAZINE 25 MG/1
1 SUPPOSITORY RECTAL DAILY
Qty: 1 EACH | Refills: 0 | Status: SHIPPED | OUTPATIENT
Start: 2021-08-12 | End: 2023-02-07

## 2021-08-13 ENCOUNTER — PRIOR AUTHORIZATION (OUTPATIENT)
Dept: FAMILY MEDICINE CLINIC | Facility: CLINIC | Age: 56
End: 2021-08-13

## 2021-09-09 ENCOUNTER — OFFICE VISIT (OUTPATIENT)
Dept: FAMILY MEDICINE CLINIC | Facility: CLINIC | Age: 56
End: 2021-09-09

## 2021-09-09 VITALS — WEIGHT: 143 LBS | BODY MASS INDEX: 22.44 KG/M2 | HEIGHT: 67 IN

## 2021-09-09 DIAGNOSIS — F41.1 GENERALIZED ANXIETY DISORDER: ICD-10-CM

## 2021-09-09 DIAGNOSIS — E83.42 HYPOMAGNESEMIA: ICD-10-CM

## 2021-09-09 DIAGNOSIS — F32.0 CURRENT MILD EPISODE OF MAJOR DEPRESSIVE DISORDER WITHOUT PRIOR EPISODE (HCC): ICD-10-CM

## 2021-09-09 DIAGNOSIS — E78.2 MIXED HYPERLIPIDEMIA: Chronic | ICD-10-CM

## 2021-09-09 DIAGNOSIS — IMO0002 TYPE 2 DIABETES, UNCONTROLLED, WITH NEUROPATHY: Primary | Chronic | ICD-10-CM

## 2021-09-09 PROCEDURE — 99443 PR PHYS/QHP TELEPHONE EVALUATION 21-30 MIN: CPT | Performed by: NURSE PRACTITIONER

## 2021-09-09 RX ORDER — MULTIVITAMIN WITH IRON
250 TABLET ORAL DAILY
Qty: 90 TABLET | Refills: 0 | Status: SHIPPED | OUTPATIENT
Start: 2021-09-09 | End: 2021-11-24 | Stop reason: SDUPTHER

## 2021-09-09 RX ORDER — PRAVASTATIN SODIUM 40 MG
40 TABLET ORAL DAILY
Qty: 90 TABLET | Refills: 0 | Status: SHIPPED | OUTPATIENT
Start: 2021-09-09 | End: 2022-01-18

## 2021-09-09 RX ORDER — EZETIMIBE 10 MG/1
10 TABLET ORAL DAILY
Qty: 90 TABLET | Refills: 0 | Status: SHIPPED | OUTPATIENT
Start: 2021-09-09 | End: 2021-12-07

## 2021-09-09 RX ORDER — MULTIVITAMIN WITH IRON
250 TABLET ORAL DAILY
COMMUNITY
End: 2021-09-09 | Stop reason: SDUPTHER

## 2021-09-09 RX ORDER — VORTIOXETINE 20 MG/1
20 TABLET, FILM COATED ORAL DAILY
Qty: 30 TABLET | Refills: 0 | Status: SHIPPED | OUTPATIENT
Start: 2021-09-09 | End: 2021-10-22 | Stop reason: SDUPTHER

## 2021-09-09 NOTE — PROGRESS NOTES
You have chosen to receive care through a telephone visit. Do you consent to use a telephone visit for your medical care today? Yes  History of Present Illness  Sandra Leahy is a 55 y.o.female who presents to the clinic today via telephone for follow up  related to her  DM, type 2 not at goal, Dyslipidemia, and Vitamin deficiencies. She has been evaluated by [facility] for a major neurocognitive disorder of uncertain etiology.      Diabetes   She has type 2 diabetes mellitus.which was diagnosed over ten years ago. Associated symptoms include stable peripheral neuropathy. Pertinent negatives for diabetes include no blurred vision, no chest pain, no foot ulcerations, no polyphagia, no polyuria .  Diabetic complications include peripheral neuropathy. Risk factors for coronary artery disease include diabetes mellitus, dyslipidemia, family history and post-menopausal. Current diabetic treatment includes insulin injections, oral agent (monotherapy) and diet. She is following a generally healthier diet with decrease in fatty foods. She has had a previous visit with a dietitian. She has seen a Podiatrist. Her last eye exam  was completed in May 2018 per Dr Lopez.She has yet to keep her appointments for follow up eye exam due to COVID.    Lab Results   Component Value Date    HGBA1C 9.40 (H) 10/09/2020     Lab Results   Component Value Date    HGBA1C 8.90 (H) 06/01/2021     Dyslipidemia   Pertinent negatives include no chest pain or shortness of breath. Current antihyperlipidemic treatment includes ezetimibe and statins. Risk factors for coronary artery disease include diabetes mellitus, dyslipidemia, family history, post-menopausal and stress.     Lab Results   Component Value Date    CHLPL 144 06/01/2021    CHLPL 155 10/09/2020    CHLPL 174 05/12/2020     Lab Results   Component Value Date    TRIG 120 06/01/2021    TRIG 250 (H) 10/09/2020    TRIG 213 (H) 05/12/2020     Lab Results   Component Value Date    HDL 42  06/01/2021    HDL 39 (L) 10/09/2020    HDL 41 05/12/2020     Lab Results   Component Value Date    LDL 80 06/01/2021    LDL 75 10/09/2020    LDL 90 05/12/2020     Anxiety/Depression   Anxiety has been gradually improving. Symptoms include excessive worry, confusion, and decreased concentration. Sandra reports no chest pain, nausea,  palpitations, shortness of breath or suicidal ideas. The severity of symptoms at times interfering with daily activities. The symptoms are aggravated by family issues and especially when the children are fussing.  Risk factors include a major life event. She is currently seeing Behavioral Health which she reports is helping.  Lab Results   Component Value Date    TSH 2.620 10/09/2020     GI Problem   Symptoms have improved  Lab Results   Component Value Date    WBC 6.13 06/01/2021    HGB 16.1 (H) 06/01/2021    HCT 47.1 (H) 06/01/2021    MCV 90.4 06/01/2021     06/01/2021     The following portions of the patient's history were reviewed and updated as appropriate: allergies, current medications, past family history, past medical history, past social history, past surgical history and problem list.    Review of Systems   Constitutional: Negative for activity change, appetite change, chills, fatigue, fever and unexpected weight change.   HENT: Negative.    Eyes: Negative for visual disturbance.   Respiratory: Negative for cough, chest tightness, shortness of breath and wheezing.    Cardiovascular: Negative for chest pain, palpitations and leg swelling.   Gastrointestinal: Negative for constipation, diarrhea, nausea and vomiting.   Endocrine: Negative for cold intolerance, heat intolerance, polydipsia, polyphagia and polyuria.   Skin: Negative for color change and rash.   Neurological: Negative for dizziness, tremors, speech difficulty, weakness, light-headedness and headaches.   Hematological: Negative for adenopathy.   Psychiatric/Behavioral: Negative for confusion and decreased  "concentration. The patient is not nervous/anxious.    All other systems reviewed and are negative.    Vital signs:  Ht 170.2 cm (67.01\")   Wt 64.9 kg (143 lb)   BMI 22.39 kg/m²     Physical Exam  Constitutional:       General: She is not in acute distress.     Comments: Pleasant; in good spirits   Pulmonary:      Effort: No respiratory distress.   Neurological:      Mental Status: She is alert.   Psychiatric:         Mood and Affect: Mood and affect normal.         Speech: Speech normal.         Behavior: Behavior is cooperative.       Patient's Body mass index is 22.39 kg/m². indicating that she is within normal range (BMI 18.5-24.9). No BMI management plan needed..     Assessment/Plan     Diagnoses and all orders for this visit:    1. Type 2 diabetes, uncontrolled, with neuropathy (CMS/HCC) (Primary)  Comments:  Continue Levemir 45u bid and Metformin  Lifetstyle modifications including nutrition and exercise reinforced  Orders:  -     metFORMIN (GLUCOPHAGE) 1000 MG tablet; Take 1 tablet by mouth 2 (Two) Times a Day With Meals.  Dispense: 180 tablet; Refill: 0    2. Mixed hyperlipidemia  Comments:  Continue Pravastatin and Zetia.  Advise low-cholesterol diet  Orders:  -     pravastatin (PRAVACHOL) 40 MG tablet; Take 1 tablet by mouth Daily.  Dispense: 90 tablet; Refill: 0  -     ezetimibe (ZETIA) 10 MG tablet; Take 1 tablet by mouth Daily.  Dispense: 90 tablet; Refill: 0    3. Hypomagnesemia  Comments:  Continue Magnesium supplement. Will check level when she returns in October   Orders:  -     Magnesium 250 MG tablet; Take 1 tablet by mouth Daily.  Dispense: 90 tablet; Refill: 0      Follow Up October 14th  Findings and recommendations discussed with Sandra.Lifestyle modifications reinforced including nutrition and activity recommendations.She will follow up in October sooner if problems/concerns occur.  This visit has been rescheduled as a phone visit to comply with patient safety concerns in accordance with " CDC recommendations. Total time of discussion was 22 minutes.      This document has been electronically signed by:

## 2021-09-10 ENCOUNTER — TELEPHONE (OUTPATIENT)
Dept: FAMILY MEDICINE CLINIC | Facility: CLINIC | Age: 56
End: 2021-09-10

## 2021-09-10 ENCOUNTER — CLINICAL SUPPORT (OUTPATIENT)
Dept: FAMILY MEDICINE CLINIC | Facility: CLINIC | Age: 56
End: 2021-09-10

## 2021-09-10 DIAGNOSIS — R39.9 LOWER URINARY TRACT SYMPTOMS (LUTS): Primary | ICD-10-CM

## 2021-09-10 DIAGNOSIS — R30.0 DYSURIA: Primary | ICD-10-CM

## 2021-09-10 LAB
BILIRUB BLD-MCNC: NEGATIVE MG/DL
CLARITY, POC: ABNORMAL
COLOR UR: YELLOW
GLUCOSE UR STRIP-MCNC: NEGATIVE MG/DL
KETONES UR QL: NEGATIVE
LEUKOCYTE EST, POC: ABNORMAL
NITRITE UR-MCNC: POSITIVE MG/ML
PH UR: 6 [PH] (ref 5–8)
PROT UR STRIP-MCNC: NEGATIVE MG/DL
RBC # UR STRIP: ABNORMAL /UL
SP GR UR: 1.02 (ref 1–1.03)
UROBILINOGEN UR QL: NORMAL

## 2021-09-10 PROCEDURE — 81003 URINALYSIS AUTO W/O SCOPE: CPT | Performed by: NURSE PRACTITIONER

## 2021-09-10 RX ORDER — NITROFURANTOIN 25; 75 MG/1; MG/1
100 CAPSULE ORAL 2 TIMES DAILY
Qty: 20 CAPSULE | Refills: 0 | Status: SHIPPED | OUTPATIENT
Start: 2021-09-10 | End: 2021-10-23

## 2021-09-10 RX ORDER — FLUCONAZOLE 150 MG/1
150 TABLET ORAL DAILY
Qty: 3 TABLET | Refills: 0 | Status: SHIPPED | OUTPATIENT
Start: 2021-09-10 | End: 2023-02-07

## 2021-09-10 NOTE — TELEPHONE ENCOUNTER
----- Message from ASH Chaudhry sent at 9/10/2021  9:12 AM EDT -----  Let me know and I will order POC and Urine culture   ----- Message -----  From: Diana Elkins MA  Sent: 9/10/2021   8:38 AM EDT  To: ASH Chaudhry    She is coming in today to provide us with a urine sample.   ----- Message -----  From: John Mueller APRN  Sent: 9/9/2021   6:33 PM EDT  To: Diana Elkins MA    I am concerned it maybe a UTI like her previous hospitalization. Would she bring do a UA for me to make sure..   ----- Message -----  From: Diana Elkins MA  Sent: 9/9/2021   4:11 PM EDT  To: ASH Chaudhry    Patient called in and stated that she has another bad yeast infection. She is requesting diflucan, at least 5 days worth.

## 2021-09-13 LAB
BACTERIA UR CULT: ABNORMAL
BACTERIA UR CULT: ABNORMAL
OTHER ANTIBIOTIC SUSC ISLT: ABNORMAL

## 2021-10-22 ENCOUNTER — OFFICE VISIT (OUTPATIENT)
Dept: FAMILY MEDICINE CLINIC | Facility: CLINIC | Age: 56
End: 2021-10-22

## 2021-10-22 VITALS — WEIGHT: 143 LBS | HEIGHT: 67 IN | BODY MASS INDEX: 22.44 KG/M2

## 2021-10-22 DIAGNOSIS — F41.1 GENERALIZED ANXIETY DISORDER: ICD-10-CM

## 2021-10-22 DIAGNOSIS — F03.90 MAJOR NEUROCOGNITIVE DISORDER (HCC): Chronic | ICD-10-CM

## 2021-10-22 DIAGNOSIS — IMO0002 TYPE 2 DIABETES, UNCONTROLLED, WITH NEUROPATHY: Primary | Chronic | ICD-10-CM

## 2021-10-22 DIAGNOSIS — F32.0 CURRENT MILD EPISODE OF MAJOR DEPRESSIVE DISORDER WITHOUT PRIOR EPISODE (HCC): ICD-10-CM

## 2021-10-22 DIAGNOSIS — E55.9 VITAMIN D DEFICIENCY: ICD-10-CM

## 2021-10-22 DIAGNOSIS — E83.42 HYPOMAGNESEMIA: ICD-10-CM

## 2021-10-22 DIAGNOSIS — F41.9 ANXIETY: Chronic | ICD-10-CM

## 2021-10-22 DIAGNOSIS — J30.9 ALLERGIC RHINITIS, UNSPECIFIED SEASONALITY, UNSPECIFIED TRIGGER: ICD-10-CM

## 2021-10-22 DIAGNOSIS — E53.8 VITAMIN B 12 DEFICIENCY: ICD-10-CM

## 2021-10-22 DIAGNOSIS — E78.5 DYSLIPIDEMIA: Chronic | ICD-10-CM

## 2021-10-22 DIAGNOSIS — M25.50 ARTHRALGIA, UNSPECIFIED JOINT: Chronic | ICD-10-CM

## 2021-10-22 PROCEDURE — 99214 OFFICE O/P EST MOD 30 MIN: CPT | Performed by: NURSE PRACTITIONER

## 2021-10-22 RX ORDER — VORTIOXETINE 20 MG/1
20 TABLET, FILM COATED ORAL DAILY
Qty: 30 TABLET | Refills: 0 | Status: SHIPPED | OUTPATIENT
Start: 2021-10-22 | End: 2021-12-07

## 2021-10-22 RX ORDER — CETIRIZINE HYDROCHLORIDE 10 MG/1
TABLET, FILM COATED ORAL
Qty: 90 TABLET | Refills: 0 | Status: SHIPPED | OUTPATIENT
Start: 2021-10-22 | End: 2022-02-16

## 2021-10-22 NOTE — PROGRESS NOTES
"You have chosen to receive care through a telehealth visit.  Do you consent to use a video/audio connection for your medical care today? Yes Present today is Sandra, her , Charles, and her daughter, Emma.     History of Present Illness  Sandra Leahy is a 55 y.o.female who presents to the clinic today via video for follow up  related to her DM, type 2 not at goal, Dyslipidemia, and Vitamin deficiencies. She has been evaluated by   Neuroscience for a major neurocognitive disorder of uncertain etiology. Today, Sandra reports \" I feel more like myself than I have in years\".      Diabetes   She has type 2 diabetes mellitus.which was diagnosed over ten years ago. Associated symptoms include stable peripheral neuropathy. Pertinent negatives for diabetes include no blurred vision, no chest pain, no foot ulcerations, no polyphagia, no polyuria .  Diabetic complications include peripheral neuropathy. Risk factors for coronary artery disease include diabetes mellitus, dyslipidemia, family history and post-menopausal. Current diabetic treatment includes multiple insulin injections which she can adjust per protocol, oral agent (monotherapy) and diet. She is following a generally healthier diet with decrease in fatty foods. She has had a previous visit with a dietitian. She has seen a Podiatrist. Her last eye exam  was completed in May 2018 per Dr Lopez.She has yet to keep her appointments for follow up eye exam due to COVID. Reports her blood sugars are doing \"fair\"    Lab Results   Component Value Date    HGBA1C 9.40 (H) 10/09/2020     Lab Results   Component Value Date    HGBA1C 8.90 (H) 06/01/2021     Dyslipidemia   Pertinent negatives include no chest pain or shortness of breath. Current antihyperlipidemic treatment includes ezetimibe and statins. Risk factors for coronary artery disease include diabetes mellitus, dyslipidemia, family history, post-menopausal and stress.     Lab Results   Component Value Date    " CHLPL 144 06/01/2021    CHLPL 155 10/09/2020    CHLPL 174 05/12/2020     Lab Results   Component Value Date    TRIG 120 06/01/2021    TRIG 250 (H) 10/09/2020    TRIG 213 (H) 05/12/2020     Lab Results   Component Value Date    HDL 42 06/01/2021    HDL 39 (L) 10/09/2020    HDL 41 05/12/2020     Lab Results   Component Value Date    LDL 80 06/01/2021    LDL 75 10/09/2020    LDL 90 05/12/2020     Anxiety/Depression   Anxiety has been gradually improving. Symptoms include excessive worry, confusion, and decreased concentration. Sandra reports no chest pain, nausea,  palpitations, shortness of breath or suicidal ideas. The severity of symptoms at times interfering with daily activities. The symptoms are aggravated by family issues and especially when the children are fussing.  Risk factors include a major life event. She is currently seeing Behavioral Health which she reports is helping.  Lab Results   Component Value Date    TSH 2.620 10/09/2020     GI Problem   Symptoms have improved  Lab Results   Component Value Date    WBC 6.13 06/01/2021    HGB 16.1 (H) 06/01/2021    HCT 47.1 (H) 06/01/2021    MCV 90.4 06/01/2021     06/01/2021     The following portions of the patient's history were reviewed and updated as appropriate: allergies, current medications, past family history, past medical history, past social history, past surgical history and problem list.    Review of Systems   Constitutional: Positive for fatigue. Negative for activity change, appetite change, chills, fever and unexpected weight change.   HENT: Negative.    Eyes: Negative for visual disturbance.   Respiratory: Negative for cough, shortness of breath and wheezing.    Cardiovascular: Negative for chest pain, palpitations and leg swelling.   Gastrointestinal: Negative for abdominal pain, constipation, diarrhea, nausea and vomiting.   Endocrine: Negative for cold intolerance, heat intolerance, polydipsia, polyphagia and polyuria.   Skin: Negative  "for color change and rash.   Neurological: Negative for dizziness, tremors, speech difficulty, weakness, light-headedness and headaches.   Hematological: Negative for adenopathy.   Psychiatric/Behavioral: Negative for confusion (Improving), decreased concentration and suicidal ideas. The patient is not nervous/anxious.    All other systems reviewed and are negative.    Vital signs:  Ht 170.2 cm (67.01\")   Wt 64.9 kg (143 lb)   BMI 22.39 kg/m²     Physical Exam  Vitals and nursing note reviewed.   Constitutional:       General: She is not in acute distress.  HENT:      Head: Normocephalic.      Nose: Nose normal.   Eyes:      General: No scleral icterus.        Right eye: No discharge.         Left eye: No discharge.      Conjunctiva/sclera: Conjunctivae normal.   Pulmonary:      Effort: No respiratory distress.   Musculoskeletal:      Cervical back: Neck supple.   Neurological:      Mental Status: She is alert.   Psychiatric:         Mood and Affect: Mood and affect normal.         Speech: Speech normal.         Behavior: Behavior is cooperative.         Cognition and Memory: Memory is impaired.       Patient's Body mass index is 22.39 kg/m². indicating that she is within normal range (BMI 18.5-24.9). No BMI management plan needed..     Assessment/Plan     Diagnoses and all orders for this visit:    1. Type 2 diabetes, uncontrolled, with neuropathy (HCC) (Primary)  Comments:  Encouraged fasting blood sugar of less than 130 mg with adjustments to her insulin regimen per her blood glucose monitoring  Orders:  -     Comprehensive Metabolic Panel; Future  -     TSH; Future  -     Lipid Panel; Future  -     Hemoglobin A1c; Future  -     MicroAlbumin, Urine, Random - Urine, Clean Catch; Future  -     Vitamin B12; Future    2. Dyslipidemia  Comments:  Continue statin and Zetia. Cardiovascular risk reduction modifications encouraged   Orders:  -     Comprehensive Metabolic Panel; Future  -     TSH; Future  -     Lipid " Panel; Future    3. Major neurocognitive disorder (CMS/HCC)  Comments:  Awaiting f/u with [facility] Continue Exelon patch    4. Anxiety  Comments:  Continue to f/u with Behavioral Health     5. Arthralgia, unspecified joint  Comments:  Continue to be as active as possible   Orders:  -     CBC & Differential; Future  -     Uric Acid; Future    6. Hypomagnesemia  -     Magnesium; Future    7. Vitamin B 12 deficiency  Comments:  Labs ordered  Orders:  -     Vitamin B12; Future    8. Vitamin D deficiency  Comments:  Labs ordered  Orders:  -     Vitamin D 25 Hydroxy; Future      Follow Up In November after labs are completed     Findings and recommendations discussed with Sandra. Lifestyle modifications reinforced including nutrition and activity recommendations. She will follow up in days sooner if problems/concerns occur.    This document has been electronically signed by:

## 2021-10-25 ENCOUNTER — TELEPHONE (OUTPATIENT)
Dept: FAMILY MEDICINE CLINIC | Facility: CLINIC | Age: 56
End: 2021-10-25

## 2021-10-25 NOTE — TELEPHONE ENCOUNTER
----- Message from ASH Chaudhry sent at 10/23/2021 12:13 PM EDT -----  Regarding: DexCom  Would you check regarding her DexCom order. It will probably have to go through a DME.

## 2021-10-26 DIAGNOSIS — IMO0002 TYPE 2 DIABETES, UNCONTROLLED, WITH NEUROPATHY: Chronic | ICD-10-CM

## 2021-10-26 RX ORDER — INSULIN DETEMIR 100 [IU]/ML
INJECTION, SOLUTION SUBCUTANEOUS
Qty: 9 PEN | Refills: 1 | Status: SHIPPED | OUTPATIENT
Start: 2021-10-26 | End: 2022-01-18

## 2021-11-09 RX ORDER — RIVASTIGMINE 4.6 MG/24H
1 PATCH, EXTENDED RELEASE TRANSDERMAL DAILY
Qty: 30 EACH | Refills: 0 | Status: SHIPPED | OUTPATIENT
Start: 2021-11-09 | End: 2021-12-07

## 2021-11-09 NOTE — TELEPHONE ENCOUNTER
Patient normally receives this from her provider in [place] but the patient has been unable to get them a refill from that office. She is completely out of the patch and is afraid to go without them for to long. Could you possibly do a one time fill until [place] send this in for her?

## 2021-11-19 ENCOUNTER — TELEPHONE (OUTPATIENT)
Dept: FAMILY MEDICINE CLINIC | Facility: CLINIC | Age: 56
End: 2021-11-19

## 2021-11-19 ENCOUNTER — LAB (OUTPATIENT)
Dept: FAMILY MEDICINE CLINIC | Facility: CLINIC | Age: 56
End: 2021-11-19

## 2021-11-19 DIAGNOSIS — E83.42 HYPOMAGNESEMIA: ICD-10-CM

## 2021-11-19 DIAGNOSIS — M25.50 ARTHRALGIA, UNSPECIFIED JOINT: Chronic | ICD-10-CM

## 2021-11-19 DIAGNOSIS — E53.8 VITAMIN B 12 DEFICIENCY: ICD-10-CM

## 2021-11-19 DIAGNOSIS — R39.9 LOWER URINARY TRACT SYMPTOMS (LUTS): Primary | ICD-10-CM

## 2021-11-19 DIAGNOSIS — IMO0002 TYPE 2 DIABETES, UNCONTROLLED, WITH NEUROPATHY: Chronic | ICD-10-CM

## 2021-11-19 DIAGNOSIS — E55.9 VITAMIN D DEFICIENCY: ICD-10-CM

## 2021-11-19 DIAGNOSIS — R30.0 DYSURIA: Primary | ICD-10-CM

## 2021-11-19 DIAGNOSIS — E78.5 DYSLIPIDEMIA: Chronic | ICD-10-CM

## 2021-11-19 LAB
25(OH)D3+25(OH)D2 SERPL-MCNC: 42 NG/ML (ref 30–100)
ALBUMIN SERPL-MCNC: 4.5 G/DL (ref 3.5–5.2)
ALBUMIN/GLOB SERPL: 1.7 G/DL
ALP SERPL-CCNC: 93 U/L (ref 39–117)
ALT SERPL-CCNC: 19 U/L (ref 1–33)
AST SERPL-CCNC: 19 U/L (ref 1–32)
BASOPHILS # BLD AUTO: 0.03 10*3/MM3 (ref 0–0.2)
BASOPHILS NFR BLD AUTO: 0.4 % (ref 0–1.5)
BILIRUB BLD-MCNC: NEGATIVE MG/DL
BILIRUB SERPL-MCNC: 0.4 MG/DL (ref 0–1.2)
BUN SERPL-MCNC: 9 MG/DL (ref 6–20)
BUN/CREAT SERPL: 12.3 (ref 7–25)
CALCIUM SERPL-MCNC: 9.3 MG/DL (ref 8.6–10.5)
CHLORIDE SERPL-SCNC: 102 MMOL/L (ref 98–107)
CHOLEST SERPL-MCNC: 139 MG/DL (ref 0–200)
CLARITY, POC: ABNORMAL
CO2 SERPL-SCNC: 24.7 MMOL/L (ref 22–29)
COLOR UR: YELLOW
CREAT SERPL-MCNC: 0.73 MG/DL (ref 0.57–1)
EOSINOPHIL # BLD AUTO: 0.22 10*3/MM3 (ref 0–0.4)
EOSINOPHIL NFR BLD AUTO: 3.2 % (ref 0.3–6.2)
ERYTHROCYTE [DISTWIDTH] IN BLOOD BY AUTOMATED COUNT: 12.3 % (ref 12.3–15.4)
EXPIRATION DATE: ABNORMAL
GLOBULIN SER CALC-MCNC: 2.6 GM/DL
GLUCOSE SERPL-MCNC: 274 MG/DL (ref 65–99)
GLUCOSE UR STRIP-MCNC: ABNORMAL MG/DL
HBA1C MFR BLD: 10 % (ref 4.8–5.6)
HCT VFR BLD AUTO: 42.4 % (ref 34–46.6)
HDLC SERPL-MCNC: 45 MG/DL (ref 40–60)
HGB BLD-MCNC: 14.3 G/DL (ref 12–15.9)
IMM GRANULOCYTES # BLD AUTO: 0.03 10*3/MM3 (ref 0–0.05)
IMM GRANULOCYTES NFR BLD AUTO: 0.4 % (ref 0–0.5)
KETONES UR QL: NEGATIVE
LDLC SERPL CALC-MCNC: 73 MG/DL (ref 0–100)
LEUKOCYTE EST, POC: ABNORMAL
LYMPHOCYTES # BLD AUTO: 2.39 10*3/MM3 (ref 0.7–3.1)
LYMPHOCYTES NFR BLD AUTO: 34.5 % (ref 19.6–45.3)
Lab: ABNORMAL
MAGNESIUM SERPL-MCNC: 1.4 MG/DL (ref 1.6–2.6)
MCH RBC QN AUTO: 30.6 PG (ref 26.6–33)
MCHC RBC AUTO-ENTMCNC: 33.7 G/DL (ref 31.5–35.7)
MCV RBC AUTO: 90.6 FL (ref 79–97)
MONOCYTES # BLD AUTO: 0.55 10*3/MM3 (ref 0.1–0.9)
MONOCYTES NFR BLD AUTO: 7.9 % (ref 5–12)
NEUTROPHILS # BLD AUTO: 3.7 10*3/MM3 (ref 1.7–7)
NEUTROPHILS NFR BLD AUTO: 53.6 % (ref 42.7–76)
NITRITE UR-MCNC: NEGATIVE MG/ML
NRBC BLD AUTO-RTO: 0 /100 WBC (ref 0–0.2)
PH UR: 6 [PH] (ref 5–8)
PLATELET # BLD AUTO: 299 10*3/MM3 (ref 140–450)
POTASSIUM SERPL-SCNC: 4.1 MMOL/L (ref 3.5–5.2)
PROT SERPL-MCNC: 7.1 G/DL (ref 6–8.5)
PROT UR STRIP-MCNC: NEGATIVE MG/DL
RBC # BLD AUTO: 4.68 10*6/MM3 (ref 3.77–5.28)
RBC # UR STRIP: NEGATIVE /UL
SODIUM SERPL-SCNC: 138 MMOL/L (ref 136–145)
SP GR UR: 1.02 (ref 1–1.03)
TRIGL SERPL-MCNC: 118 MG/DL (ref 0–150)
TSH SERPL DL<=0.005 MIU/L-ACNC: 2.24 UIU/ML (ref 0.27–4.2)
URATE SERPL-MCNC: 4.6 MG/DL (ref 2.4–5.7)
UROBILINOGEN UR QL: NORMAL
VIT B12 SERPL-MCNC: 270 PG/ML (ref 211–946)
VLDLC SERPL CALC-MCNC: 21 MG/DL (ref 5–40)
WBC # BLD AUTO: 6.92 10*3/MM3 (ref 3.4–10.8)

## 2021-11-19 PROCEDURE — 81003 URINALYSIS AUTO W/O SCOPE: CPT | Performed by: NURSE PRACTITIONER

## 2021-11-19 RX ORDER — SULFAMETHOXAZOLE AND TRIMETHOPRIM 800; 160 MG/1; MG/1
1 TABLET ORAL EVERY 12 HOURS
Qty: 20 TABLET | Refills: 0 | Status: SHIPPED | OUTPATIENT
Start: 2021-11-19 | End: 2021-11-29

## 2021-11-19 NOTE — TELEPHONE ENCOUNTER
Caller: ALEXSANDRA ROTHMAN    Relationship: EMERGENCY CONTACT    Best call back number: [phone]    What test was performed: URINALYSIS    When was the test performed: 11-19-21    Where was the test performed: IN OFFICE    Additional notes: ALEXSANDRA STATED THAT PATIENT WOULD LIKE TO KNOW RESULTS    PLEASE ADVISE

## 2021-11-20 LAB — MICROALBUMIN UR-MCNC: 10.3 UG/ML

## 2021-11-22 ENCOUNTER — TELEPHONE (OUTPATIENT)
Dept: FAMILY MEDICINE CLINIC | Facility: CLINIC | Age: 56
End: 2021-11-22

## 2021-11-22 NOTE — TELEPHONE ENCOUNTER
S/w pts  alvaro she is scheduled for an apt      ----- Message from Isabela Quiroz MA sent at 11/22/2021  8:35 AM EST -----    ----- Message -----  From: John Mueller APRN  Sent: 11/22/2021   7:05 AM EST  To: Isabela Quiroz MA    Please have Sandra to arrange a video visit so we can review labs.

## 2021-11-23 ENCOUNTER — OFFICE VISIT (OUTPATIENT)
Dept: FAMILY MEDICINE CLINIC | Facility: CLINIC | Age: 56
End: 2021-11-23

## 2021-11-23 DIAGNOSIS — E78.5 DYSLIPIDEMIA: Chronic | ICD-10-CM

## 2021-11-23 DIAGNOSIS — E55.9 VITAMIN D DEFICIENCY: ICD-10-CM

## 2021-11-23 DIAGNOSIS — E53.8 VITAMIN B 12 DEFICIENCY: ICD-10-CM

## 2021-11-23 DIAGNOSIS — N39.0 CHRONIC LOWER URINARY TRACT INFECTION: Chronic | ICD-10-CM

## 2021-11-23 DIAGNOSIS — IMO0002 TYPE 2 DIABETES, UNCONTROLLED, WITH NEUROPATHY: Primary | Chronic | ICD-10-CM

## 2021-11-23 DIAGNOSIS — F41.9 ANXIETY: Chronic | ICD-10-CM

## 2021-11-23 DIAGNOSIS — E83.42 HYPOMAGNESEMIA: ICD-10-CM

## 2021-11-23 DIAGNOSIS — Z23 ENCOUNTER FOR IMMUNIZATION: ICD-10-CM

## 2021-11-23 DIAGNOSIS — F03.90 MAJOR NEUROCOGNITIVE DISORDER (HCC): Chronic | ICD-10-CM

## 2021-11-23 PROCEDURE — 99214 OFFICE O/P EST MOD 30 MIN: CPT | Performed by: NURSE PRACTITIONER

## 2021-11-23 PROCEDURE — G0008 ADMIN INFLUENZA VIRUS VAC: HCPCS | Performed by: NURSE PRACTITIONER

## 2021-11-23 PROCEDURE — 90686 IIV4 VACC NO PRSV 0.5 ML IM: CPT | Performed by: NURSE PRACTITIONER

## 2021-11-23 NOTE — PROGRESS NOTES
History of Present Illness  Sandra Leahy is a 55 y.o.female who presents to the clinic today for follow up and to review labs  related to her DM, type 2 not at goal, Dyslipidemia, and Vitamin deficiencies. She has been evaluated by   Neuroscience for a major neurocognitive disorder of uncertain etiology.      Diabetes   She has type 2 diabetes mellitus.which was diagnosed over ten years ago. Diabetic complications include peripheral neuropathy. Risk factors for coronary artery disease include diabetes mellitus, dyslipidemia, family history and post-menopausal. Current diabetic treatment includes insulin injections twice daily,  oral agent (monotherapy) and diet. She is following a generally healthier diet with decrease in fatty foods. She has had a previous visit with a dietitian. She has seen a Podiatrist. Her last eye exam  was completed in May 2018 per Dr Lopez.She has yet to keep her appointments for follow up eye exam due to COVID.     Lab Results   Component Value Date    HGBA1C 9.40 (H) 10/09/2020     Lab Results   Component Value Date    HGBA1C 8.90 (H) 06/01/2021     Lab Results   Component Value Date    HGBA1C 10.00 (H) 11/19/2021     Dyslipidemia   Current antihyperlipidemic treatment includes ezetimibe and statins. Risk factors for coronary artery disease include diabetes mellitus, dyslipidemia, family history, post-menopausal and stress.     Lab Results   Component Value Date    CHLPL 139 11/19/2021    CHLPL 144 06/01/2021    CHLPL 155 10/09/2020     Lab Results   Component Value Date    TRIG 118 11/19/2021    TRIG 120 06/01/2021    TRIG 250 (H) 10/09/2020     Lab Results   Component Value Date    HDL 45 11/19/2021    HDL 42 06/01/2021    HDL 39 (L) 10/09/2020     Lab Results   Component Value Date    LDL 73 11/19/2021    LDL 80 06/01/2021    LDL 75 10/09/2020     Anxiety/Depression   Anxiety has been gradually improving. Symptoms include excessive worry, confusion, and decreased concentration.  "Sandra reports no chest pain, nausea,  palpitations, shortness of breath or suicidal ideas. The severity of symptoms at times interfering with daily activities. The symptoms are aggravated by family issues and especially when the children are fussing.  Risk factors include a major life event. She is currently seeing Behavioral Health which she reports is helping.  Lab Results   Component Value Date    TSH 2.240 11/19/2021     GI Problem   Symptoms have improved  Lab Results   Component Value Date    WBC 6.92 11/19/2021    HGB 14.3 11/19/2021    HCT 42.4 11/19/2021    MCV 90.6 11/19/2021     11/19/2021     The following portions of the patient's history were reviewed and updated as appropriate: allergies, current medications, past family history, past medical history, past social history, past surgical history and problem list.    Review of Systems   Constitutional: Positive for fatigue. Negative for activity change, appetite change, chills, fever and unexpected weight change.   HENT: Negative.    Eyes: Negative for visual disturbance.   Respiratory: Negative for cough, shortness of breath and wheezing.    Cardiovascular: Negative for chest pain, palpitations and leg swelling.   Gastrointestinal: Negative for nausea and vomiting.   Endocrine: Negative for cold intolerance, heat intolerance, polydipsia, polyphagia and polyuria.   Skin: Negative for color change and rash.   Neurological: Negative for dizziness, tremors, speech difficulty, weakness, light-headedness and headaches.   Hematological: Negative for adenopathy.   Psychiatric/Behavioral: Negative for confusion, decreased concentration and suicidal ideas. The patient is not nervous/anxious.    All other systems reviewed and are negative.      Vital signs:  /70 (BP Location: Left arm, Patient Position: Sitting, Cuff Size: Adult)   Pulse 102   Temp 98.6 °F (37 °C) (Temporal)   Resp 14   Ht 170.2 cm (67.01\")   Wt 73.5 kg (162 lb)   SpO2 100%   " BMI 25.37 kg/m²     Physical Exam  Vitals and nursing note reviewed.   Constitutional:       General: She is not in acute distress.     Appearance: She is well-developed.      Comments: Daughter, Emma, is with her today   HENT:      Head: Normocephalic.      Nose: Nose normal.   Eyes:      General: No scleral icterus.        Right eye: No discharge.         Left eye: No discharge.      Conjunctiva/sclera: Conjunctivae normal.   Neck:      Thyroid: No thyromegaly.      Vascular: No JVD.   Cardiovascular:      Rate and Rhythm: Normal rate and regular rhythm.      Heart sounds: Normal heart sounds. No murmur heard.  No friction rub.   Pulmonary:      Effort: Pulmonary effort is normal. No respiratory distress.      Breath sounds: Normal breath sounds. No wheezing or rales.   Abdominal:      General: Bowel sounds are normal. There is no distension.      Palpations: Abdomen is soft.      Tenderness: There is no abdominal tenderness. There is no guarding.   Musculoskeletal:      Cervical back: Neck supple.      Right lower leg: No edema.      Left lower leg: No edema.   Feet:      Right foot:      Protective Sensation: 10 sites tested. 10 sites sensed.      Skin integrity: Skin integrity normal.      Toenail Condition: Right toenails are normal.      Left foot:      Protective Sensation: 10 sites tested. 10 sites sensed.      Skin integrity: Skin integrity normal.      Toenail Condition: Left toenails are normal.   Lymphadenopathy:      Cervical: No cervical adenopathy.   Skin:     General: Skin is warm and dry.      Capillary Refill: Capillary refill takes less than 2 seconds.   Neurological:      Mental Status: She is alert and oriented to person, place, and time.      Cranial Nerves: Cranial nerves are intact.      Gait: Gait is intact.   Psychiatric:         Speech: Speech normal.         Behavior: Behavior is cooperative.         Thought Content: Thought content normal.         Cognition and Memory: Memory is  impaired.       Result Review :  Results for orders placed or performed in visit on 11/19/21   Magnesium    Specimen: Arm, Right; Blood    Blood  Release to brit   Result Value Ref Range    Magnesium 1.4 (L) 1.6 - 2.6 mg/dL   Vitamin B12    Specimen: Arm, Right; Blood    Blood  Release to brit   Result Value Ref Range    Vitamin B-12 270 211 - 946 pg/mL   MicroAlbumin, Urine, Random - Urine, Clean Catch    Specimen: Urine, Clean Catch    Urine  Release to brit   Result Value Ref Range    Microalbumin, Urine 10.3 Not Estab. ug/mL   Uric Acid    Specimen: Arm, Right; Blood    Blood  Release to brit   Result Value Ref Range    Uric Acid 4.6 2.4 - 5.7 mg/dL   Vitamin D 25 Hydroxy    Specimen: Arm, Right; Blood    Blood  Release to brit   Result Value Ref Range    25 Hydroxy, Vitamin D 42.0 30.0 - 100.0 ng/ml   Hemoglobin A1c    Specimen: Arm, Right; Blood    Blood  Release to brit   Result Value Ref Range    Hemoglobin A1C 10.00 (H) 4.80 - 5.60 %   Lipid Panel    Specimen: Arm, Right; Blood    Blood  Release to brit   Result Value Ref Range    Total Cholesterol 139 0 - 200 mg/dL    Triglycerides 118 0 - 150 mg/dL    HDL Cholesterol 45 40 - 60 mg/dL    VLDL Cholesterol Mal 21 5 - 40 mg/dL    LDL Chol Calc (NIH) 73 0 - 100 mg/dL   TSH    Specimen: Arm, Right; Blood    Blood  Release to brit   Result Value Ref Range    TSH 2.240 0.270 - 4.200 uIU/mL   Comprehensive Metabolic Panel    Specimen: Arm, Right; Blood    Blood  Release to brit   Result Value Ref Range    Glucose 274 (H) 65 - 99 mg/dL    BUN 9 6 - 20 mg/dL    Creatinine 0.73 0.57 - 1.00 mg/dL    eGFR Non African Am 82 >60 mL/min/1.73    eGFR African Am 100 >60 mL/min/1.73    BUN/Creatinine Ratio 12.3 7.0 - 25.0    Sodium 138 136 - 145 mmol/L    Potassium 4.1 3.5 - 5.2 mmol/L    Chloride 102 98 - 107 mmol/L    Total CO2 24.7 22.0 - 29.0 mmol/L    Calcium 9.3 8.6 - 10.5 mg/dL    Total Protein 7.1 6.0 - 8.5 g/dL    Albumin 4.50 3.50 - 5.20 g/dL    Globulin 2.6 gm/dL     A/G Ratio 1.7 g/dL    Total Bilirubin 0.4 0.0 - 1.2 mg/dL    Alkaline Phosphatase 93 39 - 117 U/L    AST (SGOT) 19 1 - 32 U/L    ALT (SGPT) 19 1 - 33 U/L   POCT urinalysis dipstick, automated    Specimen: Urine   Result Value Ref Range    Color Yellow Yellow, Straw, Dark Yellow, Mari    Clarity, UA Cloudy (A) Clear    Specific Gravity  1.025 1.005 - 1.030    pH, Urine 6.0 5.0 - 8.0    Leukocytes Large (3+) (A) Negative    Nitrite, UA Negative Negative    Protein, POC Negative Negative mg/dL    Glucose, UA 3+ (A) Negative, 1000 mg/dL (3+) mg/dL    Ketones, UA Negative Negative    Urobilinogen, UA Normal Normal    Bilirubin Negative Negative    Blood, UA Negative Negative    Lot Number [id number]     Expiration Date 1-8-23    CBC & Differential    Specimen: Arm, Right; Blood    Blood  Release to brit   Result Value Ref Range    WBC 6.92 3.40 - 10.80 10*3/mm3    RBC 4.68 3.77 - 5.28 10*6/mm3    Hemoglobin 14.3 12.0 - 15.9 g/dL    Hematocrit 42.4 34.0 - 46.6 %    MCV 90.6 79.0 - 97.0 fL    MCH 30.6 26.6 - 33.0 pg    MCHC 33.7 31.5 - 35.7 g/dL    RDW 12.3 12.3 - 15.4 %    Platelets 299 140 - 450 10*3/mm3    Neutrophil Rel % 53.6 42.7 - 76.0 %    Lymphocyte Rel % 34.5 19.6 - 45.3 %    Monocyte Rel % 7.9 5.0 - 12.0 %    Eosinophil Rel % 3.2 0.3 - 6.2 %    Basophil Rel % 0.4 0.0 - 1.5 %    Neutrophils Absolute 3.70 1.70 - 7.00 10*3/mm3    Lymphocytes Absolute 2.39 0.70 - 3.10 10*3/mm3    Monocytes Absolute 0.55 0.10 - 0.90 10*3/mm3    Eosinophils Absolute 0.22 0.00 - 0.40 10*3/mm3    Basophils Absolute 0.03 0.00 - 0.20 10*3/mm3    Immature Granulocyte Rel % 0.4 0.0 - 0.5 %    Immature Grans Absolute 0.03 0.00 - 0.05 10*3/mm3    nRBC 0.0 0.0 - 0.2 /100 WBC     Patient's Body mass index is 25.37 kg/m². indicating that she is overweight (BMI 25-29.9). Obesity-related health conditions include the following: diabetes mellitus and dyslipidemias. Obesity is Unchanged since last office visit. BMI is is above average; BMI  management plan is completed. Provided information on  portion control and increasing exercise..  Assessment/Plan     Diagnoses and all orders for this visit:    1. Type 2 diabetes, uncontrolled, with neuropathy (HCC) (Primary)  Comments:  Discussed her current regimen which she has only been taking her basal insulin most of the time in the morning. Will begin twice daily dose of 45 units and oral    2. Dyslipidemia  Comments:  Continue Pravastatin 40 mg and Zetia    3. Chronic lower urinary tract infection  Comments:  Treatment options reviewed and etiology. Referral to urology  Orders:  -     Ambulatory Referral to Urology    4. Anxiety  Comments:  Continue Trintellix and f/u with Behavioral Health     5. Major neurocognitive disorder (CMS/HCC)  Comments:  Continue Exelon and f/u with  Neuro    6. Hypomagnesemia  Comments:  Restart  Magnesium supplement.   Orders:  -     Magnesium 250 MG tablet; Take 1 tablet by mouth Daily.  Dispense: 30 tablet; Refill: 3    7. Vitamin B 12 deficiency  Comments:  Daily supplement  Orders:  -     cyanocobalamin ([facility] Vitamin B-12) 2000 MCG tablet; Take 1 tablet by mouth Daily.  Dispense: 30 tablet; Refill: 3    8. Vitamin D deficiency    9. Encounter for immunization  -     FluLaval/Fluarix/Fluzone >6 Months ([id number])    Injection performed in left deltoid by Isabela Quiroz MA. Patient tolerated the procedure well without complications.  11/23/21   Isabela Quiroz MA    Follow Up In January   Findings and recommendations discussed with Shane.  Reviewed test results. Lifestyle modifications reinforced including nutrition and activity recommendations. She will restart twice daily insulin injections of 45 units and Metformin. Strategies discussed to improve adherence. She can increase every 3-4 days  Levemir to reach am target of less than 150 mg. She will follow up in January sooner if problems/concerns occur.  Sandra was given instructions and counseling regarding her  condition or for health maintenance advice. Please see specific information pulled into the AVS if appropriate      This document has been electronically signed by:

## 2021-11-23 NOTE — PATIENT INSTRUCTIONS
"https://www.nhlbi.nih.gov/files/docs/public/heart/dash_brief.pdf\">   DASH Eating Plan  DASH stands for Dietary Approaches to Stop Hypertension. The DASH eating plan is a healthy eating plan that has been shown to:  · Reduce high blood pressure (hypertension).  · Reduce your risk for type 2 diabetes, heart disease, and stroke.  · Help with weight loss.  What are tips for following this plan?  Reading food labels  · Check food labels for the amount of salt (sodium) per serving. Choose foods with less than 5 percent of the Daily Value of sodium. Generally, foods with less than 300 milligrams (mg) of sodium per serving fit into this eating plan.  · To find whole grains, look for the word \"whole\" as the first word in the ingredient list.  Shopping  · Buy products labeled as \"low-sodium\" or \"no salt added.\"  · Buy fresh foods. Avoid canned foods and pre-made or frozen meals.  Cooking  · Avoid adding salt when cooking. Use salt-free seasonings or herbs instead of table salt or sea salt. Check with your health care provider or pharmacist before using salt substitutes.  · Do not garcía foods. Cook foods using healthy methods such as baking, boiling, grilling, roasting, and broiling instead.  · Cook with heart-healthy oils, such as olive, canola, avocado, soybean, or sunflower oil.  Meal planning    · Eat a balanced diet that includes:  ? 4 or more servings of fruits and 4 or more servings of vegetables each day. Try to fill one-half of your plate with fruits and vegetables.  ? 6-8 servings of whole grains each day.  ? Less than 6 oz (170 g) of lean meat, poultry, or fish each day. A 3-oz (85-g) serving of meat is about the same size as a deck of cards. One egg equals 1 oz (28 g).  ? 2-3 servings of low-fat dairy each day. One serving is 1 cup (237 mL).  ? 1 serving of nuts, seeds, or beans 5 times each week.  ? 2-3 servings of heart-healthy fats. Healthy fats called omega-3 fatty acids are found in foods such as walnuts, " flaxseeds, fortified milks, and eggs. These fats are also found in cold-water fish, such as sardines, salmon, and mackerel.  · Limit how much you eat of:  ? Canned or prepackaged foods.  ? Food that is high in trans fat, such as some fried foods.  ? Food that is high in saturated fat, such as fatty meat.  ? Desserts and other sweets, sugary drinks, and other foods with added sugar.  ? Full-fat dairy products.  · Do not salt foods before eating.  · Do not eat more than 4 egg yolks a week.  · Try to eat at least 2 vegetarian meals a week.  · Eat more home-cooked food and less restaurant, buffet, and fast food.    Lifestyle  · When eating at a restaurant, ask that your food be prepared with less salt or no salt, if possible.  · If you drink alcohol:  ? Limit how much you use to:  § 0-1 drink a day for women who are not pregnant.  § 0-2 drinks a day for men.  ? Be aware of how much alcohol is in your drink. In the [place] one drink equals one 12 oz bottle of beer (355 mL), one 5 oz glass of wine (148 mL), or one 1½ oz glass of hard liquor (44 mL).  General information  · Avoid eating more than 2,300 mg of salt a day. If you have hypertension, you may need to reduce your sodium intake to 1,500 mg a day.  · Work with your health care provider to maintain a healthy body weight or to lose weight. Ask what an ideal weight is for you.  · Get at least 30 minutes of exercise that causes your heart to beat faster (aerobic exercise) most days of the week. Activities may include walking, swimming, or biking.  · Work with your health care provider or dietitian to adjust your eating plan to your individual calorie needs.  What foods should I eat?  Fruits  All fresh, dried, or frozen fruit. Canned fruit in natural juice (without added sugar).  Vegetables  Fresh or frozen vegetables (raw, steamed, roasted, or grilled). Low-sodium or reduced-sodium tomato and vegetable juice. Low-sodium or reduced-sodium tomato sauce and tomato paste.  Low-sodium or reduced-sodium canned vegetables.  Grains  Whole-grain or whole-wheat bread. Whole-grain or whole-wheat pasta. Brown rice. Oatmeal. Quinoa. Bulgur. Whole-grain and low-sodium cereals. Kristine bread. Low-fat, low-sodium crackers. Whole-wheat flour tortillas.  Meats and other proteins  Skinless chicken or turkey. Ground chicken or turkey. Pork with fat trimmed off. Fish and seafood. Egg whites. Dried beans, peas, or lentils. Unsalted nuts, nut butters, and seeds. Unsalted canned beans. Lean cuts of beef with fat trimmed off. Low-sodium, lean precooked or cured meat, such as sausages or meat loaves.  Dairy  Low-fat (1%) or fat-free (skim) milk. Reduced-fat, low-fat, or fat-free cheeses. Nonfat, low-sodium ricotta or cottage cheese. Low-fat or nonfat yogurt. Low-fat, low-sodium cheese.  Fats and oils  Soft margarine without trans fats. Vegetable oil. Reduced-fat, low-fat, or light mayonnaise and salad dressings (reduced-sodium). Canola, safflower, olive, avocado, soybean, and sunflower oils. Avocado.  Seasonings and condiments  Herbs. Spices. Seasoning mixes without salt.  Other foods  Unsalted popcorn and pretzels. Fat-free sweets.  The items listed above may not be a complete list of foods and beverages you can eat. Contact a dietitian for more information.  What foods should I avoid?  Fruits  Canned fruit in a light or heavy syrup. Fried fruit. Fruit in cream or butter sauce.  Vegetables  Creamed or fried vegetables. Vegetables in a cheese sauce. Regular canned vegetables (not low-sodium or reduced-sodium). Regular canned tomato sauce and paste (not low-sodium or reduced-sodium). Regular tomato and vegetable juice (not low-sodium or reduced-sodium). Pickles. Olives.  Grains  Baked goods made with fat, such as croissants, muffins, or some breads. Dry pasta or rice meal packs.  Meats and other proteins  Fatty cuts of meat. Ribs. Fried meat. Ascencio. Bologna, salami, and other precooked or cured meats, such as  sausages or meat loaves. Fat from the back of a pig (fatback). Bratwurst. Salted nuts and seeds. Canned beans with added salt. Canned or smoked fish. Whole eggs or egg yolks. Chicken or turkey with skin.  Dairy  Whole or 2% milk, cream, and half-and-half. Whole or full-fat cream cheese. Whole-fat or sweetened yogurt. Full-fat cheese. Nondairy creamers. Whipped toppings. Processed cheese and cheese spreads.  Fats and oils  Butter. Stick margarine. Lard. Shortening. Ghee. Ascencio fat. Tropical oils, such as coconut, palm kernel, or palm oil.  Seasonings and condiments  Onion salt, garlic salt, seasoned salt, table salt, and sea salt. Worcestershire sauce. Tartar sauce. Barbecue sauce. Teriyaki sauce. Soy sauce, including reduced-sodium. Steak sauce. Canned and packaged gravies. Fish sauce. Oyster sauce. Cocktail sauce. Store-bought horseradish. Ketchup. Mustard. Meat flavorings and tenderizers. Bouillon cubes. Hot sauces. Pre-made or packaged marinades. Pre-made or packaged taco seasonings. Relishes. Regular salad dressings.  Other foods  Salted popcorn and pretzels.  The items listed above may not be a complete list of foods and beverages you should avoid. Contact a dietitian for more information.  Where to find more information  · National Heart, Lung, and Blood [place]: www.nhlbi.nih.gov  · American Heart Association: www.heart.org  · Academy of Nutrition and Dietetics: www.eatright.org  · National Kidney Foundation: www.kidney.org  Summary  · The DASH eating plan is a healthy eating plan that has been shown to reduce high blood pressure (hypertension). It may also reduce your risk for type 2 diabetes, heart disease, and stroke.  · When on the DASH eating plan, aim to eat more fresh fruits and vegetables, whole grains, lean proteins, low-fat dairy, and heart-healthy fats.  · With the DASH eating plan, you should limit salt (sodium) intake to 2,300 mg a day. If you have hypertension, you may need to reduce your  sodium intake to 1,500 mg a day.  · Work with your health care provider or dietitian to adjust your eating plan to your individual calorie needs.  This information is not intended to replace advice given to you by your health care provider. Make sure you discuss any questions you have with your health care provider.  Document Revised: 11/20/2020 Document Reviewed: 11/20/2020  [employer] Patient Education © 2021 [employer].  Type 2 Diabetes Mellitus, Self-Care, Adult  Caring for yourself after you have been diagnosed with type 2 diabetes (type 2 diabetes mellitus) means keeping your blood sugar (glucose) under control with a balance of:  · Nutrition.  · Exercise.  · Lifestyle changes.  · Medicines or insulin, if needed.  · Support from your team of health care providers and others.  The following information explains what you need to know to manage your diabetes at home.  What are the risks?  Having diabetes can put you at risk for other long-term (chronic) conditions, such as heart disease and kidney disease. Your health care provider may prescribe medicines to help prevent complications from diabetes.  How to monitor blood glucose    · Check your blood glucose every day, as often as told by your health care provider.  · Have your A1C (hemoglobin A1C) level checked two or more times a year, or as often as told by your health care provider.  · Your health care provider will set personalized treatment goals for you. Generally, the goal of treatment is to maintain the following blood glucose levels:  ? Before meals:  mg/dL (4.4-7.2 mmol/L).  ? After meals: below 180 mg/dL (10 mmol/L).  ? A1C level: less than 7%.  How to manage hyperglycemia and hypoglycemia  Hyperglycemia symptoms  Hyperglycemia, also called high blood glucose, occurs when blood glucose is too high. Make sure you know the early signs of hyperglycemia, such as:  · Increased thirst.  · Hunger.  · Feeling very tired.  · Needing to urinate more often  than usual.  · Blurry vision.  Hypoglycemia symptoms  Hypoglycemia, also called low blood glucose, occurs with a blood glucose level at or below 70 mg/dL (3.9 mmol/L). Diabetes medicines lower your blood glucose and can cause hypoglycemia. The risk for hypoglycemia increases during or after exercise, during sleep, during illness, and when skipping meals or not eating for a long time (fasting).  It is important to know the symptoms of hypoglycemia and treat it right away. Always have a 15-gram rapid-acting carbohydrate snack with you to treat low blood glucose. Family members and close friends should also know the symptoms and understand how to treat hypoglycemia, in case you are not able to treat yourself. Symptoms may include:  · Hunger.  · Anxiety.  · Sweating and feeling clammy.  · Dizziness or feeling light-headed.  · Sleepiness.  · Increased heart rate.  · Irritability.  · Tingling or numbness around the mouth, lips, or tongue.  · Restless sleep.  Severe hypoglycemia is when your blood glucose level is at or below 54 mg/dL (3 mmol/L). Severe hypoglycemia is an emergency. Do not wait to see if the symptoms will go away. Get medical help right away. Call your local emergency services (911 in the [place] Do not drive yourself to the hospital.  If you have severe hypoglycemia and you cannot eat or drink, you may need glucagon. A family member or close friend should learn how to check your blood glucose and how to give you glucagon. Ask your health care provider if you need to have an emergency glucagon kit available.  Follow these instructions at home:  Medicines  · Take diabetes medicines as told by your health care provider. If your health care provider prescribed insulin or diabetes medicines, take them every day.  · Do not run out of insulin or other diabetes medicines. Plan ahead so you always have these available.  · If you use insulin, adjust your dosage based on your physical activity and what foods you eat.  Your health care provider will tell you how to adjust your dosage.  · Take over-the-counter and prescription medicines only as told by your health care provider.  Eating and drinking    What you eat and drink affects your blood glucose and your insulin dosage. Making good choices helps to control your diabetes and prevent other health problems. A healthy meal plan includes eating lean proteins, complex carbohydrates, fresh fruits and vegetables, low-fat dairy products, and healthy fats.  Make an appointment to see a registered dietitian to help you create an eating plan that is right for you. Make sure that you:  · Follow instructions from your health care provider about eating or drinking restrictions.  · Drink enough fluid to keep your urine pale yellow.  · Keep a record of the carbohydrates that you eat. Do this by reading food labels and learning the standard serving sizes of foods.  · Follow your sick-day plan whenever you cannot eat or drink as usual. Make this plan in advance with your health care provider.    Activity  · Stay active. Exercise regularly, as told by your health care provider. This may include:  ? Stretching and doing strength exercises, such as yoga or weight lifting, 2 or more times a week.  ? Doing 150 minutes or more of moderate-intensity or vigorous-intensity exercise each week. This could be brisk walking, biking, or water aerobics.  § Spread out your activity over 3 or more days of the week.  § Do not go more than 2 days in a row without doing some kind of physical activity.  · When you start a new exercise or activity, work with your health care provider to adjust your insulin, medicines, or food intake as needed.  Lifestyle  · Do not use any products that contain nicotine or tobacco, such as cigarettes, e-cigarettes, and chewing tobacco. If you need help quitting, ask your health care provider.  · If your health care provider says that alcohol is safe for you, limit how much you use to  no more than 1 drink a day for women who are not pregnant and 2 drinks a day for men. In the [place] one drink equals one 12 oz bottle of beer (355 mL), one 5 oz glass of wine (148 mL), or one 1½ oz glass of hard liquor (44 mL).  · Learn to manage stress. If you need help with this, ask your health care provider.  Take care of your body    · Keep your immunizations up to date. In addition to getting vaccinations as told by your health care provider, it is recommended that you get vaccinated against the following illnesses:  ? The flu (influenza). Get a flu shot every year.  ? Pneumonia.  ? Hepatitis B.  · Schedule an eye exam soon after your diagnosis, and then one time every year after that.  · Check your skin and feet every day for cuts, bruises, redness, blisters, or sores. Schedule a foot exam with your health care provider once every year.  · Brush your teeth and gums two times a day, and floss one or more times a day. Visit your dentist one or more times every 6 months.  · Maintain a healthy weight.    General instructions  · Share your diabetes management plan with people in your workplace, school, and household.  · Carry a medical alert card or wear medical alert jewelry.  · Keep all follow-up visits as told by your health care provider. This is important.  Questions to ask your health care provider  · Should I meet with a certified diabetes care and ?  · Where can I find a support group for people with diabetes?  Where to find more information  · American Diabetes Association (ADA): www.diabetes.org  · American Association of Diabetes Care and Education Specialists (ADCES): www.diabeteseducator.org  · International Diabetes Federation (IDF): www.idf.org  Summary  · Caring for yourself after you have been diagnosed with type 2 diabetes (type 2 diabetes mellitus) means keeping your blood sugar (glucose) under control with a balance of nutrition, exercise, lifestyle changes, and  medicine.  · Check your blood glucose every day, as often as told by your health care provider.  · Having diabetes can put you at risk for other long-term (chronic) conditions, such as heart disease and kidney disease. Your health care provider may prescribe medicines to help prevent complications from diabetes.  · Share your diabetes management plan with people in your workplace, school, and household.  · Keep all follow-up visits as told by your health care provider. This is important.  This information is not intended to replace advice given to you by your health care provider. Make sure you discuss any questions you have with your health care provider.  Document Revised: 01/25/2021 Document Reviewed: 01/26/2021  Elsevier Patient Education © 2021 [employer] Inc.

## 2021-11-23 NOTE — PROGRESS NOTES
Injection  Injection performed in left deltoid by Isabela Quiroz MA. Patient tolerated the procedure well without complications.  11/23/21   Isabela Quiroz MA

## 2021-11-24 ENCOUNTER — TELEPHONE (OUTPATIENT)
Dept: FAMILY MEDICINE CLINIC | Facility: CLINIC | Age: 56
End: 2021-11-24

## 2021-11-24 VITALS
HEART RATE: 102 BPM | WEIGHT: 162 LBS | DIASTOLIC BLOOD PRESSURE: 70 MMHG | RESPIRATION RATE: 14 BRPM | BODY MASS INDEX: 25.43 KG/M2 | SYSTOLIC BLOOD PRESSURE: 120 MMHG | HEIGHT: 67 IN | OXYGEN SATURATION: 100 % | TEMPERATURE: 98.6 F

## 2021-11-24 RX ORDER — MULTIVITAMIN WITH IRON
250 TABLET ORAL DAILY
Qty: 30 TABLET | Refills: 3 | Status: SHIPPED | OUTPATIENT
Start: 2021-11-24 | End: 2023-02-07

## 2021-11-24 NOTE — TELEPHONE ENCOUNTER
Patient's  is aware of this information.      ----- Message from ASH Chaudhry sent at 11/24/2021  8:50 AM EST -----  Would you call her daughter, Emma, to let her know I have reordered her Magnesium tablets. Her level was just a little low but feel she needs to take a supplement for several months. Also, her Vitamin B 12 was low normal. At one time, Sandra was taking Vitamin B12 injections which were discontinued. I do think a daily supplement would help her or if she wanted to come in for monthly Vitamin B 12 injections. I have sent both Magnesium and Vit B12 to her pharmacy.

## 2021-11-29 ENCOUNTER — TELEPHONE (OUTPATIENT)
Dept: FAMILY MEDICINE CLINIC | Facility: CLINIC | Age: 56
End: 2021-11-29

## 2021-11-29 NOTE — TELEPHONE ENCOUNTER
Spoke with patients  and he said that she will be by tomorrow to do another urine culture for John.    ----- Message from ASH Chaudhry sent at 11/28/2021  2:07 PM EST -----  Regarding: Urine Culture  I have yet to receive results on her Urine Culture if you would check about this please

## 2021-12-07 DIAGNOSIS — E78.2 MIXED HYPERLIPIDEMIA: Chronic | ICD-10-CM

## 2021-12-07 DIAGNOSIS — F41.1 GENERALIZED ANXIETY DISORDER: ICD-10-CM

## 2021-12-07 DIAGNOSIS — F32.0 CURRENT MILD EPISODE OF MAJOR DEPRESSIVE DISORDER WITHOUT PRIOR EPISODE (HCC): ICD-10-CM

## 2021-12-07 DIAGNOSIS — IMO0002 TYPE 2 DIABETES, UNCONTROLLED, WITH NEUROPATHY: Chronic | ICD-10-CM

## 2021-12-07 RX ORDER — RIVASTIGMINE 4.6 MG/24H
PATCH, EXTENDED RELEASE TRANSDERMAL
Qty: 30 PATCH | Refills: 0 | Status: SHIPPED | OUTPATIENT
Start: 2021-12-07 | End: 2022-03-21

## 2021-12-07 RX ORDER — EZETIMIBE 10 MG/1
TABLET ORAL
Qty: 90 TABLET | Refills: 0 | Status: SHIPPED | OUTPATIENT
Start: 2021-12-07 | End: 2022-05-10

## 2021-12-07 RX ORDER — VORTIOXETINE 20 MG/1
TABLET, FILM COATED ORAL
Qty: 30 TABLET | Refills: 0 | Status: SHIPPED | OUTPATIENT
Start: 2021-12-07 | End: 2022-01-18

## 2022-01-18 DIAGNOSIS — F41.1 GENERALIZED ANXIETY DISORDER: ICD-10-CM

## 2022-01-18 DIAGNOSIS — F32.0 CURRENT MILD EPISODE OF MAJOR DEPRESSIVE DISORDER WITHOUT PRIOR EPISODE: ICD-10-CM

## 2022-01-18 DIAGNOSIS — E78.2 MIXED HYPERLIPIDEMIA: Chronic | ICD-10-CM

## 2022-01-18 DIAGNOSIS — IMO0002 TYPE 2 DIABETES, UNCONTROLLED, WITH NEUROPATHY: Chronic | ICD-10-CM

## 2022-01-18 RX ORDER — PRAVASTATIN SODIUM 40 MG
TABLET ORAL
Qty: 90 TABLET | Refills: 0 | Status: SHIPPED | OUTPATIENT
Start: 2022-01-18 | End: 2022-05-02

## 2022-01-18 RX ORDER — INSULIN DETEMIR 100 [IU]/ML
INJECTION, SOLUTION SUBCUTANEOUS
Qty: 45 ML | Refills: 0 | Status: SHIPPED | OUTPATIENT
Start: 2022-01-18 | End: 2022-03-21

## 2022-01-18 RX ORDER — VORTIOXETINE 20 MG/1
TABLET, FILM COATED ORAL
Qty: 30 TABLET | Refills: 0 | Status: SHIPPED | OUTPATIENT
Start: 2022-01-18 | End: 2022-02-17

## 2022-02-16 DIAGNOSIS — J30.9 ALLERGIC RHINITIS, UNSPECIFIED SEASONALITY, UNSPECIFIED TRIGGER: ICD-10-CM

## 2022-02-16 DIAGNOSIS — F41.1 GENERALIZED ANXIETY DISORDER: ICD-10-CM

## 2022-02-16 DIAGNOSIS — F32.0 CURRENT MILD EPISODE OF MAJOR DEPRESSIVE DISORDER WITHOUT PRIOR EPISODE: ICD-10-CM

## 2022-02-16 RX ORDER — CETIRIZINE HYDROCHLORIDE 10 MG/1
TABLET ORAL
Qty: 30 TABLET | Refills: 0 | Status: SHIPPED | OUTPATIENT
Start: 2022-02-16 | End: 2022-04-01

## 2022-02-17 RX ORDER — VORTIOXETINE 20 MG/1
TABLET, FILM COATED ORAL
Qty: 30 TABLET | Refills: 0 | Status: SHIPPED | OUTPATIENT
Start: 2022-02-17 | End: 2022-04-18 | Stop reason: SDUPTHER

## 2022-03-09 ENCOUNTER — TELEPHONE (OUTPATIENT)
Dept: FAMILY MEDICINE CLINIC | Facility: CLINIC | Age: 57
End: 2022-03-09

## 2022-03-20 DIAGNOSIS — IMO0002 TYPE 2 DIABETES, UNCONTROLLED, WITH NEUROPATHY: Chronic | ICD-10-CM

## 2022-03-21 RX ORDER — INSULIN DETEMIR 100 [IU]/ML
INJECTION, SOLUTION SUBCUTANEOUS
Qty: 45 ML | Refills: 0 | Status: SHIPPED | OUTPATIENT
Start: 2022-03-21 | End: 2022-06-13

## 2022-03-21 RX ORDER — RIVASTIGMINE 4.6 MG/24H
PATCH, EXTENDED RELEASE TRANSDERMAL
Qty: 30 PATCH | Refills: 0 | Status: SHIPPED | OUTPATIENT
Start: 2022-03-21 | End: 2022-05-05

## 2022-03-31 ENCOUNTER — TELEPHONE (OUTPATIENT)
Dept: FAMILY MEDICINE CLINIC | Facility: CLINIC | Age: 57
End: 2022-03-31

## 2022-03-31 NOTE — TELEPHONE ENCOUNTER
Caller: ELIANA ROTHMAN    Relationship: DAUGHTER     Best call back number: [phone]    What form or medical record are you requesting: LETTER STATING PATIENT HAS DEMENTIA     Who is requesting this form or medical record from you:      How would you like to receive the form or medical records (pick-up, mail, fax): FAX  If fax, what is the fax number: [phone]  If mail, what is the address:   If pick-up, provide patient with address and location details    Timeframe paperwork needed: BY 4/11/22    Additional notes: PATIENT'S DAUGHTER STATES  WILL NEED A LETTER STATING THE PATIENT HAS DEMENTIA AND IS NO LONGER SUITABLE TO TAKE CARE OF THE CHILDREN SHE HAS IN HER CARE. PATIENT'S DAUGHTER HAS A COURT CASE ON 4/13/22 TO TAKE CUSTODY OF HER NEPHEWS.

## 2022-04-01 DIAGNOSIS — J30.9 ALLERGIC RHINITIS, UNSPECIFIED SEASONALITY, UNSPECIFIED TRIGGER: ICD-10-CM

## 2022-04-01 RX ORDER — CETIRIZINE HYDROCHLORIDE 10 MG/1
TABLET ORAL
Qty: 30 TABLET | Refills: 0 | Status: SHIPPED | OUTPATIENT
Start: 2022-04-01 | End: 2022-05-10

## 2022-04-05 DIAGNOSIS — K21.9 GASTROESOPHAGEAL REFLUX DISEASE WITHOUT ESOPHAGITIS: ICD-10-CM

## 2022-04-05 DIAGNOSIS — E55.9 VITAMIN D DEFICIENCY: ICD-10-CM

## 2022-04-05 DIAGNOSIS — E78.5 DYSLIPIDEMIA: Primary | ICD-10-CM

## 2022-04-05 DIAGNOSIS — R39.9 LOWER URINARY TRACT SYMPTOMS (LUTS): ICD-10-CM

## 2022-04-05 DIAGNOSIS — E11.42 TYPE 2 DIABETES MELLITUS WITH DIABETIC POLYNEUROPATHY, WITH LONG-TERM CURRENT USE OF INSULIN: ICD-10-CM

## 2022-04-05 DIAGNOSIS — E53.8 VITAMIN B 12 DEFICIENCY: ICD-10-CM

## 2022-04-05 DIAGNOSIS — Z79.4 TYPE 2 DIABETES MELLITUS WITH DIABETIC POLYNEUROPATHY, WITH LONG-TERM CURRENT USE OF INSULIN: ICD-10-CM

## 2022-04-05 DIAGNOSIS — E83.42 HYPOMAGNESEMIA: ICD-10-CM

## 2022-04-06 ENCOUNTER — TELEPHONE (OUTPATIENT)
Dept: FAMILY MEDICINE CLINIC | Facility: CLINIC | Age: 57
End: 2022-04-06

## 2022-04-06 NOTE — TELEPHONE ENCOUNTER
Called patient's daughter to let her know that the letter John wrote for her mother will not go to the fax number she provided. I let her know that I will put it in the box up front and she can come by the office and pick it up, which she said she would do.

## 2022-04-08 ENCOUNTER — LAB (OUTPATIENT)
Dept: FAMILY MEDICINE CLINIC | Facility: CLINIC | Age: 57
End: 2022-04-08

## 2022-04-08 DIAGNOSIS — Z79.4 TYPE 2 DIABETES MELLITUS WITH DIABETIC POLYNEUROPATHY, WITH LONG-TERM CURRENT USE OF INSULIN: ICD-10-CM

## 2022-04-08 DIAGNOSIS — E53.8 VITAMIN B 12 DEFICIENCY: ICD-10-CM

## 2022-04-08 DIAGNOSIS — E78.5 DYSLIPIDEMIA: ICD-10-CM

## 2022-04-08 DIAGNOSIS — E83.42 HYPOMAGNESEMIA: ICD-10-CM

## 2022-04-08 DIAGNOSIS — E55.9 VITAMIN D DEFICIENCY: ICD-10-CM

## 2022-04-08 DIAGNOSIS — E11.42 TYPE 2 DIABETES MELLITUS WITH DIABETIC POLYNEUROPATHY, WITH LONG-TERM CURRENT USE OF INSULIN: ICD-10-CM

## 2022-04-08 DIAGNOSIS — R39.9 LOWER URINARY TRACT SYMPTOMS (LUTS): ICD-10-CM

## 2022-04-09 LAB
25(OH)D3+25(OH)D2 SERPL-MCNC: 19.6 NG/ML (ref 30–100)
ALBUMIN SERPL-MCNC: 4.7 G/DL (ref 3.5–5.2)
ALBUMIN/GLOB SERPL: 1.4 G/DL
ALP SERPL-CCNC: 127 U/L (ref 39–117)
ALT SERPL-CCNC: 19 U/L (ref 1–33)
AST SERPL-CCNC: 16 U/L (ref 1–32)
BILIRUB SERPL-MCNC: 0.5 MG/DL (ref 0–1.2)
BUN SERPL-MCNC: 16 MG/DL (ref 6–20)
BUN/CREAT SERPL: 20 (ref 7–25)
CALCIUM SERPL-MCNC: 9.7 MG/DL (ref 8.6–10.5)
CHLORIDE SERPL-SCNC: 93 MMOL/L (ref 98–107)
CHOLEST SERPL-MCNC: 178 MG/DL (ref 0–200)
CO2 SERPL-SCNC: 27.7 MMOL/L (ref 22–29)
CREAT SERPL-MCNC: 0.8 MG/DL (ref 0.57–1)
EGFRCR SERPLBLD CKD-EPI 2021: 85.5 ML/MIN/1.73
GLOBULIN SER CALC-MCNC: 3.3 GM/DL
GLUCOSE SERPL-MCNC: 450 MG/DL (ref 65–99)
HBA1C MFR BLD: 10.1 % (ref 4.8–5.6)
HDLC SERPL-MCNC: 43 MG/DL (ref 40–60)
LDLC SERPL CALC-MCNC: 86 MG/DL (ref 0–100)
MAGNESIUM SERPL-MCNC: 1.9 MG/DL (ref 1.6–2.6)
POTASSIUM SERPL-SCNC: 4.4 MMOL/L (ref 3.5–5.2)
PROT SERPL-MCNC: 8 G/DL (ref 6–8.5)
SODIUM SERPL-SCNC: 130 MMOL/L (ref 136–145)
TRIGL SERPL-MCNC: 300 MG/DL (ref 0–150)
TSH SERPL DL<=0.005 MIU/L-ACNC: 4.44 UIU/ML (ref 0.27–4.2)
VIT B12 SERPL-MCNC: 368 PG/ML (ref 211–946)
VLDLC SERPL CALC-MCNC: 49 MG/DL (ref 5–40)

## 2022-04-10 LAB
APPEARANCE UR: CLEAR
BACTERIA #/AREA URNS HPF: ABNORMAL /HPF
BACTERIA UR CULT: NO GROWTH
BACTERIA UR CULT: NORMAL
BILIRUB UR QL STRIP: NEGATIVE
COLOR UR: YELLOW
EPI CELLS #/AREA URNS HPF: ABNORMAL /HPF
GLUCOSE UR QL STRIP: ABNORMAL
HGB UR QL STRIP: NEGATIVE
KETONES UR QL STRIP: NEGATIVE
LEUKOCYTE ESTERASE UR QL STRIP: NEGATIVE
NITRITE UR QL STRIP: NEGATIVE
PH UR STRIP: 6 [PH] (ref 5–8)
PROT UR QL STRIP: NEGATIVE
RBC #/AREA URNS HPF: ABNORMAL /HPF
SP GR UR STRIP: 1.03 (ref 1–1.03)
UROBILINOGEN UR STRIP-MCNC: ABNORMAL MG/DL
WBC #/AREA URNS HPF: ABNORMAL /HPF

## 2022-04-18 ENCOUNTER — OFFICE VISIT (OUTPATIENT)
Dept: PSYCHIATRY | Facility: CLINIC | Age: 57
End: 2022-04-18

## 2022-04-18 VITALS
WEIGHT: 172.2 LBS | HEART RATE: 89 BPM | BODY MASS INDEX: 26.96 KG/M2 | DIASTOLIC BLOOD PRESSURE: 70 MMHG | SYSTOLIC BLOOD PRESSURE: 126 MMHG

## 2022-04-18 DIAGNOSIS — F32.0 CURRENT MILD EPISODE OF MAJOR DEPRESSIVE DISORDER WITHOUT PRIOR EPISODE: ICD-10-CM

## 2022-04-18 DIAGNOSIS — F41.1 GENERALIZED ANXIETY DISORDER: ICD-10-CM

## 2022-04-18 PROBLEM — G30.0 EARLY ONSET ALZHEIMER'S DEMENTIA: Status: ACTIVE | Noted: 2021-01-20

## 2022-04-18 PROBLEM — F02.80 EARLY ONSET ALZHEIMER'S DEMENTIA: Status: ACTIVE | Noted: 2021-01-20

## 2022-04-18 PROCEDURE — 99213 OFFICE O/P EST LOW 20 MIN: CPT | Performed by: NURSE PRACTITIONER

## 2022-04-18 RX ORDER — VORTIOXETINE 20 MG/1
1 TABLET, FILM COATED ORAL DAILY
Qty: 30 TABLET | Refills: 6 | Status: SHIPPED | OUTPATIENT
Start: 2022-04-18 | End: 2022-05-16 | Stop reason: SDUPTHER

## 2022-04-22 ENCOUNTER — TELEPHONE (OUTPATIENT)
Dept: PSYCHIATRY | Facility: CLINIC | Age: 57
End: 2022-04-22

## 2022-04-22 NOTE — TELEPHONE ENCOUNTER
----- Message from ASH Gasca sent at 4/21/2022  9:54 AM EDT -----  Can you call the daughter (POA) and let her know I have spoke with John regarding Dementia medication. Let her know I will do the dementia medication now and I will call her regarding changes once I evaluate the options.   Thank you

## 2022-05-02 DIAGNOSIS — E78.2 MIXED HYPERLIPIDEMIA: Chronic | ICD-10-CM

## 2022-05-02 RX ORDER — PRAVASTATIN SODIUM 40 MG
TABLET ORAL
Qty: 90 TABLET | Refills: 0 | Status: SHIPPED | OUTPATIENT
Start: 2022-05-02 | End: 2022-08-12

## 2022-05-05 RX ORDER — RIVASTIGMINE 4.6 MG/24H
PATCH, EXTENDED RELEASE TRANSDERMAL
Qty: 30 PATCH | Refills: 0 | Status: SHIPPED | OUTPATIENT
Start: 2022-05-05 | End: 2022-05-31

## 2022-05-09 DIAGNOSIS — E78.2 MIXED HYPERLIPIDEMIA: Chronic | ICD-10-CM

## 2022-05-09 DIAGNOSIS — J30.9 ALLERGIC RHINITIS, UNSPECIFIED SEASONALITY, UNSPECIFIED TRIGGER: ICD-10-CM

## 2022-05-10 RX ORDER — EZETIMIBE 10 MG/1
TABLET ORAL
Qty: 90 TABLET | Refills: 0 | Status: SHIPPED | OUTPATIENT
Start: 2022-05-10 | End: 2022-09-06

## 2022-05-10 RX ORDER — CETIRIZINE HYDROCHLORIDE 10 MG/1
TABLET ORAL
Qty: 30 TABLET | Refills: 0 | Status: SHIPPED | OUTPATIENT
Start: 2022-05-10 | End: 2022-06-15

## 2022-05-16 DIAGNOSIS — F32.0 CURRENT MILD EPISODE OF MAJOR DEPRESSIVE DISORDER WITHOUT PRIOR EPISODE: ICD-10-CM

## 2022-05-16 DIAGNOSIS — F41.1 GENERALIZED ANXIETY DISORDER: ICD-10-CM

## 2022-05-16 RX ORDER — VORTIOXETINE 20 MG/1
1 TABLET, FILM COATED ORAL DAILY
Qty: 30 TABLET | Refills: 0 | Status: SHIPPED | OUTPATIENT
Start: 2022-05-16 | End: 2022-06-14 | Stop reason: SDUPTHER

## 2022-05-31 DIAGNOSIS — G30.0 EARLY ONSET ALZHEIMER'S DEMENTIA WITH BEHAVIORAL DISTURBANCE: Primary | ICD-10-CM

## 2022-05-31 DIAGNOSIS — F02.818 EARLY ONSET ALZHEIMER'S DEMENTIA WITH BEHAVIORAL DISTURBANCE: Primary | ICD-10-CM

## 2022-05-31 RX ORDER — MEMANTINE HYDROCHLORIDE 5 MG/1
5 TABLET ORAL DAILY
Qty: 30 TABLET | Refills: 0 | Status: SHIPPED | OUTPATIENT
Start: 2022-05-31 | End: 2022-06-14 | Stop reason: SDUPTHER

## 2022-05-31 NOTE — PROGRESS NOTES
05/31/22 at 4:43pm Contacted patient's daughter (Emma) regarding medication for dementia. Discussed with daughter and patient to stop the Exelon patches. Discussed with daughter starting Namenda 5mg daily for worsening memory. Discussed with daughter and patient risks, benefits, and side effects of medication. Daughter and patient acknowledged and agreed. Discussed with daughter and patient if begin having any thoughts to harm self or others to call 911 or go to nearest ER. Daughter and patient acknowledged and agreed.

## 2022-06-13 RX ORDER — INSULIN DETEMIR 100 [IU]/ML
INJECTION, SOLUTION SUBCUTANEOUS
Qty: 45 ML | Refills: 0 | Status: SHIPPED | OUTPATIENT
Start: 2022-06-13 | End: 2022-09-01

## 2022-06-14 ENCOUNTER — OFFICE VISIT (OUTPATIENT)
Dept: PSYCHIATRY | Facility: CLINIC | Age: 57
End: 2022-06-14

## 2022-06-14 VITALS
HEART RATE: 92 BPM | SYSTOLIC BLOOD PRESSURE: 112 MMHG | WEIGHT: 170 LBS | OXYGEN SATURATION: 98 % | DIASTOLIC BLOOD PRESSURE: 68 MMHG | BODY MASS INDEX: 26.62 KG/M2

## 2022-06-14 DIAGNOSIS — F02.818 EARLY ONSET ALZHEIMER'S DEMENTIA WITH BEHAVIORAL DISTURBANCE: ICD-10-CM

## 2022-06-14 DIAGNOSIS — F41.1 GENERALIZED ANXIETY DISORDER: ICD-10-CM

## 2022-06-14 DIAGNOSIS — F32.0 CURRENT MILD EPISODE OF MAJOR DEPRESSIVE DISORDER WITHOUT PRIOR EPISODE: ICD-10-CM

## 2022-06-14 DIAGNOSIS — G30.0 EARLY ONSET ALZHEIMER'S DEMENTIA WITH BEHAVIORAL DISTURBANCE: ICD-10-CM

## 2022-06-14 PROCEDURE — 99213 OFFICE O/P EST LOW 20 MIN: CPT | Performed by: NURSE PRACTITIONER

## 2022-06-14 RX ORDER — MEMANTINE HYDROCHLORIDE 5 MG/1
5 TABLET ORAL DAILY
Qty: 30 TABLET | Refills: 0 | Status: SHIPPED | OUTPATIENT
Start: 2022-06-14 | End: 2022-07-05

## 2022-06-14 RX ORDER — VORTIOXETINE 20 MG/1
1 TABLET, FILM COATED ORAL DAILY
Qty: 30 TABLET | Refills: 0 | Status: SHIPPED | OUTPATIENT
Start: 2022-06-14 | End: 2022-07-25 | Stop reason: SDUPTHER

## 2022-06-14 NOTE — PROGRESS NOTES
Subjective   Sandra Leahy is a 56 y.o. female is here today for medication management follow-up.    Chief Complaint:  Depression anxiety dementia    History of Present Illness:    Patient presents today for follow up for medication management for dementia, depression and anxiety with daughter. Patient states she started the new medication and denies any side effects. Patient states memory is about the same right now but just started it. Patient reports currently depression is at a 0 on a 0-10 scale with 10 being the worst. Patient states anxiety has been a lot better. Patient reports anxiety is at a 0 on a 0-10 scale with 10 being the worst. Patient denies any panic attacks. Patient reports sleeping 6-7 hours a night and patient denies any nightmares. Patient reports appetite is good and eating 2-3 meals a day. Denies any nausea or vomiting. Patient denies any auditory or visual hallucinations. Patient adamantly denies any SI or HI. Patient denies any side effects to medications. Patient denies any medical changes since last visit.   The following portions of the patient's history were reviewed and updated as appropriate: allergies, current medications, past family history, past medical history, past social history, past surgical history and problem list.    Review of Systems   Constitutional: Negative.    Respiratory: Negative.    Cardiovascular: Negative.    Gastrointestinal: Negative.    Neurological: Negative.    Psychiatric/Behavioral: Positive for decreased concentration.       Objective   Physical Exam  Vitals reviewed.   Constitutional:       Appearance: Normal appearance. She is well-developed and well-groomed.   Neurological:      Mental Status: She is alert.   Psychiatric:         Attention and Perception: Perception normal. She is inattentive.         Mood and Affect: Affect normal. Mood is anxious.         Speech: Speech normal.         Behavior: Behavior normal. Behavior is cooperative.          Thought Content: Thought content normal.         Cognition and Memory: Cognition is impaired. Memory is impaired.         Judgment: Judgment normal.       Blood pressure 112/68, pulse 92, weight 77.1 kg (170 lb), SpO2 98 %. Body mass index is 26.62 kg/m².    Allergies   Allergen Reactions   • Lantus [Insulin Glargine] Confusion   • Codeine Itching   • Exenatide Nausea And Vomiting     Intolerant   • Sitagliptin Other (See Comments)     Unsure       Medication List:   Current Outpatient Medications   Medication Sig Dispense Refill   • memantine (Namenda) 5 MG tablet Take 1 tablet by mouth Daily. 30 tablet 0   • Vortioxetine HBr (Trintellix) 20 MG tablet Take 20 mg by mouth Daily. 30 tablet 0   • cetirizine (zyrTEC) 10 MG tablet TAKE 1 TABLET BY MOUTH ONCE DAILY AS NEEDED FOR ALLERGIES 30 tablet 0   • Continuous Blood Gluc  (Dexcom G6 ) device 1 Device Daily. 1 each 0   • Continuous Blood Gluc Sensor (Dexcom G6 Sensor) Every 10 (Ten) Days. 9 each 3   • Continuous Blood Gluc Transmit (Dexcom G6 Transmitter) misc 1 Device Daily. 1 each 0   • cyanocobalamin ([employer] Vitamin B-12) 2000 MCG tablet Take 1 tablet by mouth Daily. 30 tablet 3   • ezetimibe (ZETIA) 10 MG tablet Take 1 tablet by mouth once daily 90 tablet 0   • fluconazole (Diflucan) 150 MG tablet Take 1 tablet by mouth Daily. 3 tablet 0   • Levemir FlexTouch 100 UNIT/ML injection INJECT 45 UNITS SUBCUTANEOUSLY TWICE DAILY 45 mL 0   • Magnesium 250 MG tablet Take 1 tablet by mouth Daily. 30 tablet 3   • metFORMIN (GLUCOPHAGE) 1000 MG tablet TAKE 1 TABLET BY MOUTH TWICE DAILY WITH MEALS 180 tablet 0   • nitrofurantoin, macrocrystal-monohydrate, (Macrobid) 100 MG capsule Take 1 capsule by mouth 2 (Two) Times a Day. 20 capsule 0   • pravastatin (PRAVACHOL) 40 MG tablet TAKE 1 TABLET BY MOUTH ONCE DAILY (PLEASE  CALL  TO  SCHEDULE  A  FOLLOW-UP  APPOINTMENT) 90 tablet 0     No current facility-administered medications for this visit.       Mental Status  Exam:   Hygiene:   good  Cooperation:  Cooperative  Eye Contact:  Fair  Psychomotor Behavior:  Restless  Affect:  Appropriate  Hopelessness: Denies  Speech:  Normal  Thought Process:  Disorganized  Thought Content:  Normal  Suicidal:  None  Homicidal:  None  Hallucinations:  None  Delusion:  None  Memory:  Deficits  Orientation:  Person, Place, Time and Situation  Reliability:  poor  Insight:  Poor  Judgement:  Poor  Impulse Control:  Poor  Physical/Medical Issues:  No                 Assessment & Plan   Diagnoses and all orders for this visit:    1. Early onset Alzheimer's dementia with behavioral disturbance (HCC)  -     memantine (Namenda) 5 MG tablet; Take 1 tablet by mouth Daily.  Dispense: 30 tablet; Refill: 0    2. Current mild episode of major depressive disorder without prior episode (HCC)  -     Vortioxetine HBr (Trintellix) 20 MG tablet; Take 20 mg by mouth Daily.  Dispense: 30 tablet; Refill: 0    3. Generalized anxiety disorder  -     Vortioxetine HBr (Trintellix) 20 MG tablet; Take 20 mg by mouth Daily.  Dispense: 30 tablet; Refill: 0            Discussed medication options with daughter and patient. Cont. Namenda 5mg daily for dementia. Cont. Trintellix 20mg daily for depression and anxiety. Reviewed the risks, benefits, and side effects of the medications; patient and daughter acknowledged and verbally consented. Daughter and patient is agreeable to call the office with any questions, concerns, or worsening of symptoms. Daughter and patient is aware to call 911 or go to the nearest ER should begin having SI/HI.          Follow up in four weeks        Errors in dictation may reflect use of voice recognition software and not all errors in transcription may have been detected prior to signing.              This document has been electronically signed by ASH Gasca   June 30, 2022 12:54 EDT

## 2022-06-15 DIAGNOSIS — J30.9 ALLERGIC RHINITIS, UNSPECIFIED SEASONALITY, UNSPECIFIED TRIGGER: ICD-10-CM

## 2022-06-15 RX ORDER — CETIRIZINE HYDROCHLORIDE 10 MG/1
TABLET ORAL
Qty: 30 TABLET | Refills: 0 | Status: SHIPPED | OUTPATIENT
Start: 2022-06-15 | End: 2022-07-25

## 2022-06-17 ENCOUNTER — TELEPHONE (OUTPATIENT)
Dept: FAMILY MEDICINE CLINIC | Facility: CLINIC | Age: 57
End: 2022-06-17

## 2022-06-17 ENCOUNTER — LAB (OUTPATIENT)
Dept: FAMILY MEDICINE CLINIC | Facility: CLINIC | Age: 57
End: 2022-06-17

## 2022-06-17 DIAGNOSIS — R30.0 DYSURIA: Primary | ICD-10-CM

## 2022-06-17 DIAGNOSIS — R39.9 LOWER URINARY TRACT SYMPTOMS (LUTS): Primary | ICD-10-CM

## 2022-06-17 LAB
BILIRUB BLD-MCNC: NEGATIVE MG/DL
CLARITY, POC: CLEAR
COLOR UR: YELLOW
EXPIRATION DATE: ABNORMAL
GLUCOSE UR STRIP-MCNC: NEGATIVE MG/DL
KETONES UR QL: NEGATIVE
LEUKOCYTE EST, POC: ABNORMAL
Lab: ABNORMAL
NITRITE UR-MCNC: NEGATIVE MG/ML
PH UR: 5.5 [PH] (ref 5–8)
PROT UR STRIP-MCNC: NEGATIVE MG/DL
RBC # UR STRIP: NEGATIVE /UL
SP GR UR: 1.01 (ref 1–1.03)
UROBILINOGEN UR QL: NORMAL

## 2022-06-17 PROCEDURE — 81003 URINALYSIS AUTO W/O SCOPE: CPT | Performed by: NURSE PRACTITIONER

## 2022-06-17 RX ORDER — FLUCONAZOLE 150 MG/1
150 TABLET ORAL DAILY
Qty: 3 TABLET | Refills: 0 | Status: SHIPPED | OUTPATIENT
Start: 2022-06-17 | End: 2022-06-20

## 2022-06-17 RX ORDER — NITROFURANTOIN 25; 75 MG/1; MG/1
100 CAPSULE ORAL 2 TIMES DAILY
Qty: 20 CAPSULE | Refills: 0 | Status: SHIPPED | OUTPATIENT
Start: 2022-06-17 | End: 2023-02-07

## 2022-06-17 NOTE — TELEPHONE ENCOUNTER
Patients daughter is aware.       ----- Message from ASH Chaudhry sent at 6/17/2022 12:16 PM EDT -----  Regarding: Urinalysis  Please let Sandra know that her urinalysis in the office did show leukocytes.  I sent in a prescription for Macrobid 100 mg twice a day for 10 days for her as well as Diflucan.  Urine culture was ordered that will not be back until over the weekend probably.  If medication needs to be changed I will call to let them know.  If they have any questions please feel free to give me a call

## 2022-06-22 LAB
BACTERIA UR CULT: NORMAL
BACTERIA UR CULT: NORMAL

## 2022-07-01 DIAGNOSIS — G30.0 EARLY ONSET ALZHEIMER'S DEMENTIA WITH BEHAVIORAL DISTURBANCE: ICD-10-CM

## 2022-07-01 DIAGNOSIS — F02.818 EARLY ONSET ALZHEIMER'S DEMENTIA WITH BEHAVIORAL DISTURBANCE: ICD-10-CM

## 2022-07-05 RX ORDER — MEMANTINE HYDROCHLORIDE 5 MG/1
TABLET ORAL
Qty: 30 TABLET | Refills: 0 | Status: SHIPPED | OUTPATIENT
Start: 2022-07-05 | End: 2022-07-25 | Stop reason: SDUPTHER

## 2022-07-25 ENCOUNTER — OFFICE VISIT (OUTPATIENT)
Dept: PSYCHIATRY | Facility: CLINIC | Age: 57
End: 2022-07-25

## 2022-07-25 VITALS
WEIGHT: 174.8 LBS | BODY MASS INDEX: 27.37 KG/M2 | DIASTOLIC BLOOD PRESSURE: 60 MMHG | SYSTOLIC BLOOD PRESSURE: 114 MMHG | HEART RATE: 62 BPM

## 2022-07-25 DIAGNOSIS — J30.9 ALLERGIC RHINITIS, UNSPECIFIED SEASONALITY, UNSPECIFIED TRIGGER: ICD-10-CM

## 2022-07-25 DIAGNOSIS — F41.1 GENERALIZED ANXIETY DISORDER: ICD-10-CM

## 2022-07-25 DIAGNOSIS — G30.0 EARLY ONSET ALZHEIMER'S DEMENTIA WITH BEHAVIORAL DISTURBANCE: ICD-10-CM

## 2022-07-25 DIAGNOSIS — F02.818 EARLY ONSET ALZHEIMER'S DEMENTIA WITH BEHAVIORAL DISTURBANCE: ICD-10-CM

## 2022-07-25 DIAGNOSIS — F32.0 CURRENT MILD EPISODE OF MAJOR DEPRESSIVE DISORDER WITHOUT PRIOR EPISODE: ICD-10-CM

## 2022-07-25 PROCEDURE — 99213 OFFICE O/P EST LOW 20 MIN: CPT | Performed by: NURSE PRACTITIONER

## 2022-07-25 RX ORDER — MEMANTINE HYDROCHLORIDE 5 MG/1
5 TABLET ORAL DAILY
Qty: 30 TABLET | Refills: 0 | Status: SHIPPED | OUTPATIENT
Start: 2022-07-25 | End: 2022-09-06

## 2022-07-25 RX ORDER — VORTIOXETINE 20 MG/1
1 TABLET, FILM COATED ORAL DAILY
Qty: 30 TABLET | Refills: 0 | Status: SHIPPED | OUTPATIENT
Start: 2022-07-25 | End: 2022-12-20 | Stop reason: SDUPTHER

## 2022-07-25 RX ORDER — CETIRIZINE HYDROCHLORIDE 10 MG/1
TABLET ORAL
Qty: 30 TABLET | Refills: 0 | Status: SHIPPED | OUTPATIENT
Start: 2022-07-25 | End: 2022-08-26

## 2022-08-12 DIAGNOSIS — E78.2 MIXED HYPERLIPIDEMIA: Chronic | ICD-10-CM

## 2022-08-12 RX ORDER — PRAVASTATIN SODIUM 40 MG
TABLET ORAL
Qty: 90 TABLET | Refills: 0 | Status: SHIPPED | OUTPATIENT
Start: 2022-08-12 | End: 2022-11-21

## 2022-08-26 DIAGNOSIS — J30.9 ALLERGIC RHINITIS, UNSPECIFIED SEASONALITY, UNSPECIFIED TRIGGER: ICD-10-CM

## 2022-08-26 RX ORDER — CETIRIZINE HYDROCHLORIDE 10 MG/1
TABLET ORAL
Qty: 30 TABLET | Refills: 0 | Status: SHIPPED | OUTPATIENT
Start: 2022-08-26 | End: 2022-10-03

## 2022-09-01 RX ORDER — INSULIN DETEMIR 100 [IU]/ML
INJECTION, SOLUTION SUBCUTANEOUS
Qty: 45 ML | Refills: 0 | Status: SHIPPED | OUTPATIENT
Start: 2022-09-01 | End: 2022-11-08

## 2022-09-06 DIAGNOSIS — F02.818 EARLY ONSET ALZHEIMER'S DEMENTIA WITH BEHAVIORAL DISTURBANCE: ICD-10-CM

## 2022-09-06 DIAGNOSIS — E78.2 MIXED HYPERLIPIDEMIA: Chronic | ICD-10-CM

## 2022-09-06 DIAGNOSIS — G30.0 EARLY ONSET ALZHEIMER'S DEMENTIA WITH BEHAVIORAL DISTURBANCE: ICD-10-CM

## 2022-09-06 RX ORDER — EZETIMIBE 10 MG/1
TABLET ORAL
Qty: 90 TABLET | Refills: 0 | Status: SHIPPED | OUTPATIENT
Start: 2022-09-06 | End: 2022-12-12

## 2022-09-06 RX ORDER — MEMANTINE HYDROCHLORIDE 5 MG/1
TABLET ORAL
Qty: 30 TABLET | Refills: 0 | Status: SHIPPED | OUTPATIENT
Start: 2022-09-06 | End: 2022-10-10

## 2022-10-01 DIAGNOSIS — J30.9 ALLERGIC RHINITIS, UNSPECIFIED SEASONALITY, UNSPECIFIED TRIGGER: ICD-10-CM

## 2022-10-03 RX ORDER — CETIRIZINE HYDROCHLORIDE 10 MG/1
TABLET ORAL
Qty: 30 TABLET | Refills: 0 | Status: SHIPPED | OUTPATIENT
Start: 2022-10-03 | End: 2022-11-07

## 2022-10-08 DIAGNOSIS — F02.818 EARLY ONSET ALZHEIMER'S DEMENTIA WITH BEHAVIORAL DISTURBANCE: ICD-10-CM

## 2022-10-08 DIAGNOSIS — G30.0 EARLY ONSET ALZHEIMER'S DEMENTIA WITH BEHAVIORAL DISTURBANCE: ICD-10-CM

## 2022-10-10 RX ORDER — MEMANTINE HYDROCHLORIDE 5 MG/1
TABLET ORAL
Qty: 30 TABLET | Refills: 0 | Status: SHIPPED | OUTPATIENT
Start: 2022-10-10 | End: 2022-11-07

## 2022-11-04 ENCOUNTER — LAB (OUTPATIENT)
Dept: FAMILY MEDICINE CLINIC | Facility: CLINIC | Age: 57
End: 2022-11-04

## 2022-11-04 DIAGNOSIS — E11.42 TYPE 2 DIABETES MELLITUS WITH DIABETIC POLYNEUROPATHY, WITH LONG-TERM CURRENT USE OF INSULIN: ICD-10-CM

## 2022-11-04 DIAGNOSIS — E87.6 HYPOKALEMIA: ICD-10-CM

## 2022-11-04 DIAGNOSIS — Z79.4 TYPE 2 DIABETES MELLITUS WITH DIABETIC POLYNEUROPATHY, WITH LONG-TERM CURRENT USE OF INSULIN: ICD-10-CM

## 2022-11-04 DIAGNOSIS — E78.2 MIXED HYPERLIPIDEMIA: Primary | ICD-10-CM

## 2022-11-04 DIAGNOSIS — E53.8 VITAMIN B 12 DEFICIENCY: ICD-10-CM

## 2022-11-04 DIAGNOSIS — E83.42 HYPOMAGNESEMIA: ICD-10-CM

## 2022-11-04 DIAGNOSIS — E55.9 VITAMIN D DEFICIENCY: ICD-10-CM

## 2022-11-04 DIAGNOSIS — E78.5 DYSLIPIDEMIA: ICD-10-CM

## 2022-11-04 LAB
25(OH)D3+25(OH)D2 SERPL-MCNC: 25.9 NG/ML (ref 30–100)
ALBUMIN SERPL-MCNC: 4.6 G/DL (ref 3.5–5.2)
ALBUMIN/GLOB SERPL: 1.8 G/DL
ALP SERPL-CCNC: 79 U/L (ref 39–117)
ALT SERPL-CCNC: 17 U/L (ref 1–33)
AST SERPL-CCNC: 22 U/L (ref 1–32)
BASOPHILS # BLD AUTO: 0.04 10*3/MM3 (ref 0–0.2)
BASOPHILS NFR BLD AUTO: 0.5 % (ref 0–1.5)
BILIRUB SERPL-MCNC: 0.5 MG/DL (ref 0–1.2)
BUN SERPL-MCNC: 11 MG/DL (ref 6–20)
BUN/CREAT SERPL: 12.5 (ref 7–25)
CALCIUM SERPL-MCNC: 10.1 MG/DL (ref 8.6–10.5)
CHLORIDE SERPL-SCNC: 106 MMOL/L (ref 98–107)
CHOLEST SERPL-MCNC: 161 MG/DL (ref 0–200)
CO2 SERPL-SCNC: 27.9 MMOL/L (ref 22–29)
CREAT SERPL-MCNC: 0.88 MG/DL (ref 0.57–1)
EGFRCR SERPLBLD CKD-EPI 2021: 76.3 ML/MIN/1.73
EOSINOPHIL # BLD AUTO: 0.23 10*3/MM3 (ref 0–0.4)
EOSINOPHIL NFR BLD AUTO: 3.1 % (ref 0.3–6.2)
ERYTHROCYTE [DISTWIDTH] IN BLOOD BY AUTOMATED COUNT: 12.5 % (ref 12.3–15.4)
GLOBULIN SER CALC-MCNC: 2.5 GM/DL
GLUCOSE SERPL-MCNC: 97 MG/DL (ref 65–99)
HBA1C MFR BLD: 9.8 % (ref 4.8–5.6)
HCT VFR BLD AUTO: 43.5 % (ref 34–46.6)
HDLC SERPL-MCNC: 44 MG/DL (ref 40–60)
HGB BLD-MCNC: 14.6 G/DL (ref 12–15.9)
IMM GRANULOCYTES # BLD AUTO: 0.03 10*3/MM3 (ref 0–0.05)
IMM GRANULOCYTES NFR BLD AUTO: 0.4 % (ref 0–0.5)
LDLC SERPL CALC-MCNC: 89 MG/DL (ref 0–100)
LYMPHOCYTES # BLD AUTO: 2.42 10*3/MM3 (ref 0.7–3.1)
LYMPHOCYTES NFR BLD AUTO: 33.1 % (ref 19.6–45.3)
MAGNESIUM SERPL-MCNC: 1.8 MG/DL (ref 1.6–2.6)
MCH RBC QN AUTO: 30.6 PG (ref 26.6–33)
MCHC RBC AUTO-ENTMCNC: 33.6 G/DL (ref 31.5–35.7)
MCV RBC AUTO: 91.2 FL (ref 79–97)
MONOCYTES # BLD AUTO: 0.56 10*3/MM3 (ref 0.1–0.9)
MONOCYTES NFR BLD AUTO: 7.7 % (ref 5–12)
NEUTROPHILS # BLD AUTO: 4.03 10*3/MM3 (ref 1.7–7)
NEUTROPHILS NFR BLD AUTO: 55.2 % (ref 42.7–76)
NRBC BLD AUTO-RTO: 0 /100 WBC (ref 0–0.2)
PLATELET # BLD AUTO: 295 10*3/MM3 (ref 140–450)
POTASSIUM SERPL-SCNC: 4.1 MMOL/L (ref 3.5–5.2)
PROT SERPL-MCNC: 7.1 G/DL (ref 6–8.5)
RBC # BLD AUTO: 4.77 10*6/MM3 (ref 3.77–5.28)
SODIUM SERPL-SCNC: 144 MMOL/L (ref 136–145)
TRIGL SERPL-MCNC: 160 MG/DL (ref 0–150)
TSH SERPL DL<=0.005 MIU/L-ACNC: 2.79 UIU/ML (ref 0.27–4.2)
VIT B12 SERPL-MCNC: 208 PG/ML (ref 211–946)
VLDLC SERPL CALC-MCNC: 28 MG/DL (ref 5–40)
WBC # BLD AUTO: 7.31 10*3/MM3 (ref 3.4–10.8)

## 2022-11-05 DIAGNOSIS — F02.818 EARLY ONSET ALZHEIMER'S DEMENTIA WITH BEHAVIORAL DISTURBANCE: ICD-10-CM

## 2022-11-05 DIAGNOSIS — J30.9 ALLERGIC RHINITIS, UNSPECIFIED SEASONALITY, UNSPECIFIED TRIGGER: ICD-10-CM

## 2022-11-05 DIAGNOSIS — G30.0 EARLY ONSET ALZHEIMER'S DEMENTIA WITH BEHAVIORAL DISTURBANCE: ICD-10-CM

## 2022-11-07 RX ORDER — CETIRIZINE HYDROCHLORIDE 10 MG/1
TABLET ORAL
Qty: 30 TABLET | Refills: 0 | Status: SHIPPED | OUTPATIENT
Start: 2022-11-07 | End: 2022-12-12

## 2022-11-07 RX ORDER — MEMANTINE HYDROCHLORIDE 5 MG/1
TABLET ORAL
Qty: 30 TABLET | Refills: 0 | Status: SHIPPED | OUTPATIENT
Start: 2022-11-07 | End: 2022-12-15

## 2022-11-08 RX ORDER — INSULIN DETEMIR 100 [IU]/ML
INJECTION, SOLUTION SUBCUTANEOUS
Qty: 45 ML | Refills: 0 | Status: SHIPPED | OUTPATIENT
Start: 2022-11-08 | End: 2023-01-09

## 2022-11-19 DIAGNOSIS — E78.2 MIXED HYPERLIPIDEMIA: Chronic | ICD-10-CM

## 2022-11-21 RX ORDER — PRAVASTATIN SODIUM 40 MG
TABLET ORAL
Qty: 30 TABLET | Refills: 0 | Status: SHIPPED | OUTPATIENT
Start: 2022-11-21 | End: 2022-12-22

## 2022-12-10 DIAGNOSIS — F02.818 EARLY ONSET ALZHEIMER'S DEMENTIA WITH BEHAVIORAL DISTURBANCE: ICD-10-CM

## 2022-12-10 DIAGNOSIS — E78.2 MIXED HYPERLIPIDEMIA: Chronic | ICD-10-CM

## 2022-12-10 DIAGNOSIS — J30.9 ALLERGIC RHINITIS, UNSPECIFIED SEASONALITY, UNSPECIFIED TRIGGER: ICD-10-CM

## 2022-12-10 DIAGNOSIS — G30.0 EARLY ONSET ALZHEIMER'S DEMENTIA WITH BEHAVIORAL DISTURBANCE: ICD-10-CM

## 2022-12-12 ENCOUNTER — TELEPHONE (OUTPATIENT)
Dept: FAMILY MEDICINE CLINIC | Facility: CLINIC | Age: 57
End: 2022-12-12

## 2022-12-12 RX ORDER — MEMANTINE HYDROCHLORIDE 5 MG/1
TABLET ORAL
Qty: 30 TABLET | Refills: 0 | OUTPATIENT
Start: 2022-12-12

## 2022-12-12 RX ORDER — EZETIMIBE 10 MG/1
TABLET ORAL
Qty: 90 TABLET | Refills: 0 | Status: SHIPPED | OUTPATIENT
Start: 2022-12-12 | End: 2023-03-13

## 2022-12-12 RX ORDER — CETIRIZINE HYDROCHLORIDE 10 MG/1
5 TABLET ORAL DAILY
Qty: 30 TABLET | Refills: 0 | Status: SHIPPED | OUTPATIENT
Start: 2022-12-12 | End: 2023-01-09

## 2022-12-12 NOTE — TELEPHONE ENCOUNTER
Charles said will have to check with daughter about doing a video call with [facility] for refills and call us back for appointment he said it is very hard for Sandra to come in office.

## 2022-12-12 NOTE — TELEPHONE ENCOUNTER
Pt was last seen on 11/23/2021.   Pt does not have a follow up, will you all reach out to them?

## 2022-12-15 DIAGNOSIS — G30.0 EARLY ONSET ALZHEIMER'S DEMENTIA WITH BEHAVIORAL DISTURBANCE: ICD-10-CM

## 2022-12-15 DIAGNOSIS — F02.818 EARLY ONSET ALZHEIMER'S DEMENTIA WITH BEHAVIORAL DISTURBANCE: ICD-10-CM

## 2022-12-15 RX ORDER — MEMANTINE HYDROCHLORIDE 5 MG/1
TABLET ORAL
Qty: 6 TABLET | Refills: 0 | Status: SHIPPED | OUTPATIENT
Start: 2022-12-15 | End: 2022-12-20 | Stop reason: SDUPTHER

## 2022-12-15 RX ORDER — MEMANTINE HYDROCHLORIDE 5 MG/1
5 TABLET ORAL DAILY
Qty: 30 TABLET | Refills: 0 | Status: CANCELLED | OUTPATIENT
Start: 2022-12-15

## 2022-12-20 ENCOUNTER — OFFICE VISIT (OUTPATIENT)
Dept: PSYCHIATRY | Facility: CLINIC | Age: 57
End: 2022-12-20
Payer: MEDICARE

## 2022-12-20 VITALS
WEIGHT: 168 LBS | BODY MASS INDEX: 26.31 KG/M2 | DIASTOLIC BLOOD PRESSURE: 68 MMHG | HEART RATE: 87 BPM | SYSTOLIC BLOOD PRESSURE: 118 MMHG

## 2022-12-20 DIAGNOSIS — F41.1 GENERALIZED ANXIETY DISORDER: ICD-10-CM

## 2022-12-20 DIAGNOSIS — G30.0 EARLY ONSET ALZHEIMER'S DEMENTIA WITH BEHAVIORAL DISTURBANCE: ICD-10-CM

## 2022-12-20 DIAGNOSIS — F32.0 CURRENT MILD EPISODE OF MAJOR DEPRESSIVE DISORDER WITHOUT PRIOR EPISODE: ICD-10-CM

## 2022-12-20 DIAGNOSIS — F02.818 EARLY ONSET ALZHEIMER'S DEMENTIA WITH BEHAVIORAL DISTURBANCE: ICD-10-CM

## 2022-12-20 PROCEDURE — 99213 OFFICE O/P EST LOW 20 MIN: CPT | Performed by: NURSE PRACTITIONER

## 2022-12-20 RX ORDER — MEMANTINE HYDROCHLORIDE 5 MG/1
5 TABLET ORAL DAILY
Qty: 30 TABLET | Refills: 2 | Status: SHIPPED | OUTPATIENT
Start: 2022-12-20 | End: 2023-03-20

## 2022-12-20 RX ORDER — VORTIOXETINE 20 MG/1
1 TABLET, FILM COATED ORAL DAILY
Qty: 30 TABLET | Refills: 2 | Status: SHIPPED | OUTPATIENT
Start: 2022-12-20

## 2022-12-20 NOTE — PROGRESS NOTES
Subjective   Sandra Leahy is a 57 y.o. female is here today for medication management follow-up.    Chief Complaint: depression anxiety dementia    History of Present Illness:    Patient presents today for a follow up for medication management for depression, anxiety, and dementia. Patient states everything is going good. Patient states she has been doing really good lately. Patient states depression has been doing good. Patient states depression is at \"20%\" on a 0-10 scale with 10 being the worst. Patient denies any thoughts to harm self or others. Patient denies any panic attacks. Patient states anxiety is at a 2-3 on a 0-10 scale with 10 being the worst. Patient states sleeping good and getting about 5-6 hours a night. Denies any nightmares. Patient states appetite is good and eating at least 2-3 times a day. Patient denies any nausea or vomiting. Patient states memory has been doing better. Patient states still asking questions a couple times. Denies any auditory or visual hallucinations. Patient reports medication compliance and denies any side effects. Patient states unable to remember what gender her grand baby is and when asked to remember three items including Table , Door, and cat she is unable to remember any after 4 mins.  The following portions of the patient's history were reviewed and updated as appropriate: allergies, current medications, past family history, past medical history, past social history, past surgical history and problem list.    Review of Systems   Constitutional: Negative.    Respiratory: Negative.    Cardiovascular: Negative.    Gastrointestinal: Negative.    Neurological: Negative.    Psychiatric/Behavioral: Positive for dysphoric mood. The patient is nervous/anxious.        Objective   Physical Exam  Vitals reviewed.   Constitutional:       Appearance: Normal appearance. She is well-developed and well-groomed.   Neurological:      Mental Status: She is alert.   Psychiatric:          Attention and Perception: Attention and perception normal.         Mood and Affect: Affect normal. Mood is anxious.         Speech: Speech normal.         Behavior: Behavior normal. Behavior is cooperative.         Thought Content: Thought content normal.         Cognition and Memory: Memory is impaired.         Judgment: Judgment normal.       Blood pressure 118/68, pulse 87, weight 76.2 kg (168 lb). Body mass index is 26.31 kg/m².    Allergies   Allergen Reactions   • Lantus [Insulin Glargine] Confusion   • Codeine Itching   • Exenatide Nausea And Vomiting     Intolerant   • Sitagliptin Other (See Comments)     Unsure       Medication List:   Current Outpatient Medications   Medication Sig Dispense Refill   • memantine (NAMENDA) 5 MG tablet Take 1 tablet by mouth Daily. 30 tablet 2   • Vortioxetine HBr (Trintellix) 20 MG tablet Take 1 tablet by mouth Daily. 30 tablet 2   • Continuous Blood Gluc  (Dexcom G6 ) device 1 Device Daily. 1 each 0   • Continuous Blood Gluc Sensor (Dexcom G6 Sensor) Every 10 (Ten) Days. 9 each 3   • Continuous Blood Gluc Transmit (Dexcom G6 Transmitter) misc 1 Device Daily. 1 each 0   • cyanocobalamin ([employer] Vitamin B-12) 2000 MCG tablet Take 1 tablet by mouth Daily. 30 tablet 3   • EQ Allergy Relief, Cetirizine, 10 MG tablet Take 1/2 (one-half) tablet by mouth once daily 30 tablet 0   • ezetimibe (ZETIA) 10 MG tablet Take 1 tablet by mouth once daily 90 tablet 0   • fluconazole (Diflucan) 150 MG tablet Take 1 tablet by mouth Daily. 3 tablet 0   • Levemir FlexTouch 100 UNIT/ML injection INJECT 45 UNITS SUBCUTANEOUSLY TWICE DAILY 45 mL 0   • Magnesium 250 MG tablet Take 1 tablet by mouth Daily. 30 tablet 3   • metFORMIN (GLUCOPHAGE) 1000 MG tablet TAKE 1 TABLET BY MOUTH TWICE DAILY WITH MEALS 60 tablet 0   • nitrofurantoin, macrocrystal-monohydrate, (Macrobid) 100 MG capsule Take 1 capsule by mouth 2 (Two) Times a Day. 20 capsule 0   • pravastatin (PRAVACHOL) 40 MG  tablet Take 1 tablet by mouth once daily 30 tablet 0     No current facility-administered medications for this visit.       Mental Status Exam:   Hygiene:   good  Cooperation:  Cooperative  Eye Contact:  Good  Psychomotor Behavior:  Appropriate  Affect:  Appropriate  Hopelessness: Denies  Speech:  Normal  Thought Process:  Goal directed and Linear  Thought Content:  Normal  Suicidal:  None  Homicidal:  None  Hallucinations:  None  Delusion:  None  Memory:  Deficits  Orientation:  Person, Place, Time and Situation  Reliability:  poor  Insight:  Poor  Judgement:  Fair  Impulse Control:  Fair  Physical/Medical Issues:  No               Assessment & Plan   Diagnoses and all orders for this visit:    1. Current mild episode of major depressive disorder without prior episode (HCC)  -     Vortioxetine HBr (Trintellix) 20 MG tablet; Take 1 tablet by mouth Daily.  Dispense: 30 tablet; Refill: 2    2. Generalized anxiety disorder  -     Vortioxetine HBr (Trintellix) 20 MG tablet; Take 1 tablet by mouth Daily.  Dispense: 30 tablet; Refill: 2    3. Early onset Alzheimer's dementia with behavioral disturbance (HCC)  -     memantine (NAMENDA) 5 MG tablet; Take 1 tablet by mouth Daily.  Dispense: 30 tablet; Refill: 2            Discussed medication options with patient. Cont. Trintellix 20mg daily for depression and anxiety. Cont. Namenda 5mg daily for dementia and memory. Reviewed the risks, benefits, and side effects of the medications; patient acknowledged and verbally consented.  Patient is agreeable to call the office with any questions, concerns, or worsening of symptoms. Patient is aware to call 911 or go to the nearest ER should begin having SI/HI.          Follow up in three months        Errors in dictation may reflect use of voice recognition software and not all errors in transcription may have been detected prior to signing.              This document has been electronically signed by ASH Gasca   January 10,  2023 13:44 EST

## 2022-12-22 DIAGNOSIS — E78.2 MIXED HYPERLIPIDEMIA: Chronic | ICD-10-CM

## 2022-12-22 RX ORDER — PRAVASTATIN SODIUM 40 MG
TABLET ORAL
Qty: 30 TABLET | Refills: 0 | Status: SHIPPED | OUTPATIENT
Start: 2022-12-22 | End: 2023-01-26

## 2023-01-08 DIAGNOSIS — J30.9 ALLERGIC RHINITIS, UNSPECIFIED SEASONALITY, UNSPECIFIED TRIGGER: ICD-10-CM

## 2023-01-09 RX ORDER — INSULIN DETEMIR 100 [IU]/ML
INJECTION, SOLUTION SUBCUTANEOUS
Qty: 45 ML | Refills: 0 | Status: SHIPPED | OUTPATIENT
Start: 2023-01-09 | End: 2023-01-17

## 2023-01-09 RX ORDER — CETIRIZINE HYDROCHLORIDE 10 MG/1
TABLET, FILM COATED ORAL
Qty: 30 TABLET | Refills: 0 | Status: SHIPPED | OUTPATIENT
Start: 2023-01-09 | End: 2023-02-08

## 2023-01-17 DIAGNOSIS — E11.42 TYPE 2 DIABETES MELLITUS WITH DIABETIC POLYNEUROPATHY, WITH LONG-TERM CURRENT USE OF INSULIN: Primary | ICD-10-CM

## 2023-01-17 DIAGNOSIS — Z79.4 TYPE 2 DIABETES MELLITUS WITH DIABETIC POLYNEUROPATHY, WITH LONG-TERM CURRENT USE OF INSULIN: Primary | ICD-10-CM

## 2023-01-17 RX ORDER — INSULIN DEGLUDEC 200 U/ML
45 INJECTION, SOLUTION SUBCUTANEOUS 2 TIMES DAILY
Qty: 15 ML | Refills: 0 | Status: SHIPPED | OUTPATIENT
Start: 2023-01-17 | End: 2023-02-08

## 2023-01-25 DIAGNOSIS — E78.2 MIXED HYPERLIPIDEMIA: Chronic | ICD-10-CM

## 2023-01-26 RX ORDER — PRAVASTATIN SODIUM 40 MG
TABLET ORAL
Qty: 30 TABLET | Refills: 0 | Status: SHIPPED | OUTPATIENT
Start: 2023-01-26 | End: 2023-02-27

## 2023-01-26 NOTE — TELEPHONE ENCOUNTER
Please let them know I am refilling it and see if they are receptive for her to received HH care. If so, I will put in referral for her but she will need at least a video visit

## 2023-01-26 NOTE — TELEPHONE ENCOUNTER
She would qualify depending on what you need ordered. She will have to have at least a telephone visit first though.

## 2023-01-27 NOTE — TELEPHONE ENCOUNTER
Charles has declined home health for Sandra. He said she doesn't need it. But I did get her scheduled for a office visit in feb and told charles that she needs to keep it in order to get refills.

## 2023-02-04 DIAGNOSIS — E11.42 TYPE 2 DIABETES MELLITUS WITH DIABETIC POLYNEUROPATHY, WITH LONG-TERM CURRENT USE OF INSULIN: ICD-10-CM

## 2023-02-04 DIAGNOSIS — J30.9 ALLERGIC RHINITIS, UNSPECIFIED SEASONALITY, UNSPECIFIED TRIGGER: ICD-10-CM

## 2023-02-04 DIAGNOSIS — Z79.4 TYPE 2 DIABETES MELLITUS WITH DIABETIC POLYNEUROPATHY, WITH LONG-TERM CURRENT USE OF INSULIN: ICD-10-CM

## 2023-02-07 ENCOUNTER — OFFICE VISIT (OUTPATIENT)
Dept: FAMILY MEDICINE CLINIC | Facility: CLINIC | Age: 58
End: 2023-02-07
Payer: MEDICARE

## 2023-02-07 VITALS
DIASTOLIC BLOOD PRESSURE: 60 MMHG | TEMPERATURE: 97.3 F | BODY MASS INDEX: 25.01 KG/M2 | OXYGEN SATURATION: 96 % | HEIGHT: 68 IN | SYSTOLIC BLOOD PRESSURE: 90 MMHG | WEIGHT: 165 LBS | HEART RATE: 86 BPM

## 2023-02-07 DIAGNOSIS — Z87.440 HISTORY OF RECURRENT UTIS: ICD-10-CM

## 2023-02-07 DIAGNOSIS — F02.80 EARLY ONSET ALZHEIMER'S DEMENTIA, UNSPECIFIED DEMENTIA SEVERITY, UNSPECIFIED WHETHER BEHAVIORAL, PSYCHOTIC, OR MOOD DISTURBANCE OR ANXIETY: Chronic | ICD-10-CM

## 2023-02-07 DIAGNOSIS — Z00.00 MEDICARE ANNUAL WELLNESS VISIT, SUBSEQUENT: Primary | ICD-10-CM

## 2023-02-07 DIAGNOSIS — K21.9 GASTROESOPHAGEAL REFLUX DISEASE WITHOUT ESOPHAGITIS: Chronic | ICD-10-CM

## 2023-02-07 DIAGNOSIS — F32.0 CURRENT MILD EPISODE OF MAJOR DEPRESSIVE DISORDER WITHOUT PRIOR EPISODE: Chronic | ICD-10-CM

## 2023-02-07 DIAGNOSIS — G30.0 EARLY ONSET ALZHEIMER'S DEMENTIA, UNSPECIFIED DEMENTIA SEVERITY, UNSPECIFIED WHETHER BEHAVIORAL, PSYCHOTIC, OR MOOD DISTURBANCE OR ANXIETY: Chronic | ICD-10-CM

## 2023-02-07 DIAGNOSIS — Z79.4 TYPE 2 DIABETES MELLITUS WITH DIABETIC POLYNEUROPATHY, WITH LONG-TERM CURRENT USE OF INSULIN: Chronic | ICD-10-CM

## 2023-02-07 DIAGNOSIS — E55.9 VITAMIN D DEFICIENCY: ICD-10-CM

## 2023-02-07 DIAGNOSIS — E78.5 DYSLIPIDEMIA: Chronic | ICD-10-CM

## 2023-02-07 DIAGNOSIS — E53.8 VITAMIN B 12 DEFICIENCY: ICD-10-CM

## 2023-02-07 DIAGNOSIS — F03.90 MAJOR NEUROCOGNITIVE DISORDER: Chronic | ICD-10-CM

## 2023-02-07 DIAGNOSIS — E83.42 HYPOMAGNESEMIA: ICD-10-CM

## 2023-02-07 DIAGNOSIS — M25.50 ARTHRALGIA, UNSPECIFIED JOINT: Chronic | ICD-10-CM

## 2023-02-07 DIAGNOSIS — E11.42 TYPE 2 DIABETES MELLITUS WITH DIABETIC POLYNEUROPATHY, WITH LONG-TERM CURRENT USE OF INSULIN: Chronic | ICD-10-CM

## 2023-02-07 PROBLEM — A41.9 SEPSIS: Status: RESOLVED | Noted: 2021-07-31 | Resolved: 2023-02-07

## 2023-02-07 LAB
BILIRUB BLD-MCNC: NEGATIVE MG/DL
CLARITY, POC: ABNORMAL
COLOR UR: YELLOW
EXPIRATION DATE: ABNORMAL
GLUCOSE UR STRIP-MCNC: NEGATIVE MG/DL
KETONES UR QL: NEGATIVE
LEUKOCYTE EST, POC: ABNORMAL
Lab: ABNORMAL
NITRITE UR-MCNC: NEGATIVE MG/ML
PH UR: 6 [PH] (ref 5–8)
PROT UR STRIP-MCNC: NEGATIVE MG/DL
RBC # UR STRIP: NEGATIVE /UL
SP GR UR: 1.01 (ref 1–1.03)
UROBILINOGEN UR QL: ABNORMAL

## 2023-02-07 PROCEDURE — 81003 URINALYSIS AUTO W/O SCOPE: CPT | Performed by: NURSE PRACTITIONER

## 2023-02-07 PROCEDURE — 96160 PT-FOCUSED HLTH RISK ASSMT: CPT | Performed by: NURSE PRACTITIONER

## 2023-02-07 PROCEDURE — G0439 PPPS, SUBSEQ VISIT: HCPCS | Performed by: NURSE PRACTITIONER

## 2023-02-07 PROCEDURE — 1170F FXNL STATUS ASSESSED: CPT | Performed by: NURSE PRACTITIONER

## 2023-02-07 PROCEDURE — 1159F MED LIST DOCD IN RCRD: CPT | Performed by: NURSE PRACTITIONER

## 2023-02-07 RX ORDER — FAMOTIDINE 40 MG/1
40 TABLET, FILM COATED ORAL DAILY
Qty: 30 TABLET | Refills: 0 | Status: SHIPPED | OUTPATIENT
Start: 2023-02-07

## 2023-02-07 NOTE — PROGRESS NOTES
The ABCs of the Annual Wellness Visit  Subsequent Medicare Wellness Visit    Sandra Leahy is a 57 y.o. female who presents for a Subsequent Medicare Wellness Visit. She is accompanied by her daughter, Emma, who assists with the history.     The following portions of the patient's history were reviewed and   updated as appropriate: allergies, current medications, past family history, past medical history, past social history, past surgical history and problem list.    Compared to one year ago, the patient feels her physical   health is the same.    Compared to one year ago, the patient feels her mental   health is the same.    Recent Hospitalizations:  She was not admitted to the hospital during the last year.     Current Medical Providers:  Patient Care Team:  John Mueller APRN as PCP - General (Family Medicine)  Janett Victoria APRN- Behavioral Health  Outpatient Medications Prior to Visit   Medication Sig Dispense Refill   • cyanocobalamin ([employer] Vitamin B-12) 2000 MCG tablet Take 1 tablet by mouth Daily. 30 tablet 3   • EQ Allergy Relief, Cetirizine, 10 MG tablet Take 1/2 (one-half) tablet by mouth once daily 30 tablet 0   • ezetimibe (ZETIA) 10 MG tablet Take 1 tablet by mouth once daily 90 tablet 0   • Insulin Degludec (Tresiba FlexTouch) 200 UNIT/ML solution pen-injector pen injection Inject 45 Units under the skin into the appropriate area as directed 2 (Two) Times a Day for 30 days. 15 mL 0   • memantine (NAMENDA) 5 MG tablet Take 1 tablet by mouth Daily. 30 tablet 2   • metFORMIN (GLUCOPHAGE) 1000 MG tablet TAKE 1 TABLET BY MOUTH TWICE DAILY WITH MEALS 60 tablet 0   • pravastatin (PRAVACHOL) 40 MG tablet Take 1 tablet by mouth once daily 30 tablet 0   • Vortioxetine HBr (Trintellix) 20 MG tablet Take 1 tablet by mouth Daily. 30 tablet 2     No facility-administered medications prior to visit.     No opioid medication identified on active medication list. I have reviewed chart for other potential   "high risk medication/s and harmful drug interactions in the elderly.    Aspirin is not on active medication list.  Aspirin use is indicated based on review of current medical condition/s. Pros and cons of this therapy have been discussed with this patient. Benefits of this medication outweigh potential harm.  Patient has been instructed to start taking this medication..    Patient Active Problem List   Diagnosis   • Type 2 diabetes, uncontrolled, with neuropathy   • Dyslipidemia   • Vitamin B 12 deficiency   • Vitamin D deficiency   • Major neurocognitive disorder (CMS/HCC)   • Anxiety   • Early onset Alzheimer's dementia (HCC)     Advance Care Planning  Advance Directive is on file.  ACP discussion was held with the patient during this visit. Information provided       Vitals:    23 0908   BP: 90/60   BP Location: Right arm   Patient Position: Sitting   Cuff Size: Adult   Pulse: 86   Temp: 97.3 °F (36.3 °C)   TempSrc: Temporal   SpO2: 96%   Weight: 74.8 kg (165 lb)   Height: 172.7 cm (68\")     Estimated body mass index is 25.09 kg/m² as calculated from the following:    Height as of this encounter: 172.7 cm (68\").    Weight as of this encounter: 74.8 kg (165 lb).  Vision Screening    Right eye Left eye Both eyes   Without correction 20/40 20/25 20/25   With correction        BMI is >= 25 and <30. (Overweight) The following options were offered after discussion;: weight loss educational material (shared in after visit summary)    Does the patient have evidence of cognitive impairment?   Yes: Continue current medications.and follow up with Behavioral Health            HEALTH RISK ASSESSMENT    Smoking Status:  Social History     Tobacco Use   Smoking Status Former   • Types: Cigarettes   • Quit date: 1996   • Years since quittin.7   • Passive exposure: Past   Smokeless Tobacco Never     Alcohol Consumption:  Social History     Substance and Sexual Activity   Alcohol Use No     Fall Risk " Screen:    BRANDY Fall Risk Assessment was completed, and patient is at LOW risk for falls.Assessment completed on:2/7/2023    Depression Screening:  PHQ-2/PHQ-9 Depression Screening 2/7/2023   Little Interest or Pleasure in Doing Things 0-->not at all   Feeling Down, Depressed or Hopeless 0-->not at all   PHQ-9: Brief Depression Severity Measure Score 0       Health Habits and Functional and Cognitive Screening:  Functional & Cognitive Status 2/7/2023   Do you have difficulty preparing food and eating? No   Do you have difficulty bathing yourself, getting dressed or grooming yourself? No   Do you have difficulty using the toilet? No   Do you have difficulty moving around from place to place? No   Do you have trouble with steps or getting out of a bed or a chair? No   Current Diet Unhealthy Diet   Dental Exam Not up to date   Eye Exam Not up to date   Exercise (times per week)    Current Exercises Include Walking   Current Exercise Activities Include -   Do you need help using the phone?  No   Are you deaf or do you have serious difficulty hearing?  No   Do you need help with transportation? No   Do you need help shopping? No   Do you need help preparing meals?  No   Do you need help with housework?  No   Do you need help with laundry? No   Do you need help taking your medications? No   Do you need help managing money? No   Do you ever drive or ride in a car without wearing a seat belt? No   Have you felt unusual stress, anger or loneliness in the last month? No   Who do you live with? Spouse   If you need help, do you have trouble finding someone available to you? No   Have you been bothered in the last four weeks by sexual problems? No   Do you have difficulty concentrating, remembering or making decisions? Yes       Age-appropriate Screening Schedule:  Refer to the list below for future screening recommendations based on patient's age, sex and/or medical conditions. Orders for these recommended tests are listed  in the plan section. The patient has been provided with a written plan.    Health Maintenance   Topic Date Due   • ZOSTER VACCINE (1 of 2) Never done   • MAMMOGRAM  TBS   • DIABETIC EYE EXAM  05/07/2019   • DIABETIC FOOT EXAM  06/09/2022   • INFLUENZA VACCINE  Declined    • URINE MICROALBUMIN  Completed today   • HEMOGLOBIN A1C  05/04/2023   • LIPID PANEL  11/04/2023   • TDAP/TD VACCINES (2 - Td or Tdap) 05/19/2030   • PAP SMEAR  Discontinued                CMS Preventative Services Quick Reference  Risk Factors Identified During Encounter:  Vision Screening Recommended  Cardiovascular Risk Reduction including Glycemic Control   Advanced Directives     The above risks/problems have been discussed with the patient.  Pertinent information has been shared with the patient in the After Visit Summary.    Diagnoses and all orders for this visit:    1. Medicare annual wellness visit, subsequent (Primary)  Comments:  Recommendations discussed with Sandra and her daughter, Emma, who is with her today    2. Type 2 diabetes mellitus with diabetic polyneuropathy, with long-term current use of insulin (Former[facility])  Comments:  Continue Tresiba and Glucophage. Changes will be made if needed after labs are available   Orders:  -     Comprehensive Metabolic Panel  -     TSH  -     Hemoglobin A1c  -     MicroAlbumin, Urine, Random - Urine, Clean Catch  -     Vitamin B12    3. Dyslipidemia  Comments:  Continue Pravastatin and Ezetimibe   Orders:  -     Lipid Panel    4. Gastroesophageal reflux disease without esophagitis  Comments:  Pepcid prescribed   Orders:  -     CBC & Differential  -     famotidine (Pepcid) 40 MG tablet; Take 1 tablet by mouth Daily.  Dispense: 30 tablet; Refill: 0    5. Arthralgia, unspecified joint  Comments:  Consservative measures. NSAIDs used with caution   Orders:  -     Uric Acid    6. Major neurocognitive disorder (CMS/[facility])  Comments:  Evaluated by [facility]     7. Early onset  Alzheimer's dementia, unspecified dementia severity, unspecified whether behavioral, psychotic, or mood disturbance or anxiety (HCC)  Comments:  Coninue Namenda 5 mg and f/u with Behavioral Health     8. Current mild episode of major depressive disorder without prior episode (HCC)  Comments:  Continue Trintelix and f/u with Behavioral Health     9. History of recurrent UTIs  Comments:  Urinalysis ordered and completed today  Orders:  -     Cancel: Urine Culture - Urine, Urine, Random Void  -     POCT urinalysis dipstick, automated    10. Vitamin B 12 deficiency  Comments:  Vit B12 level ordered today    11. Vitamin D deficiency  Comments:  Check Vit D level today  Orders:  -     Vitamin D,25-Hydroxy    12. Hypomagnesemia  Comments:  Magnesium level ordered today   Orders:  -     Magnesium    Follow Up: In six months  Next Medicare Wellness visit to be scheduled in 1 year.      Findings and recommendations discussed with Sandra and her daughter. Lifestyle modifications reinforced including nutrition and activity recommendations. Sandra will follow up in six months sooner if problems/concerns occur.  An After Visit Summary and PPPS were made available to the patient.

## 2023-02-07 NOTE — TELEPHONE ENCOUNTER
Caller: [email or web address] fue    Relationship: Emergency Contact    Best call back number: [phone]    Requested Prescriptions:   Requested Prescriptions     Pending Prescriptions Disp Refills   • cetirizine (zyrTEC) 10 MG tablet [Pharmacy Med Name: Cetirizine HCl 10 MG Oral Tablet] 30 tablet 0     Sig: Take 1/2 (one-half) tablet by mouth once daily   • metFORMIN (GLUCOPHAGE) 1000 MG tablet [Pharmacy Med Name: metFORMIN HCl 1000 MG Oral Tablet] 60 tablet 0     Sig: TAKE 1 TABLET BY MOUTH TWICE DAILY WITH MEALS   • Tresiba FlexTouch 200 UNIT/ML solution pen-injector pen injection [Pharmacy Med Name: Tresiba FlexTouch 200 UNIT/ML Subcutaneous Solution Pen-injector] 9 mL 0     Sig: INJECT 45 UNITS SUBCUTANEOUSLY TWICE DAILY        Pharmacy where request should be sent: [facility] [address] [place] [address] [phone] [facility] [phone] [facility]     Additional details provided by patient: [facility] IS OUT OF HER INSULIN.  HAS 2 PILLS OF THE OTHER TWO.    Does the patient have less than a 3 day supply:  [x] Yes  [] No    Would you like a call back once the refill request has been completed: [x] Yes [] No    If the office needs to give you a call back, can they leave a voicemail: [x] Yes [] No    Jane Gerber Rep   02/07/23 11:12 EST

## 2023-02-08 DIAGNOSIS — Z79.4 TYPE 2 DIABETES MELLITUS WITH DIABETIC POLYNEUROPATHY, WITH LONG-TERM CURRENT USE OF INSULIN: ICD-10-CM

## 2023-02-08 DIAGNOSIS — E11.42 TYPE 2 DIABETES MELLITUS WITH DIABETIC POLYNEUROPATHY, WITH LONG-TERM CURRENT USE OF INSULIN: ICD-10-CM

## 2023-02-08 LAB
25(OH)D3+25(OH)D2 SERPL-MCNC: 24.9 NG/ML (ref 30–100)
ALBUMIN SERPL-MCNC: 4.5 G/DL (ref 3.5–5.2)
ALBUMIN/GLOB SERPL: 1.7 G/DL
ALP SERPL-CCNC: 101 U/L (ref 39–117)
ALT SERPL-CCNC: 19 U/L (ref 1–33)
AST SERPL-CCNC: 19 U/L (ref 1–32)
BASOPHILS # BLD AUTO: 0.03 10*3/MM3 (ref 0–0.2)
BASOPHILS NFR BLD AUTO: 0.4 % (ref 0–1.5)
BILIRUB SERPL-MCNC: 0.4 MG/DL (ref 0–1.2)
BUN SERPL-MCNC: 10 MG/DL (ref 6–20)
BUN/CREAT SERPL: 13 (ref 7–25)
CALCIUM SERPL-MCNC: 9.7 MG/DL (ref 8.6–10.5)
CHLORIDE SERPL-SCNC: 105 MMOL/L (ref 98–107)
CHOLEST SERPL-MCNC: 122 MG/DL (ref 0–200)
CO2 SERPL-SCNC: 27.3 MMOL/L (ref 22–29)
CREAT SERPL-MCNC: 0.77 MG/DL (ref 0.57–1)
EGFRCR SERPLBLD CKD-EPI 2021: 89 ML/MIN/1.73
EOSINOPHIL # BLD AUTO: 0.29 10*3/MM3 (ref 0–0.4)
EOSINOPHIL NFR BLD AUTO: 3.8 % (ref 0.3–6.2)
ERYTHROCYTE [DISTWIDTH] IN BLOOD BY AUTOMATED COUNT: 13 % (ref 12.3–15.4)
GLOBULIN SER CALC-MCNC: 2.7 GM/DL
GLUCOSE SERPL-MCNC: 76 MG/DL (ref 65–99)
HBA1C MFR BLD: 7.2 % (ref 4.8–5.6)
HCT VFR BLD AUTO: 43.5 % (ref 34–46.6)
HDLC SERPL-MCNC: 39 MG/DL (ref 40–60)
HGB BLD-MCNC: 14.8 G/DL (ref 12–15.9)
IMM GRANULOCYTES # BLD AUTO: 0.03 10*3/MM3 (ref 0–0.05)
IMM GRANULOCYTES NFR BLD AUTO: 0.4 % (ref 0–0.5)
LDLC SERPL CALC-MCNC: 53 MG/DL (ref 0–100)
LYMPHOCYTES # BLD AUTO: 1.92 10*3/MM3 (ref 0.7–3.1)
LYMPHOCYTES NFR BLD AUTO: 24.8 % (ref 19.6–45.3)
MAGNESIUM SERPL-MCNC: 2 MG/DL (ref 1.6–2.6)
MCH RBC QN AUTO: 31.2 PG (ref 26.6–33)
MCHC RBC AUTO-ENTMCNC: 34 G/DL (ref 31.5–35.7)
MCV RBC AUTO: 91.8 FL (ref 79–97)
MICROALBUMIN UR-MCNC: 21.5 UG/ML
MONOCYTES # BLD AUTO: 0.5 10*3/MM3 (ref 0.1–0.9)
MONOCYTES NFR BLD AUTO: 6.5 % (ref 5–12)
NEUTROPHILS # BLD AUTO: 4.96 10*3/MM3 (ref 1.7–7)
NEUTROPHILS NFR BLD AUTO: 64.1 % (ref 42.7–76)
NRBC BLD AUTO-RTO: 0 /100 WBC (ref 0–0.2)
PLATELET # BLD AUTO: 300 10*3/MM3 (ref 140–450)
POTASSIUM SERPL-SCNC: 4 MMOL/L (ref 3.5–5.2)
PROT SERPL-MCNC: 7.2 G/DL (ref 6–8.5)
RBC # BLD AUTO: 4.74 10*6/MM3 (ref 3.77–5.28)
SODIUM SERPL-SCNC: 144 MMOL/L (ref 136–145)
TRIGL SERPL-MCNC: 184 MG/DL (ref 0–150)
TSH SERPL DL<=0.005 MIU/L-ACNC: 3.04 UIU/ML (ref 0.27–4.2)
URATE SERPL-MCNC: 5.8 MG/DL (ref 2.4–5.7)
VIT B12 SERPL-MCNC: 209 PG/ML (ref 211–946)
VLDLC SERPL CALC-MCNC: 30 MG/DL (ref 5–40)
WBC # BLD AUTO: 7.73 10*3/MM3 (ref 3.4–10.8)

## 2023-02-08 RX ORDER — INSULIN DEGLUDEC 200 U/ML
INJECTION, SOLUTION SUBCUTANEOUS
Qty: 9 ML | Refills: 3 | Status: SHIPPED | OUTPATIENT
Start: 2023-02-08

## 2023-02-08 RX ORDER — CETIRIZINE HYDROCHLORIDE 10 MG/1
TABLET ORAL
Qty: 90 TABLET | Refills: 0 | Status: SHIPPED | OUTPATIENT
Start: 2023-02-08

## 2023-02-08 RX ORDER — INSULIN DEGLUDEC 200 U/ML
45 INJECTION, SOLUTION SUBCUTANEOUS 2 TIMES DAILY
Qty: 15 ML | Refills: 0 | Status: CANCELLED | OUTPATIENT
Start: 2023-02-08 | End: 2023-03-10

## 2023-02-09 DIAGNOSIS — E53.8 VITAMIN B 12 DEFICIENCY: Primary | ICD-10-CM

## 2023-02-10 DIAGNOSIS — N39.0 URINARY TRACT INFECTION WITHOUT HEMATURIA, SITE UNSPECIFIED: Primary | ICD-10-CM

## 2023-02-10 LAB
BACTERIA UR CULT: ABNORMAL
OTHER ANTIBIOTIC SUSC ISLT: ABNORMAL

## 2023-02-10 RX ORDER — AMOXICILLIN AND CLAVULANATE POTASSIUM 875; 125 MG/1; MG/1
1 TABLET, FILM COATED ORAL 2 TIMES DAILY
Qty: 20 TABLET | Refills: 0 | Status: SHIPPED | OUTPATIENT
Start: 2023-02-10

## 2023-02-10 RX ORDER — FLUCONAZOLE 150 MG/1
150 TABLET ORAL DAILY
Qty: 3 TABLET | Refills: 0 | Status: SHIPPED | OUTPATIENT
Start: 2023-02-10 | End: 2023-02-13

## 2023-02-11 ENCOUNTER — HOSPITAL ENCOUNTER (EMERGENCY)
Facility: HOSPITAL | Age: 58
Discharge: HOME | End: 2023-02-11
Attending: STUDENT IN AN ORGANIZED HEALTH CARE EDUCATION/TRAINING PROGRAM | Admitting: STUDENT IN AN ORGANIZED HEALTH CARE EDUCATION/TRAINING PROGRAM
Payer: MEDICARE

## 2023-02-11 ENCOUNTER — APPOINTMENT (OUTPATIENT)
Dept: CT IMAGING | Facility: HOSPITAL | Age: 58
End: 2023-02-11
Payer: MEDICARE

## 2023-02-11 ENCOUNTER — APPOINTMENT (OUTPATIENT)
Dept: GENERAL RADIOLOGY | Facility: HOSPITAL | Age: 58
End: 2023-02-11
Payer: MEDICARE

## 2023-02-11 VITALS
SYSTOLIC BLOOD PRESSURE: 149 MMHG | DIASTOLIC BLOOD PRESSURE: 86 MMHG | TEMPERATURE: 97 F | RESPIRATION RATE: 18 BRPM | BODY MASS INDEX: 24.71 KG/M2 | WEIGHT: 163 LBS | OXYGEN SATURATION: 100 % | HEIGHT: 68 IN | HEART RATE: 86 BPM

## 2023-02-11 DIAGNOSIS — G30.0 EARLY ONSET ALZHEIMER'S DEMENTIA WITH AGITATION, UNSPECIFIED DEMENTIA SEVERITY: Primary | ICD-10-CM

## 2023-02-11 DIAGNOSIS — F02.811 EARLY ONSET ALZHEIMER'S DEMENTIA WITH AGITATION, UNSPECIFIED DEMENTIA SEVERITY: Primary | ICD-10-CM

## 2023-02-11 LAB
ALBUMIN SERPL-MCNC: 4.8 G/DL (ref 3.5–5.2)
ALBUMIN/GLOB SERPL: 1.2 G/DL
ALP SERPL-CCNC: 131 U/L (ref 39–117)
ALT SERPL W P-5'-P-CCNC: 27 U/L (ref 1–33)
ANION GAP SERPL CALCULATED.3IONS-SCNC: 13.7 MMOL/L (ref 5–15)
AST SERPL-CCNC: 27 U/L (ref 1–32)
BASOPHILS # BLD AUTO: 0.06 10*3/MM3 (ref 0–0.2)
BASOPHILS NFR BLD AUTO: 0.5 % (ref 0–1.5)
BILIRUB SERPL-MCNC: 0.3 MG/DL (ref 0–1.2)
BILIRUB UR QL STRIP: NEGATIVE
BUN SERPL-MCNC: 13 MG/DL (ref 6–20)
BUN/CREAT SERPL: 18.3 (ref 7–25)
CALCIUM SPEC-SCNC: 9.7 MG/DL (ref 8.6–10.5)
CHLORIDE SERPL-SCNC: 98 MMOL/L (ref 98–107)
CLARITY UR: CLEAR
CO2 SERPL-SCNC: 27.3 MMOL/L (ref 22–29)
COLOR UR: YELLOW
CREAT SERPL-MCNC: 0.71 MG/DL (ref 0.57–1)
CRP SERPL-MCNC: <0.3 MG/DL (ref 0–0.5)
D-LACTATE SERPL-SCNC: 1.1 MMOL/L (ref 0.5–2)
DEPRECATED RDW RBC AUTO: 39.8 FL (ref 37–54)
EGFRCR SERPLBLD CKD-EPI 2021: 98.1 ML/MIN/1.73
EOSINOPHIL # BLD AUTO: 0.37 10*3/MM3 (ref 0–0.4)
EOSINOPHIL NFR BLD AUTO: 3.1 % (ref 0.3–6.2)
ERYTHROCYTE [DISTWIDTH] IN BLOOD BY AUTOMATED COUNT: 12.3 % (ref 12.3–15.4)
ERYTHROCYTE [SEDIMENTATION RATE] IN BLOOD: 10 MM/HR (ref 0–30)
FLUAV RNA RESP QL NAA+PROBE: NOT DETECTED
FLUBV RNA RESP QL NAA+PROBE: NOT DETECTED
GLOBULIN UR ELPH-MCNC: 4 GM/DL
GLUCOSE SERPL-MCNC: 178 MG/DL (ref 65–99)
GLUCOSE UR STRIP-MCNC: ABNORMAL MG/DL
HCT VFR BLD AUTO: 44.3 % (ref 34–46.6)
HGB BLD-MCNC: 15.1 G/DL (ref 12–15.9)
HGB UR QL STRIP.AUTO: NEGATIVE
HOLD SPECIMEN: NORMAL
HOLD SPECIMEN: NORMAL
IMM GRANULOCYTES # BLD AUTO: 0.07 10*3/MM3 (ref 0–0.05)
IMM GRANULOCYTES NFR BLD AUTO: 0.6 % (ref 0–0.5)
KETONES UR QL STRIP: NEGATIVE
LEUKOCYTE ESTERASE UR QL STRIP.AUTO: NEGATIVE
LYMPHOCYTES # BLD AUTO: 3.48 10*3/MM3 (ref 0.7–3.1)
LYMPHOCYTES NFR BLD AUTO: 29.2 % (ref 19.6–45.3)
MAGNESIUM SERPL-MCNC: 1.7 MG/DL (ref 1.6–2.6)
MCH RBC QN AUTO: 30.6 PG (ref 26.6–33)
MCHC RBC AUTO-ENTMCNC: 34.1 G/DL (ref 31.5–35.7)
MCV RBC AUTO: 89.9 FL (ref 79–97)
MONOCYTES # BLD AUTO: 0.74 10*3/MM3 (ref 0.1–0.9)
MONOCYTES NFR BLD AUTO: 6.2 % (ref 5–12)
NEUTROPHILS NFR BLD AUTO: 60.4 % (ref 42.7–76)
NEUTROPHILS NFR BLD AUTO: 7.21 10*3/MM3 (ref 1.7–7)
NITRITE UR QL STRIP: NEGATIVE
NRBC BLD AUTO-RTO: 0 /100 WBC (ref 0–0.2)
PH UR STRIP.AUTO: 6 [PH] (ref 5–8)
PLATELET # BLD AUTO: 324 10*3/MM3 (ref 140–450)
PMV BLD AUTO: 10.1 FL (ref 6–12)
POTASSIUM SERPL-SCNC: 3.7 MMOL/L (ref 3.5–5.2)
PROCALCITONIN SERPL-MCNC: 0.04 NG/ML (ref 0–0.25)
PROT SERPL-MCNC: 8.8 G/DL (ref 6–8.5)
PROT UR QL STRIP: NEGATIVE
RBC # BLD AUTO: 4.93 10*6/MM3 (ref 3.77–5.28)
SARS-COV-2 RNA RESP QL NAA+PROBE: NOT DETECTED
SODIUM SERPL-SCNC: 139 MMOL/L (ref 136–145)
SP GR UR STRIP: 1.01 (ref 1–1.03)
UROBILINOGEN UR QL STRIP: ABNORMAL
WBC NRBC COR # BLD: 11.93 10*3/MM3 (ref 3.4–10.8)
WHOLE BLOOD HOLD COAG: NORMAL
WHOLE BLOOD HOLD SPECIMEN: NORMAL

## 2023-02-11 PROCEDURE — 80053 COMPREHEN METABOLIC PANEL: CPT | Performed by: PHYSICIAN ASSISTANT

## 2023-02-11 PROCEDURE — 85652 RBC SED RATE AUTOMATED: CPT | Performed by: PHYSICIAN ASSISTANT

## 2023-02-11 PROCEDURE — 83605 ASSAY OF LACTIC ACID: CPT | Performed by: PHYSICIAN ASSISTANT

## 2023-02-11 PROCEDURE — 93005 ELECTROCARDIOGRAM TRACING: CPT | Performed by: PHYSICIAN ASSISTANT

## 2023-02-11 PROCEDURE — 87636 SARSCOV2 & INF A&B AMP PRB: CPT | Performed by: PHYSICIAN ASSISTANT

## 2023-02-11 PROCEDURE — 81003 URINALYSIS AUTO W/O SCOPE: CPT | Performed by: PHYSICIAN ASSISTANT

## 2023-02-11 PROCEDURE — 83735 ASSAY OF MAGNESIUM: CPT | Performed by: PHYSICIAN ASSISTANT

## 2023-02-11 PROCEDURE — 71045 X-RAY EXAM CHEST 1 VIEW: CPT

## 2023-02-11 PROCEDURE — 84145 PROCALCITONIN (PCT): CPT | Performed by: PHYSICIAN ASSISTANT

## 2023-02-11 PROCEDURE — 99284 EMERGENCY DEPT VISIT MOD MDM: CPT

## 2023-02-11 PROCEDURE — 86140 C-REACTIVE PROTEIN: CPT | Performed by: PHYSICIAN ASSISTANT

## 2023-02-11 PROCEDURE — 36415 COLL VENOUS BLD VENIPUNCTURE: CPT

## 2023-02-11 PROCEDURE — 93010 ELECTROCARDIOGRAM REPORT: CPT | Performed by: SPECIALIST

## 2023-02-11 PROCEDURE — 70450 CT HEAD/BRAIN W/O DYE: CPT

## 2023-02-11 PROCEDURE — 87040 BLOOD CULTURE FOR BACTERIA: CPT | Performed by: PHYSICIAN ASSISTANT

## 2023-02-11 PROCEDURE — 85025 COMPLETE CBC W/AUTO DIFF WBC: CPT | Performed by: PHYSICIAN ASSISTANT

## 2023-02-11 RX ORDER — SODIUM CHLORIDE 0.9 % (FLUSH) 0.9 %
10 SYRINGE (ML) INJECTION AS NEEDED
Status: DISCONTINUED | OUTPATIENT
Start: 2023-02-11 | End: 2023-02-11 | Stop reason: HOSPADM

## 2023-02-12 LAB
QT INTERVAL: 374 MS
QTC INTERVAL: 445 MS

## 2023-02-12 NOTE — ED PROVIDER NOTES
Subjective   History of Present Illness  57-year-old female who presents to the ED today with her daughter for altered mental status.  Her daughter reports that she has been more confused than normal today.  The patient does have a history of Alzheimer's dementia.  Her daughter reports that she started famotidine 40 mg this morning as well as Augmentin for a UTI.  After taking these medication she became anxious and restless and was and able to sit still.  She seemed to be more agitated than normal.  She was seen by her PCP 2 days ago and they received a call last night that she had a UTI and this is why she is on the Augmentin.  Her daughter states she has not had a fever.  She has not had any nausea, vomiting or diarrhea.  The patient denies any urinary symptoms.  She denies any chest pain or shortness of breath.  She denies abdominal pain.  Her appetite has been normal.  Her daughter does report that she does get recurrent urinary tract infections.    History provided by:  Patient and relative (daughter)  Altered Mental Status  Presenting symptoms: behavior changes and confusion    Severity:  Mild  Most recent episode:  Today  Episode history:  Continuous  Duration: started today.  Timing:  Constant  Progression:  Waxing and waning  Chronicity:  Recurrent  Context: dementia and recent change in medication    Associated symptoms: agitation    Associated symptoms: no abdominal pain, no bladder incontinence, no decreased appetite, no difficulty breathing, no fever, no headaches, no light-headedness, no nausea, no palpitations, no rash, no vomiting and no weakness        Review of Systems   Constitutional: Negative.  Negative for decreased appetite and fever.   HENT: Negative.    Eyes: Negative.    Respiratory: Negative.    Cardiovascular: Negative for palpitations.   Gastrointestinal: Negative for abdominal pain, nausea and vomiting.   Genitourinary: Negative.  Negative for bladder incontinence.   Musculoskeletal:  Negative.    Skin: Negative for rash.   Neurological: Negative.  Negative for weakness, light-headedness and headaches.   Psychiatric/Behavioral: Positive for agitation and confusion.   All other systems reviewed and are negative.      Past Medical History:   Diagnosis Date   • Bursitis    • Fatigue    • Fibromyalgia    • Postsurgical menopause    • Tetanus toxoid vaccination status unknown    • Visual impairment    • Vitamin D deficiency        Allergies   Allergen Reactions   • Lantus [Insulin Glargine] Confusion   • Codeine Itching   • Exenatide Nausea And Vomiting     Intolerant   • Sitagliptin Other (See Comments)     Unsure       Past Surgical History:   Procedure Laterality Date   • BREAST BIOPSY Left 7-8 yrs ago    benign   • CHOLECYSTECTOMY     • HYSTERECTOMY      40s   • KNEE ARTHROSCOPY W/ MENISCAL REPAIR Left        Family History   Problem Relation Age of Onset   • Diabetes Other    • Asthma Other    • Hypertension Other    • Diabetes Mother    • Heart disease Mother    • Hypertension Mother    • Hyperlipidemia Mother    • Arthritis Mother    • Diabetes Father    • Heart disease Father    • Diabetes Sister    • Cancer Sister    • Diabetes Brother        Social History     Socioeconomic History   • Marital status:      Spouse name: alvaro   • Number of children: 5   • Years of education: 12   Tobacco Use   • Smoking status: Former     Types: Cigarettes     Quit date: 1996     Years since quittin.7     Passive exposure: Past   • Smokeless tobacco: Never   Vaping Use   • Vaping Use: Never used   Substance and Sexual Activity   • Alcohol use: No   • Drug use: No   • Sexual activity: Yes     Partners: Male           Objective   Physical Exam  Vitals and nursing note reviewed.   Constitutional:       General: She is not in acute distress.     Appearance: She is well-developed. She is not diaphoretic.   HENT:      Head: Normocephalic and atraumatic.      Mouth/Throat:      Mouth: Mucous  membranes are moist.      Pharynx: Oropharynx is clear.   Eyes:      Extraocular Movements: Extraocular movements intact.      Pupils: Pupils are equal, round, and reactive to light.   Cardiovascular:      Rate and Rhythm: Normal rate and regular rhythm.      Heart sounds: Normal heart sounds.   Pulmonary:      Effort: Pulmonary effort is normal.      Breath sounds: Normal breath sounds.   Abdominal:      General: Bowel sounds are normal.      Palpations: Abdomen is soft.   Musculoskeletal:         General: Normal range of motion.      Cervical back: Normal range of motion and neck supple.   Lymphadenopathy:      Cervical: No cervical adenopathy.   Skin:     General: Skin is warm and dry.      Capillary Refill: Capillary refill takes less than 2 seconds.   Neurological:      Mental Status: She is alert. She is confused.      Comments: Disoriented to time   Psychiatric:         Mood and Affect: Mood normal.         Procedures        Results for orders placed or performed during the hospital encounter of 02/11/23   COVID-19 and FLU A/B PCR - Swab, Nasopharynx    Specimen: Nasopharynx; Swab   Result Value Ref Range    COVID19 Not Detected Not Detected - Ref. Range    Influenza A PCR Not Detected Not Detected    Influenza B PCR Not Detected Not Detected   Comprehensive Metabolic Panel    Specimen: Blood   Result Value Ref Range    Glucose 178 (H) 65 - 99 mg/dL    BUN 13 6 - 20 mg/dL    Creatinine 0.71 0.57 - 1.00 mg/dL    Sodium 139 136 - 145 mmol/L    Potassium 3.7 3.5 - 5.2 mmol/L    Chloride 98 98 - 107 mmol/L    CO2 27.3 22.0 - 29.0 mmol/L    Calcium 9.7 8.6 - 10.5 mg/dL    Total Protein 8.8 (H) 6.0 - 8.5 g/dL    Albumin 4.8 3.5 - 5.2 g/dL    ALT (SGPT) 27 1 - 33 U/L    AST (SGOT) 27 1 - 32 U/L    Alkaline Phosphatase 131 (H) 39 - 117 U/L    Total Bilirubin 0.3 0.0 - 1.2 mg/dL    Globulin 4.0 gm/dL    A/G Ratio 1.2 g/dL    BUN/Creatinine Ratio 18.3 7.0 - 25.0    Anion Gap 13.7 5.0 - 15.0 mmol/L    eGFR 98.1 >60.0  mL/min/1.73   Urinalysis With Culture If Indicated - Urine, Clean Catch    Specimen: Urine, Clean Catch   Result Value Ref Range    Color, UA Yellow Yellow, Straw    Appearance, UA Clear Clear    pH, UA 6.0 5.0 - 8.0    Specific Gravity, UA 1.007 1.005 - 1.030    Glucose,  mg/dL (1+) (A) Negative    Ketones, UA Negative Negative    Bilirubin, UA Negative Negative    Blood, UA Negative Negative    Protein, UA Negative Negative    Leuk Esterase, UA Negative Negative    Nitrite, UA Negative Negative    Urobilinogen, UA 0.2 E.U./dL 0.2 - 1.0 E.U./dL   Lactic Acid, Plasma    Specimen: Blood   Result Value Ref Range    Lactate 1.1 0.5 - 2.0 mmol/L   Procalcitonin    Specimen: Blood   Result Value Ref Range    Procalcitonin 0.04 0.00 - 0.25 ng/mL   Sedimentation Rate    Specimen: Blood   Result Value Ref Range    Sed Rate 10 0 - 30 mm/hr   C-reactive Protein    Specimen: Blood   Result Value Ref Range    C-Reactive Protein <0.30 0.00 - 0.50 mg/dL   Magnesium    Specimen: Blood   Result Value Ref Range    Magnesium 1.7 1.6 - 2.6 mg/dL   CBC Auto Differential    Specimen: Blood   Result Value Ref Range    WBC 11.93 (H) 3.40 - 10.80 10*3/mm3    RBC 4.93 3.77 - 5.28 10*6/mm3    Hemoglobin 15.1 12.0 - 15.9 g/dL    Hematocrit 44.3 34.0 - 46.6 %    MCV 89.9 79.0 - 97.0 fL    MCH 30.6 26.6 - 33.0 pg    MCHC 34.1 31.5 - 35.7 g/dL    RDW 12.3 12.3 - 15.4 %    RDW-SD 39.8 37.0 - 54.0 fl    MPV 10.1 6.0 - 12.0 fL    Platelets 324 140 - 450 10*3/mm3    Neutrophil % 60.4 42.7 - 76.0 %    Lymphocyte % 29.2 19.6 - 45.3 %    Monocyte % 6.2 5.0 - 12.0 %    Eosinophil % 3.1 0.3 - 6.2 %    Basophil % 0.5 0.0 - 1.5 %    Immature Grans % 0.6 (H) 0.0 - 0.5 %    Neutrophils, Absolute 7.21 (H) 1.70 - 7.00 10*3/mm3    Lymphocytes, Absolute 3.48 (H) 0.70 - 3.10 10*3/mm3    Monocytes, Absolute 0.74 0.10 - 0.90 10*3/mm3    Eosinophils, Absolute 0.37 0.00 - 0.40 10*3/mm3    Basophils, Absolute 0.06 0.00 - 0.20 10*3/mm3    Immature Grans, Absolute  0.07 (H) 0.00 - 0.05 10*3/mm3    nRBC 0.0 0.0 - 0.2 /100 WBC   ECG 12 Lead Altered Mental Status   Result Value Ref Range    QT Interval 374 ms    QTC Interval 445 ms   Green Top (Gel)   Result Value Ref Range    Extra Tube Hold for add-ons.    Lavender Top   Result Value Ref Range    Extra Tube hold for add-on    Gold Top - SST   Result Value Ref Range    Extra Tube Hold for add-ons.    Light Blue Top   Result Value Ref Range    Extra Tube Hold for add-ons.          ED Course  ED Course as of 02/11/23 2001   Sat Feb 11, 2023   1845 Normal sinus rhythm ventricular rate 85  QRS 96 QTc 445  Possible anterior infarct of undetermined age  No acute ischemia prominent ST deviations on EKG today  Electronically signed by Maia De Souza DO, 02/11/23, 7:55 PM EST.   [LK]   1858 CT Head Without Contrast  FINDINGS:   Small focus of decreased attenuation within the right superior frontal lobe and subcortical white matter likely represents the sequela of old infarct and not appreciably changed. No mass, mass effect or midline shift. Extensive intracranial  atherosclerotic calcifications. Mild generalized atrophy with diffuse white matter changes likely reflecting chronic microvascular disease. No hemorrhage or abnormal extra-axial fluid collections.     Bony calvarium, skull base and mastoids unremarkable. Left sphenoid sinus mucosal disease.     IMPRESSION:  No definite acute intracranial abnormality.     Focal area of suspected encephalomalacia right frontal lobe and likely the sequela of old infarct and not significantly changed.     Mild cortical atrophy with diffuse white matter changes likely reflecting chronic microvascular disease. [AH]      ED Course User Index  [AH] Alexandra Corey, PA  [LK] Maia De Souza DO                                           Medical Decision Making  57-year-old female who presents to the ED today for increased confusion and agitation.  She does have a history of Alzheimer's dementia.  She  was started on Augmentin and famotidine today and after that she became anxious and restless and was more aggressive than normal.  Currently the patient is alert and oriented x2.  She is disoriented to time.  Her exam is otherwise unremarkable.  No evidence of UTI today.  Her white blood cell count and inflammatory markers are normal.  No acute findings on head CT.  Plan is for discharge home to follow-up as needed.  She will return to the ED if symptoms change or worsen    Early onset Alzheimer's dementia with agitation, unspecified dementia severity (HCC): chronic illness or injury  Amount and/or Complexity of Data Reviewed  Labs: ordered.  Radiology: ordered. Decision-making details documented in ED Course.  ECG/medicine tests: ordered.      Risk  Prescription drug management.          Final diagnoses:   Early onset Alzheimer's dementia with agitation, unspecified dementia severity (HCC)       ED Disposition  ED Disposition     ED Disposition   Discharge    Condition   Stable    Comment   --             John Mueller, APRN  [address] [facility] [address]  [phone]    Schedule an appointment as soon as possible for a visit in 2 days  As needed         Medication List      No changes were made to your prescriptions during this visit.          Alexandra Corey PA  02/11/23 2001

## 2023-02-16 LAB
BACTERIA SPEC AEROBE CULT: NORMAL
BACTERIA SPEC AEROBE CULT: NORMAL

## 2023-02-25 DIAGNOSIS — E78.2 MIXED HYPERLIPIDEMIA: Chronic | ICD-10-CM

## 2023-02-27 RX ORDER — PRAVASTATIN SODIUM 40 MG
TABLET ORAL
Qty: 30 TABLET | Refills: 5 | Status: SHIPPED | OUTPATIENT
Start: 2023-02-27

## 2023-03-11 DIAGNOSIS — E78.2 MIXED HYPERLIPIDEMIA: Chronic | ICD-10-CM

## 2023-03-13 RX ORDER — EZETIMIBE 10 MG/1
TABLET ORAL
Qty: 90 TABLET | Refills: 0 | Status: SHIPPED | OUTPATIENT
Start: 2023-03-13

## 2023-03-19 DIAGNOSIS — G30.0 EARLY ONSET ALZHEIMER'S DEMENTIA WITH BEHAVIORAL DISTURBANCE: ICD-10-CM

## 2023-03-19 DIAGNOSIS — F02.818 EARLY ONSET ALZHEIMER'S DEMENTIA WITH BEHAVIORAL DISTURBANCE: ICD-10-CM

## 2023-03-20 RX ORDER — MEMANTINE HYDROCHLORIDE 5 MG/1
TABLET ORAL
Qty: 90 TABLET | Refills: 0 | Status: SHIPPED | OUTPATIENT
Start: 2023-03-20

## 2023-04-17 DIAGNOSIS — E11.42 TYPE 2 DIABETES MELLITUS WITH DIABETIC POLYNEUROPATHY, WITH LONG-TERM CURRENT USE OF INSULIN: ICD-10-CM

## 2023-04-17 DIAGNOSIS — Z79.4 TYPE 2 DIABETES MELLITUS WITH DIABETIC POLYNEUROPATHY, WITH LONG-TERM CURRENT USE OF INSULIN: ICD-10-CM

## 2023-04-18 RX ORDER — INSULIN DEGLUDEC 200 U/ML
INJECTION, SOLUTION SUBCUTANEOUS
Qty: 27 ML | Refills: 3 | Status: SHIPPED | OUTPATIENT
Start: 2023-04-18

## 2023-04-21 DIAGNOSIS — F41.1 GENERALIZED ANXIETY DISORDER: ICD-10-CM

## 2023-04-21 DIAGNOSIS — G30.0 EARLY ONSET ALZHEIMER'S DEMENTIA WITH BEHAVIORAL DISTURBANCE: ICD-10-CM

## 2023-04-21 DIAGNOSIS — F32.0 CURRENT MILD EPISODE OF MAJOR DEPRESSIVE DISORDER WITHOUT PRIOR EPISODE: ICD-10-CM

## 2023-04-21 DIAGNOSIS — F02.818 EARLY ONSET ALZHEIMER'S DEMENTIA WITH BEHAVIORAL DISTURBANCE: ICD-10-CM

## 2023-04-21 RX ORDER — VORTIOXETINE 20 MG/1
1 TABLET, FILM COATED ORAL DAILY
Qty: 30 TABLET | Refills: 2 | Status: SHIPPED | OUTPATIENT
Start: 2023-04-21

## 2023-04-21 RX ORDER — MEMANTINE HYDROCHLORIDE 5 MG/1
5 TABLET ORAL DAILY
Qty: 90 TABLET | Refills: 0 | Status: SHIPPED | OUTPATIENT
Start: 2023-04-21

## 2023-05-20 DIAGNOSIS — J30.9 ALLERGIC RHINITIS, UNSPECIFIED SEASONALITY, UNSPECIFIED TRIGGER: ICD-10-CM

## 2023-05-22 RX ORDER — CETIRIZINE HYDROCHLORIDE 10 MG/1
TABLET, FILM COATED ORAL
Qty: 90 TABLET | Refills: 0 | Status: SHIPPED | OUTPATIENT
Start: 2023-05-22

## 2023-06-08 ENCOUNTER — TELEPHONE (OUTPATIENT)
Dept: FAMILY MEDICINE CLINIC | Facility: CLINIC | Age: 58
End: 2023-06-08

## 2023-06-08 NOTE — TELEPHONE ENCOUNTER
Caller: DAUGHTER    Relationship: Semaj MONROY    Best call back number: [phone]      Who are you requesting to speak with (clinical staff, provider,  specific staff member): CLINICAL    Do you know the name of the person who called: ELIANA    What was the call regarding: PATIENTS DAUGHTER HAS QUESTIONS ABOUT MERGING AN APPOINTMENT TOMORROW FOR HER MOM.  DAUGHTER SAYS SHE HAS SPOKEN TO MARBIN ABOUT THIS ONCE ALREADY TODAY.    PLEASE ADVISE.

## 2023-06-08 NOTE — TELEPHONE ENCOUNTER
I spoke with Emma. Sandra has a f/u with [facility] tomorrow. I will let Janett know if Sandra needs me I can assist

## 2023-06-09 ENCOUNTER — LAB (OUTPATIENT)
Dept: FAMILY MEDICINE CLINIC | Facility: CLINIC | Age: 58
End: 2023-06-09
Payer: MEDICARE

## 2023-06-09 DIAGNOSIS — E78.2 MIXED HYPERLIPIDEMIA: ICD-10-CM

## 2023-06-09 DIAGNOSIS — E53.8 VITAMIN B 12 DEFICIENCY: ICD-10-CM

## 2023-06-09 DIAGNOSIS — E11.42 TYPE 2 DIABETES MELLITUS WITH DIABETIC POLYNEUROPATHY, WITH LONG-TERM CURRENT USE OF INSULIN: Primary | ICD-10-CM

## 2023-06-09 DIAGNOSIS — E55.9 VITAMIN D DEFICIENCY: ICD-10-CM

## 2023-06-09 DIAGNOSIS — E78.5 DYSLIPIDEMIA: ICD-10-CM

## 2023-06-09 DIAGNOSIS — Z79.4 TYPE 2 DIABETES MELLITUS WITH DIABETIC POLYNEUROPATHY, WITH LONG-TERM CURRENT USE OF INSULIN: Primary | ICD-10-CM

## 2023-06-09 PROCEDURE — 36415 COLL VENOUS BLD VENIPUNCTURE: CPT | Performed by: NURSE PRACTITIONER

## 2023-06-10 LAB
25(OH)D3+25(OH)D2 SERPL-MCNC: 23.8 NG/ML (ref 30–100)
ALBUMIN SERPL-MCNC: 4.6 G/DL (ref 3.5–5.2)
ALBUMIN/GLOB SERPL: 1.7 G/DL
ALP SERPL-CCNC: 106 U/L (ref 39–117)
ALT SERPL-CCNC: 39 U/L (ref 1–33)
AST SERPL-CCNC: 29 U/L (ref 1–32)
BASOPHILS # BLD AUTO: 0.04 10*3/MM3 (ref 0–0.2)
BASOPHILS NFR BLD AUTO: 0.5 % (ref 0–1.5)
BILIRUB SERPL-MCNC: 0.4 MG/DL (ref 0–1.2)
BUN SERPL-MCNC: 12 MG/DL (ref 6–20)
BUN/CREAT SERPL: 16.7 (ref 7–25)
CALCIUM SERPL-MCNC: 9.7 MG/DL (ref 8.6–10.5)
CHLORIDE SERPL-SCNC: 103 MMOL/L (ref 98–107)
CHOLEST SERPL-MCNC: 131 MG/DL (ref 0–200)
CO2 SERPL-SCNC: 25.4 MMOL/L (ref 22–29)
CREAT SERPL-MCNC: 0.72 MG/DL (ref 0.57–1)
EGFRCR SERPLBLD CKD-EPI 2021: 96.5 ML/MIN/1.73
EOSINOPHIL # BLD AUTO: 0.22 10*3/MM3 (ref 0–0.4)
EOSINOPHIL NFR BLD AUTO: 2.5 % (ref 0.3–6.2)
ERYTHROCYTE [DISTWIDTH] IN BLOOD BY AUTOMATED COUNT: 13.1 % (ref 12.3–15.4)
GLOBULIN SER CALC-MCNC: 2.7 GM/DL
GLUCOSE SERPL-MCNC: 106 MG/DL (ref 65–99)
HBA1C MFR BLD: 6.2 % (ref 4.8–5.6)
HCT VFR BLD AUTO: 39.3 % (ref 34–46.6)
HDLC SERPL-MCNC: 45 MG/DL (ref 40–60)
HGB BLD-MCNC: 13.5 G/DL (ref 12–15.9)
IMM GRANULOCYTES # BLD AUTO: 0.04 10*3/MM3 (ref 0–0.05)
IMM GRANULOCYTES NFR BLD AUTO: 0.5 % (ref 0–0.5)
LDLC SERPL CALC-MCNC: 64 MG/DL (ref 0–100)
LYMPHOCYTES # BLD AUTO: 2.23 10*3/MM3 (ref 0.7–3.1)
LYMPHOCYTES NFR BLD AUTO: 25.6 % (ref 19.6–45.3)
MAGNESIUM SERPL-MCNC: 1.7 MG/DL (ref 1.6–2.6)
MCH RBC QN AUTO: 30.8 PG (ref 26.6–33)
MCHC RBC AUTO-ENTMCNC: 34.4 G/DL (ref 31.5–35.7)
MCV RBC AUTO: 89.7 FL (ref 79–97)
MONOCYTES # BLD AUTO: 0.53 10*3/MM3 (ref 0.1–0.9)
MONOCYTES NFR BLD AUTO: 6.1 % (ref 5–12)
NEUTROPHILS # BLD AUTO: 5.65 10*3/MM3 (ref 1.7–7)
NEUTROPHILS NFR BLD AUTO: 64.8 % (ref 42.7–76)
NRBC BLD AUTO-RTO: 0 /100 WBC (ref 0–0.2)
PLATELET # BLD AUTO: 300 10*3/MM3 (ref 140–450)
POTASSIUM SERPL-SCNC: 3.9 MMOL/L (ref 3.5–5.2)
PROT SERPL-MCNC: 7.3 G/DL (ref 6–8.5)
RBC # BLD AUTO: 4.38 10*6/MM3 (ref 3.77–5.28)
SODIUM SERPL-SCNC: 139 MMOL/L (ref 136–145)
TRIGL SERPL-MCNC: 124 MG/DL (ref 0–150)
TSH SERPL DL<=0.005 MIU/L-ACNC: 1.5 UIU/ML (ref 0.27–4.2)
URATE SERPL-MCNC: 4.7 MG/DL (ref 2.4–5.7)
VIT B12 SERPL-MCNC: 172 PG/ML (ref 211–946)
VLDLC SERPL CALC-MCNC: 22 MG/DL (ref 5–40)
WBC # BLD AUTO: 8.71 10*3/MM3 (ref 3.4–10.8)

## 2023-06-13 DIAGNOSIS — E53.8 VITAMIN B 12 DEFICIENCY: ICD-10-CM

## 2023-06-13 DIAGNOSIS — E55.9 VITAMIN D DEFICIENCY: Primary | ICD-10-CM

## 2023-06-13 RX ORDER — ACETAMINOPHEN 160 MG
2000 TABLET,DISINTEGRATING ORAL DAILY
Qty: 30 CAPSULE | Refills: 6 | Status: SHIPPED | OUTPATIENT
Start: 2023-06-13 | End: 2024-06-12

## 2023-07-30 DIAGNOSIS — F02.818 EARLY ONSET ALZHEIMER'S DEMENTIA WITH BEHAVIORAL DISTURBANCE: ICD-10-CM

## 2023-07-30 DIAGNOSIS — G30.0 EARLY ONSET ALZHEIMER'S DEMENTIA WITH BEHAVIORAL DISTURBANCE: ICD-10-CM

## 2023-07-31 RX ORDER — MEMANTINE HYDROCHLORIDE 10 MG/1
TABLET ORAL
Qty: 30 TABLET | Refills: 0 | Status: SHIPPED | OUTPATIENT
Start: 2023-07-31

## 2023-08-15 ENCOUNTER — OFFICE VISIT (OUTPATIENT)
Dept: FAMILY MEDICINE CLINIC | Facility: CLINIC | Age: 58
End: 2023-08-15
Payer: MEDICARE

## 2023-08-15 VITALS
RESPIRATION RATE: 14 BRPM | HEART RATE: 94 BPM | WEIGHT: 164.02 LBS | DIASTOLIC BLOOD PRESSURE: 60 MMHG | BODY MASS INDEX: 24.86 KG/M2 | HEIGHT: 68 IN | SYSTOLIC BLOOD PRESSURE: 90 MMHG | TEMPERATURE: 97.7 F | OXYGEN SATURATION: 97 %

## 2023-08-15 DIAGNOSIS — E55.9 VITAMIN D DEFICIENCY: Chronic | ICD-10-CM

## 2023-08-15 DIAGNOSIS — E78.5 DYSLIPIDEMIA: Chronic | ICD-10-CM

## 2023-08-15 DIAGNOSIS — F32.A ANXIETY AND DEPRESSION: Chronic | ICD-10-CM

## 2023-08-15 DIAGNOSIS — F03.90 MAJOR NEUROCOGNITIVE DISORDER: Chronic | ICD-10-CM

## 2023-08-15 DIAGNOSIS — Z79.4 TYPE 2 DIABETES MELLITUS WITH DIABETIC POLYNEUROPATHY, WITH LONG-TERM CURRENT USE OF INSULIN: Primary | Chronic | ICD-10-CM

## 2023-08-15 DIAGNOSIS — F41.9 ANXIETY AND DEPRESSION: Chronic | ICD-10-CM

## 2023-08-15 DIAGNOSIS — E11.42 TYPE 2 DIABETES MELLITUS WITH DIABETIC POLYNEUROPATHY, WITH LONG-TERM CURRENT USE OF INSULIN: Primary | Chronic | ICD-10-CM

## 2023-08-15 DIAGNOSIS — E53.8 VITAMIN B 12 DEFICIENCY: Chronic | ICD-10-CM

## 2023-08-15 RX ORDER — CYANOCOBALAMIN 1000 UG/ML
1000 INJECTION, SOLUTION INTRAMUSCULAR; SUBCUTANEOUS
Status: SHIPPED | OUTPATIENT
Start: 2023-08-15

## 2023-08-15 RX ORDER — PRAVASTATIN SODIUM 40 MG
40 TABLET ORAL DAILY
Qty: 30 TABLET | Refills: 5 | Status: SHIPPED | OUTPATIENT
Start: 2023-08-15

## 2023-08-15 RX ORDER — INSULIN DEGLUDEC 200 U/ML
45 INJECTION, SOLUTION SUBCUTANEOUS DAILY
Qty: 27 ML | Refills: 3 | Status: SHIPPED | OUTPATIENT
Start: 2023-08-15

## 2023-08-15 RX ORDER — EZETIMIBE 10 MG/1
10 TABLET ORAL DAILY
Qty: 90 TABLET | Refills: 0 | Status: SHIPPED | OUTPATIENT
Start: 2023-08-15

## 2023-08-15 RX ADMIN — CYANOCOBALAMIN 1000 MCG: 1000 INJECTION, SOLUTION INTRAMUSCULAR; SUBCUTANEOUS at 08:45

## 2023-08-15 NOTE — PROGRESS NOTES
History of Present Illness   Sandra has been diagnosed with DM, type 2 treated with insulin, Dyslipidemia, and Vitamin deficiencies.  She is being treated for Major Neurocognitive Disorder and Early onset Alzheimer's dementia by our Behavioral [facility]. She has also been evaluated by [facility].    Alzheimer's Disease  This is a chronic problem. The current episode started more than 1 year ago. The problem has been worsening. Emma reports she found a ham in the pantry with mold on it. A package for Emma in the freezer, hidden shoes, biting of nails,  failure to eat.     Diabetes   She has type 2 diabetes mellitus.which was diagnosed over ten years ago. Diabetic complications include peripheral neuropathy. Risk factors for coronary artery disease include diabetes mellitus, dyslipidemia, family history and post-menopausal. Current diabetic treatment includes insulin injections twice daily,  oral agent (monotherapy) and diet.     Lab Results   Component Value Date    HGBA1C 6.20 (H) 06/09/2023      Dyslipidemia   Current antihyperlipidemic treatment includes ezetimibe and statins. Risk factors for coronary artery disease include diabetes mellitus, dyslipidemia, family history, post-menopausal and stress.  Lab Results   Component Value Date    CHLPL 131 06/09/2023    CHLPL 122 02/07/2023    CHLPL 161 11/04/2022     Lab Results   Component Value Date    TRIG 124 06/09/2023    TRIG 184 (H) 02/07/2023    TRIG 160 (H) 11/04/2022     Lab Results   Component Value Date    HDL 45 06/09/2023    HDL 39 (L) 02/07/2023    HDL 44 11/04/2022     Lab Results   Component Value Date    LDL 64 06/09/2023    LDL 53 02/07/2023    LDL 89 11/04/2022       Anxiety/Depression   Symptoms include excessive worry, confusion, and decreased concentration. The severity of symptoms are interfering with daily activities.  Risk factors include a major life event. She is currently seeing Behavioral Health.   Lab Results  "  Component Value Date    TSH 1.500 06/09/2023     The following portions of the patient's history were reviewed and updated as appropriate: allergies, current medications, past family history, past medical history, past social history, past surgical history and problem list.    Review of Systems   Constitutional:  Positive for activity change and appetite change. Negative for unexpected weight change.   HENT: Negative.     Eyes:  Negative for visual disturbance.   Respiratory:  Negative for cough, shortness of breath and wheezing.    Cardiovascular:  Negative for palpitations and leg swelling.   Gastrointestinal:  Negative for nausea and vomiting.   Endocrine: Negative for cold intolerance, heat intolerance, polydipsia, polyphagia and polyuria.   Musculoskeletal:  Positive for arthralgias.   Skin:  Negative for color change.   Neurological:  Negative for dizziness, tremors, speech difficulty and light-headedness.   Hematological:  Negative for adenopathy.   Psychiatric/Behavioral:  Positive for confusion, decreased concentration and sleep disturbance. Negative for behavioral problems and self-injury. The patient is not nervous/anxious.    All other systems reviewed and are negative.    Vital signs:  BP 90/60 (BP Location: Right arm, Patient Position: Sitting, Cuff Size: Adult)   Pulse 94   Temp 97.7 øF (36.5 øC) (Temporal)   Resp 14   Ht 172.7 cm (67.99\")   Wt 74.4 kg (164 lb 0.4 oz)   SpO2 97%   BMI 24.95 kg/mý     Physical Exam  Vitals and nursing note reviewed.   Constitutional:       Appearance: She is well-developed.   HENT:      Head: Normocephalic.      Nose: Nose normal.      Mouth/Throat:      Pharynx: No oropharyngeal exudate.   Eyes:      General: No scleral icterus.        Right eye: No discharge.         Left eye: No discharge.      Conjunctiva/sclera: Conjunctivae normal.   Neck:      Trachea: No tracheal tenderness.   Cardiovascular:      Rate and Rhythm: Normal rate and regular rhythm.      " Heart sounds: Normal heart sounds. No murmur heard.    No friction rub.   Pulmonary:      Effort: Pulmonary effort is normal. No respiratory distress.      Breath sounds: Normal breath sounds.   Musculoskeletal:      Cervical back: Neck supple.      Right lower leg: No edema.      Left lower leg: No edema.   Lymphadenopathy:      Head:      Right side of head: No tonsillar or preauricular adenopathy.      Left side of head: No tonsillar or preauricular adenopathy.      Cervical: No cervical adenopathy.   Skin:     General: Skin is warm and dry.      Findings: No erythema or rash.   Neurological:      Mental Status: She is alert.      Comments: When asked a question Sandra looks to Emma to answer.    Psychiatric:         Behavior: Behavior is cooperative.         Cognition and Memory: Cognition is impaired. Memory is impaired.     Result Review :  Results for orders placed or performed in visit on 06/09/23   Magnesium    Specimen: Arm, Left; Blood    Blood  Manual Differen   Result Value Ref Range    Magnesium 1.7 1.6 - 2.6 mg/dL   Uric acid    Specimen: Arm, Left; Blood    Blood  Manual Differen   Result Value Ref Range    Uric Acid 4.7 2.4 - 5.7 mg/dL   Vitamin B12    Specimen: Arm, Left; Blood    Blood  Manual Differen   Result Value Ref Range    Vitamin B-12 172 (L) 211 - 946 pg/mL   Vitamin D 25 hydroxy    Specimen: Arm, Left; Blood    Blood  Manual Differen   Result Value Ref Range    25 Hydroxy, Vitamin D 23.8 (L) 30.0 - 100.0 ng/ml   Hemoglobin A1c    Specimen: Arm, Left; Blood    Blood  Manual Differen   Result Value Ref Range    Hemoglobin A1C 6.20 (H) 4.80 - 5.60 %   TSH    Specimen: Arm, Left; Blood    Blood  Manual Differen   Result Value Ref Range    TSH 1.500 0.270 - 4.200 uIU/mL   Lipid panel    Specimen: Arm, Left; Blood    Blood  Manual Differen   Result Value Ref Range    Total Cholesterol 131 0 - 200 mg/dL    Triglycerides 124 0 - 150 mg/dL    HDL Cholesterol 45 40 - 60 mg/dL    VLDL Cholesterol  Mal 22 5 - 40 mg/dL    LDL Chol Calc (NIH) 64 0 - 100 mg/dL   Comprehensive metabolic panel    Specimen: Arm, Left; Blood    Blood  Manual Differen   Result Value Ref Range    Glucose 106 (H) 65 - 99 mg/dL    BUN 12 6 - 20 mg/dL    Creatinine 0.72 0.57 - 1.00 mg/dL    EGFR Result 96.5 >60.0 mL/min/1.73    BUN/Creatinine Ratio 16.7 7.0 - 25.0    Sodium 139 136 - 145 mmol/L    Potassium 3.9 3.5 - 5.2 mmol/L    Chloride 103 98 - 107 mmol/L    Total CO2 25.4 22.0 - 29.0 mmol/L    Calcium 9.7 8.6 - 10.5 mg/dL    Total Protein 7.3 6.0 - 8.5 g/dL    Albumin 4.6 3.5 - 5.2 g/dL    Globulin 2.7 gm/dL    A/G Ratio 1.7 g/dL    Total Bilirubin 0.4 0.0 - 1.2 mg/dL    Alkaline Phosphatase 106 39 - 117 U/L    AST (SGOT) 29 1 - 32 U/L    ALT (SGPT) 39 (H) 1 - 33 U/L   CBC & Differential    Specimen: Arm, Left; Blood    Blood  Manual Differen   Result Value Ref Range    WBC 8.71 3.40 - 10.80 10*3/mm3    RBC 4.38 3.77 - 5.28 10*6/mm3    Hemoglobin 13.5 12.0 - 15.9 g/dL    Hematocrit 39.3 34.0 - 46.6 %    MCV 89.7 79.0 - 97.0 fL    MCH 30.8 26.6 - 33.0 pg    MCHC 34.4 31.5 - 35.7 g/dL    RDW 13.1 12.3 - 15.4 %    Platelets 300 140 - 450 10*3/mm3    Neutrophil Rel % 64.8 42.7 - 76.0 %    Lymphocyte Rel % 25.6 19.6 - 45.3 %    Monocyte Rel % 6.1 5.0 - 12.0 %    Eosinophil Rel % 2.5 0.3 - 6.2 %    Basophil Rel % 0.5 0.0 - 1.5 %    Neutrophils Absolute 5.65 1.70 - 7.00 10*3/mm3    Lymphocytes Absolute 2.23 0.70 - 3.10 10*3/mm3    Monocytes Absolute 0.53 0.10 - 0.90 10*3/mm3    Eosinophils Absolute 0.22 0.00 - 0.40 10*3/mm3    Basophils Absolute 0.04 0.00 - 0.20 10*3/mm3    Immature Granulocyte Rel % 0.5 0.0 - 0.5 %    Immature Grans Absolute 0.04 0.00 - 0.05 10*3/mm3    nRBC 0.0 0.0 - 0.2 /100 WBC     BMI is within normal parameters. No other follow-up for BMI required.     Assessment & Plan     Diagnoses and all orders for this visit:    1. Type 2 diabetes mellitus with diabetic polyneuropathy, with long-term current use of insulin  (Primary)  Comments:  Continue Tresiba and Metformin  Orders:  -     Insulin Degludec (Tresiba FlexTouch) 200 UNIT/ML solution pen-injector pen injection; Inject 45 Units under the skin into the appropriate area as directed Daily.  Dispense: 27 mL; Refill: 3    2. Dyslipidemia  Comments:  Continue Pravastatin and Zetia.  Advise low-cholesterol diet  Orders:  -     ezetimibe (ZETIA) 10 MG tablet; Take 1 tablet by mouth Daily.  Dispense: 90 tablet; Refill: 0  -     pravastatin (PRAVACHOL) 40 MG tablet; Take 1 tablet by mouth Daily.  Dispense: 30 tablet; Refill: 5    3. Vitamin B 12 deficiency  Comments:  Daily supplement. . Vitamin B12 inj today  Orders:  -     cyanocobalamin injection 1,000 mcg    4. Vitamin D deficiency  Comments:  Daily supplement    5. Major neurocognitive disorder  Comments:  Continue Namenda and f/u with Behavioral Health    6. Anxiety and depression  Comments:  Continue Quetiapine and Vortioxetine. F/u with Behavioral Health    Follow Up In September   Findings and recommendations discussed with Shane.. Reviewed test results. Sandra will follow up in September sooner if problems/concerns occur.   Sandra was given instructions and counseling regarding her condition or for health maintenance advice. Please see specific information pulled into the AVS if appropriate    This document has been electronically signed by:

## 2023-08-15 NOTE — PROGRESS NOTES
Injection  Injection performed in right deltoid by Isabela Quiroz MA. Patient tolerated the procedure well without complications.  08/15/23   Isabela Quiroz MA

## 2023-08-25 DIAGNOSIS — F02.818 EARLY ONSET ALZHEIMER'S DEMENTIA WITH BEHAVIORAL DISTURBANCE: ICD-10-CM

## 2023-08-25 DIAGNOSIS — G30.0 EARLY ONSET ALZHEIMER'S DEMENTIA WITH BEHAVIORAL DISTURBANCE: ICD-10-CM

## 2023-08-25 RX ORDER — QUETIAPINE FUMARATE 25 MG/1
TABLET, FILM COATED ORAL
Qty: 60 TABLET | Refills: 0 | Status: SHIPPED | OUTPATIENT
Start: 2023-08-25

## 2023-08-30 DIAGNOSIS — G30.0 MODERATE EARLY ONSET ALZHEIMER'S DEMENTIA, UNSPECIFIED WHETHER BEHAVIORAL, PSYCHOTIC, OR MOOD DISTURBANCE OR ANXIETY: ICD-10-CM

## 2023-08-30 DIAGNOSIS — F02.B0 MODERATE EARLY ONSET ALZHEIMER'S DEMENTIA, UNSPECIFIED WHETHER BEHAVIORAL, PSYCHOTIC, OR MOOD DISTURBANCE OR ANXIETY: ICD-10-CM

## 2023-08-30 DIAGNOSIS — F03.90 MAJOR NEUROCOGNITIVE DISORDER: Primary | ICD-10-CM

## 2023-09-02 DIAGNOSIS — G30.0 EARLY ONSET ALZHEIMER'S DEMENTIA WITH BEHAVIORAL DISTURBANCE: ICD-10-CM

## 2023-09-02 DIAGNOSIS — F02.818 EARLY ONSET ALZHEIMER'S DEMENTIA WITH BEHAVIORAL DISTURBANCE: ICD-10-CM

## 2023-09-05 RX ORDER — MEMANTINE HYDROCHLORIDE 10 MG/1
TABLET ORAL
Qty: 30 TABLET | Refills: 0 | Status: SHIPPED | OUTPATIENT
Start: 2023-09-05

## 2023-09-05 RX ORDER — PEN NEEDLE, DIABETIC 31 GX5/16"
NEEDLE, DISPOSABLE MISCELLANEOUS
Qty: 100 EACH | Refills: 0 | Status: SHIPPED | OUTPATIENT
Start: 2023-09-05

## 2023-09-29 ENCOUNTER — OFFICE VISIT (OUTPATIENT)
Dept: FAMILY MEDICINE CLINIC | Facility: CLINIC | Age: 58
End: 2023-09-29
Payer: MEDICARE

## 2023-09-29 VITALS
TEMPERATURE: 97.4 F | OXYGEN SATURATION: 97 % | SYSTOLIC BLOOD PRESSURE: 80 MMHG | WEIGHT: 156.97 LBS | DIASTOLIC BLOOD PRESSURE: 40 MMHG | BODY MASS INDEX: 23.79 KG/M2 | RESPIRATION RATE: 14 BRPM | HEIGHT: 68 IN | HEART RATE: 89 BPM

## 2023-09-29 DIAGNOSIS — E11.42 TYPE 2 DIABETES MELLITUS WITH DIABETIC POLYNEUROPATHY, WITH LONG-TERM CURRENT USE OF INSULIN: Primary | Chronic | ICD-10-CM

## 2023-09-29 DIAGNOSIS — E53.8 VITAMIN B 12 DEFICIENCY: Chronic | ICD-10-CM

## 2023-09-29 DIAGNOSIS — E78.5 DYSLIPIDEMIA: ICD-10-CM

## 2023-09-29 DIAGNOSIS — J30.9 CHRONIC ALLERGIC RHINITIS: Chronic | ICD-10-CM

## 2023-09-29 DIAGNOSIS — E55.9 VITAMIN D DEFICIENCY: Chronic | ICD-10-CM

## 2023-09-29 DIAGNOSIS — Z79.4 TYPE 2 DIABETES MELLITUS WITH DIABETIC POLYNEUROPATHY, WITH LONG-TERM CURRENT USE OF INSULIN: Primary | Chronic | ICD-10-CM

## 2023-09-29 DIAGNOSIS — K21.9 GASTROESOPHAGEAL REFLUX DISEASE WITHOUT ESOPHAGITIS: Chronic | ICD-10-CM

## 2023-09-29 DIAGNOSIS — G30.0 MODERATE EARLY ONSET ALZHEIMER'S DEMENTIA, UNSPECIFIED WHETHER BEHAVIORAL, PSYCHOTIC, OR MOOD DISTURBANCE OR ANXIETY: Chronic | ICD-10-CM

## 2023-09-29 DIAGNOSIS — F02.B0 MODERATE EARLY ONSET ALZHEIMER'S DEMENTIA, UNSPECIFIED WHETHER BEHAVIORAL, PSYCHOTIC, OR MOOD DISTURBANCE OR ANXIETY: Chronic | ICD-10-CM

## 2023-09-29 DIAGNOSIS — I95.1 ORTHOSTATIC HYPOTENSION: ICD-10-CM

## 2023-09-29 LAB
EXPIRATION DATE: NORMAL
HBA1C MFR BLD: 6.4 %
Lab: NORMAL

## 2023-09-29 RX ORDER — CETIRIZINE HYDROCHLORIDE 10 MG/1
5 TABLET ORAL DAILY
Qty: 90 TABLET | Refills: 0 | Status: SHIPPED | OUTPATIENT
Start: 2023-09-29

## 2023-09-29 RX ORDER — FAMOTIDINE 40 MG/1
40 TABLET, FILM COATED ORAL DAILY
Qty: 30 TABLET | Refills: 3 | Status: SHIPPED | OUTPATIENT
Start: 2023-09-29

## 2023-09-29 RX ORDER — MAGNESIUM GLUCONATE 27 MG(500)
27 TABLET ORAL EVERY OTHER DAY
COMMUNITY

## 2023-09-29 RX ORDER — ACETAMINOPHEN 160 MG
2000 TABLET,DISINTEGRATING ORAL DAILY
Qty: 30 CAPSULE | Refills: 6 | Status: SHIPPED | OUTPATIENT
Start: 2023-09-29 | End: 2024-09-28

## 2023-09-29 NOTE — PROGRESS NOTES
History of Present Illness  Sandra Leahy is a 57 y.o. female who presents to the clinic today pertaining toDM, type 2 treated with insulin, Dyslipidemia, and Vitamin deficiencies.  She is being treated for Major Neurocognitive Disorder and Early onset Alzheimer's dementia by our Behavioral Health. She has also been evaluated by [facility].  Her daughter, Emma,  accompanies her today. Emma does report her mother has had episodes of dizziness especially when she is going from a seated to standing position.  She did episode of a fall.    Alzheimer's Disease  This is a chronic problem. The current episode started more than 1 year ago. The problem has been worsening. Emma reports she found a ham in the pantry with mold on it. A package for Emma in the freezer, hidden shoes, biting of nails,  failure to eat with noted weight loss of 7 pounds since her last visit August 15, 2023..     Diabetes   She has type 2 diabetes mellitus.which was diagnosed over ten years ago. Diabetic complications include peripheral neuropathy. Risk factors for coronary artery disease include diabetes mellitus, dyslipidemia, family history and post-menopausal. Current diabetic treatment includes insulin injections twice daily,  oral agent (monotherapy) and diet.     Lab Results   Component Value Date    HGBA1C 6.20 (H) 06/09/2023      Dyslipidemia   Current antihyperlipidemic treatment includes ezetimibe and statins. Risk factors for coronary artery disease include diabetes mellitus, dyslipidemia, family history, post-menopausal and stress.  Lab Results   Component Value Date    CHLPL 131 06/09/2023    CHLPL 122 02/07/2023    CHLPL 161 11/04/2022     Lab Results   Component Value Date    TRIG 124 06/09/2023    TRIG 184 (H) 02/07/2023    TRIG 160 (H) 11/04/2022     Lab Results   Component Value Date    HDL 45 06/09/2023    HDL 39 (L) 02/07/2023    HDL 44 11/04/2022     Lab Results   Component Value Date    LDL 64  "06/09/2023    LDL 53 02/07/2023    LDL 89 11/04/2022       Anxiety/Depression   Symptoms include excessive worry, confusion, and decreased concentration. The severity of symptoms are interfering with daily activities.  Risk factors include a major life event. She is currently seeing Behavioral Health.   Lab Results   Component Value Date    TSH 1.500 06/09/2023     The following portions of the patient's history were reviewed and updated as appropriate: allergies, current medications, past family history, past medical history, past social history, past surgical history and problem list.    Review of Systems   Constitutional:  Positive for activity change and appetite change. Negative for unexpected weight change.   HENT: Negative.     Eyes:  Negative for visual disturbance.   Respiratory:  Negative for cough, shortness of breath and wheezing.    Cardiovascular:  Negative for chest pain, palpitations and leg swelling.   Gastrointestinal:  Negative for nausea and vomiting.   Endocrine: Negative for cold intolerance, heat intolerance, polydipsia, polyphagia and polyuria.   Musculoskeletal:  Positive for arthralgias.   Skin:  Negative for color change.   Allergic/Immunologic: Positive for environmental allergies.   Neurological:  Positive for dizziness and light-headedness. Negative for tremors and speech difficulty.   Hematological:  Negative for adenopathy.   Psychiatric/Behavioral:  Positive for confusion, decreased concentration and sleep disturbance. Negative for behavioral problems and self-injury. The patient is not nervous/anxious.    All other systems reviewed and are negative.    Vital signs:  BP (!) 80/40 (BP Location: Right arm, Patient Position: Standing, Cuff Size: Adult)   Pulse 89   Temp 97.4 °F (36.3 °C) (Temporal)   Resp 14   Ht 172.7 cm (67.99\")   Wt 71.2 kg (156 lb 15.5 oz)   SpO2 97%   BMI 23.87 kg/m²     Physical Exam  Vitals and nursing note reviewed.   Constitutional:       General: She is " not in acute distress.     Appearance: She is well-developed.   HENT:      Head: Normocephalic.      Nose: Nose normal.      Mouth/Throat:      Pharynx: No oropharyngeal exudate.   Eyes:      General: No scleral icterus.        Right eye: No discharge.         Left eye: No discharge.      Conjunctiva/sclera: Conjunctivae normal.   Neck:      Trachea: No tracheal tenderness.   Cardiovascular:      Rate and Rhythm: Normal rate and regular rhythm.      Heart sounds: Normal heart sounds. No murmur heard.    No friction rub.   Pulmonary:      Effort: Pulmonary effort is normal. No respiratory distress.      Breath sounds: Normal breath sounds.   Musculoskeletal:      Cervical back: Neck supple.      Right lower leg: No edema.      Left lower leg: No edema.   Lymphadenopathy:      Head:      Right side of head: No tonsillar or preauricular adenopathy.      Left side of head: No tonsillar or preauricular adenopathy.      Cervical: No cervical adenopathy.   Skin:     General: Skin is warm and dry.      Capillary Refill: Capillary refill takes less than 2 seconds.      Findings: No erythema or rash.   Neurological:      Mental Status: She is alert. Mental status is at baseline.      Comments: When asked a question Sandra looks to Emma to answer.    Psychiatric:         Mood and Affect: Mood and affect normal.         Behavior: Behavior is cooperative.         Cognition and Memory: Cognition is impaired. Memory is impaired.       Result Review :  Results for orders placed or performed in visit on 09/29/23   POCT glycated hemoglobin, total    Specimen: Urine   Result Value Ref Range    Hemoglobin A1C 6.4 %    Lot Number [id number]     Expiration Date 5/7/25      BMI is within normal parameters. No other follow-up for BMI required.     Assessment & Plan     Diagnoses and all orders for this visit:    1. Type 2 diabetes mellitus with diabetic polyneuropathy, with long-term current use of insulin (Primary)  Comments:  Continue  Tresiba but lower dose to 40 units  and Metformin  Orders:  -     POCT glycated hemoglobin, total    2. Orthostatic hypotension  Comments:  Counseled regarding supportive care and safety measures.  Questionable side effect of Seroquel.  Message sent to  regarding reducing to nightly only    3. Moderate early onset Alzheimer's dementia, unspecified whether behavioral, psychotic, or mood disturbance or anxiety  Comments:  Continues to worsen.  Goal is to place her in an a skilled nursing facility due to safety and progression of disease    4. Dyslipidemia  Comments:  Continue pravastatin    5. Chronic allergic rhinitis  Comments:  Continue Zyrtec mg as needed  Orders:  -     cetirizine (EQ Allergy Relief, Cetirizine,) 10 MG tablet; Take 0.5 tablets by mouth Daily.  Dispense: 90 tablet; Refill: 0    6. Vitamin B 12 deficiency  Comments:  Weekly vitamin B-12 injections x4 at home then every other week  Orders:  -     Cyanocobalamin 1000 MCG/ML kit; Inject 1 mL as directed Every 7 (Seven) Days.  Dispense: 10 mL; Refill: 0    7. Vitamin D deficiency  Comments:  Continue daily supplement  Orders:  -     Cholecalciferol (Vitamin D3) 50 MCG (2000 UT) capsule; Take 1 capsule by mouth Daily.  Dispense: 30 capsule; Refill: 6    8. Gastroesophageal reflux disease without esophagitis  Comments:  Pepcid prescribed   Orders:  -     famotidine (Pepcid) 40 MG tablet; Take 1 tablet by mouth Daily.  Dispense: 30 tablet; Refill: 3    Follow Up To be determined per insurance issues  Findings and recommendations discussed with Sandra and her daughter. Reviewed A1C results.  Will reduce Tresiba to 40 units.  Lifestyle modifications reinforced including nutrition and activity recommendations as appropriate.  Counseled regarding safety measures.  Encouraged Emma to contact me if Sandra's symptoms worsen and diagnostic testing will be ordered.  Noted last CT of the Brain was February 2023 with no significant changes.    Sandra was given  instructions and counseling regarding her condition or for health maintenance advice. Please see specific information pulled into the AVS if appropriate    This document has been electronically signed by:

## 2023-10-06 DIAGNOSIS — G30.0 EARLY ONSET ALZHEIMER'S DEMENTIA WITH BEHAVIORAL DISTURBANCE: ICD-10-CM

## 2023-10-06 DIAGNOSIS — F02.818 EARLY ONSET ALZHEIMER'S DEMENTIA WITH BEHAVIORAL DISTURBANCE: ICD-10-CM

## 2023-10-06 RX ORDER — MEMANTINE HYDROCHLORIDE 10 MG/1
TABLET ORAL
Qty: 30 TABLET | Refills: 2 | Status: SHIPPED | OUTPATIENT
Start: 2023-10-06

## 2023-10-17 DIAGNOSIS — G30.0 EARLY ONSET ALZHEIMER'S DEMENTIA WITH BEHAVIORAL DISTURBANCE: Primary | ICD-10-CM

## 2023-10-17 DIAGNOSIS — F02.818 EARLY ONSET ALZHEIMER'S DEMENTIA WITH BEHAVIORAL DISTURBANCE: ICD-10-CM

## 2023-10-17 DIAGNOSIS — F41.1 GENERALIZED ANXIETY DISORDER: ICD-10-CM

## 2023-10-17 DIAGNOSIS — F02.818 EARLY ONSET ALZHEIMER'S DEMENTIA WITH BEHAVIORAL DISTURBANCE: Primary | ICD-10-CM

## 2023-10-17 DIAGNOSIS — G30.0 EARLY ONSET ALZHEIMER'S DEMENTIA WITH BEHAVIORAL DISTURBANCE: ICD-10-CM

## 2023-10-17 RX ORDER — QUETIAPINE FUMARATE 25 MG/1
25 TABLET, FILM COATED ORAL DAILY
Qty: 30 TABLET | Refills: 0 | Status: SHIPPED | OUTPATIENT
Start: 2023-10-17 | End: 2023-10-17

## 2023-10-17 RX ORDER — HYDROXYZINE PAMOATE 25 MG/1
25-50 CAPSULE ORAL 2 TIMES DAILY PRN
Qty: 30 CAPSULE | Refills: 1 | Status: SHIPPED | OUTPATIENT
Start: 2023-10-17

## 2023-10-17 NOTE — TELEPHONE ENCOUNTER
Daughter called and stated that she needed a refill on her seroquel. She said that John changed it to once a day instead of twice a day. She has been out of the medication for a while, her dad did not let her know so she could request a refill.  Could you refill this please?

## 2023-10-17 NOTE — PROGRESS NOTES
10/17/23 at 3:30 pm Contacted Emma (POA) regarding patient. Discussed with POA to discontinue Seroquel. Will start Hydroxyzine 25mg 1-2 capsules twice daily as needed for anxiety. Discussed with patient and daughter risks, benefits, and side effects of medication. POA and patient acknowledged and agreed. Discussed with Emma if patient begins having thoughts to harm self or others to call 911 or go to nearest ER.

## 2023-10-22 DIAGNOSIS — F32.0 CURRENT MILD EPISODE OF MAJOR DEPRESSIVE DISORDER WITHOUT PRIOR EPISODE: ICD-10-CM

## 2023-10-22 DIAGNOSIS — F41.1 GENERALIZED ANXIETY DISORDER: ICD-10-CM

## 2023-10-23 RX ORDER — VORTIOXETINE 20 MG/1
1 TABLET, FILM COATED ORAL DAILY
Qty: 30 TABLET | Refills: 0 | Status: SHIPPED | OUTPATIENT
Start: 2023-10-23

## 2023-10-26 DIAGNOSIS — G30.0 EARLY ONSET ALZHEIMER'S DEMENTIA, UNSPECIFIED DEMENTIA SEVERITY, UNSPECIFIED WHETHER BEHAVIORAL, PSYCHOTIC, OR MOOD DISTURBANCE OR ANXIETY: Primary | ICD-10-CM

## 2023-10-26 DIAGNOSIS — F02.80 EARLY ONSET ALZHEIMER'S DEMENTIA, UNSPECIFIED DEMENTIA SEVERITY, UNSPECIFIED WHETHER BEHAVIORAL, PSYCHOTIC, OR MOOD DISTURBANCE OR ANXIETY: Primary | ICD-10-CM

## 2023-11-06 ENCOUNTER — REFERRAL TRIAGE (OUTPATIENT)
Dept: CASE MANAGEMENT | Facility: OTHER | Age: 58
End: 2023-11-06
Payer: MEDICARE

## 2023-11-06 ENCOUNTER — OFFICE VISIT (OUTPATIENT)
Dept: PSYCHIATRY | Facility: CLINIC | Age: 58
End: 2023-11-06
Payer: MEDICARE

## 2023-11-06 VITALS — BODY MASS INDEX: 23.04 KG/M2 | HEART RATE: 80 BPM | OXYGEN SATURATION: 97 % | HEIGHT: 68 IN | WEIGHT: 152 LBS

## 2023-11-06 DIAGNOSIS — F32.0 CURRENT MILD EPISODE OF MAJOR DEPRESSIVE DISORDER WITHOUT PRIOR EPISODE: ICD-10-CM

## 2023-11-06 DIAGNOSIS — F41.1 GENERALIZED ANXIETY DISORDER: ICD-10-CM

## 2023-11-06 DIAGNOSIS — F02.818 EARLY ONSET ALZHEIMER'S DEMENTIA WITH BEHAVIORAL DISTURBANCE: ICD-10-CM

## 2023-11-06 DIAGNOSIS — G30.0 EARLY ONSET ALZHEIMER'S DEMENTIA WITH BEHAVIORAL DISTURBANCE: ICD-10-CM

## 2023-11-06 PROCEDURE — 1160F RVW MEDS BY RX/DR IN RCRD: CPT | Performed by: NURSE PRACTITIONER

## 2023-11-06 PROCEDURE — 99214 OFFICE O/P EST MOD 30 MIN: CPT | Performed by: NURSE PRACTITIONER

## 2023-11-06 PROCEDURE — 1159F MED LIST DOCD IN RCRD: CPT | Performed by: NURSE PRACTITIONER

## 2023-11-06 RX ORDER — LORAZEPAM 0.5 MG/1
0.25 TABLET ORAL DAILY PRN
Qty: 15 TABLET | Refills: 0 | Status: SHIPPED | OUTPATIENT
Start: 2023-11-06 | End: 2023-11-21 | Stop reason: SDUPTHER

## 2023-11-06 RX ORDER — MEMANTINE HYDROCHLORIDE 10 MG/1
10 TABLET ORAL DAILY
Status: ON HOLD
Start: 2023-11-06

## 2023-11-06 RX ORDER — VORTIOXETINE 20 MG/1
1 TABLET, FILM COATED ORAL DAILY
Status: ON HOLD
Start: 2023-11-06 | End: 2023-11-27

## 2023-11-06 NOTE — PROGRESS NOTES
Subjective   Sandra Leahy is a 57 y.o. female is here today for medication management follow-up.    Chief Complaint:  dementia anxiety     History of Present Illness:    Patient presents today for a follow up for medication management for dementia and anxiety with daughter. Daughter states not doing well and worried about safety now. Daughter states not eating but drinking some. Daughter states able to get medicine in her sometimes. Daughter reports she is just sitting and crying. Daughter reports the evenings are awful and around 3-4 pm she changes mood. Daughter states she is only aggressive towards dad and gets frustrated trying to communicate. Daughter reports she is not sleeping much. Daughter reports she went to walk to daughters once and [place]. Daughter states her  happened to look outside and saw her standing there. Daughter reports she is dealing with restlessness, can't get her to sit down, picking, and always on a mission. Patient states she is unable to remember how many grandbabies have. When asked names of grandchildren and children she doesn't know names and grand-kids name. Denies any thoughts to harm self or others. Denies any auditory or visual hallucinations. Daughter reports they are trying to get her into a nursing home but not been successful yet and they have tried [facility], [facility] , [facility], [facility], [facility], and assisted living. Denies any medical changes since last visit.   The following portions of the patient's history were reviewed and updated as appropriate: allergies, current medications, past family history, past medical history, past social history, past surgical history, and problem list.    Review of Systems   Constitutional:  Positive for appetite change.   Respiratory: Negative.     Cardiovascular: Negative.    Gastrointestinal: Negative.    Neurological: Negative.    Psychiatric/Behavioral:  Positive  "for confusion, decreased concentration and sleep disturbance. The patient is nervous/anxious.        Objective   Physical Exam  Vitals reviewed.   Constitutional:       Appearance: Normal appearance. She is well-developed and well-groomed.   Neurological:      Mental Status: She is alert.   Psychiatric:         Attention and Perception: Perception normal. She is inattentive.         Mood and Affect: Affect normal. Mood is anxious.         Speech: Speech is delayed.         Behavior: Behavior is slowed. Behavior is cooperative.         Thought Content: Thought content normal.         Cognition and Memory: Memory is impaired.         Judgment: Judgment is impulsive.       Pulse 80, height 172.7 cm (67.99\"), weight 68.9 kg (152 lb), SpO2 97%.Body mass index is 23.12 kg/m².    Allergies   Allergen Reactions    Lantus [Insulin Glargine] Confusion    Codeine Itching    Exenatide Nausea And Vomiting     Intolerant    Sitagliptin Other (See Comments)     Unsure       Medication List:   Current Outpatient Medications   Medication Sig Dispense Refill    memantine (NAMENDA) 10 MG tablet Take 1 tablet by mouth Daily.      Vortioxetine HBr (Trintellix) 20 MG tablet Take 1 tablet by mouth Daily.      ampicillin (PRINCIPEN) 500 MG capsule Take 1 capsule by mouth 3 (Three) Times a Day. With a glass of water before meals 30 capsule 0    B-D ULTRAFINE III SHORT PEN 31G X 8 MM misc USE AS DIRECTED TWICE DAILY 100 each 0    cetirizine (EQ Allergy Relief, Cetirizine,) 10 MG tablet Take 0.5 tablets by mouth Daily. 90 tablet 0    Cholecalciferol (Vitamin D3) 50 MCG (2000 UT) capsule Take 1 capsule by mouth Daily. 30 capsule 6    cyanocobalamin ([employer] Vitamin B-12) 2000 MCG tablet Take 1 tablet by mouth Daily. 30 tablet 6    Cyanocobalamin 1000 MCG/ML kit Inject 1 mL as directed Every 7 (Seven) Days. 10 mL 0    ezetimibe (ZETIA) 10 MG tablet Take 1 tablet by mouth Daily. 90 tablet 0    famotidine (Pepcid) 40 MG tablet Take 1 tablet by mouth " Daily. 30 tablet 3    hydrOXYzine pamoate (VISTARIL) 25 MG capsule Take 1-2 capsules by mouth 2 (Two) Times a Day As Needed for Anxiety. 30 capsule 1    Insulin Degludec (Tresiba FlexTouch) 200 UNIT/ML solution pen-injector pen injection Inject 45 Units under the skin into the appropriate area as directed Daily. 27 mL 3    LORazepam (Ativan) 0.5 MG tablet Take 0.5 tablets by mouth Daily As Needed for Anxiety. 15 tablet 0    magnesium gluconate (MAGONATE) 500 MG tablet Take 1 tablet by mouth Every Other Day.      metFORMIN (GLUCOPHAGE) 1000 MG tablet Take 1 tablet by mouth 2 (Two) Times a Day With Meals. 60 tablet 3    pravastatin (PRAVACHOL) 40 MG tablet Take 1 tablet by mouth Daily. 30 tablet 5     No current facility-administered medications for this visit.       Mental Status Exam:   Hygiene:   good  Cooperation:  Cooperative  Eye Contact:  Fair  Psychomotor Behavior:  Restless  Affect:  Appropriate  Hopelessness: Denies  Speech:  Minimal  Thought Process:  Disorganized  Thought Content:  Normal  Suicidal:  None  Homicidal:  None  Hallucinations:  Not demonstrated today  Delusion:  Unable to demonstrate  Memory:  Deficits  Orientation:  Unable to evaluate  Reliability:  poor  Insight:  Poor  Judgement:  Poor  Impulse Control:  Poor  Physical/Medical Issues:  No              Assessment & Plan   Diagnoses and all orders for this visit:    1. Generalized anxiety disorder  -     LORazepam (Ativan) 0.5 MG tablet; Take 0.5 tablets by mouth Daily As Needed for Anxiety.  Dispense: 15 tablet; Refill: 0  -     Vortioxetine HBr (Trintellix) 20 MG tablet; Take 1 tablet by mouth Daily.    2. Current mild episode of major depressive disorder without prior episode  -     Vortioxetine HBr (Trintellix) 20 MG tablet; Take 1 tablet by mouth Daily.    3. Early onset Alzheimer's dementia with behavioral disturbance  -     memantine (NAMENDA) 10 MG tablet; Take 1 tablet by mouth Daily.  -     Ambulatory Referral to Social Care  Services (Amb Case Mgmt)            Discussed medication options with daughter and patient. Start Ativan 0.5mg 0.5tab daily as needed for worsening anxiety. Cont. Namenda 10mg daily for dementia. Cont. Trintellix 20mg daily for depression and anxiety. Referral for  for community based services. Reviewed the risks, benefits, and side effects of the medications; daughter and patient acknowledged and verbally consented. Daughter and patient is agreeable to call the office with any questions, concerns, or worsening of symptoms. Daughter and patient is aware to call 911 or go to the nearest ER should begin having any thoughts to harm self or others.          Follow up in two months            Errors in dictation may reflect use of voice recognition software and not all errors in transcription may have been detected prior to signing.              This document has been electronically signed by ASH Gasca   November 20, 2023 14:22 EST

## 2023-11-07 ENCOUNTER — PATIENT OUTREACH (OUTPATIENT)
Dept: CASE MANAGEMENT | Facility: OTHER | Age: 58
End: 2023-11-07
Payer: MEDICARE

## 2023-11-09 ENCOUNTER — LAB (OUTPATIENT)
Dept: FAMILY MEDICINE CLINIC | Facility: CLINIC | Age: 58
End: 2023-11-09
Payer: MEDICARE

## 2023-11-09 ENCOUNTER — PATIENT OUTREACH (OUTPATIENT)
Dept: CASE MANAGEMENT | Facility: OTHER | Age: 58
End: 2023-11-09
Payer: MEDICARE

## 2023-11-09 DIAGNOSIS — R30.0 DYSURIA: Primary | ICD-10-CM

## 2023-11-09 LAB
BILIRUB BLD-MCNC: NEGATIVE MG/DL
CLARITY, POC: CLEAR
COLOR UR: YELLOW
GLUCOSE UR STRIP-MCNC: ABNORMAL MG/DL
KETONES UR QL: NEGATIVE
LEUKOCYTE EST, POC: ABNORMAL
NITRITE UR-MCNC: NEGATIVE MG/ML
PH UR: 6 [PH] (ref 5–8)
PROT UR STRIP-MCNC: NEGATIVE MG/DL
RBC # UR STRIP: NEGATIVE /UL
SP GR UR: 1.01 (ref 1–1.03)
UROBILINOGEN UR QL: NORMAL

## 2023-11-09 PROCEDURE — 81002 URINALYSIS NONAUTO W/O SCOPE: CPT | Performed by: NURSE PRACTITIONER

## 2023-11-11 DIAGNOSIS — E11.9 TYPE 2 DIABETES MELLITUS TREATED WITH INSULIN: ICD-10-CM

## 2023-11-11 DIAGNOSIS — N30.00 ACUTE CYSTITIS WITHOUT HEMATURIA: Primary | ICD-10-CM

## 2023-11-11 DIAGNOSIS — Z79.4 TYPE 2 DIABETES MELLITUS TREATED WITH INSULIN: ICD-10-CM

## 2023-11-11 LAB
BACTERIA UR CULT: ABNORMAL
BACTERIA UR CULT: ABNORMAL

## 2023-11-11 RX ORDER — AMPICILLIN 500 MG/1
500 CAPSULE ORAL 3 TIMES DAILY
Qty: 30 CAPSULE | Refills: 0 | Status: SHIPPED | OUTPATIENT
Start: 2023-11-11

## 2023-11-11 RX ORDER — FLUCONAZOLE 150 MG/1
150 TABLET ORAL DAILY
Qty: 3 TABLET | Refills: 0 | Status: SHIPPED | OUTPATIENT
Start: 2023-11-11 | End: 2023-11-14

## 2023-11-14 ENCOUNTER — PATIENT OUTREACH (OUTPATIENT)
Dept: CASE MANAGEMENT | Facility: OTHER | Age: 58
End: 2023-11-14
Payer: MEDICARE

## 2023-11-14 NOTE — OUTREACH NOTE
Patient Outreach    SW placed referral for pt to [facility] in [place]. SW spoke with pt daughter who requested it. SW will F/u within 1-2 days to ensure referral was received and coordinate with facility further.     Madie GONZALEZ -   Ambulatory Case Management    11/14/2023, 12:08 EST

## 2023-11-16 ENCOUNTER — PATIENT OUTREACH (OUTPATIENT)
Dept: CASE MANAGEMENT | Facility: OTHER | Age: 58
End: 2023-11-16
Payer: MEDICARE

## 2023-11-16 NOTE — OUTREACH NOTE
Patient Outreach  UTRx1.     Madie GONZALEZ -   Ambulatory Case Management    11/16/2023, 10:01 EST

## 2023-11-21 ENCOUNTER — PATIENT OUTREACH (OUTPATIENT)
Dept: CASE MANAGEMENT | Facility: OTHER | Age: 58
End: 2023-11-21
Payer: MEDICARE

## 2023-11-21 DIAGNOSIS — F41.1 GENERALIZED ANXIETY DISORDER: ICD-10-CM

## 2023-11-21 RX ORDER — LORAZEPAM 0.5 MG/1
0.25 TABLET ORAL 3 TIMES DAILY PRN
Qty: 45 TABLET | Refills: 0 | Status: ON HOLD | OUTPATIENT
Start: 2023-11-21

## 2023-11-21 NOTE — OUTREACH NOTE
Patient Outreach    SW spoke with pt daughter to F/u on the referral made to [facility]. Pt daughter stated she has not heard from the facility. SW stated she would call to inquire about the status of the referral. Pt daughter expressed thanks.     Care Coordination    SW placed a call to [facility] admission's department, and was sent to [employer], where SW left a message stating reason for the call and requesting a call back. HERMINIA will F/u tomorrow.     Madie GONZALEZ -   Ambulatory Case Management    11/21/2023, 09:54 EST

## 2023-11-21 NOTE — PROGRESS NOTES
11/21/23 at 3:55pm Contacted Daughter regarding patient. Daughter states patient is doing well with dose in the evening and getting good rest for four or five hours however having a problem with nervousness and restlessness during the day around 2-3 pm. Daughter states she is unable to sit still and constantly moving and restless. Daughter denies any agitation or aggression. Daughter states they did give her a whole pill during the day and didn't notice any difference. Discussed with daughter will increase lorazepam to 0.5mg 0.5 tab three times daily as needed for anxiety. Discussed with daughter and patient risks, benefits, and side effects of medication. Daughter and patient acknowledged and agreed. Daughter and patient is aware to contact office if have any questions or concerns. Daughter and patient aware to call 911 or go directly to ER if begin having any thoughts to harm self or others.

## 2023-11-22 ENCOUNTER — TELEPHONE (OUTPATIENT)
Dept: FAMILY MEDICINE CLINIC | Facility: CLINIC | Age: 58
End: 2023-11-22

## 2023-11-22 NOTE — TELEPHONE ENCOUNTER
Caller: [facility] [address] [place] [address] - [phone]  - [phone]    Relationship: Pharmacy    Best call back number:  [phone]    Which medication are you concerned about:     LORazepam (Ativan) 0.5 MG tablet L    What are your concerns:        PHARMACY STATED AN EARLY REFILL OVERRIDE REQUEST IS  NEEDED  PHARMACY STATES SHE CAN REFILL 11/26/23     PATIENT WANTS TO REFILL EARLY BECAUSE DOCTOR INCREASED THE DOSE

## 2023-11-25 ENCOUNTER — HOSPITAL ENCOUNTER (EMERGENCY)
Facility: HOSPITAL | Age: 58
Discharge: STILL ADMITTED OR ADMINISTRATIVE | DRG: 057 | End: 2023-11-25
Attending: STUDENT IN AN ORGANIZED HEALTH CARE EDUCATION/TRAINING PROGRAM
Payer: MEDICARE

## 2023-11-25 ENCOUNTER — HOSPITAL ENCOUNTER (INPATIENT)
Facility: HOSPITAL | Age: 58
LOS: 3 days | Discharge: HOME | DRG: 057 | End: 2023-11-28
Attending: PSYCHIATRY & NEUROLOGY | Admitting: PSYCHIATRY & NEUROLOGY
Payer: MEDICARE

## 2023-11-25 VITALS
BODY MASS INDEX: 20.73 KG/M2 | OXYGEN SATURATION: 100 % | DIASTOLIC BLOOD PRESSURE: 79 MMHG | SYSTOLIC BLOOD PRESSURE: 117 MMHG | RESPIRATION RATE: 18 BRPM | HEIGHT: 69 IN | WEIGHT: 140 LBS | HEART RATE: 89 BPM | TEMPERATURE: 97.4 F

## 2023-11-25 DIAGNOSIS — E83.42 HYPOMAGNESEMIA: ICD-10-CM

## 2023-11-25 DIAGNOSIS — F03.918 DEMENTIA WITH BEHAVIORAL DISTURBANCE: Primary | ICD-10-CM

## 2023-11-25 DIAGNOSIS — F41.9 ANXIETY AND DEPRESSION: Primary | ICD-10-CM

## 2023-11-25 DIAGNOSIS — F32.A ANXIETY AND DEPRESSION: Primary | ICD-10-CM

## 2023-11-25 LAB
ALBUMIN SERPL-MCNC: 4.9 G/DL (ref 3.5–5.2)
ALBUMIN/GLOB SERPL: 1.5 G/DL
ALP SERPL-CCNC: 88 U/L (ref 39–117)
ALT SERPL W P-5'-P-CCNC: 14 U/L (ref 1–33)
AMPHET+METHAMPHET UR QL: NEGATIVE
AMPHETAMINES UR QL: NEGATIVE
ANION GAP SERPL CALCULATED.3IONS-SCNC: 12.7 MMOL/L (ref 5–15)
AST SERPL-CCNC: 19 U/L (ref 1–32)
BACTERIA UR QL AUTO: ABNORMAL /HPF
BARBITURATES UR QL SCN: NEGATIVE
BASOPHILS # BLD AUTO: 0.05 10*3/MM3 (ref 0–0.2)
BASOPHILS NFR BLD AUTO: 0.6 % (ref 0–1.5)
BENZODIAZ UR QL SCN: NEGATIVE
BILIRUB SERPL-MCNC: 0.5 MG/DL (ref 0–1.2)
BILIRUB UR QL STRIP: NEGATIVE
BUN SERPL-MCNC: 15 MG/DL (ref 6–20)
BUN/CREAT SERPL: 15.6 (ref 7–25)
BUPRENORPHINE SERPL-MCNC: NEGATIVE NG/ML
CALCIUM SPEC-SCNC: 10 MG/DL (ref 8.6–10.5)
CANNABINOIDS SERPL QL: NEGATIVE
CHLORIDE SERPL-SCNC: 98 MMOL/L (ref 98–107)
CLARITY UR: CLEAR
CO2 SERPL-SCNC: 25.3 MMOL/L (ref 22–29)
COCAINE UR QL: NEGATIVE
COLOR UR: YELLOW
CREAT SERPL-MCNC: 0.96 MG/DL (ref 0.57–1)
DEPRECATED RDW RBC AUTO: 39.8 FL (ref 37–54)
EGFRCR SERPLBLD CKD-EPI 2021: 68.3 ML/MIN/1.73
EOSINOPHIL # BLD AUTO: 0.23 10*3/MM3 (ref 0–0.4)
EOSINOPHIL NFR BLD AUTO: 2.6 % (ref 0.3–6.2)
ERYTHROCYTE [DISTWIDTH] IN BLOOD BY AUTOMATED COUNT: 12.2 % (ref 12.3–15.4)
ETHANOL BLD-MCNC: <10 MG/DL (ref 0–10)
ETHANOL UR QL: <0.01 %
FENTANYL UR-MCNC: NEGATIVE NG/ML
GLOBULIN UR ELPH-MCNC: 3.2 GM/DL
GLUCOSE SERPL-MCNC: 219 MG/DL (ref 65–99)
GLUCOSE UR STRIP-MCNC: ABNORMAL MG/DL
HCT VFR BLD AUTO: 41.8 % (ref 34–46.6)
HGB BLD-MCNC: 14.4 G/DL (ref 12–15.9)
HGB UR QL STRIP.AUTO: NEGATIVE
HOLD SPECIMEN: NORMAL
HYALINE CASTS UR QL AUTO: ABNORMAL /LPF
IMM GRANULOCYTES # BLD AUTO: 0.03 10*3/MM3 (ref 0–0.05)
IMM GRANULOCYTES NFR BLD AUTO: 0.3 % (ref 0–0.5)
KETONES UR QL STRIP: NEGATIVE
LEUKOCYTE ESTERASE UR QL STRIP.AUTO: ABNORMAL
LYMPHOCYTES # BLD AUTO: 2.27 10*3/MM3 (ref 0.7–3.1)
LYMPHOCYTES NFR BLD AUTO: 25.5 % (ref 19.6–45.3)
MAGNESIUM SERPL-MCNC: 1.2 MG/DL (ref 1.6–2.6)
MCH RBC QN AUTO: 31.2 PG (ref 26.6–33)
MCHC RBC AUTO-ENTMCNC: 34.4 G/DL (ref 31.5–35.7)
MCV RBC AUTO: 90.7 FL (ref 79–97)
METHADONE UR QL SCN: NEGATIVE
MONOCYTES # BLD AUTO: 0.54 10*3/MM3 (ref 0.1–0.9)
MONOCYTES NFR BLD AUTO: 6.1 % (ref 5–12)
NEUTROPHILS NFR BLD AUTO: 5.79 10*3/MM3 (ref 1.7–7)
NEUTROPHILS NFR BLD AUTO: 64.9 % (ref 42.7–76)
NITRITE UR QL STRIP: NEGATIVE
NRBC BLD AUTO-RTO: 0 /100 WBC (ref 0–0.2)
OPIATES UR QL: NEGATIVE
OXYCODONE UR QL SCN: NEGATIVE
PCP UR QL SCN: NEGATIVE
PH UR STRIP.AUTO: 5.5 [PH] (ref 5–8)
PLATELET # BLD AUTO: 286 10*3/MM3 (ref 140–450)
PMV BLD AUTO: 10.3 FL (ref 6–12)
POTASSIUM SERPL-SCNC: 4.2 MMOL/L (ref 3.5–5.2)
PROT SERPL-MCNC: 8.1 G/DL (ref 6–8.5)
PROT UR QL STRIP: NEGATIVE
RBC # BLD AUTO: 4.61 10*6/MM3 (ref 3.77–5.28)
RBC # UR STRIP: ABNORMAL /HPF
REF LAB TEST METHOD: ABNORMAL
SODIUM SERPL-SCNC: 136 MMOL/L (ref 136–145)
SP GR UR STRIP: 1.02 (ref 1–1.03)
SQUAMOUS #/AREA URNS HPF: ABNORMAL /HPF
TRICYCLICS UR QL SCN: NEGATIVE
UROBILINOGEN UR QL STRIP: ABNORMAL
WBC # UR STRIP: ABNORMAL /HPF
WBC NRBC COR # BLD AUTO: 8.91 10*3/MM3 (ref 3.4–10.8)
WHOLE BLOOD HOLD COAG: NORMAL
WHOLE BLOOD HOLD SPECIMEN: NORMAL

## 2023-11-25 PROCEDURE — 93005 ELECTROCARDIOGRAM TRACING: CPT | Performed by: PSYCHIATRY & NEUROLOGY

## 2023-11-25 PROCEDURE — 93010 ELECTROCARDIOGRAM REPORT: CPT | Performed by: INTERNAL MEDICINE

## 2023-11-25 PROCEDURE — 82077 ASSAY SPEC XCP UR&BREATH IA: CPT | Performed by: PHYSICIAN ASSISTANT

## 2023-11-25 PROCEDURE — 99285 EMERGENCY DEPT VISIT HI MDM: CPT

## 2023-11-25 PROCEDURE — 83735 ASSAY OF MAGNESIUM: CPT | Performed by: PHYSICIAN ASSISTANT

## 2023-11-25 PROCEDURE — 80053 COMPREHEN METABOLIC PANEL: CPT | Performed by: PHYSICIAN ASSISTANT

## 2023-11-25 PROCEDURE — 85025 COMPLETE CBC W/AUTO DIFF WBC: CPT | Performed by: PHYSICIAN ASSISTANT

## 2023-11-25 PROCEDURE — 80307 DRUG TEST PRSMV CHEM ANLYZR: CPT | Performed by: PHYSICIAN ASSISTANT

## 2023-11-25 PROCEDURE — 81001 URINALYSIS AUTO W/SCOPE: CPT | Performed by: PHYSICIAN ASSISTANT

## 2023-11-25 PROCEDURE — 36415 COLL VENOUS BLD VENIPUNCTURE: CPT

## 2023-11-25 RX ORDER — LOPERAMIDE HYDROCHLORIDE 2 MG/1
2 CAPSULE ORAL
Status: DISCONTINUED | OUTPATIENT
Start: 2023-11-25 | End: 2023-11-28 | Stop reason: HOSPADM

## 2023-11-25 RX ORDER — ACETAMINOPHEN 325 MG/1
650 TABLET ORAL EVERY 6 HOURS PRN
Status: DISCONTINUED | OUTPATIENT
Start: 2023-11-25 | End: 2023-11-28 | Stop reason: HOSPADM

## 2023-11-25 RX ORDER — HYDROXYZINE 50 MG/1
50 TABLET, FILM COATED ORAL EVERY 6 HOURS PRN
Status: DISCONTINUED | OUTPATIENT
Start: 2023-11-25 | End: 2023-11-28 | Stop reason: HOSPADM

## 2023-11-25 RX ORDER — OLANZAPINE 5 MG/1
5 TABLET ORAL NIGHTLY
Status: COMPLETED | OUTPATIENT
Start: 2023-11-25 | End: 2023-11-25

## 2023-11-25 RX ORDER — IBUPROFEN 400 MG/1
400 TABLET ORAL EVERY 6 HOURS PRN
Status: DISCONTINUED | OUTPATIENT
Start: 2023-11-25 | End: 2023-11-28 | Stop reason: HOSPADM

## 2023-11-25 RX ORDER — ALUMINA, MAGNESIA, AND SIMETHICONE 2400; 2400; 240 MG/30ML; MG/30ML; MG/30ML
15 SUSPENSION ORAL EVERY 6 HOURS PRN
Status: DISCONTINUED | OUTPATIENT
Start: 2023-11-25 | End: 2023-11-28 | Stop reason: HOSPADM

## 2023-11-25 RX ORDER — BENZTROPINE MESYLATE 1 MG/1
2 TABLET ORAL ONCE AS NEEDED
Status: DISCONTINUED | OUTPATIENT
Start: 2023-11-25 | End: 2023-11-28 | Stop reason: HOSPADM

## 2023-11-25 RX ORDER — FAMOTIDINE 20 MG/1
40 TABLET, FILM COATED ORAL DAILY
Status: DISCONTINUED | OUTPATIENT
Start: 2023-11-26 | End: 2023-11-28 | Stop reason: HOSPADM

## 2023-11-25 RX ORDER — LORAZEPAM 0.5 MG/1
0.25 TABLET ORAL 3 TIMES DAILY PRN
Status: DISCONTINUED | OUTPATIENT
Start: 2023-11-25 | End: 2023-11-26

## 2023-11-25 RX ORDER — FAMOTIDINE 20 MG/1
20 TABLET, FILM COATED ORAL 2 TIMES DAILY PRN
Status: DISCONTINUED | OUTPATIENT
Start: 2023-11-25 | End: 2023-11-28 | Stop reason: HOSPADM

## 2023-11-25 RX ORDER — CYANOCOBALAMIN 1000 UG/ML
1000 INJECTION, SOLUTION INTRAMUSCULAR; SUBCUTANEOUS
Status: DISCONTINUED | OUTPATIENT
Start: 2023-11-26 | End: 2023-11-28 | Stop reason: HOSPADM

## 2023-11-25 RX ORDER — TRAZODONE HYDROCHLORIDE 50 MG/1
50 TABLET ORAL NIGHTLY PRN
Status: DISCONTINUED | OUTPATIENT
Start: 2023-11-25 | End: 2023-11-28 | Stop reason: HOSPADM

## 2023-11-25 RX ORDER — CETIRIZINE HYDROCHLORIDE 10 MG/1
5 TABLET ORAL DAILY
Status: DISCONTINUED | OUTPATIENT
Start: 2023-11-26 | End: 2023-11-28 | Stop reason: HOSPADM

## 2023-11-25 RX ORDER — LANOLIN ALCOHOL/MO/W.PET/CERES
2000 CREAM (GRAM) TOPICAL DAILY
Status: DISCONTINUED | OUTPATIENT
Start: 2023-11-26 | End: 2023-11-28 | Stop reason: HOSPADM

## 2023-11-25 RX ORDER — BENZTROPINE MESYLATE 1 MG/ML
1 INJECTION INTRAMUSCULAR; INTRAVENOUS ONCE AS NEEDED
Status: DISCONTINUED | OUTPATIENT
Start: 2023-11-25 | End: 2023-11-28 | Stop reason: HOSPADM

## 2023-11-25 RX ORDER — PRAVASTATIN SODIUM 40 MG
40 TABLET ORAL DAILY
Status: DISCONTINUED | OUTPATIENT
Start: 2023-11-26 | End: 2023-11-28 | Stop reason: HOSPADM

## 2023-11-25 RX ORDER — BENZONATATE 100 MG/1
100 CAPSULE ORAL 3 TIMES DAILY PRN
Status: DISCONTINUED | OUTPATIENT
Start: 2023-11-25 | End: 2023-11-28 | Stop reason: HOSPADM

## 2023-11-25 RX ORDER — ONDANSETRON 4 MG/1
4 TABLET, FILM COATED ORAL EVERY 6 HOURS PRN
Status: DISCONTINUED | OUTPATIENT
Start: 2023-11-25 | End: 2023-11-28 | Stop reason: HOSPADM

## 2023-11-25 RX ORDER — CYANOCOBALAMIN 1000 UG/ML
1000 INJECTION, SOLUTION INTRAMUSCULAR; SUBCUTANEOUS
Status: CANCELLED | OUTPATIENT
Start: 2023-11-27

## 2023-11-25 RX ORDER — ECHINACEA PURPUREA EXTRACT 125 MG
2 TABLET ORAL AS NEEDED
Status: DISCONTINUED | OUTPATIENT
Start: 2023-11-25 | End: 2023-11-28 | Stop reason: HOSPADM

## 2023-11-25 RX ORDER — MEMANTINE HYDROCHLORIDE 10 MG/1
10 TABLET ORAL DAILY
Status: DISCONTINUED | OUTPATIENT
Start: 2023-11-26 | End: 2023-11-28 | Stop reason: HOSPADM

## 2023-11-25 RX ADMIN — TRAZODONE HYDROCHLORIDE 50 MG: 50 TABLET ORAL at 21:59

## 2023-11-25 RX ADMIN — OLANZAPINE 5 MG: 5 TABLET, FILM COATED ORAL at 22:48

## 2023-11-25 RX ADMIN — MAGNESIUM GLUCONATE 500 MG ORAL TABLET 800 MG: 500 TABLET ORAL at 16:56

## 2023-11-25 RX ADMIN — HYDROXYZINE HYDROCHLORIDE 50 MG: 50 TABLET ORAL at 21:59

## 2023-11-25 RX ADMIN — LORAZEPAM 0.25 MG: 0.5 TABLET ORAL at 22:13

## 2023-11-25 NOTE — ED PROVIDER NOTES
Subjective   History of Present Illness  57-year-old female who presents to the ED today with her daughter for a mental health evaluation.  The patient does have a history of dementia.  The patient's daughter reports that her behaviors have gotten worse over the last 2 weeks.  She is wanting to wander day and night.  She will not eat or sleep.  Her mind seems to be racing and she has been having increasing aggressive behaviors towards her family.  The patient denies any suicidal or homicidal ideations at this time.  There is no concern for drug or alcohol use.    History provided by:  Patient and relative (daughter)  Mental Health Problem  Presenting symptoms: aggressive behavior and agitation    Presenting symptoms: no suicidal thoughts    Patient accompanied by:  Family member  Degree of incapacity (severity):  Moderate  Onset quality:  Gradual  Duration:  2 weeks  Timing:  Constant  Progression:  Worsening  Chronicity:  New  Context: not alcohol use and not drug abuse    Relieved by:  Nothing  Worsened by:  Nothing  Associated symptoms: appetite change, insomnia and irritability    Risk factors: neurological disease        Review of Systems   Constitutional:  Positive for appetite change and irritability.   HENT: Negative.     Eyes: Negative.    Respiratory: Negative.     Cardiovascular: Negative.    Gastrointestinal: Negative.    Genitourinary: Negative.    Musculoskeletal: Negative.    Skin: Negative.    Neurological: Negative.    Psychiatric/Behavioral:  Positive for agitation, behavioral problems, confusion and sleep disturbance. Negative for suicidal ideas. The patient has insomnia.    All other systems reviewed and are negative.      Past Medical History:   Diagnosis Date    Bursitis     Dementia     Diabetic acetonemia     Fatigue     Fibromyalgia     Postsurgical menopause     Tetanus toxoid vaccination status unknown     Visual impairment     Vitamin D deficiency        Allergies   Allergen Reactions     Lantus [Insulin Glargine] Confusion    Codeine Itching    Exenatide Nausea And Vomiting     Intolerant    Sitagliptin Other (See Comments)     Unsure       Past Surgical History:   Procedure Laterality Date    BREAST BIOPSY Left 7-8 yrs ago    benign    CHOLECYSTECTOMY      HYSTERECTOMY      40s    KNEE ARTHROSCOPY W/ MENISCAL REPAIR Left        Family History   Problem Relation Age of Onset    Diabetes Other     Asthma Other     Hypertension Other     Diabetes Mother     Heart disease Mother     Hypertension Mother     Hyperlipidemia Mother     Arthritis Mother     Diabetes Father     Heart disease Father     Diabetes Sister     Cancer Sister     Diabetes Brother        Social History     Socioeconomic History    Marital status:      Spouse name: alvaro    Number of children: 5    Years of education: 12   Tobacco Use    Smoking status: Former     Types: Cigarettes     Quit date: 1996     Years since quittin.5     Passive exposure: Past    Smokeless tobacco: Never   Vaping Use    Vaping Use: Never used   Substance and Sexual Activity    Alcohol use: No    Drug use: No    Sexual activity: Yes     Partners: Male           Objective   Physical Exam  Vitals and nursing note reviewed.   Constitutional:       General: She is not in acute distress.     Appearance: Normal appearance.   HENT:      Head: Normocephalic and atraumatic.      Right Ear: External ear normal.      Left Ear: External ear normal.      Nose: Nose normal.   Eyes:      Conjunctiva/sclera: Conjunctivae normal.      Pupils: Pupils are equal, round, and reactive to light.   Cardiovascular:      Rate and Rhythm: Normal rate and regular rhythm.      Pulses: Normal pulses.      Heart sounds: Normal heart sounds.   Pulmonary:      Effort: Pulmonary effort is normal.      Breath sounds: Normal breath sounds.   Abdominal:      General: Bowel sounds are normal.      Palpations: Abdomen is soft.   Musculoskeletal:         General: Normal range of  motion.      Cervical back: Normal range of motion and neck supple.   Skin:     General: Skin is warm and dry.      Capillary Refill: Capillary refill takes less than 2 seconds.   Neurological:      General: No focal deficit present.      Mental Status: She is alert. She is disoriented and confused.   Psychiatric:         Mood and Affect: Mood is anxious.         Behavior: Behavior is cooperative.         Thought Content: Thought content does not include homicidal or suicidal ideation.         Procedures       Results for orders placed or performed during the hospital encounter of 11/25/23   Comprehensive Metabolic Panel    Specimen: Arm, Left; Blood   Result Value Ref Range    Glucose 219 (H) 65 - 99 mg/dL    BUN 15 6 - 20 mg/dL    Creatinine 0.96 0.57 - 1.00 mg/dL    Sodium 136 136 - 145 mmol/L    Potassium 4.2 3.5 - 5.2 mmol/L    Chloride 98 98 - 107 mmol/L    CO2 25.3 22.0 - 29.0 mmol/L    Calcium 10.0 8.6 - 10.5 mg/dL    Total Protein 8.1 6.0 - 8.5 g/dL    Albumin 4.9 3.5 - 5.2 g/dL    ALT (SGPT) 14 1 - 33 U/L    AST (SGOT) 19 1 - 32 U/L    Alkaline Phosphatase 88 39 - 117 U/L    Total Bilirubin 0.5 0.0 - 1.2 mg/dL    Globulin 3.2 gm/dL    A/G Ratio 1.5 g/dL    BUN/Creatinine Ratio 15.6 7.0 - 25.0    Anion Gap 12.7 5.0 - 15.0 mmol/L    eGFR 68.3 >60.0 mL/min/1.73   Urinalysis With Microscopic If Indicated (No Culture) - Urine, Clean Catch    Specimen: Urine, Clean Catch   Result Value Ref Range    Color, UA Yellow Yellow, Straw    Appearance, UA Clear Clear    pH, UA 5.5 5.0 - 8.0    Specific Gravity, UA 1.023 1.005 - 1.030    Glucose, UA >=1000 mg/dL (3+) (A) Negative    Ketones, UA Negative Negative    Bilirubin, UA Negative Negative    Blood, UA Negative Negative    Protein, UA Negative Negative    Leuk Esterase, UA Small (1+) (A) Negative    Nitrite, UA Negative Negative    Urobilinogen, UA 0.2 E.U./dL 0.2 - 1.0 E.U./dL   Ethanol    Specimen: Arm, Left; Blood   Result Value Ref Range    Ethanol <10 0 - 10  mg/dL    Ethanol % <0.010 %   Urine Drug Screen - Urine, Clean Catch    Specimen: Urine, Clean Catch   Result Value Ref Range    THC, Screen, Urine Negative Negative    Phencyclidine (PCP), Urine Negative Negative    Cocaine Screen, Urine Negative Negative    Methamphetamine, Ur Negative Negative    Opiate Screen Negative Negative    Amphetamine Screen, Urine Negative Negative    Benzodiazepine Screen, Urine Negative Negative    Tricyclic Antidepressants Screen Negative Negative    Methadone Screen, Urine Negative Negative    Barbiturates Screen, Urine Negative Negative    Oxycodone Screen, Urine Negative Negative    Buprenorphine, Screen, Urine Negative Negative   Magnesium    Specimen: Arm, Left; Blood   Result Value Ref Range    Magnesium 1.2 (L) 1.6 - 2.6 mg/dL   CBC Auto Differential    Specimen: Arm, Left; Blood   Result Value Ref Range    WBC 8.91 3.40 - 10.80 10*3/mm3    RBC 4.61 3.77 - 5.28 10*6/mm3    Hemoglobin 14.4 12.0 - 15.9 g/dL    Hematocrit 41.8 34.0 - 46.6 %    MCV 90.7 79.0 - 97.0 fL    MCH 31.2 26.6 - 33.0 pg    MCHC 34.4 31.5 - 35.7 g/dL    RDW 12.2 (L) 12.3 - 15.4 %    RDW-SD 39.8 37.0 - 54.0 fl    MPV 10.3 6.0 - 12.0 fL    Platelets 286 140 - 450 10*3/mm3    Neutrophil % 64.9 42.7 - 76.0 %    Lymphocyte % 25.5 19.6 - 45.3 %    Monocyte % 6.1 5.0 - 12.0 %    Eosinophil % 2.6 0.3 - 6.2 %    Basophil % 0.6 0.0 - 1.5 %    Immature Grans % 0.3 0.0 - 0.5 %    Neutrophils, Absolute 5.79 1.70 - 7.00 10*3/mm3    Lymphocytes, Absolute 2.27 0.70 - 3.10 10*3/mm3    Monocytes, Absolute 0.54 0.10 - 0.90 10*3/mm3    Eosinophils, Absolute 0.23 0.00 - 0.40 10*3/mm3    Basophils, Absolute 0.05 0.00 - 0.20 10*3/mm3    Immature Grans, Absolute 0.03 0.00 - 0.05 10*3/mm3    nRBC 0.0 0.0 - 0.2 /100 WBC   Fentanyl, Urine - Urine, Clean Catch    Specimen: Urine, Clean Catch   Result Value Ref Range    Fentanyl, Urine Negative Negative   Urinalysis, Microscopic Only - Urine, Clean Catch    Specimen: Urine, Clean Catch    Result Value Ref Range    RBC, UA 0-2 None Seen, 0-2 /HPF    WBC, UA 6-10 (A) None Seen, 0-2 /HPF    Bacteria, UA None Seen None Seen /HPF    Squamous Epithelial Cells, UA 3-6 (A) None Seen, 0-2 /HPF    Hyaline Casts, UA 3-6 None Seen /LPF    Methodology Automated Microscopy    [place] Urine Culture Tube - Urine, Clean Catch    Specimen: Urine, Clean Catch   Result Value Ref Range    Extra Tube Hold for add-ons.    Green Top (Gel)   Result Value Ref Range    Extra Tube Hold for add-ons.    Lavender Top   Result Value Ref Range    Extra Tube hold for add-on    Gold Top - SST   Result Value Ref Range    Extra Tube Hold for add-ons.    Light Blue Top   Result Value Ref Range    Extra Tube Hold for add-ons.           ED Course  ED Course as of 11/25/23 1824   Sat Nov 25, 2023   1652 Medically clear for psych [AH]      ED Course User Index  [AH] Alexandra Corey PA                                           Medical Decision Making  57-year-old female who presents to the ED today for mental health evaluation.  She was medically cleared for the evaluation.  Psychiatry was consulted and she will be admitted.    Problems Addressed:  Dementia with behavioral disturbance: complicated acute illness or injury  Hypomagnesemia: complicated acute illness or injury    Amount and/or Complexity of Data Reviewed  Labs: ordered.    Risk  OTC drugs.        Final diagnoses:   Dementia with behavioral disturbance   Hypomagnesemia       ED Disposition  ED Disposition       ED Disposition   DC/Transfer to Behavioral Health    Condition   Stable    Comment   --               No follow-up provider specified.       Medication List      No changes were made to your prescriptions during this visit.            Alexandra Corey PA  11/25/23 1824

## 2023-11-25 NOTE — NURSING NOTE
daughter Brenda Norton remains with pt at this time. She report that she has tried to get her mother into a nursing home and has tried and is not here for that. She just wants her to be able to rest and not be so restless all the time and is hoping being her and some type of medications will help her mother. Emotional support provided to pt.

## 2023-11-25 NOTE — NURSING NOTE
Called and spoke to Dr. Rodriguez. Intake information and family concern provided to him. Labs discussed. Instructed ok to admit and due to the milieu on senior right now and her current state he would like to admit her upstairs on adult psych with regular adult admission orders. Admit to adult with routine orders. [id number]

## 2023-11-25 NOTE — NURSING NOTE
Patient brought in today by her daughter daughter Brenda Norton  . She reports that she is also her legal POA. She states that for the last three or four years now her mother has been diagnoses with early onset dementia. She sees Kav[facility] Mueller outpatient for medical and also sees ivy schwab for mental health and she recently prescribed her ativan. They tried the low dose and increased it but still there has been no difference and her mothers behavior has gotten worse and she is just not sure she can keep her safe right now. She states that she never stops moving and is wandering all hours of the day and night. She states that she has been wild. Is not sleeping, pacing. Mind racing non stop. Balling up her fists, and they got called by the neighbors that she was out again. When they try redirect her she gets aggressive with them. Threatening and for the past two weeks she has been worse and non stop. They report that a family member works here and suggested that they bring her here to get her on the right medications. Daughter wants her admitted to psych and see if they can help her. And also stated to them that she just wanted to end it. Pt denies any active SI HI or AVH. Patient only alert to her name at this time. No hx of etoh or drug use reported.

## 2023-11-25 NOTE — PROGRESS NOTES
11/25/23 at 2:58 pm Contacted [facility] pharmacy regarding medication. Pharmacy staff stated even with the increase in dose insurance will not cover prescription until tomorrow.       11/25/23 at 3:05pm Contacted patient's daughter Emma (POA) regarding medication and informed her of what pharmacy stated with medication. Daughter states she is taking patient to hospital due to anxiety, safety, and behavior past three days. Daughter states it has taken multiple people and still having difficulties keeping her safe. Discussed with patient to notify office with any changes.

## 2023-11-26 LAB
GLUCOSE BLDC GLUCOMTR-MCNC: 109 MG/DL (ref 70–130)
GLUCOSE BLDC GLUCOMTR-MCNC: 127 MG/DL (ref 70–130)
GLUCOSE BLDC GLUCOMTR-MCNC: 298 MG/DL (ref 70–130)
GLUCOSE BLDC GLUCOMTR-MCNC: 371 MG/DL (ref 70–130)
MAGNESIUM SERPL-MCNC: 1.9 MG/DL (ref 1.6–2.6)
QT INTERVAL: 384 MS
QTC INTERVAL: 467 MS

## 2023-11-26 PROCEDURE — 25010000002 CYANOCOBALAMIN PER 1000 MCG: Performed by: PSYCHIATRY & NEUROLOGY

## 2023-11-26 PROCEDURE — 83735 ASSAY OF MAGNESIUM: CPT | Performed by: PSYCHIATRY & NEUROLOGY

## 2023-11-26 PROCEDURE — 99223 1ST HOSP IP/OBS HIGH 75: CPT | Performed by: PSYCHIATRY & NEUROLOGY

## 2023-11-26 PROCEDURE — 82948 REAGENT STRIP/BLOOD GLUCOSE: CPT

## 2023-11-26 PROCEDURE — 63710000001 INSULIN LISPRO (HUMAN) PER 5 UNITS: Performed by: PSYCHIATRY & NEUROLOGY

## 2023-11-26 PROCEDURE — 63710000001 INSULIN GLARGINE PER 5 UNITS: Performed by: PSYCHIATRY & NEUROLOGY

## 2023-11-26 RX ORDER — INSULIN LISPRO 100 [IU]/ML
2-9 INJECTION, SOLUTION INTRAVENOUS; SUBCUTANEOUS
Status: DISCONTINUED | OUTPATIENT
Start: 2023-11-26 | End: 2023-11-28 | Stop reason: HOSPADM

## 2023-11-26 RX ORDER — NICOTINE POLACRILEX 4 MG
15 LOZENGE BUCCAL
Status: DISCONTINUED | OUTPATIENT
Start: 2023-11-26 | End: 2023-11-28 | Stop reason: HOSPADM

## 2023-11-26 RX ORDER — OLANZAPINE 5 MG/1
5 TABLET ORAL NIGHTLY
Status: DISCONTINUED | OUTPATIENT
Start: 2023-11-26 | End: 2023-11-28

## 2023-11-26 RX ORDER — DEXTROSE MONOHYDRATE 25 G/50ML
25 INJECTION, SOLUTION INTRAVENOUS
Status: DISCONTINUED | OUTPATIENT
Start: 2023-11-26 | End: 2023-11-28 | Stop reason: HOSPADM

## 2023-11-26 RX ADMIN — MAGNESIUM GLUCONATE 500 MG ORAL TABLET 200 MG: 500 TABLET ORAL at 08:22

## 2023-11-26 RX ADMIN — INSULIN GLARGINE 35 UNITS: 100 INJECTION, SOLUTION SUBCUTANEOUS at 08:22

## 2023-11-26 RX ADMIN — HYDROXYZINE HYDROCHLORIDE 50 MG: 50 TABLET ORAL at 20:25

## 2023-11-26 RX ADMIN — Medication 2000 MCG: at 08:22

## 2023-11-26 RX ADMIN — MEMANTINE HYDROCHLORIDE 10 MG: 10 TABLET, FILM COATED ORAL at 08:22

## 2023-11-26 RX ADMIN — FAMOTIDINE 40 MG: 20 TABLET, FILM COATED ORAL at 08:22

## 2023-11-26 RX ADMIN — CETIRIZINE HYDROCHLORIDE 5 MG: 10 TABLET, FILM COATED ORAL at 08:22

## 2023-11-26 RX ADMIN — METFORMIN HYDROCHLORIDE 1000 MG: 500 TABLET ORAL at 17:07

## 2023-11-26 RX ADMIN — METFORMIN HYDROCHLORIDE 1000 MG: 500 TABLET ORAL at 08:22

## 2023-11-26 RX ADMIN — TRAZODONE HYDROCHLORIDE 50 MG: 50 TABLET ORAL at 20:25

## 2023-11-26 RX ADMIN — INSULIN LISPRO 6 UNITS: 100 INJECTION, SOLUTION INTRAVENOUS; SUBCUTANEOUS at 13:01

## 2023-11-26 RX ADMIN — CYANOCOBALAMIN 1000 MCG: 1000 INJECTION, SOLUTION INTRAMUSCULAR at 08:24

## 2023-11-26 RX ADMIN — PRAVASTATIN SODIUM 40 MG: 40 TABLET ORAL at 08:22

## 2023-11-26 RX ADMIN — OLANZAPINE 5 MG: 5 TABLET, FILM COATED ORAL at 20:25

## 2023-11-26 NOTE — PLAN OF CARE
Goal Outcome Evaluation:  Plan of Care Reviewed With: patient  Patient Agreement with Plan of Care: unable to participate     Progress: no change  Outcome Evaluation: Patient continues to be confused. Patient oriented to self only. Patient denies anxiety, depression, SI/HI or AVH. Patient was placed on falls today and care plan updated d/t orhostatic hypotension. Patient is easily redirectable at present time.

## 2023-11-26 NOTE — NURSING NOTE
Contacted Dr. Rodriguez regarding increased behaviors of confusion, agitation, attempting to push on the exit doors. New orders noted, zyprexa 5 mg, one time dose.

## 2023-11-26 NOTE — H&P
INITIAL PSYCHIATRIC HISTORY & PHYSICAL    Patient Identification:  Name:   Sandra Leahy  Age:   57 y.o.  Sex:   female  :   1965  MRN:   [id number]  Visit Number:   [id number]  Primary Care Physician:   John Mueller APRN    SUBJECTIVE    CC/Focus of Exam: Confusion    HPI: Sandra Leahy is a 57 y.o. female who was admitted on 2023 with complaints of confusion.  Patient was a poor historian so contacted her daughter which is her legal POA and she states that for 3 or 4 years her mother has been diagnosed with early onset of dementia.  Daughter states that the patient never stops moving and is wandering all hours of the day and night.  Daughter states that her mother has been wild. Is not sleeping, pacing. Mind racing non stop. Balling up her fists, and they got called by the neighbors that she was out again. When they try redirect her she gets aggressive with them. Threatening and for the past two weeks she has been worse and non stop.  Patient denies any substance abuse but states that she is prescribed Ativan.  Patient denies any alcohol abuse.  Patient denies any tobacco use.  Patient states her health as a stressor in her life.  Patient denies any history of physical, mental, or sexual abuse.  Daughter states her mother's appetite is poor.  Daughter states her mother's sleep is poor.  Patient denies any nightmares.  Patient was unable to rate her anxiety on a scale of 1-10 with 10 being the most severe but her daughter states it is 10.  Patient was unable to rate her depression on a scale of 1-10 with 10 being the most severe but daughter states it is 10.  Patient denies any suicidal ideation.  Patient denies any homicidal ideation.  Patient denies any hallucinations but the daughter states that she gets confused on what she sees sometimes.  Patient was admitted to [facility] psychiatry for further safety and stabilization.    Available medical/psychiatric records reviewed and  incorporated into the current document.     PAST PSYCHIATRIC HX: Patient has had no prior admissions.  Patient states that she receives outpatient care with ivy schwab from [facility].    SUBSTANCE USE HX: UDS was negative.  See HPI for current use.    SOCIAL HX: Patient states she was born in [place] [facility].  Patient states she was raised in [place].  Patient states that she currently resides with her  in [place].  Patient states she is  and has 3 grown children.  Patient states that she currently draws social security.  Patient states she has a high school diploma.  Patient denies any legal issues.    Past Medical History:   Diagnosis Date    Bursitis     Dementia     Diabetic acetonemia     Fatigue     Fibromyalgia     Postsurgical menopause     Tetanus toxoid vaccination status unknown     Visual impairment     Vitamin D deficiency        Past Surgical History:   Procedure Laterality Date    BREAST BIOPSY Left 7-8 yrs ago    benign    CHOLECYSTECTOMY      HYSTERECTOMY      40s    KNEE ARTHROSCOPY W/ MENISCAL REPAIR Left        Family History   Problem Relation Age of Onset    Diabetes Other     Asthma Other     Hypertension Other     Diabetes Mother     Heart disease Mother     Hypertension Mother     Hyperlipidemia Mother     Arthritis Mother     Diabetes Father     Heart disease Father     Diabetes Sister     Cancer Sister     Diabetes Brother          Medications Prior to Admission   Medication Sig Dispense Refill Last Dose    cetirizine (EQ Allergy Relief, Cetirizine,) 10 MG tablet Take 0.5 tablets by mouth Daily. 90 tablet 0 11/25/2023    cyanocobalamin ([facility] Vitamin B-12) 2000 MCG tablet Take 1 tablet by mouth Daily. 30 tablet 6 11/25/2023    famotidine (Pepcid) 40 MG tablet Take 1 tablet by mouth Daily. 30 tablet 3 11/25/2023    Insulin Degludec (TRESIBA FLEXTOUCH) 200 UNIT/ML solution pen-injector pen injection Inject 0-45 Units under the skin into the  appropriate area as directed Daily As Needed (High BG per scale).   Past Week    magnesium oxide 250 MG tablet Take 1 tablet by mouth Daily. OTC   11/25/2023    memantine (NAMENDA) 10 MG tablet Take 1 tablet by mouth Daily.   11/25/2023    metFORMIN (GLUCOPHAGE) 1000 MG tablet Take 1 tablet by mouth 2 (Two) Times a Day With Meals. 60 tablet 3 11/25/2023    pravastatin (PRAVACHOL) 40 MG tablet Take 1 tablet by mouth Daily. 30 tablet 5 11/25/2023    Vortioxetine HBr (Trintellix) 20 MG tablet Take 1 tablet by mouth Daily.   11/25/2023    Cyanocobalamin 1000 MCG/ML kit Inject 1 mL as directed Every 7 (Seven) Days. 10 mL 0 Unknown    LORazepam (Ativan) 0.5 MG tablet Take 0.5 tablets by mouth 3 (Three) Times a Day As Needed for Anxiety. 45 tablet 0 Unknown    [DISCONTINUED] B-D ULTRAFINE III SHORT PEN 31G X 8 MM misc USE AS DIRECTED TWICE DAILY 100 each 0            ALLERGIES:  Lantus [insulin glargine], Codeine, Exenatide, and Sitagliptin    Temp:  [96.9 °F (36.1 °C)-98.3 °F (36.8 °C)] 98.3 °F (36.8 °C)  Heart Rate:  [88-99] 88  Resp:  [18] 18  BP: ()/(43-82) 104/50    REVIEW OF SYSTEMS:  Review of Systems   Constitutional: Negative.    HENT: Negative.     Eyes: Negative.    Respiratory: Negative.     Cardiovascular: Negative.    Gastrointestinal: Negative.    Endocrine: Negative.    Genitourinary: Negative.    Musculoskeletal: Negative.    Allergic/Immunologic: Negative.    Neurological: Negative.    Hematological: Negative.    Psychiatric/Behavioral:  Positive for confusion and hallucinations. The patient is nervous/anxious.       See HPI for psychiatric ROS  OBJECTIVE    PHYSICAL EXAM:    Constitutional:  Appears well-developed and well-nourished.   HENT:   Head: Normocephalic and atraumatic.   Right Ear: External ear normal.   Left Ear: External ear normal.   Mouth/Throat: Oropharynx is clear and moist.   Eyes: Pupils are equal, round, and reactive to light. Conjunctivae and EOM are normal.   Neck: Normal range  of motion. Neck supple.   Cardiovascular: Normal rate, regular rhythm and normal heart sounds.    Respiratory: Effort normal and breath sounds normal. No respiratory distress. No wheezes.   GI: Soft. Bowel sounds are normal.No distension. There is no tenderness.   Musculoskeletal: Normal range of motion. No edema or deformity.   Neurological:No cranial nerve deficit. Coordination normal.   Skin: Skin is warm and dry. No rash noted. No erythema.     MENTAL STATUS EXAM:   Hygiene:   fair  Cooperation:  Cooperative  Eye Contact:  Good  Psychomotor Behavior:  Appropriate  Affect:  Appropriate  Hopelessness: 5  Speech:  Minimal  Disorganized  Thought Content:  Unable to demonstrate  Suicidal:  None  Homicidal:  None  Hallucinations:  None  Delusion:  Unable to demonstrate  Memory:  Deficits  Orientation:  Person  Reliability:  fair  Insight:  Poor  Judgement:  Poor  Impulse Control:  Poor      Imaging Results (Last 24 Hours)       ** No results found for the last 24 hours. **             ECG/EMG Results (most recent)       Procedure Component Value Units Date/Time    ECG 12 Lead Other; Baseline Cardiac Status [[id number]] Collected: 11/25/23 2002     Updated: 11/25/23 2004     QT Interval 384 ms      QTC Interval 467 ms     Narrative:      Test Reason : Other~  Blood Pressure :   */*   mmHG  Vent. Rate :  89 BPM     Atrial Rate :  89 BPM     P-R Int : 138 ms          QRS Dur :  84 ms      QT Int : 384 ms       P-R-T Axes :  55  -6  24 degrees     QTc Int : 467 ms    Normal sinus rhythm  Possible Anterior infarct (cited on or before 11-FEB-2023)  Abnormal ECG  When compared with ECG of 11-FEB-2023 18:45,  QRS voltage has decreased    Referred By:            Confirmed By:              Lab Results   Component Value Date    GLUCOSE 219 (H) 11/25/2023    BUN 15 11/25/2023    CREATININE 0.96 11/25/2023    EGFRIFNONA 82 11/19/2021    EGFRIFAFRI 100 11/19/2021    BCR 15.6 11/25/2023    CO2 25.3 11/25/2023    CALCIUM 10.0  11/25/2023    PROTENTOTREF 7.3 06/09/2023    ALBUMIN 4.9 11/25/2023    LABIL2 1.7 06/09/2023    AST 19 11/25/2023    ALT 14 11/25/2023       Lab Results   Component Value Date    WBC 8.91 11/25/2023    HGB 14.4 11/25/2023    HCT 41.8 11/25/2023    MCV 90.7 11/25/2023     11/25/2023       Pain Management Panel          Latest Ref Rng & Units 11/25/2023   Pain Management Panel   Amphetamine, Urine Qual Negative Negative    Barbiturates Screen, Urine Negative Negative    Benzodiazepine Screen, Urine Negative Negative    Buprenorphine, Screen, Urine Negative Negative    Cocaine Screen, Urine Negative Negative    Fentanyl, Urine Negative Negative    Methadone Screen , Urine Negative Negative    Methamphetamine, Ur Negative Negative        Brief Urine Lab Results  (Last result in the past 365 days)        Color   Clarity   Blood   Leuk Est   Nitrite   Protein   CREAT   Urine HCG        11/25/23 1541 Yellow   Clear   Negative   Small (1+)   Negative   Negative                   DATA  Labs reviewed. Glucose 371. UA shows glucose > 1000, small leukocyte esterase, 6-10 WBC, 3-6 sq epi cells. UDS negative.   EKG reviewed. QTc 467 ms  AKOSUA reviewed.   Record reviewed. The patient has been in treatment in the outpatient behavioral health clinic for GIOVANI, MDD and dementia with behavioral disturbance.          ASSESSMENT & PLAN:        Dementia with behavioral disturbance    Early onset Alzheimer's dementia  -Start olanzapine 5 mg hs. The risk of treatment outweighs the risk of no treatment as patient is exhibiting increasingly dangerous behaviors.  -Check lipid panel and HbA1c.  -Continue memantine      Type 2 diabetes mellitus treated with insulin  -Lantus 35 U nightly  -Metformin 1000 mg bid  -Sliding scale insulin coverage      Dyslipidemia  -Pravastatin      GIOVANI  -Vortioxetine  -Stop lorazepam to avoid disinhibition      MDD rec mild  -Vortioxetine      Hospital bed: No    The patient has been admitted for safety and  stabilization.  Patient will be monitored for suicidality daily and maintained on Special Precautions Level 3 (q15 min checks) .  The patient will have individual and group therapy with a master's level therapist. A master treatment plan will be developed and agreed upon by the patient and his/her treatment team.  The patient's estimated length of stay in the hospital is 5-7 days.       Written by Nahomy Suh acting as scribe for Dr.Mazhar Rodriguez signature on this note affirms that the note adequately documents the care provided.   This note was generated using a scribe,   Nahomy Suh MA  11/26/23  9:19 AM EST    INina MD, personally performed the services described in this documentation as scribed by the above named individual in my presence, and it is both accurate and complete.

## 2023-11-26 NOTE — NURSING NOTE
Patient becoming increasingly confused and agitated. Pacing the halls, looking into other patients rooms, attempting to get out of the doors and rambling. Patient requiring frequent redirection.     Patient given prn medications for anxiety.    Will continue to monitor.

## 2023-11-26 NOTE — PLAN OF CARE
Goal Outcome Evaluation:  Plan of Care Reviewed With: patient  Patient Agreement with Plan of Care: unable to participate     Progress: no change  Outcome Evaluation: Patient admitted to adult psych from ED. Patient disoriented and confused was unable to get any information from patient. Per intake note: Patient brought in today by her daughter Brenda Norton  . She reports that she is also her legal POA. She states that for the last three or four years now her mother has been diagnoses with early onset dementia. She sees [facility] for medical and also sees ivy schwab for mental health and she recently prescribed her ativan. They tried the low dose and increased it but still there has been no difference and her mothers behavior has gotten worse and she is just not sure she can keep her safe right now. She states that she never stops moving and is wandering all hours of the day and night. She states that she has been wild. Is not sleeping, pacing. Mind racing non stop. Balling up her fists, and they got called by the neighbors that she was out again. When they try redirect her she gets aggressive with them. Threatening and for the past two weeks she has been worse and non stop. They report that a family member works here and suggested that they bring her here to get her on the right medications. Daughter wants her admitted to psych and see if they can help her. And also stated to them that she just wanted to end it. Pt denies any active SI HI or AVH. Patient only alert to her name at this time. No hx of etoh or drug use reported.

## 2023-11-27 DIAGNOSIS — F32.0 CURRENT MILD EPISODE OF MAJOR DEPRESSIVE DISORDER WITHOUT PRIOR EPISODE: ICD-10-CM

## 2023-11-27 DIAGNOSIS — F41.1 GENERALIZED ANXIETY DISORDER: ICD-10-CM

## 2023-11-27 LAB
CHOLEST SERPL-MCNC: 118 MG/DL (ref 0–200)
GLUCOSE BLDC GLUCOMTR-MCNC: 143 MG/DL (ref 70–130)
GLUCOSE BLDC GLUCOMTR-MCNC: 172 MG/DL (ref 70–130)
GLUCOSE BLDC GLUCOMTR-MCNC: 179 MG/DL (ref 70–130)
GLUCOSE BLDC GLUCOMTR-MCNC: 183 MG/DL (ref 70–130)
HBA1C MFR BLD: 7.4 % (ref 4.8–5.6)
HDLC SERPL-MCNC: 37 MG/DL (ref 40–60)
LDLC SERPL CALC-MCNC: 55 MG/DL (ref 0–100)
LDLC/HDLC SERPL: 1.36 {RATIO}
TRIGL SERPL-MCNC: 154 MG/DL (ref 0–150)
VLDLC SERPL-MCNC: 26 MG/DL (ref 5–40)

## 2023-11-27 PROCEDURE — 82948 REAGENT STRIP/BLOOD GLUCOSE: CPT

## 2023-11-27 PROCEDURE — 80061 LIPID PANEL: CPT | Performed by: PSYCHIATRY & NEUROLOGY

## 2023-11-27 PROCEDURE — 25010000002 ZIPRASIDONE MESYLATE PER 10 MG: Performed by: PSYCHIATRY & NEUROLOGY

## 2023-11-27 PROCEDURE — 83036 HEMOGLOBIN GLYCOSYLATED A1C: CPT | Performed by: PSYCHIATRY & NEUROLOGY

## 2023-11-27 PROCEDURE — 63710000001 INSULIN DETEMIR PER 5 UNITS: Performed by: PSYCHIATRY & NEUROLOGY

## 2023-11-27 PROCEDURE — 99232 SBSQ HOSP IP/OBS MODERATE 35: CPT | Performed by: PSYCHIATRY & NEUROLOGY

## 2023-11-27 PROCEDURE — 63710000001 INSULIN LISPRO (HUMAN) PER 5 UNITS: Performed by: PSYCHIATRY & NEUROLOGY

## 2023-11-27 RX ORDER — VORTIOXETINE 20 MG/1
1 TABLET, FILM COATED ORAL DAILY
Qty: 30 TABLET | Refills: 0 | Status: SHIPPED | OUTPATIENT
Start: 2023-11-27 | End: 2023-12-01 | Stop reason: SDUPTHER

## 2023-11-27 RX ADMIN — PRAVASTATIN SODIUM 40 MG: 40 TABLET ORAL at 08:30

## 2023-11-27 RX ADMIN — INSULIN LISPRO 2 UNITS: 100 INJECTION, SOLUTION INTRAVENOUS; SUBCUTANEOUS at 11:29

## 2023-11-27 RX ADMIN — HYDROXYZINE HYDROCHLORIDE 50 MG: 50 TABLET ORAL at 22:25

## 2023-11-27 RX ADMIN — CETIRIZINE HYDROCHLORIDE 5 MG: 10 TABLET, FILM COATED ORAL at 08:30

## 2023-11-27 RX ADMIN — METFORMIN HYDROCHLORIDE 1000 MG: 500 TABLET ORAL at 17:28

## 2023-11-27 RX ADMIN — ZIPRASIDONE MESYLATE 10 MG: 20 INJECTION, POWDER, LYOPHILIZED, FOR SOLUTION INTRAMUSCULAR at 22:41

## 2023-11-27 RX ADMIN — MAGNESIUM GLUCONATE 500 MG ORAL TABLET 200 MG: 500 TABLET ORAL at 08:30

## 2023-11-27 RX ADMIN — OLANZAPINE 5 MG: 5 TABLET, FILM COATED ORAL at 20:34

## 2023-11-27 RX ADMIN — Medication 2000 MCG: at 08:30

## 2023-11-27 RX ADMIN — FAMOTIDINE 40 MG: 20 TABLET, FILM COATED ORAL at 08:30

## 2023-11-27 RX ADMIN — TRAZODONE HYDROCHLORIDE 50 MG: 50 TABLET ORAL at 22:25

## 2023-11-27 RX ADMIN — INSULIN LISPRO 2 UNITS: 100 INJECTION, SOLUTION INTRAVENOUS; SUBCUTANEOUS at 16:36

## 2023-11-27 RX ADMIN — INSULIN DETEMIR 35 UNITS: 100 INJECTION, SOLUTION SUBCUTANEOUS at 20:33

## 2023-11-27 RX ADMIN — METFORMIN HYDROCHLORIDE 1000 MG: 500 TABLET ORAL at 08:30

## 2023-11-27 RX ADMIN — MEMANTINE HYDROCHLORIDE 10 MG: 10 TABLET, FILM COATED ORAL at 08:30

## 2023-11-27 RX ADMIN — INSULIN LISPRO 2 UNITS: 100 INJECTION, SOLUTION INTRAVENOUS; SUBCUTANEOUS at 20:32

## 2023-11-27 NOTE — NURSING NOTE
Spoke with lead Ruth Ann concerning my assessment on patient I noticed listed under allergies included Lantus (Glargine) in which the patient has a hx of adverse side affects listed. Confusion was listed from 2016.     Spoke to the pharmacist Bossman about reaction listed in allergies. Stated that it's medium risk not a true allergy, but an adverse reaction. System filtered it and it needed no override to dispense medication.     PharmacistBossman looked at previous doctor notes concerning the reaction to patient with Lantus; confusion, dizziness, nausea, and visual disturbance. Patient home medication was Degludec (Tresiba) changed to Lantus.    Bossman D/C Lantus and added Levemir per protocol

## 2023-11-27 NOTE — NURSING NOTE
Spoke with rosette Vallecillo concerning patient diet and medical history. During assessment with patient stated she was a diabetic and takes insulin. Upon looking at the patients medical history noted  hx of diabetic acetonemia. Added diabetic to medical history.    Noted and modified diet from regular to consistent carbohydrate.

## 2023-11-27 NOTE — PROGRESS NOTES
"[id number]      DATA:      Therapist met individually with patient this date to introduce role and to discuss hospitalization expectations. Patient agreeable. Reviewed medical record and staffed case with treatment team this date. No major issues identified.       Clinical Maneuvering/Intervention:     Therapist assisted patient in processing above session content; acknowledged and normalized patient’s thoughts, feelings, and concerns.  Discussed the therapist/patient relationship and explain the parameters and limitations of relative confidentiality.  Also discussed the importance of active participation, and honesty to the treatment process.  Encouraged the patient to discuss/vent their feelings, frustrations, and fears concerning their ongoing medical issues and validated their feelings.     Allowed patient to freely discuss issues without interruption or judgment. Provided safe, confidential environment to facilitate the development of positive therapeutic relationship and encourage open, honest communication.      Therapist completed integrated summary, treatment plan, and initiated social history this date.  Therapist is strongly encouraging family involvement in treatment.       ASSESSMENT:      The patient is a 57 year old  female. Per report, \"Patient brought in today by her daughter daughter Brenda Norton  . She reports that she is also her legal POA. She states that for the last three or four years now her mother has been diagnoses with early onset dementia. [facility] and also sees [facility]y [facility]wab for mental health and she recently prescribed her ativan. They tried the low dose and increased it but still there has been no difference and her mothers behavior has gotten worse and she is just not sure she can keep her safe right now. She states that she never stops moving and is wandering all hours of the day and night. She states that she has been wild. Is " "not sleeping, pacing. Mind racing non stop. Balling up her fists, and they got called by the neighbors that she was out again. When they try redirect her she gets aggressive with them. Threatening and for the past two weeks she has been worse and non stop. They report that a family member works here and suggested that they bring her here to get her on the right medications. Daughter wants her admitted to psych and see if they can help her. And also stated to them that she just wanted to end it. Pt denies any active SI HI or AVH. Patient only alert to her name at this time. No hx of etoh or drug use reported. \"      Today, patient was seen 1-1 in the office. Patient noted to have perplexed affect, congruent mood, confused thought content.  Patient certainly pleasant upon interview, but makes off-topic comments.  Patient is oriented to self only.  When asked where she is today, patient responded, \"[place].\"  Patient denies SI/HI/AVH and depression/anxiety.      Therapist attempted contact with her POA/daughter Brenda Norton at [phone]; no answer this date.  Left voicemail.      Goals for treatment: Dementia      Prior Hospitalizations / Dates/current treatment:  No prior admissions, attends [facility] for outpatient.       Childhood History:       Suicide Attempts:  denies      Alcohol:  denies     Substance use: denies UDS negative     Legal:  denies     Relationship/support: Patient reports that she is  and lives with spouse.     Spiritual:  Episcopal      Education:   12th grade, [employer]     Access to firearms:  Denies         PLAN:       Patient to remain hospitalized this date.      Treatment team will focus efforts on stabilizing patient's acute symptoms while providing education on healthy coping and crisis management to reduce hospitalizations.   Patient requires daily psychiatrist evaluation and 24/7 nursing supervision to promote patient  safety.     Therapist will offer " 1-4 individual sessions, family education, and appropriate referral.     Therapist recommends continued assessment. Therapist attempting to get ahold of JOSSE Gutierrez for planning. Patient is scheduled currently with [facility] on 1/2/24.

## 2023-11-27 NOTE — PLAN OF CARE
Problem: Adult Behavioral Health Plan of Care  Goal: Plan of Care Review  Outcome: Ongoing, Progressing  Flowsheets (Taken 11/27/2023 1205)  Consent Given to Review Plan with: Daughter/JOSSE Gutierrez  Progress: no change  Plan of Care Reviewed With:   patient   daughter  Patient Agreement with Plan of Care: agrees  Outcome Evaluation: New admit. Completed social history and integrated summary  Goal: Patient-Specific Goal (Individualization)  Outcome: Ongoing, Progressing  Flowsheets  Taken 11/27/2023 1205 by Luzma Garza  Patient-Specific Goals (Include Timeframe): Patient will deny SI/HI prior to discharge. Patient will identify 1 healthy coping skill for stress prior to discharge.  Individualized Care Needs: Therapist will offer 1-4 individual sessions, family education, aftercare planning  Taken 11/27/2023 1200 by Luzma Garza  Patient Personal Strengths:   resourceful   resilient   family/social support   community support  Patient Vulnerabilities:   history of unsuccessful treatment   lacks insight into illness   poor impulse control  Taken 11/25/2023 1931 by Bradley Luque RN  Anxieties, Fears or Concerns: none voiced  Goal: Optimized Coping Skills in Response to Life Stressors  Outcome: Ongoing, Progressing  Intervention: Promote Effective Coping Strategies  Flowsheets (Taken 11/27/2023 1205)  Supportive Measures:   active listening utilized   counseling provided  Goal: Develops/Participates in Therapeutic [place] to Support Successful Transition  Outcome: Ongoing, Progressing  Intervention: Foster Therapeutic [place]  Flowsheets (Taken 11/27/2023 1205)  Trust Relationship/Rapport:   care explained   choices provided   emotional support provided   empathic listening provided  Intervention: Mutually Develop Transition Plan  Flowsheets  Taken 11/27/2023 1205  Transition Support:   community resources reviewed   follow-up care coordinated   follow-up care discussed   crisis  management plan promoted   crisis management plan verbalized  Anticipated Discharge Disposition: home with family  Taken 11/27/2023 1203  Discharge Coordination/Progress: Patient has [employer] Medicare Replacement for discharge planning and attends [facility]  Concerns Comments: NA  Transportation Anticipated: family or friend will provide  Transportation Concerns: no car  Current Discharge Risk: psychiatric illness  Concerns to be Addressed:   discharge planning   mental health   coping/stress   cognitive/perceptual  Readmission Within the Last 30 Days: no previous admission in last 30 days  Patient/Family Anticipated Services at Transition:   outpatient care   mental health services  Patient's Choice of Community Agency(s): [facility]  Patient/Family Anticipates Transition to: home  Offered/Gave Vendor List: no   Goal Outcome Evaluation:  Plan of Care Reviewed With: patient, daughter  Patient Agreement with Plan of Care: agrees  Consent Given to Review Plan with: Daughter/AMRIKMARSHA Gutierrez  Progress: no change  Outcome Evaluation: New admit. Completed social history and integrated summary

## 2023-11-27 NOTE — PLAN OF CARE
"Goal Outcome Evaluation:  Plan of Care Reviewed With: patient  Patient Agreement with Plan of Care: unable to participate        Outcome Evaluation: Patient experiencing aphasia/word salad; rambling, incoherently at times;patient reports appetite \"yes,I know. Do you know Valarie?\" \"I eat, sleep not really. Patient hyperactive wandering around to other patients rooms and while they ate snack. Patient couldn't rate anxiety or depression nor could she confirm  or deny SI, HI, or AVH this shift. Patient is redirectable, but struggles to get a logical sentence out. Patient is very confused and disorientated of her surroundings, others, and placement. Tried to redirect patient to eat and drink for snack, but she couldn't be redirected. Patient was calm, laughing, confused, worried, and felt lost. Encouraged and listened empathically to the patient and ensured her that she was safe and no need to worry. Patient education;medication management and falls precaution;patient didn't verbalize understanding;patient education needed reinforced. Patient cooperative with med pass. Patient SP3/FALLS         "

## 2023-11-27 NOTE — PROGRESS NOTES
"INPATIENT PSYCHIATRIC PROGRESS NOTE    Name:  Sandra Leahy  :  1965  MRN:  [id number]  Visit Number:  [id number]  Length of stay:  2    SUBJECTIVE    CC/Focus of Exam: Confusion, rigo versus behavior disturbance    INTERVAL HISTORY:  First time seeing patient.  Chart, notes, vitals, labs and EKG personally reviewed.  Glucose 143, A1c 7.4, UA 6-10 WBC and negative nitrite    Patient continues to be confused, intermittently appropriate with conversation alternating with random comments.  She reports sleeping better last night.  She has not appeared agitated, elevated, labile.  Occasionally wanders, but has not exhibited any dangerous behavior thus far.    Depression rating 2/10  Anxiety rating 5/10  Sleep: Roughly 9 hours overnight  Withdrawal sx: None apparent  Cravin/10    Review of Systems   Constitutional: Negative.    Respiratory: Negative.     Cardiovascular: Negative.    Gastrointestinal: Negative.    Musculoskeletal: Negative.    Psychiatric/Behavioral:  Positive for confusion. The patient is nervous/anxious.        OBJECTIVE    Temp:  [97.6 °F (36.4 °C)-98.6 °F (37 °C)] 97.6 °F (36.4 °C)  Heart Rate:  [] 100  Resp:  [18] 18  BP: ()/(52-69) 86/54    MENTAL STATUS EXAM:  Appearance: Casually dressed, good hygeine.   Cooperation: Cooperative  Psychomotor: No significant psychomotor agitation/retardation, No EPS, No motor tics  Speech: Variable rate, amount; intermittently rambling   Mood: \"Fine\"   Affect: congruent, appropriate  Thought Content: Confused  Thought process: Disorganized  Suicidality: No SI  Homicidality: No HI  Perception: No apparent AH/VH  Insight: Poor  Judgment: fair    Lab Results (last 24 hours)       Procedure Component Value Units Date/Time    POC Glucose Once [[id number]]  (Abnormal) Collected: 23    Specimen: Blood Updated: 23     Glucose 143 mg/dL     Lipid Panel [[id number]]  (Abnormal) Collected: 23 050    Specimen: Blood Updated: " 11/27/23 0546     Total Cholesterol 118 mg/dL      Triglycerides 154 mg/dL      HDL Cholesterol 37 mg/dL      LDL Cholesterol  55 mg/dL      VLDL Cholesterol 26 mg/dL      LDL/HDL Ratio 1.36    Narrative:      Cholesterol Reference Ranges  (U.S. Department of Health and Human Services ATP III Classifications)    Desirable          <200 mg/dL  Borderline High    200-239 mg/dL  High Risk          >240 mg/dL      Triglyceride Reference Ranges  (U.S. Department of Health and Human Services ATP III Classifications)    Normal           <150 mg/dL  Borderline High  150-199 mg/dL  High             200-499 mg/dL  Very High        >500 mg/dL    HDL Reference Ranges  (U.S. Department of Health and Human Services ATP III Classifications)    Low     <40 mg/dl (major risk factor for CHD)  High    >60 mg/dl ('negative' risk factor for CHD)        LDL Reference Ranges  (U.S. Department of Health and Human Services ATP III Classifications)    Optimal          <100 mg/dL  Near Optimal     100-129 mg/dL  Borderline High  130-159 mg/dL  High             160-189 mg/dL  Very High        >189 mg/dL    Hemoglobin A1c [[id number]]  (Abnormal) Collected: 11/27/23 0508    Specimen: Blood Updated: 11/27/23 0541     Hemoglobin A1C 7.40 %     Narrative:      Hemoglobin A1C Ranges:    Increased Risk for Diabetes  5.7% to 6.4%  Diabetes                     >= 6.5%  Diabetic Goal                < 7.0%    POC Glucose Once [[id number]]  (Normal) Collected: 11/26/23 2027    Specimen: Blood Updated: 11/26/23 2034     Glucose 127 mg/dL     POC Glucose Once [[id number]]  (Normal) Collected: 11/26/23 1616    Specimen: Blood Updated: 11/26/23 1623     Glucose 109 mg/dL     POC Glucose Once [[id number]]  (Abnormal) Collected: 11/26/23 1257    Specimen: Blood Updated: 11/26/23 1304     Glucose 298 mg/dL                Imaging Results (Last 24 Hours)       ** No results found for the last 24 hours. **               ECG/EMG Results (most recent)       Procedure  Component Value Units Date/Time    ECG 12 Lead Other; Baseline Cardiac Status [[id number]] Collected: 11/25/23 2002     Updated: 11/26/23 1943     QT Interval 384 ms      QTC Interval 467 ms     Narrative:      Test Reason : Other~  Blood Pressure :   */*   mmHG  Vent. Rate :  89 BPM     Atrial Rate :  89 BPM     P-R Int : 138 ms          QRS Dur :  84 ms      QT Int : 384 ms       P-R-T Axes :  55  -6  24 degrees     QTc Int : 467 ms    Normal sinus rhythm  Possible Anterior infarct (cited on or before 11-FEB-2023)  Abnormal ECG  When compared with ECG of 11-FEB-2023 18:45,  QRS voltage has decreased  Confirmed by Mark Mueller ([id number]) on 11/26/2023 7:40:45 PM    Referred By:            Confirmed By: Mark Mueller             ALLERGIES: Lantus [insulin glargine], Codeine, Exenatide, and Sitagliptin      Current Facility-Administered Medications:     acetaminophen (TYLENOL) tablet 650 mg, 650 mg, Oral, Q6H PRN, Nina Rodriguez MD    aluminum-magnesium hydroxide-simethicone (MAALOX MAX) 400-400-40 MG/5ML suspension 15 mL, 15 mL, Oral, Q6H PRN, Nina Rodriguez MD    benzonatate (TESSALON) capsule 100 mg, 100 mg, Oral, TID PRN, Nina Rodriguez MD    benztropine (COGENTIN) tablet 2 mg, 2 mg, Oral, Once PRN **OR** benztropine (COGENTIN) injection 1 mg, 1 mg, Intramuscular, Once PRN, Nina Rodriguez MD    cetirizine (zyrTEC) tablet 5 mg, 5 mg, Oral, Daily, Nina Rodriguez MD, 5 mg at 11/27/23 0830    cyanocobalamin injection 1,000 mcg, 1,000 mcg, Intramuscular, Q7 Days, Nina Rodriguez MD, 1,000 mcg at 11/26/23 0824    dextrose (D50W) (25 g/50 mL) IV injection 25 g, 25 g, Intravenous, Q15 Min PRN, Nina Rodriguez MD    dextrose (GLUTOSE) oral gel 15 g, 15 g, Oral, Q15 Min PRN, Nina Rodriguez MD    famotidine (PEPCID) tablet 20 mg, 20 mg, Oral, BID PRN, Nina Rodriguez MD    famotidine (PEPCID) tablet 40 mg, 40 mg, Oral, Daily, Nina Rodriguez MD, 40 mg at 11/27/23 0830    glucagon (human recombinant) (GLUCAGEN DIAGNOSTIC)  injection 1 mg, 1 mg, Intramuscular, Q15 Min PRN, Nina Rodriguez MD    hydrOXYzine (ATARAX) tablet 50 mg, 50 mg, Oral, Q6H PRN, Nina Rodriguez MD, 50 mg at 11/26/23 2025    ibuprofen (ADVIL,MOTRIN) tablet 400 mg, 400 mg, Oral, Q6H PRN, Nina Rodriguez MD    insulin detemir (LEVEMIR) injection 35 Units, 35 Units, Subcutaneous, Nightly, Nina Rodriguez MD    Insulin Lispro (humaLOG) injection 2-9 Units, 2-9 Units, Subcutaneous, 4x Daily AC & at Bedtime, Nina Rodriguez MD, 6 Units at 11/26/23 1301    loperamide (IMODIUM) capsule 2 mg, 2 mg, Oral, Q2H PRN, Nina Rodriguez MD    magnesium hydroxide (MILK OF MAGNESIA) suspension 10 mL, 10 mL, Oral, Daily PRN, Nina Rodriguez MD    magnesium oxide (MAG-OX) tablet 200 mg, 200 mg, Oral, Daily, Nina Rodriguez MD, 200 mg at 11/27/23 0830    memantine (NAMENDA) tablet 10 mg, 10 mg, Oral, Daily, Nina Rodriguez MD, 10 mg at 11/27/23 0830    metFORMIN (GLUCOPHAGE) tablet 1,000 mg, 1,000 mg, Oral, BID With Meals, Nina Rodriguez MD, 1,000 mg at 11/27/23 0830    OLANZapine (zyPREXA) tablet 5 mg, 5 mg, Oral, Nightly, Nina Rodriguez MD, 5 mg at 11/26/23 2025    ondansetron (ZOFRAN) tablet 4 mg, 4 mg, Oral, Q6H PRN, Nina Rodriguez MD    pravastatin (PRAVACHOL) tablet 40 mg, 40 mg, Oral, Daily, Nina Rodriguez MD, 40 mg at 11/27/23 0830    sodium chloride nasal spray 2 spray, 2 spray, Each Nare, PRN, Nina Rodriguez MD    traZODone (DESYREL) tablet 50 mg, 50 mg, Oral, Nightly PRN, Nina Rodriguez MD, 50 mg at 11/26/23 2025    vitamin B-12 (CYANOCOBALAMIN) tablet 2,000 mcg, 2,000 mcg, Oral, Daily, Nina Rodriguez MD, 2,000 mcg at 11/27/23 0830    Vortioxetine HBr (TRINTELLIX) tablet 20 mg, 1 tablet, Oral, Daily, Nina Rodriguez MD    Reviewed chart, notes, vitals, labs and EKG personally reviewed.    ASSESSMENT & PLAN:        Dementia with behavioral disturbance    Type 2 diabetes mellitus treated with insulin    Dyslipidemia    Anxiety and depression    Early onset Alzheimer's dementia       Dementia with behavioral disturbance    Early onset Alzheimer's dementia  -Started olanzapine 5 mg nightly on 11/26/2023. The risk of treatment outweighs the risk of no treatment as patient is exhibiting increasingly dangerous behaviors.  -Reviewed A1c, elevated at 7.4  -Continue memantine       Type 2 diabetes mellitus treated with insulin  -Lantus changed to Levemir 35 U nightly due to previous allergy  -Metformin 1000 mg bid  -Sliding scale insulin coverage       Dyslipidemia  -Pravastatin       GIOVANI  -Vortioxetine  -Stop lorazepam to avoid disinhibition       MDD rec mild  -Vortioxetine      Hospital bed: No     The patient has been admitted for safety and stabilization.  Patient will be monitored for suicidality daily and maintained on Special Precautions Level 3 (q15 min checks) .  The patient will have individual and group therapy with a master's level therapist. A master treatment plan will be developed and agreed upon by the patient and his/her treatment team.  The patient's estimated length of stay in the hospital is 1-3 days.     Special precautions: Special Precautions Level 3 (q15 min checks)     Behavioral Health Treatment Plan and Problem List: I have reviewed and approved the Behavioral Health Treatment Plan and Problem list.  The patient has had a chance to review and agrees with the treatment plan.    I have reviewed the copied text and it is accurate as of 11/27/23     Clinician:  Poncho Prather MD  11/27/23  08:42 EST

## 2023-11-27 NOTE — PLAN OF CARE
Goal Outcome Evaluation:  Plan of Care Reviewed With: patient  Patient Agreement with Plan of Care: agrees     Progress: no change  Outcome Evaluation: Pt confused during assessment at times but talked to daughter and she isn't as anxious compared to what she has been at home. She exhibits othostatic hypotension from sitting to standing taking from one to 3 minutes to have adequate blood pressure. He daughter stated she always had some hypotension but got better when stopping seroquel which also made her dizzy. Pt encouraged to increase fluid intake calm and courteous throughout day No acute stress noted will continue to monitor

## 2023-11-28 ENCOUNTER — PATIENT OUTREACH (OUTPATIENT)
Dept: CASE MANAGEMENT | Facility: OTHER | Age: 58
End: 2023-11-28
Payer: MEDICARE

## 2023-11-28 VITALS
RESPIRATION RATE: 18 BRPM | HEART RATE: 95 BPM | SYSTOLIC BLOOD PRESSURE: 103 MMHG | BODY MASS INDEX: 21.59 KG/M2 | WEIGHT: 145.8 LBS | OXYGEN SATURATION: 97 % | TEMPERATURE: 96.5 F | DIASTOLIC BLOOD PRESSURE: 54 MMHG | HEIGHT: 69 IN

## 2023-11-28 PROBLEM — F03.918 DEMENTIA WITH BEHAVIORAL DISTURBANCE: Status: RESOLVED | Noted: 2023-11-25 | Resolved: 2023-11-28

## 2023-11-28 LAB
GLUCOSE BLDC GLUCOMTR-MCNC: 183 MG/DL (ref 70–130)
GLUCOSE BLDC GLUCOMTR-MCNC: 203 MG/DL (ref 70–130)

## 2023-11-28 PROCEDURE — 82948 REAGENT STRIP/BLOOD GLUCOSE: CPT

## 2023-11-28 PROCEDURE — 63710000001 INSULIN LISPRO (HUMAN) PER 5 UNITS: Performed by: PSYCHIATRY & NEUROLOGY

## 2023-11-28 PROCEDURE — 99239 HOSP IP/OBS DSCHRG MGMT >30: CPT | Performed by: PSYCHIATRY & NEUROLOGY

## 2023-11-28 PROCEDURE — 25010000002 ZIPRASIDONE MESYLATE PER 10 MG

## 2023-11-28 RX ORDER — LORAZEPAM 0.5 MG/1
0.5 TABLET ORAL NIGHTLY PRN
Qty: 14 TABLET | Refills: 0 | Status: SHIPPED | OUTPATIENT
Start: 2023-11-28 | End: 2023-12-01 | Stop reason: SDUPTHER

## 2023-11-28 RX ORDER — LORAZEPAM 0.5 MG/1
0.5 TABLET ORAL NIGHTLY PRN
Status: DISCONTINUED | OUTPATIENT
Start: 2023-11-28 | End: 2023-11-28 | Stop reason: HOSPADM

## 2023-11-28 RX ORDER — DOCUSATE SODIUM 100 MG/1
100 CAPSULE, LIQUID FILLED ORAL DAILY
Qty: 30 CAPSULE | Refills: 0 | Status: SHIPPED | OUTPATIENT
Start: 2023-11-28

## 2023-11-28 RX ORDER — OLANZAPINE 5 MG/1
7.5 TABLET ORAL
Status: DISCONTINUED | OUTPATIENT
Start: 2023-11-28 | End: 2023-11-28 | Stop reason: HOSPADM

## 2023-11-28 RX ORDER — OLANZAPINE 7.5 MG/1
7.5 TABLET, FILM COATED ORAL
Qty: 30 TABLET | Refills: 0 | Status: SHIPPED | OUTPATIENT
Start: 2023-11-28 | End: 2023-12-01 | Stop reason: SDUPTHER

## 2023-11-28 RX ADMIN — INSULIN LISPRO 2 UNITS: 100 INJECTION, SOLUTION INTRAVENOUS; SUBCUTANEOUS at 11:48

## 2023-11-28 RX ADMIN — PRAVASTATIN SODIUM 40 MG: 40 TABLET ORAL at 09:02

## 2023-11-28 RX ADMIN — IBUPROFEN 400 MG: 400 TABLET, FILM COATED ORAL at 09:57

## 2023-11-28 RX ADMIN — Medication 2000 MCG: at 09:02

## 2023-11-28 RX ADMIN — FAMOTIDINE 40 MG: 20 TABLET, FILM COATED ORAL at 09:00

## 2023-11-28 RX ADMIN — CETIRIZINE HYDROCHLORIDE 5 MG: 10 TABLET, FILM COATED ORAL at 09:00

## 2023-11-28 RX ADMIN — MEMANTINE HYDROCHLORIDE 10 MG: 10 TABLET, FILM COATED ORAL at 09:00

## 2023-11-28 RX ADMIN — MAGNESIUM GLUCONATE 500 MG ORAL TABLET 200 MG: 500 TABLET ORAL at 09:01

## 2023-11-28 RX ADMIN — INSULIN LISPRO 4 UNITS: 100 INJECTION, SOLUTION INTRAVENOUS; SUBCUTANEOUS at 07:23

## 2023-11-28 RX ADMIN — HYDROXYZINE HYDROCHLORIDE 50 MG: 50 TABLET ORAL at 11:44

## 2023-11-28 RX ADMIN — METFORMIN HYDROCHLORIDE 1000 MG: 500 TABLET ORAL at 07:23

## 2023-11-28 NOTE — OUTREACH NOTE
Patient Outreach    SW e-mailed [facility] HR department requesting status of referral.     Madie GONZALEZ -   Ambulatory Case Management    11/28/2023, 09:53 EST

## 2023-11-28 NOTE — PLAN OF CARE
Goal Outcome Evaluation:  Plan of Care Reviewed With: patient  Patient Agreement with Plan of Care: agrees     Progress: improving  Outcome Evaluation: Patient discharged ,leaving the unit with belongings. Patient denies suicidal or homicidal ideation

## 2023-11-28 NOTE — PLAN OF CARE
"  Problem: Adult Behavioral Health Plan of Care  Goal: Optimized Coping Skills in Response to Life Stressors  Outcome: Ongoing, Progressing  Intervention: Promote Effective Coping Strategies  Flowsheets (Taken 11/28/2023 0820)  Supportive Measures:   active listening utilized   counseling provided  Goal: Develops/Participates in Therapeutic [place] to Support Successful Transition  Outcome: Ongoing, Progressing  Intervention: Foster Therapeutic [place]  Flowsheets (Taken 11/27/2023 1925 by Maria Luisa March RN)  Trust Relationship/Rapport:   care explained   choices provided   emotional support provided   empathic listening provided   questions answered   questions encouraged   reassurance provided   thoughts/feelings acknowledged  Intervention: Mutually Develop Transition Plan  Flowsheets (Taken 11/27/2023 1205)  Transition Support:   community resources reviewed   follow-up care coordinated   follow-up care discussed   crisis management plan promoted   crisis management plan verbalized       0855    DATA:    Therapist met with the patient individually. Therapist continues reviewing plan of care and aftercare plan.  The patient was agreeable.    ASSESSMENT:    Patient was seen for follow up of  Dementia with behavioral disturbance     Today, patient was seen 1-1 in the day room. Patient noted to have congruent affect/mood, disorganized thought content, good eye contact. Patient is pleasant this morning and talkative. Patient reports, \"Feeling a lot better.\"  Patient had episode of agitation that required PRN medications to calm her last night.      Therapist contacted patient's POA/daughter Brenda Norton at [phone]; Brenda was very supportive this date.  She reports that the patient's behaviors at home have been increasingly worse at night and patient gets very anxious and starts clawing at herself and trying to escape.  Brenda reports that her mother is Medicaid pending currently and that she is trying to find " nursing home referral.  She appears insightful regarding psych admission being a barrier to SNF referral and reports that she plans to pursue herself.  Therapist also educated Brenda about the state nursing home options and encouraged her to call admin at [place] to be educated about how to do a possible referral there. For now, patient will return home with family. Brenda lives on the property and takes care of both her mother and father who are disabled.  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         CLINICAL MANEUVERING:    Therapist provided safe, secure environment for patient to share.  Provided reflective listening and psychoeducation.  Assisted patient in processing the above session. Assisted patient in identifying any questions or needs to be addressed today.          Plan:     Patient to remain hospitalized this date.      Treatment team will focus efforts on stabilizing patient's acute symptoms while providing education on healthy coping and crisis management to reduce hospitalizations.   Patient requires daily psychiatrist evaluation and 24/7 nursing supervision to promote patient  safety.     Therapist will offer 1-4 individual sessions, family education, and appropriate referral.     Therapist recommends continued assessment.  Therapist readjusted [facility] appointment per JOSSE Gutierrez's consent.  Patient to return home with family care and supervision.  Home is reported to be safe guarded.

## 2023-11-28 NOTE — NURSING NOTE
Reviewed discharge, medication, Ativan with Emma LOAIZA, [phone], verbalized understanding, agreeable for discharge, states will  around 5 pm

## 2023-11-28 NOTE — DISCHARGE SUMMARY
PSYCHIATRIC DISCHARGE SUMMARY     Patient Identification:  Name:  Sandra Leahy  Age:  57 y.o.  Sex:  female  :  1965  MRN:  [id number]  Visit Number:  [id number]    Date of Admission:2023   Date of Discharge:  2023    Discharge Diagnosis:  Active Problems:    Type 2 diabetes mellitus treated with insulin    Dyslipidemia    Anxiety and depression    Early onset Alzheimer's dementia      Admission Diagnosis:  Dementia with behavioral disturbance [F03.918]     Hospital Course  Patient is a 57 y.o. female presented with concern for manic behavior, confusion, agitation.  Admitted for crisis stabilization.  Daughter is POA. Admission labs with glucose 371, no acute abnormalities otherwise.  Home medications continued, with the exception of lorazepam which was briefly discontinued to assess for disinhibition.  Olanzapine initiated and later increased to 7.5 mg at dinnertime daily.  Patient symptom burden appears largely related to dementia.  She tolerated medication changes well.  She experienced insomnia and confusion, but no overtly dangerous behaviors or acutely concerning psychiatric symptoms otherwise.  Lorazepam resumed at bedtime to assist with insomnia.  Patient appeared to struggle with the milieu at night, with some noise and lights keeping her awake.  Additionally, inpatient psychiatric hospitalization is generally not the most therapeutic option for patients with dementia provided they can maintain safe behavior otherwise.  As such, patient was deemed appropriate for discharge home and can continue outpatient care with PCP and neurology.  Daughter involved in treatment and safe discharge planning.    By the conclusion of this hospitalization, patient is exhibiting no acutely concerning symptoms of mood, psychotic or thought disorder that would necessitate further inpatient care. Patient is also denying SI, HI, and AVH. Patient has shown improvement of presenting symptoms, exhibited  "no behavior concerning for harm to self or others, and is considered appropriate for discharge to a lower level of care today. Treatment and safe discharge planning completed. Outpatient care ascertained.     Mental Status Exam upon discharge:   Mood \"good\"   Affect-congruent, appropriate, stable  Thought Content-goal directed, no delusional material present  Thought process-linear, organized.  Suicidality: No SI  Homicidality: No HI  Perception: No AH/VH    Procedures Performed         Consults:   Consults       No orders found from 10/27/2023 to 11/26/2023.            Pertinent Test Results:   Lab Results (last 7 days)       Procedure Component Value Units Date/Time    POC Glucose Once [[id number]]  (Abnormal) Collected: 11/28/23 0603    Specimen: Blood Updated: 11/28/23 0609     Glucose 203 mg/dL     POC Glucose Once [[id number]]  (Abnormal) Collected: 11/27/23 1905    Specimen: Blood Updated: 11/27/23 1912     Glucose 172 mg/dL     POC Glucose Once [[id number]]  (Abnormal) Collected: 11/27/23 1559    Specimen: Blood Updated: 11/27/23 1605     Glucose 179 mg/dL     POC Glucose Once [[id number]]  (Abnormal) Collected: 11/27/23 1056    Specimen: Blood Updated: 11/27/23 1103     Glucose 183 mg/dL     POC Glucose Once [[id number]]  (Abnormal) Collected: 11/27/23 0619    Specimen: Blood Updated: 11/27/23 0626     Glucose 143 mg/dL     Lipid Panel [[id number]]  (Abnormal) Collected: 11/27/23 0508    Specimen: Blood Updated: 11/27/23 0546     Total Cholesterol 118 mg/dL      Triglycerides 154 mg/dL      HDL Cholesterol 37 mg/dL      LDL Cholesterol  55 mg/dL      VLDL Cholesterol 26 mg/dL      LDL/HDL Ratio 1.36    Narrative:      Cholesterol Reference Ranges  (U.S. Department of Health and Human Services ATP III Classifications)    Desirable          <200 mg/dL  Borderline High    200-239 mg/dL  High Risk          >240 mg/dL      Triglyceride Reference Ranges  (U.S. Department of Health and Human Services ATP III " Classifications)    Normal           <150 mg/dL  Borderline High  150-199 mg/dL  High             200-499 mg/dL  Very High        >500 mg/dL    HDL Reference Ranges  (U.S. Department of Health and Human Services ATP III Classifications)    Low     <40 mg/dl (major risk factor for CHD)  High    >60 mg/dl ('negative' risk factor for CHD)        LDL Reference Ranges  (U.S. Department of Health and Human Services ATP III Classifications)    Optimal          <100 mg/dL  Near Optimal     100-129 mg/dL  Borderline High  130-159 mg/dL  High             160-189 mg/dL  Very High        >189 mg/dL    Hemoglobin A1c [[id number]]  (Abnormal) Collected: 11/27/23 0508    Specimen: Blood Updated: 11/27/23 0541     Hemoglobin A1C 7.40 %     Narrative:      Hemoglobin A1C Ranges:    Increased Risk for Diabetes  5.7% to 6.4%  Diabetes                     >= 6.5%  Diabetic Goal                < 7.0%    POC Glucose Once [[id number]]  (Normal) Collected: 11/26/23 2027    Specimen: Blood Updated: 11/26/23 2034     Glucose 127 mg/dL     POC Glucose Once [[id number]]  (Normal) Collected: 11/26/23 1616    Specimen: Blood Updated: 11/26/23 1623     Glucose 109 mg/dL     POC Glucose Once [[id number]]  (Abnormal) Collected: 11/26/23 1257    Specimen: Blood Updated: 11/26/23 1304     Glucose 298 mg/dL     POC Glucose Once [[id number]]  (Abnormal) Collected: 11/26/23 0731    Specimen: Blood Updated: 11/26/23 0738     Glucose 371 mg/dL     Magnesium [[id number]]  (Normal) Collected: 11/26/23 0505    Specimen: Blood Updated: 11/26/23 0626     Magnesium 1.9 mg/dL             Condition on Discharge:  improved    Vital Signs  Temp:  [96.5 °F (35.8 °C)-97.2 °F (36.2 °C)] 96.5 °F (35.8 °C)  Heart Rate:  [87-98] 95  Resp:  [18] 18  BP: (103-116)/(54-71) 103/54    Discharge Disposition:  Home or Self Care    Discharge Medications:     Discharge Medications        New Medications        Instructions Start Date   docusate sodium 100 MG capsule  Commonly known as:  Colace   100 mg, Oral, Daily      OLANZapine 7.5 MG tablet  Commonly known as: zyPREXA   7.5 mg, Oral, Daily With Dinner             Changes to Medications        Instructions Start Date   LORazepam 0.5 MG tablet  Commonly known as: ATIVAN  What changed:   how much to take  when to take this  reasons to take this   0.5 mg, Oral, Nightly PRN             Continue These Medications        Instructions Start Date   cetirizine 10 MG tablet  Commonly known as: EQ Allergy Relief (Cetirizine)   5 mg, Oral, Daily      cyanocobalamin 2000 MCG tablet  Commonly known as: [facility] Vitamin B-12   2,000 mcg, Oral, Daily      Cyanocobalamin 1000 MCG/ML kit   1,000 mcg, Injection, Every 7 Days      famotidine 40 MG tablet  Commonly known as: Pepcid   40 mg, Oral, Daily      Insulin Degludec 200 UNIT/ML solution pen-injector pen injection  Commonly known as: TRESIBA FLEXTOUCH   0-45 Units, Subcutaneous, Daily PRN      magnesium oxide 250 MG tablet   250 mg, Oral, Daily, OTC      memantine 10 MG tablet  Commonly known as: NAMENDA   10 mg, Oral, Daily      metFORMIN 1000 MG tablet  Commonly known as: GLUCOPHAGE   1,000 mg, Oral, 2 Times Daily With Meals      pravastatin 40 MG tablet  Commonly known as: PRAVACHOL   40 mg, Oral, Daily      Trintellix 20 MG tablet  Generic drug: Vortioxetine HBr   20 mg, Oral, Daily             Stop These Medications      B-D ULTRAFINE III SHORT PEN 31G X 8 MM misc  Generic drug: Insulin Pen Needle              Discharge Diet: Normal    Activity at Discharge: Normal    Follow-up Appointments  Future Appointments   Date Time Provider Department Center   12/1/2023  9:15 AM Janett Victoria APRN E  [facility] None   1/2/2024 10:45 AM Janett Victoria APRN [facility] None         Test Results Pending at Discharge  None     Time: I spent greater than 30 minutes on this discharge activity which included: face-to-face encounter with the patient, reviewing the data in the system, coordination of the care with the nursing  staff as well as consultants, documentation, and entering orders.      Clinician:   Poncho Prather MD  11/28/23  10:38 EST

## 2023-11-28 NOTE — PLAN OF CARE
"Goal Outcome Evaluation:  Plan of Care Reviewed With: patient  Patient Agreement with Plan of Care: agrees      Pt was confused during assessment,she is disoriented to time, place and situation. She was unable to rate anxiety, pt stated \"she loves it\". Pt denies depression. Patient denies SI/HI/AVH. Pt doesn't voice any concerns at this time. No acute s/s of distress noted.             "

## 2023-11-28 NOTE — PROGRESS NOTES
1033:     Patient is being discharged. Therapist contacted JOSSE Gutierrez who was agreeable to discharge.  Brenda plans to pick patient up between 4-5 this date.     Assisted  JOSSE Gutierrez in identifying risk factors which would indicate the need for higher level of care including thoughts to harm self or others and/or self-harming behavior and encouraged patient to call [phone], call 911, or present to the nearest emergency room should any of these events occur. Discussed crisis intervention services and means to access.  POA [place] agreeable.

## 2023-11-28 NOTE — NURSING NOTE
Pt was becoming extremely agitated and tearful, trying to out the unit entrance door and she wanted to go outside. Attempted to redirect patient, but she was becoming more agitated and trying to open the doors. Notified Dr. Deras, informed him of the medication patient already took for the night and her Otc  467.     New Order:   Geodone 10mg IM Once STAT RBTO Dr. Deras

## 2023-12-01 ENCOUNTER — TELEMEDICINE (OUTPATIENT)
Dept: PSYCHIATRY | Facility: CLINIC | Age: 58
End: 2023-12-01
Payer: MEDICARE

## 2023-12-01 DIAGNOSIS — G30.0 EARLY ONSET ALZHEIMER'S DEMENTIA WITH BEHAVIORAL DISTURBANCE: Primary | ICD-10-CM

## 2023-12-01 DIAGNOSIS — F02.818 EARLY ONSET ALZHEIMER'S DEMENTIA WITH BEHAVIORAL DISTURBANCE: Primary | ICD-10-CM

## 2023-12-01 DIAGNOSIS — F41.1 GENERALIZED ANXIETY DISORDER: ICD-10-CM

## 2023-12-01 DIAGNOSIS — F32.0 CURRENT MILD EPISODE OF MAJOR DEPRESSIVE DISORDER WITHOUT PRIOR EPISODE: ICD-10-CM

## 2023-12-01 PROCEDURE — 1160F RVW MEDS BY RX/DR IN RCRD: CPT | Performed by: NURSE PRACTITIONER

## 2023-12-01 PROCEDURE — 1159F MED LIST DOCD IN RCRD: CPT | Performed by: NURSE PRACTITIONER

## 2023-12-01 PROCEDURE — 99214 OFFICE O/P EST MOD 30 MIN: CPT | Performed by: NURSE PRACTITIONER

## 2023-12-01 RX ORDER — VORTIOXETINE 20 MG/1
1 TABLET, FILM COATED ORAL DAILY
Qty: 30 TABLET | Refills: 0 | Status: SHIPPED | OUTPATIENT
Start: 2023-12-01

## 2023-12-01 RX ORDER — LORAZEPAM 1 MG/1
1 TABLET ORAL NIGHTLY PRN
Qty: 30 TABLET | Refills: 0 | Status: SHIPPED | OUTPATIENT
Start: 2023-12-01

## 2023-12-01 RX ORDER — OLANZAPINE 7.5 MG/1
7.5 TABLET, FILM COATED ORAL
Qty: 30 TABLET | Refills: 0 | Status: SHIPPED | OUTPATIENT
Start: 2023-12-01

## 2023-12-01 NOTE — PROGRESS NOTES
Subjective   Sandra Leahy is a 57 y.o. female is here today for medication management follow-up.    This provider is located at [facility] at [address] [place] [address]. The Patient is seen remotely at home, using [employer] torin. Patient is being seen via telehealth and stated they are in a secure environment for this session. The patient's condition being diagnosed/treated is appropriate for telemedicine. The provider identified him/herself as well as his or her credentials. The patient and/or patients guardian consent to be seen remotely, and when consent is given they understand that the consent allows for patient identifiable information to be sent to a third party as needed. They may refuse to be seen remotely at any time. The electronic data is encrypted and password protected, and the patient has been advised of the potential risks to privacy not withstanding such measures.      Chief Complaint:  dementia anxiety depression     History of Present Illness:    Patient presents today for a follow up for medication management for dementia, anxiety, and depression with daughter. Daughter states she was in the hospital recently and they started her on zyprexa. Daughter states the zyprexa does help with her mood but still not sleeping. Daughter states still having problems with blood pressure dropping when stand up. Daughter states taking a whole tablet of the ativan. Daughter states taking the zyprexa around 3-4pm. Denies any side effects. Daughter states didn't sleep at all last night and been up all night. Denies any panic attacks. Denies any aggression or anger. Denies any thoughts to harm self or others. Daughter states the confusion has gotten worse and didn't recognize her when came home from hospital. Appetite is decent and eating a little more. Denies any thoughts to harm self or others. Daughter states not been outside roaming since been home from hospital. Daughter states still  taking her medications.   The following portions of the patient's history were reviewed and updated as appropriate: allergies, current medications, past family history, past medical history, past social history, past surgical history, and problem list.    Review of Systems   Constitutional:  Positive for appetite change.   Respiratory: Negative.     Cardiovascular: Negative.    Gastrointestinal: Negative.    Neurological: Negative.    Psychiatric/Behavioral:  Positive for confusion and sleep disturbance. The patient is nervous/anxious.        Objective   Physical Exam  Constitutional:       Appearance: Normal appearance. She is well-developed and well-groomed.   Neurological:      Mental Status: She is alert.   Psychiatric:         Attention and Perception: Perception normal. She is inattentive.         Mood and Affect: Mood is anxious. Affect is flat.         Speech: Speech is delayed.         Behavior: Behavior is slowed. Behavior is cooperative.         Thought Content: Thought content normal.         Cognition and Memory: Memory is impaired.         Judgment: Judgment is impulsive.       There were no vitals taken for this visit.There is no height or weight on file to calculate BMI.  Unable to obtain vitals due to video visit.     Allergies   Allergen Reactions    Lantus [Insulin Glargine] Confusion    Codeine Itching    Exenatide Nausea And Vomiting     Intolerant    Sitagliptin Other (See Comments)     Unsure       Medication List:   Current Outpatient Medications   Medication Sig Dispense Refill    LORazepam (ATIVAN) 1 MG tablet Take 1 tablet by mouth At Night As Needed for Anxiety (insomnia). Indications: Feeling Anxious, Trouble Sleeping 30 tablet 0    OLANZapine (zyPREXA) 7.5 MG tablet Take 1 tablet by mouth Daily With Dinner. Indications: Behavioral Disorders associated with Dementia 30 tablet 0    Vortioxetine HBr (Trintellix) 20 MG tablet Take 1 tablet by mouth Daily. Indications: Major Depressive  Disorder 30 tablet 0    cetirizine (EQ Allergy Relief, Cetirizine,) 10 MG tablet Take 0.5 tablets by mouth Daily. 90 tablet 0    cyanocobalamin ([facility] Vitamin B-12) 2000 MCG tablet Take 1 tablet by mouth Daily. 30 tablet 6    Cyanocobalamin 1000 MCG/ML kit Inject 1 mL as directed Every 7 (Seven) Days. 10 mL 0    docusate sodium (Colace) 100 MG capsule Take 1 capsule by mouth Daily. Indications: Constipation 30 capsule 0    famotidine (Pepcid) 40 MG tablet Take 1 tablet by mouth Daily. 30 tablet 3    Insulin Degludec (TRESIBA FLEXTOUCH) 200 UNIT/ML solution pen-injector pen injection Inject 0-45 Units under the skin into the appropriate area as directed Daily As Needed (High BG per scale).      magnesium oxide 250 MG tablet Take 1 tablet by mouth Daily. OTC      memantine (NAMENDA) 10 MG tablet Take 1 tablet by mouth Daily.      metFORMIN (GLUCOPHAGE) 1000 MG tablet Take 1 tablet by mouth 2 (Two) Times a Day With Meals. 60 tablet 3    pravastatin (PRAVACHOL) 40 MG tablet Take 1 tablet by mouth Daily. 30 tablet 5     No current facility-administered medications for this visit.       Mental Status Exam:   Hygiene:   good  Cooperation:  Cooperative  Eye Contact:  Poor  Psychomotor Behavior:  Restless  Affect:  Appropriate  Hopelessness: Denies  Speech:  Minimal  Thought Process:  Disorganized  Thought Content:  Normal  Suicidal:  None  Homicidal:  None  Hallucinations:  None  Delusion:  None  Memory:  Deficits  Orientation:  Person and Place  Reliability:  poor  Insight:  Poor  Judgement:  Poor  Impulse Control:  Poor  Physical/Medical Issues:  No                 Assessment & Plan   Diagnoses and all orders for this visit:    1. Early onset Alzheimer's dementia with behavioral disturbance (Primary)    2. Generalized anxiety disorder  -     LORazepam (ATIVAN) 1 MG tablet; Take 1 tablet by mouth At Night As Needed for Anxiety (insomnia). Indications: Feeling Anxious, Trouble Sleeping  Dispense: 30 tablet; Refill: 0  -      Vortioxetine HBr (Trintellix) 20 MG tablet; Take 1 tablet by mouth Daily. Indications: Major Depressive Disorder  Dispense: 30 tablet; Refill: 0    3. Current mild episode of major depressive disorder without prior episode  -     OLANZapine (zyPREXA) 7.5 MG tablet; Take 1 tablet by mouth Daily With Dinner. Indications: Behavioral Disorders associated with Dementia  Dispense: 30 tablet; Refill: 0  -     Vortioxetine HBr (Trintellix) 20 MG tablet; Take 1 tablet by mouth Daily. Indications: Major Depressive Disorder  Dispense: 30 tablet; Refill: 0            Discussed medication options with daughter and patient. Change Ativan to 1 mg daily at night as needed for anxiety. Cont. Trintellix 20 mg daily for depression and anxiety. Cont. Zyprexa 7.5mg daily for depression and mood.   Reviewed the risks, benefits, and side effects of the medications; daughter and patient acknowledged and verbally consented. Daughter and patient was instructed on medication side effects, benefits, and also of no treatment.  Daughter and patient was given an explanation regarding potential for increased risk of diabetes, lipids, and weight gain.  Labs will be assessed as clinically indicated.  Diet was discussed especially healthy diet choices and increasing activity and exercise.  Daughter and patient was strongly urged to continue weight maintance or weight loss efforts. Daughter and patient reported verbalized understanding of instructions.  Daughter and patient is agreeable to call the office with any questions, concerns, or worsening of symptoms. Daughter and patient is aware to call 911 or go to the nearest ER should begin having SI/HI.          Follow up in four weeks        Errors in dictation may reflect use of voice recognition software and not all errors in transcription may have been detected prior to signing.              This document has been electronically signed by ASH Gasca   December 11, 2023 11:39 EST

## 2023-12-06 ENCOUNTER — PATIENT OUTREACH (OUTPATIENT)
Dept: CASE MANAGEMENT | Facility: OTHER | Age: 58
End: 2023-12-06
Payer: MEDICARE

## 2023-12-06 NOTE — OUTREACH NOTE
Patient Outreach    SW spoke with pt daughter to F/u on referrals. SW reviewed that she had not heard back from [facility]. SW stated she would send the LTC Facility listing to her for review, to pick out a few more facilities to refer the pt to. SW also discussed in-home care and making a referral for a call back and assessment to [facility], [facility]. Pt daughter was in support of this, and SW completed the request form for the call back. SW printed off the confirmation for that to send to the daughter as well. Pt daughter expressed thanks. HERMINIA will F/u in a week to monitor engagement with [facility], [facility].     Madie GONZALEZ -   Ambulatory Case Management    12/6/2023, 13:11 EST

## 2023-12-18 ENCOUNTER — PATIENT OUTREACH (OUTPATIENT)
Dept: CASE MANAGEMENT | Facility: OTHER | Age: 58
End: 2023-12-18
Payer: MEDICARE

## 2023-12-18 NOTE — OUTREACH NOTE
Patient Outreach    HERMINIA  spoke with pt daughter, who stated that she received a letter from [facility] verifying that they had added her to a list of people to be called in the future regarding applying for services. HERMINIA stated with pt daughter that it may be strategic to go in-person to the Medicaid office and inquire about the status of pt waiver-support Medicaid to verify that it is instated. Pt daughter stated she would and is agreeable to F/u with SW next week regarding this. She expressed thanks.     Madie GONZALEZ -   Ambulatory Case Management    12/18/2023, 12:52 EST

## 2023-12-24 ENCOUNTER — APPOINTMENT (OUTPATIENT)
Dept: CT IMAGING | Facility: HOSPITAL | Age: 58
End: 2023-12-24
Payer: MEDICARE

## 2023-12-24 LAB
ALBUMIN SERPL-MCNC: 4.2 G/DL (ref 3.5–5.2)
ALBUMIN/GLOB SERPL: 1.2 G/DL
ALP SERPL-CCNC: 115 U/L (ref 39–117)
ALT SERPL W P-5'-P-CCNC: 62 U/L (ref 1–33)
ANION GAP SERPL CALCULATED.3IONS-SCNC: 19 MMOL/L (ref 5–15)
AST SERPL-CCNC: 37 U/L (ref 1–32)
BASOPHILS # BLD AUTO: 0.05 10*3/MM3 (ref 0–0.2)
BASOPHILS NFR BLD AUTO: 0.4 % (ref 0–1.5)
BILIRUB SERPL-MCNC: 0.7 MG/DL (ref 0–1.2)
BUN SERPL-MCNC: 16 MG/DL (ref 8–23)
BUN/CREAT SERPL: 14.4 (ref 7–25)
CALCIUM SPEC-SCNC: 9.8 MG/DL (ref 8.6–10.5)
CHLORIDE SERPL-SCNC: 97 MMOL/L (ref 98–107)
CO2 SERPL-SCNC: 20 MMOL/L (ref 22–29)
CREAT SERPL-MCNC: 1.11 MG/DL (ref 0.57–1)
CRP SERPL-MCNC: 2.06 MG/DL (ref 0–0.5)
DEPRECATED RDW RBC AUTO: 41.2 FL (ref 37–54)
EGFRCR SERPLBLD CKD-EPI 2021: 57 ML/MIN/1.73
EOSINOPHIL # BLD AUTO: 0.26 10*3/MM3 (ref 0–0.4)
EOSINOPHIL NFR BLD AUTO: 2 % (ref 0.3–6.2)
ERYTHROCYTE [DISTWIDTH] IN BLOOD BY AUTOMATED COUNT: 12 % (ref 12.3–15.4)
ERYTHROCYTE [SEDIMENTATION RATE] IN BLOOD: 13 MM/HR (ref 0–30)
GLOBULIN UR ELPH-MCNC: 3.6 GM/DL
GLUCOSE SERPL-MCNC: 413 MG/DL (ref 65–99)
HCT VFR BLD AUTO: 43.5 % (ref 34–46.6)
HGB BLD-MCNC: 14.3 G/DL (ref 12–15.9)
IMM GRANULOCYTES # BLD AUTO: 0.05 10*3/MM3 (ref 0–0.05)
IMM GRANULOCYTES NFR BLD AUTO: 0.4 % (ref 0–0.5)
LYMPHOCYTES # BLD AUTO: 1.67 10*3/MM3 (ref 0.7–3.1)
LYMPHOCYTES NFR BLD AUTO: 13 % (ref 19.6–45.3)
MCH RBC QN AUTO: 30.8 PG (ref 26.6–33)
MCHC RBC AUTO-ENTMCNC: 32.9 G/DL (ref 31.5–35.7)
MCV RBC AUTO: 93.5 FL (ref 79–97)
MONOCYTES # BLD AUTO: 1.26 10*3/MM3 (ref 0.1–0.9)
MONOCYTES NFR BLD AUTO: 9.8 % (ref 5–12)
NEUTROPHILS NFR BLD AUTO: 74.4 % (ref 42.7–76)
NEUTROPHILS NFR BLD AUTO: 9.57 10*3/MM3 (ref 1.7–7)
NRBC BLD AUTO-RTO: 0 /100 WBC (ref 0–0.2)
PLATELET # BLD AUTO: 265 10*3/MM3 (ref 140–450)
PMV BLD AUTO: 10.2 FL (ref 6–12)
POTASSIUM SERPL-SCNC: 4.2 MMOL/L (ref 3.5–5.2)
PROT SERPL-MCNC: 7.8 G/DL (ref 6–8.5)
RBC # BLD AUTO: 4.65 10*6/MM3 (ref 3.77–5.28)
SODIUM SERPL-SCNC: 136 MMOL/L (ref 136–145)
WBC NRBC COR # BLD AUTO: 12.86 10*3/MM3 (ref 3.4–10.8)

## 2023-12-24 PROCEDURE — 70486 CT MAXILLOFACIAL W/O DYE: CPT

## 2023-12-24 PROCEDURE — 70450 CT HEAD/BRAIN W/O DYE: CPT

## 2023-12-24 PROCEDURE — 80053 COMPREHEN METABOLIC PANEL: CPT | Performed by: PHYSICIAN ASSISTANT

## 2023-12-24 PROCEDURE — 86140 C-REACTIVE PROTEIN: CPT | Performed by: PHYSICIAN ASSISTANT

## 2023-12-24 PROCEDURE — 99285 EMERGENCY DEPT VISIT HI MDM: CPT

## 2023-12-24 PROCEDURE — 85652 RBC SED RATE AUTOMATED: CPT | Performed by: PHYSICIAN ASSISTANT

## 2023-12-24 PROCEDURE — 85025 COMPLETE CBC W/AUTO DIFF WBC: CPT | Performed by: PHYSICIAN ASSISTANT

## 2023-12-24 PROCEDURE — 70450 CT HEAD/BRAIN W/O DYE: CPT | Performed by: RADIOLOGY

## 2023-12-24 PROCEDURE — 36415 COLL VENOUS BLD VENIPUNCTURE: CPT

## 2023-12-24 PROCEDURE — 87040 BLOOD CULTURE FOR BACTERIA: CPT | Performed by: PHYSICIAN ASSISTANT

## 2023-12-24 PROCEDURE — 83036 HEMOGLOBIN GLYCOSYLATED A1C: CPT | Performed by: STUDENT IN AN ORGANIZED HEALTH CARE EDUCATION/TRAINING PROGRAM

## 2023-12-24 PROCEDURE — 70486 CT MAXILLOFACIAL W/O DYE: CPT | Performed by: RADIOLOGY

## 2023-12-25 ENCOUNTER — HOSPITAL ENCOUNTER (INPATIENT)
Facility: HOSPITAL | Age: 58
LOS: 10 days | Discharge: NURSING, REHAB OR LONG-TERM CARE | End: 2024-01-04
Attending: STUDENT IN AN ORGANIZED HEALTH CARE EDUCATION/TRAINING PROGRAM | Admitting: HOSPITALIST
Payer: MEDICARE

## 2023-12-25 DIAGNOSIS — F02.C4: ICD-10-CM

## 2023-12-25 DIAGNOSIS — A41.9 SEVERE SEPSIS: ICD-10-CM

## 2023-12-25 DIAGNOSIS — L03.211 FACIAL CELLULITIS: Primary | ICD-10-CM

## 2023-12-25 DIAGNOSIS — R65.20 SEVERE SEPSIS: ICD-10-CM

## 2023-12-25 LAB
ALBUMIN SERPL-MCNC: 4.3 G/DL (ref 3.5–5.2)
ALBUMIN/GLOB SERPL: 1.3 G/DL
ALP SERPL-CCNC: 123 U/L (ref 39–117)
ALT SERPL W P-5'-P-CCNC: 65 U/L (ref 1–33)
ANION GAP SERPL CALCULATED.3IONS-SCNC: 14.1 MMOL/L (ref 5–15)
AST SERPL-CCNC: 39 U/L (ref 1–32)
BASOPHILS # BLD AUTO: 0.04 10*3/MM3 (ref 0–0.2)
BASOPHILS NFR BLD AUTO: 0.3 % (ref 0–1.5)
BILIRUB SERPL-MCNC: 0.5 MG/DL (ref 0–1.2)
BILIRUB UR QL STRIP: NEGATIVE
BUN SERPL-MCNC: 15 MG/DL (ref 8–23)
BUN/CREAT SERPL: 15.8 (ref 7–25)
CALCIUM SPEC-SCNC: 9.6 MG/DL (ref 8.6–10.5)
CHLORIDE SERPL-SCNC: 98 MMOL/L (ref 98–107)
CLARITY UR: CLEAR
CO2 SERPL-SCNC: 23.9 MMOL/L (ref 22–29)
COLOR UR: YELLOW
CREAT SERPL-MCNC: 0.95 MG/DL (ref 0.57–1)
CRP SERPL-MCNC: 3.37 MG/DL (ref 0–0.5)
D-LACTATE SERPL-SCNC: 1.9 MMOL/L (ref 0.5–2)
D-LACTATE SERPL-SCNC: 3.1 MMOL/L (ref 0.5–2)
DEPRECATED RDW RBC AUTO: 39.5 FL (ref 37–54)
EGFRCR SERPLBLD CKD-EPI 2021: 68.7 ML/MIN/1.73
EOSINOPHIL # BLD AUTO: 0.27 10*3/MM3 (ref 0–0.4)
EOSINOPHIL NFR BLD AUTO: 2.1 % (ref 0.3–6.2)
ERYTHROCYTE [DISTWIDTH] IN BLOOD BY AUTOMATED COUNT: 11.9 % (ref 12.3–15.4)
GLOBULIN UR ELPH-MCNC: 3.4 GM/DL
GLUCOSE BLDC GLUCOMTR-MCNC: 260 MG/DL (ref 70–130)
GLUCOSE BLDC GLUCOMTR-MCNC: 276 MG/DL (ref 70–130)
GLUCOSE BLDC GLUCOMTR-MCNC: 280 MG/DL (ref 70–130)
GLUCOSE BLDC GLUCOMTR-MCNC: 292 MG/DL (ref 70–130)
GLUCOSE BLDC GLUCOMTR-MCNC: 334 MG/DL (ref 70–130)
GLUCOSE BLDC GLUCOMTR-MCNC: 430 MG/DL (ref 70–130)
GLUCOSE SERPL-MCNC: 344 MG/DL (ref 65–99)
GLUCOSE UR STRIP-MCNC: ABNORMAL MG/DL
HBA1C MFR BLD: 7.7 % (ref 4.8–5.6)
HCT VFR BLD AUTO: 40 % (ref 34–46.6)
HGB BLD-MCNC: 13.7 G/DL (ref 12–15.9)
HGB UR QL STRIP.AUTO: NEGATIVE
HOLD SPECIMEN: NORMAL
HOLD SPECIMEN: NORMAL
IMM GRANULOCYTES # BLD AUTO: 0.06 10*3/MM3 (ref 0–0.05)
IMM GRANULOCYTES NFR BLD AUTO: 0.5 % (ref 0–0.5)
KETONES UR QL STRIP: NEGATIVE
LEUKOCYTE ESTERASE UR QL STRIP.AUTO: NEGATIVE
LYMPHOCYTES # BLD AUTO: 2.04 10*3/MM3 (ref 0.7–3.1)
LYMPHOCYTES NFR BLD AUTO: 15.9 % (ref 19.6–45.3)
MCH RBC QN AUTO: 31.4 PG (ref 26.6–33)
MCHC RBC AUTO-ENTMCNC: 34.3 G/DL (ref 31.5–35.7)
MCV RBC AUTO: 91.5 FL (ref 79–97)
MONOCYTES # BLD AUTO: 1.17 10*3/MM3 (ref 0.1–0.9)
MONOCYTES NFR BLD AUTO: 9.1 % (ref 5–12)
NEUTROPHILS NFR BLD AUTO: 72.1 % (ref 42.7–76)
NEUTROPHILS NFR BLD AUTO: 9.24 10*3/MM3 (ref 1.7–7)
NITRITE UR QL STRIP: NEGATIVE
NRBC BLD AUTO-RTO: 0 /100 WBC (ref 0–0.2)
PH UR STRIP.AUTO: 5.5 [PH] (ref 5–8)
PLATELET # BLD AUTO: 253 10*3/MM3 (ref 140–450)
PMV BLD AUTO: 10 FL (ref 6–12)
POTASSIUM SERPL-SCNC: 3.9 MMOL/L (ref 3.5–5.2)
PROT SERPL-MCNC: 7.7 G/DL (ref 6–8.5)
PROT UR QL STRIP: NEGATIVE
RBC # BLD AUTO: 4.37 10*6/MM3 (ref 3.77–5.28)
SODIUM SERPL-SCNC: 136 MMOL/L (ref 136–145)
SP GR UR STRIP: 1.01 (ref 1–1.03)
UROBILINOGEN UR QL STRIP: ABNORMAL
WBC NRBC COR # BLD AUTO: 12.82 10*3/MM3 (ref 3.4–10.8)
WHOLE BLOOD HOLD COAG: NORMAL
WHOLE BLOOD HOLD SPECIMEN: NORMAL

## 2023-12-25 PROCEDURE — 85025 COMPLETE CBC W/AUTO DIFF WBC: CPT | Performed by: HOSPITALIST

## 2023-12-25 PROCEDURE — 83605 ASSAY OF LACTIC ACID: CPT | Performed by: STUDENT IN AN ORGANIZED HEALTH CARE EDUCATION/TRAINING PROGRAM

## 2023-12-25 PROCEDURE — 25810000003 SODIUM CHLORIDE 0.9 % SOLUTION 250 ML FLEX CONT: Performed by: STUDENT IN AN ORGANIZED HEALTH CARE EDUCATION/TRAINING PROGRAM

## 2023-12-25 PROCEDURE — 25010000002 VANCOMYCIN 1 G RECONSTITUTED SOLUTION 1 EACH VIAL: Performed by: STUDENT IN AN ORGANIZED HEALTH CARE EDUCATION/TRAINING PROGRAM

## 2023-12-25 PROCEDURE — 99221 1ST HOSP IP/OBS SF/LOW 40: CPT | Performed by: SURGERY

## 2023-12-25 PROCEDURE — 82948 REAGENT STRIP/BLOOD GLUCOSE: CPT

## 2023-12-25 PROCEDURE — 81003 URINALYSIS AUTO W/O SCOPE: CPT | Performed by: PHYSICIAN ASSISTANT

## 2023-12-25 PROCEDURE — 63710000001 INSULIN GLARGINE PER 5 UNITS: Performed by: STUDENT IN AN ORGANIZED HEALTH CARE EDUCATION/TRAINING PROGRAM

## 2023-12-25 PROCEDURE — 80053 COMPREHEN METABOLIC PANEL: CPT | Performed by: HOSPITALIST

## 2023-12-25 PROCEDURE — 63710000001 INSULIN LISPRO (HUMAN) PER 5 UNITS: Performed by: HOSPITALIST

## 2023-12-25 PROCEDURE — 63710000001 INSULIN REGULAR HUMAN PER 5 UNITS: Performed by: STUDENT IN AN ORGANIZED HEALTH CARE EDUCATION/TRAINING PROGRAM

## 2023-12-25 PROCEDURE — 25010000002 LORAZEPAM PER 2 MG: Performed by: STUDENT IN AN ORGANIZED HEALTH CARE EDUCATION/TRAINING PROGRAM

## 2023-12-25 PROCEDURE — 86140 C-REACTIVE PROTEIN: CPT | Performed by: HOSPITALIST

## 2023-12-25 PROCEDURE — 99223 1ST HOSP IP/OBS HIGH 75: CPT

## 2023-12-25 PROCEDURE — 25810000003 SODIUM CHLORIDE 0.9 % SOLUTION: Performed by: HOSPITALIST

## 2023-12-25 PROCEDURE — 25010000002 PIPERACILLIN SOD-TAZOBACTAM PER 1 G: Performed by: HOSPITALIST

## 2023-12-25 PROCEDURE — 25010000002 PIPERACILLIN SOD-TAZOBACTAM PER 1 G: Performed by: STUDENT IN AN ORGANIZED HEALTH CARE EDUCATION/TRAINING PROGRAM

## 2023-12-25 RX ORDER — SODIUM CHLORIDE 9 MG/ML
40 INJECTION, SOLUTION INTRAVENOUS AS NEEDED
Status: DISCONTINUED | OUTPATIENT
Start: 2023-12-25 | End: 2024-01-04 | Stop reason: HOSPADM

## 2023-12-25 RX ORDER — NICOTINE POLACRILEX 4 MG
15 LOZENGE BUCCAL
Status: DISCONTINUED | OUTPATIENT
Start: 2023-12-25 | End: 2024-01-04 | Stop reason: HOSPADM

## 2023-12-25 RX ORDER — FAMOTIDINE 20 MG/1
40 TABLET, FILM COATED ORAL DAILY
Status: DISCONTINUED | OUTPATIENT
Start: 2023-12-25 | End: 2024-01-04 | Stop reason: HOSPADM

## 2023-12-25 RX ORDER — BISACODYL 10 MG
10 SUPPOSITORY, RECTAL RECTAL DAILY PRN
Status: DISCONTINUED | OUTPATIENT
Start: 2023-12-25 | End: 2024-01-04 | Stop reason: HOSPADM

## 2023-12-25 RX ORDER — LORAZEPAM 2 MG/ML
1 INJECTION INTRAMUSCULAR ONCE
Status: COMPLETED | OUTPATIENT
Start: 2023-12-25 | End: 2023-12-25

## 2023-12-25 RX ORDER — BISACODYL 5 MG/1
5 TABLET, DELAYED RELEASE ORAL DAILY PRN
Status: DISCONTINUED | OUTPATIENT
Start: 2023-12-25 | End: 2024-01-04 | Stop reason: HOSPADM

## 2023-12-25 RX ORDER — SODIUM CHLORIDE 0.9 % (FLUSH) 0.9 %
10 SYRINGE (ML) INJECTION AS NEEDED
Status: DISCONTINUED | OUTPATIENT
Start: 2023-12-25 | End: 2024-01-04 | Stop reason: HOSPADM

## 2023-12-25 RX ORDER — LANOLIN ALCOHOL/MO/W.PET/CERES
2000 CREAM (GRAM) TOPICAL DAILY
Status: DISCONTINUED | OUTPATIENT
Start: 2023-12-25 | End: 2024-01-04 | Stop reason: HOSPADM

## 2023-12-25 RX ORDER — SODIUM CHLORIDE 9 MG/ML
100 INJECTION, SOLUTION INTRAVENOUS CONTINUOUS
Status: DISCONTINUED | OUTPATIENT
Start: 2023-12-25 | End: 2023-12-28

## 2023-12-25 RX ORDER — GLUCAGON 1 MG/ML
1 KIT INJECTION
Status: DISCONTINUED | OUTPATIENT
Start: 2023-12-25 | End: 2024-01-04 | Stop reason: HOSPADM

## 2023-12-25 RX ORDER — INSULIN LISPRO 100 [IU]/ML
3-14 INJECTION, SOLUTION INTRAVENOUS; SUBCUTANEOUS
Status: DISCONTINUED | OUTPATIENT
Start: 2023-12-25 | End: 2023-12-30

## 2023-12-25 RX ORDER — CETIRIZINE HYDROCHLORIDE 10 MG/1
5 TABLET ORAL DAILY
Status: DISCONTINUED | OUTPATIENT
Start: 2023-12-25 | End: 2024-01-04 | Stop reason: HOSPADM

## 2023-12-25 RX ORDER — SODIUM CHLORIDE 0.9 % (FLUSH) 0.9 %
10 SYRINGE (ML) INJECTION EVERY 12 HOURS SCHEDULED
Status: DISCONTINUED | OUTPATIENT
Start: 2023-12-25 | End: 2024-01-04 | Stop reason: HOSPADM

## 2023-12-25 RX ORDER — POLYETHYLENE GLYCOL 3350 17 G/17G
17 POWDER, FOR SOLUTION ORAL DAILY PRN
Status: DISCONTINUED | OUTPATIENT
Start: 2023-12-25 | End: 2024-01-04 | Stop reason: HOSPADM

## 2023-12-25 RX ORDER — DEXTROSE MONOHYDRATE 25 G/50ML
25 INJECTION, SOLUTION INTRAVENOUS
Status: DISCONTINUED | OUTPATIENT
Start: 2023-12-25 | End: 2024-01-04 | Stop reason: HOSPADM

## 2023-12-25 RX ORDER — PRAVASTATIN SODIUM 40 MG
40 TABLET ORAL DAILY
Status: DISCONTINUED | OUTPATIENT
Start: 2023-12-25 | End: 2024-01-04 | Stop reason: HOSPADM

## 2023-12-25 RX ORDER — AMOXICILLIN 250 MG
2 CAPSULE ORAL 2 TIMES DAILY
Status: DISCONTINUED | OUTPATIENT
Start: 2023-12-25 | End: 2024-01-04 | Stop reason: HOSPADM

## 2023-12-25 RX ORDER — LORAZEPAM 2 MG/ML
2 INJECTION INTRAMUSCULAR ONCE
Status: DISCONTINUED | OUTPATIENT
Start: 2023-12-25 | End: 2023-12-25

## 2023-12-25 RX ORDER — DOCUSATE SODIUM 100 MG/1
100 CAPSULE, LIQUID FILLED ORAL DAILY
Status: DISCONTINUED | OUTPATIENT
Start: 2023-12-25 | End: 2024-01-04 | Stop reason: HOSPADM

## 2023-12-25 RX ORDER — MEMANTINE HYDROCHLORIDE 10 MG/1
10 TABLET ORAL DAILY
Status: DISCONTINUED | OUTPATIENT
Start: 2023-12-25 | End: 2024-01-04 | Stop reason: HOSPADM

## 2023-12-25 RX ADMIN — OLANZAPINE 7.5 MG: 2.5 TABLET, FILM COATED ORAL at 18:22

## 2023-12-25 RX ADMIN — DOCUSATE SODIUM 50 MG AND SENNOSIDES 8.6 MG 2 TABLET: 8.6; 5 TABLET, FILM COATED ORAL at 09:18

## 2023-12-25 RX ADMIN — Medication 2000 MCG: at 10:15

## 2023-12-25 RX ADMIN — LORAZEPAM 1 MG: 2 INJECTION INTRAMUSCULAR; INTRAVENOUS at 03:06

## 2023-12-25 RX ADMIN — VANCOMYCIN HYDROCHLORIDE 1000 MG: 1 INJECTION, POWDER, LYOPHILIZED, FOR SOLUTION INTRAVENOUS at 20:40

## 2023-12-25 RX ADMIN — MAGNESIUM GLUCONATE 500 MG ORAL TABLET 400 MG: 500 TABLET ORAL at 16:03

## 2023-12-25 RX ADMIN — INSULIN GLARGINE 15 UNITS: 100 INJECTION, SOLUTION SUBCUTANEOUS at 10:49

## 2023-12-25 RX ADMIN — INSULIN LISPRO 8 UNITS: 100 INJECTION, SOLUTION INTRAVENOUS; SUBCUTANEOUS at 09:16

## 2023-12-25 RX ADMIN — HUMAN INSULIN 10 UNITS: 100 INJECTION, SOLUTION SUBCUTANEOUS at 03:37

## 2023-12-25 RX ADMIN — INSULIN LISPRO 8 UNITS: 100 INJECTION, SOLUTION INTRAVENOUS; SUBCUTANEOUS at 11:59

## 2023-12-25 RX ADMIN — Medication 10 ML: at 10:16

## 2023-12-25 RX ADMIN — INSULIN LISPRO 8 UNITS: 100 INJECTION, SOLUTION INTRAVENOUS; SUBCUTANEOUS at 18:21

## 2023-12-25 RX ADMIN — PIPERACILLIN SODIUM AND TAZOBACTAM SODIUM 3.38 G: 3; .375 INJECTION, POWDER, LYOPHILIZED, FOR SOLUTION INTRAVENOUS at 06:55

## 2023-12-25 RX ADMIN — PIPERACILLIN SODIUM AND TAZOBACTAM SODIUM 3.38 G: 3; .375 INJECTION, POWDER, LYOPHILIZED, FOR SOLUTION INTRAVENOUS at 22:39

## 2023-12-25 RX ADMIN — DOCUSATE SODIUM 100 MG: 100 CAPSULE, LIQUID FILLED ORAL at 10:15

## 2023-12-25 RX ADMIN — PIPERACILLIN SODIUM AND TAZOBACTAM SODIUM 3.38 G: 3; .375 INJECTION, POWDER, LYOPHILIZED, FOR SOLUTION INTRAVENOUS at 00:32

## 2023-12-25 RX ADMIN — INSULIN LISPRO 14 UNITS: 100 INJECTION, SOLUTION INTRAVENOUS; SUBCUTANEOUS at 20:40

## 2023-12-25 RX ADMIN — VANCOMYCIN HYDROCHLORIDE 1000 MG: 1 INJECTION, POWDER, LYOPHILIZED, FOR SOLUTION INTRAVENOUS at 01:12

## 2023-12-25 RX ADMIN — PIPERACILLIN SODIUM AND TAZOBACTAM SODIUM 3.38 G: 3; .375 INJECTION, POWDER, LYOPHILIZED, FOR SOLUTION INTRAVENOUS at 16:02

## 2023-12-25 RX ADMIN — SODIUM CHLORIDE 100 ML/HR: 9 INJECTION, SOLUTION INTRAVENOUS at 18:37

## 2023-12-25 RX ADMIN — CETIRIZINE HYDROCHLORIDE 5 MG: 10 TABLET, FILM COATED ORAL at 10:15

## 2023-12-25 RX ADMIN — SODIUM CHLORIDE 100 ML/HR: 9 INJECTION, SOLUTION INTRAVENOUS at 06:03

## 2023-12-25 RX ADMIN — FAMOTIDINE 40 MG: 20 TABLET, FILM COATED ORAL at 10:16

## 2023-12-25 RX ADMIN — MEMANTINE HYDROCHLORIDE 10 MG: 10 TABLET ORAL at 10:16

## 2023-12-25 RX ADMIN — PRAVASTATIN SODIUM 40 MG: 40 TABLET ORAL at 10:16

## 2023-12-25 RX ADMIN — SODIUM CHLORIDE 1000 ML: 9 INJECTION, SOLUTION INTRAVENOUS at 06:00

## 2023-12-25 NOTE — CONSULTS
Patient Name:  Sandra Leahy  YOB: 1965  [id number]       Patient Care Team:  John Mueller APRN as PCP - General (Family Medicine)  Madie Valle as  ([facility]) ([facility] [place])      General Surgery Consult Note     Date of Consultation: 12/25/23    Consulting Physician : Tulio Sifuentes DO    Reason for Consult : right face cellulitis     Subjective     I have been asked to see  Sandra Leahy , a 58 y.o. female in consultation for right facial cellulitis. She presented to the emergency department last night with issue of facial cellulitis. Admitted to medicine with IV abx after discussion with [facility] regarding next best step of management.       Allergy:   Allergies   Allergen Reactions    Lantus [Insulin Glargine] Confusion    Codeine Itching    Exenatide Nausea And Vomiting     Intolerant    Sitagliptin Other (See Comments)     Unsure       Medications:  cetirizine, 5 mg, Oral, Daily  docusate sodium, 100 mg, Oral, Daily  famotidine, 40 mg, Oral, Daily  insulin glargine, 15 Units, Subcutaneous, Daily  insulin lispro, 3-14 Units, Subcutaneous, 4x Daily AC & at Bedtime  magnesium oxide, 250 mg, Oral, Daily  memantine, 10 mg, Oral, Daily  OLANZapine, 7.5 mg, Oral, Daily With Dinner  piperacillin-tazobactam, 3.375 g, Intravenous, Q8H  pravastatin, 40 mg, Oral, Daily  senna-docusate sodium, 2 tablet, Oral, BID  sodium chloride, 10 mL, Intravenous, Q12H  vancomycin, 1,000 mg, Intravenous, Q18H  cyanocobalamin, 2,000 mcg, Oral, Daily      sodium chloride, 100 mL/hr, Last Rate: 100 mL/hr (12/25/23 0603)      No current facility-administered medications on file prior to encounter.     Current Outpatient Medications on File Prior to Encounter   Medication Sig    cetirizine (EQ Allergy Relief, Cetirizine,) 10 MG tablet Take 0.5 tablets by mouth Daily.    cyanocobalamin ([facility] Vitamin B-12) 2000 MCG tablet Take 1 tablet by mouth Daily.    docusate sodium (Colace) 100 MG  "capsule Take 1 capsule by mouth Daily. Indications: Constipation    famotidine (Pepcid) 40 MG tablet Take 1 tablet by mouth Daily.    magnesium oxide 250 MG tablet Take 1 tablet by mouth Daily. OTC  Indications: Disorder with Low Magnesium Levels    memantine (NAMENDA) 10 MG tablet Take 1 tablet by mouth Daily.    metFORMIN (GLUCOPHAGE) 1000 MG tablet Take 1 tablet by mouth 2 (Two) Times a Day With Meals.    OLANZapine (zyPREXA) 7.5 MG tablet Take 1 tablet by mouth Daily With Dinner. Indications: Behavioral Disorders associated with Dementia    pravastatin (PRAVACHOL) 40 MG tablet Take 1 tablet by mouth Daily.    Vortioxetine HBr (Trintellix) 20 MG tablet Take 1 tablet by mouth Daily. Indications: Major Depressive Disorder    Insulin Degludec (TRESIBA FLEXTOUCH) 200 UNIT/ML solution pen-injector pen injection Inject 0-45 Units under the skin into the appropriate area as directed Daily As Needed (High BG per scale). Indications: Type 2 Diabetes    [DISCONTINUED] Cyanocobalamin 1000 MCG/ML kit Inject 1 mL as directed Every 7 (Seven) Days.    [DISCONTINUED] LORazepam (ATIVAN) 1 MG tablet Take 1 tablet by mouth At Night As Needed for Anxiety (insomnia). Indications: Feeling Anxious, Trouble Sleeping       PMHx:   Past Medical History:   Diagnosis Date    Bursitis     Dementia     Diabetic acetonemia     Fatigue     Fibromyalgia     Postsurgical menopause     Tetanus toxoid vaccination status unknown     Visual impairment     Vitamin D deficiency        Unable to obtain history due to somnolence this morning           Objective     Physical Exam:      Vital Signs  /84 (BP Location: Left arm, Patient Position: Lying)   Pulse 116   Temp 98.4 °F (36.9 °C) (Axillary)   Resp 20   Ht 175.3 cm (69.02\")   Wt 68.5 kg (151 lb 1.6 oz)   SpO2 96%   BMI 22.30 kg/m²     Intake/Output Summary (Last 24 hours) at 12/25/2023 1304  Last data filed at 12/25/2023 1230  Gross per 24 hour   Intake 590 ml   Output --   Net 590 ml "         Physical Exam:  Face: cellulitis over right jaw   Head: Normocephalic, atraumatic.   CV: Regular rate and rhythm   Lungs: Bilateral chest rise and fall, no use of accessory muscles   Extremities:  No cyanosis, clubbing or edema bilaterally   Neurologic: No gross deficits      Assessment and Plan:    Problem List Items Addressed This Visit          Other    * (Principal) Facial cellulitis - Primary     Other Visit Diagnoses       Severe sepsis        Severe dementia associated with other underlying disease, with anxiety        Relevant Medications    LORazepam (ATIVAN) injection 1 mg (Completed)    memantine (NAMENDA) tablet 10 mg    OLANZapine (zyPREXA) tablet 7.5 mg (Start on 12/25/2023  6:00 PM)             Active Hospital Problems    Diagnosis  POA    **Facial cellulitis [L03.211]  Yes      Resolved Hospital Problems   No resolved problems to display.      Ms Leahy is a 57 yo F w/ right facial cellulitis. This appears to be secondary to infected tooth. I recommended discussion with [facility] yesterday who recommended IV abx. [facility] will plan to see patient in clinic for tooth removal.   If patient worsens, will need to be transferred for definitive management of condition.       Billie Valverde MD  12/25/23  13:04 EST

## 2023-12-25 NOTE — ED PROVIDER NOTES
Subjective   History of Present Illness  58-year-old female with baseline history of significant advanced dementia with the daughter being POA that presents for several days of right lower facial swelling that has progressed and become more painful and tender and the patient had also been complaining of difficulty in opening her mouth.      Review of Systems    Past Medical History:   Diagnosis Date    Bursitis     Dementia     Diabetic acetonemia     Fatigue     Fibromyalgia     Postsurgical menopause     Tetanus toxoid vaccination status unknown     Visual impairment     Vitamin D deficiency        Allergies   Allergen Reactions    Lantus [Insulin Glargine] Confusion    Codeine Itching    Exenatide Nausea And Vomiting     Intolerant    Sitagliptin Other (See Comments)     Unsure       Past Surgical History:   Procedure Laterality Date    BREAST BIOPSY Left 7-8 yrs ago    benign    CHOLECYSTECTOMY      HYSTERECTOMY      40s    KNEE ARTHROSCOPY W/ MENISCAL REPAIR Left        Family History   Problem Relation Age of Onset    Diabetes Other     Asthma Other     Hypertension Other     Diabetes Mother     Heart disease Mother     Hypertension Mother     Hyperlipidemia Mother     Arthritis Mother     Diabetes Father     Heart disease Father     Diabetes Sister     Cancer Sister     Diabetes Brother        Social History     Socioeconomic History    Marital status:      Spouse name: alvaro    Number of children: 5    Years of education: 12   Tobacco Use    Smoking status: Former     Types: Cigarettes     Quit date: 1996     Years since quittin.6     Passive exposure: Past    Smokeless tobacco: Never   Vaping Use    Vaping Use: Never used   Substance and Sexual Activity    Alcohol use: No    Drug use: No    Sexual activity: Yes     Partners: Male           Objective   Physical Exam  Vitals and nursing note reviewed.   Constitutional:       General: She is not in acute distress.     Appearance: She is  well-developed. She is not diaphoretic.   HENT:      Head: Normocephalic and atraumatic.      Comments: Patient has a very large right lower area swelling induration erythema on the right lower jawline that prevents patient from completely opening the mouth.  Uvula is midline and she is handling her oral secretions.     Right Ear: External ear normal.      Left Ear: External ear normal.      Nose: Nose normal.   Eyes:      Conjunctiva/sclera: Conjunctivae normal.      Pupils: Pupils are equal, round, and reactive to light.   Neck:      Vascular: No JVD.      Trachea: No tracheal deviation.   Cardiovascular:      Rate and Rhythm: Normal rate and regular rhythm.      Heart sounds: Normal heart sounds. No murmur heard.  Pulmonary:      Effort: Pulmonary effort is normal. No respiratory distress.      Breath sounds: Normal breath sounds. No wheezing.   Abdominal:      General: Bowel sounds are normal.      Palpations: Abdomen is soft.      Tenderness: There is no abdominal tenderness.   Musculoskeletal:         General: No deformity. Normal range of motion.      Cervical back: Normal range of motion and neck supple.   Skin:     General: Skin is warm and dry.      Coloration: Skin is not pale.      Findings: No erythema or rash.   Neurological:      Mental Status: She is alert and oriented to person, place, and time.      Cranial Nerves: No cranial nerve deficit.   Psychiatric:      Comments: Patient has severe advanced dementia.  She is awake and alert and can answer very simple questions         Procedures       Results for orders placed or performed during the hospital encounter of 12/25/23   Blood Culture - Blood, Arm, Right    Specimen: Arm, Right; Blood   Result Value Ref Range    Blood Culture No growth at 3 days    Blood Culture - Blood, Arm, Left    Specimen: Arm, Left; Blood   Result Value Ref Range    Blood Culture No growth at 3 days    Comprehensive Metabolic Panel    Specimen: Blood   Result Value Ref Range     Glucose 413 (C) 65 - 99 mg/dL    BUN 16 8 - 23 mg/dL    Creatinine 1.11 (H) 0.57 - 1.00 mg/dL    Sodium 136 136 - 145 mmol/L    Potassium 4.2 3.5 - 5.2 mmol/L    Chloride 97 (L) 98 - 107 mmol/L    CO2 20.0 (L) 22.0 - 29.0 mmol/L    Calcium 9.8 8.6 - 10.5 mg/dL    Total Protein 7.8 6.0 - 8.5 g/dL    Albumin 4.2 3.5 - 5.2 g/dL    ALT (SGPT) 62 (H) 1 - 33 U/L    AST (SGOT) 37 (H) 1 - 32 U/L    Alkaline Phosphatase 115 39 - 117 U/L    Total Bilirubin 0.7 0.0 - 1.2 mg/dL    Globulin 3.6 gm/dL    A/G Ratio 1.2 g/dL    BUN/Creatinine Ratio 14.4 7.0 - 25.0    Anion Gap 19.0 (H) 5.0 - 15.0 mmol/L    eGFR 57.0 (L) >60.0 mL/min/1.73   Urinalysis With Microscopic If Indicated (No Culture) - Urine, Clean Catch    Specimen: Urine, Clean Catch   Result Value Ref Range    Color, UA Yellow Yellow, Straw    Appearance, UA Clear Clear    pH, UA 5.5 5.0 - 8.0    Specific Gravity, UA 1.015 1.005 - 1.030    Glucose, UA >=1000 mg/dL (3+) (A) Negative    Ketones, UA Negative Negative    Bilirubin, UA Negative Negative    Blood, UA Negative Negative    Protein, UA Negative Negative    Leuk Esterase, UA Negative Negative    Nitrite, UA Negative Negative    Urobilinogen, UA 0.2 E.U./dL 0.2 - 1.0 E.U./dL   C-reactive Protein    Specimen: Blood   Result Value Ref Range    C-Reactive Protein 2.06 (H) 0.00 - 0.50 mg/dL   Sedimentation Rate    Specimen: Blood   Result Value Ref Range    Sed Rate 13 0 - 30 mm/hr   CBC Auto Differential    Specimen: Blood   Result Value Ref Range    WBC 12.86 (H) 3.40 - 10.80 10*3/mm3    RBC 4.65 3.77 - 5.28 10*6/mm3    Hemoglobin 14.3 12.0 - 15.9 g/dL    Hematocrit 43.5 34.0 - 46.6 %    MCV 93.5 79.0 - 97.0 fL    MCH 30.8 26.6 - 33.0 pg    MCHC 32.9 31.5 - 35.7 g/dL    RDW 12.0 (L) 12.3 - 15.4 %    RDW-SD 41.2 37.0 - 54.0 fl    MPV 10.2 6.0 - 12.0 fL    Platelets 265 140 - 450 10*3/mm3    Neutrophil % 74.4 42.7 - 76.0 %    Lymphocyte % 13.0 (L) 19.6 - 45.3 %    Monocyte % 9.8 5.0 - 12.0 %    Eosinophil % 2.0 0.3  - 6.2 %    Basophil % 0.4 0.0 - 1.5 %    Immature Grans % 0.4 0.0 - 0.5 %    Neutrophils, Absolute 9.57 (H) 1.70 - 7.00 10*3/mm3    Lymphocytes, Absolute 1.67 0.70 - 3.10 10*3/mm3    Monocytes, Absolute 1.26 (H) 0.10 - 0.90 10*3/mm3    Eosinophils, Absolute 0.26 0.00 - 0.40 10*3/mm3    Basophils, Absolute 0.05 0.00 - 0.20 10*3/mm3    Immature Grans, Absolute 0.05 0.00 - 0.05 10*3/mm3    nRBC 0.0 0.0 - 0.2 /100 WBC   Lactic Acid, Plasma    Specimen: Arm, Left; Blood   Result Value Ref Range    Lactate 3.1 (C) 0.5 - 2.0 mmol/L   Hemoglobin A1c    Specimen: Blood   Result Value Ref Range    Hemoglobin A1C 7.70 (H) 4.80 - 5.60 %   STAT Lactic Acid, Reflex    Specimen: Arm, Left; Blood   Result Value Ref Range    Lactate 1.9 0.5 - 2.0 mmol/L   CBC Auto Differential    Specimen: Blood   Result Value Ref Range    WBC 12.82 (H) 3.40 - 10.80 10*3/mm3    RBC 4.37 3.77 - 5.28 10*6/mm3    Hemoglobin 13.7 12.0 - 15.9 g/dL    Hematocrit 40.0 34.0 - 46.6 %    MCV 91.5 79.0 - 97.0 fL    MCH 31.4 26.6 - 33.0 pg    MCHC 34.3 31.5 - 35.7 g/dL    RDW 11.9 (L) 12.3 - 15.4 %    RDW-SD 39.5 37.0 - 54.0 fl    MPV 10.0 6.0 - 12.0 fL    Platelets 253 140 - 450 10*3/mm3    Neutrophil % 72.1 42.7 - 76.0 %    Lymphocyte % 15.9 (L) 19.6 - 45.3 %    Monocyte % 9.1 5.0 - 12.0 %    Eosinophil % 2.1 0.3 - 6.2 %    Basophil % 0.3 0.0 - 1.5 %    Immature Grans % 0.5 0.0 - 0.5 %    Neutrophils, Absolute 9.24 (H) 1.70 - 7.00 10*3/mm3    Lymphocytes, Absolute 2.04 0.70 - 3.10 10*3/mm3    Monocytes, Absolute 1.17 (H) 0.10 - 0.90 10*3/mm3    Eosinophils, Absolute 0.27 0.00 - 0.40 10*3/mm3    Basophils, Absolute 0.04 0.00 - 0.20 10*3/mm3    Immature Grans, Absolute 0.06 (H) 0.00 - 0.05 10*3/mm3    nRBC 0.0 0.0 - 0.2 /100 WBC   Comprehensive Metabolic Panel    Specimen: Blood   Result Value Ref Range    Glucose 344 (H) 65 - 99 mg/dL    BUN 15 8 - 23 mg/dL    Creatinine 0.95 0.57 - 1.00 mg/dL    Sodium 136 136 - 145 mmol/L    Potassium 3.9 3.5 - 5.2 mmol/L     Chloride 98 98 - 107 mmol/L    CO2 23.9 22.0 - 29.0 mmol/L    Calcium 9.6 8.6 - 10.5 mg/dL    Total Protein 7.7 6.0 - 8.5 g/dL    Albumin 4.3 3.5 - 5.2 g/dL    ALT (SGPT) 65 (H) 1 - 33 U/L    AST (SGOT) 39 (H) 1 - 32 U/L    Alkaline Phosphatase 123 (H) 39 - 117 U/L    Total Bilirubin 0.5 0.0 - 1.2 mg/dL    Globulin 3.4 gm/dL    A/G Ratio 1.3 g/dL    BUN/Creatinine Ratio 15.8 7.0 - 25.0    Anion Gap 14.1 5.0 - 15.0 mmol/L    eGFR 68.7 >60.0 mL/min/1.73   C-reactive Protein    Specimen: Blood   Result Value Ref Range    C-Reactive Protein 3.37 (H) 0.00 - 0.50 mg/dL   CBC (No Diff)    Specimen: Blood   Result Value Ref Range    WBC 9.95 3.40 - 10.80 10*3/mm3    RBC 3.79 3.77 - 5.28 10*6/mm3    Hemoglobin 11.7 (L) 12.0 - 15.9 g/dL    Hematocrit 35.0 34.0 - 46.6 %    MCV 92.3 79.0 - 97.0 fL    MCH 30.9 26.6 - 33.0 pg    MCHC 33.4 31.5 - 35.7 g/dL    RDW 12.0 (L) 12.3 - 15.4 %    RDW-SD 40.7 37.0 - 54.0 fl    MPV 9.8 6.0 - 12.0 fL    Platelets 212 140 - 450 10*3/mm3   Comprehensive Metabolic Panel    Specimen: Blood   Result Value Ref Range    Glucose 94 65 - 99 mg/dL    BUN 12 8 - 23 mg/dL    Creatinine 0.85 0.57 - 1.00 mg/dL    Sodium 132 (L) 136 - 145 mmol/L    Potassium 3.0 (L) 3.5 - 5.2 mmol/L    Chloride 99 98 - 107 mmol/L    CO2 24.7 22.0 - 29.0 mmol/L    Calcium 8.7 8.6 - 10.5 mg/dL    Total Protein 6.5 6.0 - 8.5 g/dL    Albumin 3.5 3.5 - 5.2 g/dL    ALT (SGPT) 52 (H) 1 - 33 U/L    AST (SGOT) 37 (H) 1 - 32 U/L    Alkaline Phosphatase 86 39 - 117 U/L    Total Bilirubin 0.7 0.0 - 1.2 mg/dL    Globulin 3.0 gm/dL    A/G Ratio 1.2 g/dL    BUN/Creatinine Ratio 14.1 7.0 - 25.0    Anion Gap 8.3 5.0 - 15.0 mmol/L    eGFR 78.5 >60.0 mL/min/1.73   Magnesium    Specimen: Blood   Result Value Ref Range    Magnesium 1.5 (L) 1.6 - 2.4 mg/dL   Vancomycin, Trough    Specimen: Blood   Result Value Ref Range    Vancomycin Trough 5.50 5.00 - 20.00 mcg/mL   Potassium    Specimen: Blood   Result Value Ref Range    Potassium 4.0 3.5 -  5.2 mmol/L   CBC (No Diff)    Specimen: Blood   Result Value Ref Range    WBC 9.61 3.40 - 10.80 10*3/mm3    RBC 3.63 (L) 3.77 - 5.28 10*6/mm3    Hemoglobin 11.5 (L) 12.0 - 15.9 g/dL    Hematocrit 33.5 (L) 34.0 - 46.6 %    MCV 92.3 79.0 - 97.0 fL    MCH 31.7 26.6 - 33.0 pg    MCHC 34.3 31.5 - 35.7 g/dL    RDW 11.9 (L) 12.3 - 15.4 %    RDW-SD 39.8 37.0 - 54.0 fl    MPV 10.2 6.0 - 12.0 fL    Platelets 211 140 - 450 10*3/mm3   Basic Metabolic Panel    Specimen: Blood   Result Value Ref Range    Glucose 198 (H) 65 - 99 mg/dL    BUN 14 8 - 23 mg/dL    Creatinine 0.75 0.57 - 1.00 mg/dL    Sodium 139 136 - 145 mmol/L    Potassium 3.8 3.5 - 5.2 mmol/L    Chloride 105 98 - 107 mmol/L    CO2 22.5 22.0 - 29.0 mmol/L    Calcium 8.7 8.6 - 10.5 mg/dL    BUN/Creatinine Ratio 18.7 7.0 - 25.0    Anion Gap 11.5 5.0 - 15.0 mmol/L    eGFR 91.3 >60.0 mL/min/1.73   CBC (No Diff)    Specimen: Blood   Result Value Ref Range    WBC 7.63 3.40 - 10.80 10*3/mm3    RBC 3.88 3.77 - 5.28 10*6/mm3    Hemoglobin 12.4 12.0 - 15.9 g/dL    Hematocrit 37.2 34.0 - 46.6 %    MCV 95.9 79.0 - 97.0 fL    MCH 32.0 26.6 - 33.0 pg    MCHC 33.3 31.5 - 35.7 g/dL    RDW 12.0 (L) 12.3 - 15.4 %    RDW-SD 41.6 37.0 - 54.0 fl    MPV 9.9 6.0 - 12.0 fL    Platelets 221 140 - 450 10*3/mm3   Basic Metabolic Panel    Specimen: Blood   Result Value Ref Range    Glucose 154 (H) 65 - 99 mg/dL    BUN 11 8 - 23 mg/dL    Creatinine 0.79 0.57 - 1.00 mg/dL    Sodium 140 136 - 145 mmol/L    Potassium 3.8 3.5 - 5.2 mmol/L    Chloride 108 (H) 98 - 107 mmol/L    CO2 21.8 (L) 22.0 - 29.0 mmol/L    Calcium 9.3 8.6 - 10.5 mg/dL    BUN/Creatinine Ratio 13.9 7.0 - 25.0    Anion Gap 10.2 5.0 - 15.0 mmol/L    eGFR 85.8 >60.0 mL/min/1.73   POC Glucose Once    Specimen: Blood   Result Value Ref Range    Glucose 430 (C) 70 - 130 mg/dL   POC Glucose Once    Specimen: Blood   Result Value Ref Range    Glucose 280 (H) 70 - 130 mg/dL   POC Glucose Once    Specimen: Blood   Result Value Ref Range     Glucose 276 (H) 70 - 130 mg/dL   POC Glucose Once    Specimen: Blood   Result Value Ref Range    Glucose 292 (H) 70 - 130 mg/dL   POC Glucose Once    Specimen: Blood   Result Value Ref Range    Glucose 260 (H) 70 - 130 mg/dL   POC Glucose Once    Specimen: Blood   Result Value Ref Range    Glucose 334 (H) 70 - 130 mg/dL   POC Glucose Once    Specimen: Blood   Result Value Ref Range    Glucose 149 (H) 70 - 130 mg/dL   POC Glucose Once    Specimen: Blood   Result Value Ref Range    Glucose 279 (H) 70 - 130 mg/dL   POC Glucose Once    Specimen: Blood   Result Value Ref Range    Glucose 181 (H) 70 - 130 mg/dL   POC Glucose Once    Specimen: Blood   Result Value Ref Range    Glucose 140 (H) 70 - 130 mg/dL   POC Glucose Once    Specimen: Blood   Result Value Ref Range    Glucose 169 (H) 70 - 130 mg/dL   POC Glucose Once    Specimen: Blood   Result Value Ref Range    Glucose 206 (H) 70 - 130 mg/dL   POC Glucose Once    Specimen: Blood   Result Value Ref Range    Glucose 180 (H) 70 - 130 mg/dL   POC Glucose Once    Specimen: Blood   Result Value Ref Range    Glucose 314 (H) 70 - 130 mg/dL   POC Glucose Once    Specimen: Blood   Result Value Ref Range    Glucose 155 (H) 70 - 130 mg/dL   POC Glucose Once    Specimen: Blood   Result Value Ref Range    Glucose 161 (H) 70 - 130 mg/dL   POC Glucose Once    Specimen: Blood   Result Value Ref Range    Glucose 151 (H) 70 - 130 mg/dL   POC Glucose Once    Specimen: Blood   Result Value Ref Range    Glucose 263 (H) 70 - 130 mg/dL   Green Top (Gel)   Result Value Ref Range    Extra Tube Hold for add-ons.    Lavender Top   Result Value Ref Range    Extra Tube hold for add-on    Gold Top - SST   Result Value Ref Range    Extra Tube Hold for add-ons.    Light Blue Top   Result Value Ref Range    Extra Tube Hold for add-ons.      CT Facial Bones Without Contrast   Final Result       1.  Soft tissue swelling with stranding noted overlying the body of the   right mandible consistent with  edema and cellulitis.   2.  Myositis noted affecting the musculature overlying the body of the   right mandible.   3.  No abscess or hematoma.   4.  Mild mucosal thickening in the paranasal sinuses.   5.  No acute fracture or dislocation.   6.  No air in the soft tissues.   7.  No foreign body.       This report was finalized on 12/25/2023 1:39 AM by James Ivy MD.          CT Head Without Contrast   Final Result       1.  No acute process seen in the brain.   2.  No acute hemorrhage, mass, infarct, or edema.   3.  No shift of midline structures.   4.  Mild to moderate generalized brain volume loss with mild to moderate   chronic small vessel ischemic type changes in the white matter.   5.  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           This report was finalized on 12/25/2023 1:28 AM by James Ivy MD.                  ED Course  ED Course as of 12/28/23 2047   Mon Dec 25, 2023   0433 Patient's blood sugar was 430 mg/dL and was given 10 units of IV insulin.    Consulted with Dr. Valverde who recommended consulting with orofacial surgery at .  I spoke with Dr Hernandez who consulted with Dr. Krishnamurthy who reviewed the patient's imaging and felt that it was complex cellulitis with a tooth abscess being the source in the right lower mandible but no definitive abscess to drain and there is no involvement of the TMJ joint at this time recommended admitting the patient for IV antibiotic therapy and to control pain and swelling and then transition to oral antibiotics for additional 10 days and follow-up the first week of January with [facility] [employer] [facility].    Information was given to  to have a scheduled follow-up appointment made as the tooth will need to be extracted.  Daughter will need to bring POA papers as patient potentially could have surgical intervention on the day of follow-up at the dental clinic.     [LK]      ED Course User Index  [LK] Maia De Souza DO                                              Medical Decision Making  Problems Addressed:  Facial cellulitis: complicated acute illness or injury  Severe dementia associated with other underlying disease, with anxiety: complicated acute illness or injury  Severe sepsis: complicated acute illness or injury    Amount and/or Complexity of Data Reviewed  Labs: ordered.  Radiology: ordered.    Risk  OTC drugs.  Prescription drug management.  Decision regarding hospitalization.        Final diagnoses:   Facial cellulitis   Severe sepsis   Severe dementia associated with other underlying disease, with anxiety       ED Disposition  ED Disposition       ED Disposition   Decision to Admit    Condition   --    Comment   Level of Care: Med/Surg [1]   Diagnosis: Facial cellulitis [937546]   Admitting Physician: YESSI BELLE [[id number]]   Attending Physician: YESSI BELLE [[id number]]   Certification: I Certify That Inpatient Hospital Services Are Medically Necessary For Greater Than 2 Midnights                 No follow-up provider specified.       Medication List        ASK your doctor about these medications      cyanocobalamin 2000 MCG tablet  Commonly known as: [facility] Vitamin B-12  Take 1 tablet by mouth Daily.  Ask about: Which instructions should I use?                 Maia De Souza DO  12/28/23 1836

## 2023-12-25 NOTE — PROGRESS NOTES
Pharmacy was consulted to dose vancomycin and zosyn for a skin and soft tissue infection and sepsis. Based on an estimated CrCl of 58mL/min, an extended infusion zosyn dose of 3.375g q8hr and vancomycin 1g q18hr has been ordered. A vancomycin trough will be obtained and the dose will be adjusted as needed to maintain a therapeutic AUC concentration of 400-600mg/L.hr. Thank you for the consult. Pharmacy will continue to follow.     Thank you,   Bossman Mobley, PharmD  04:52 EST

## 2023-12-25 NOTE — H&P
"     [facility] Medicine Services  HISTORY & PHYSICAL    Patient Identification:  Name:  Sandra Leahy  Age:  58 y.o.  Sex:  female  :  1965  MRN:  [id number]   Visit Number:  [id number]  Admit Date: 2023   Primary Care Physician:  John Mueller APRN     Subjective     Chief complaint:   Chief Complaint   Patient presents with    Abscess    Jaw Pain     History of presenting illness:   Patient is a 58 y.o. female with past medical history significant for dementia with behavioral disturbances, DM, that presented to the [facility] emergency department for evaluation of  facial swelling.  Facial cellulitis/myositis found on imaging in ED. the ED provider spoke with Dr. Bernal at  due to myositis noted on imaging, in case this was thought to be a patient that required oromaxillary surgery, ED provider was advised the patient would be fine to remain at our facility.    Patient examined at bedside in ED. Due to patient's advanced dementia, and no family members available for history, reportedly obtained from ED report/note.  Patient was brought to ER by daughter due to several days of right lower facial swelling.  Has become progressively more painful and tender.  The patient is now complaining of difficulty opening her mouth. Patient was sleeping when I went to examine her, but quickly aroused when I entered the room. Patient would tell me her name but would only say \"shut up\" to any other questions. She was very agitated and attempting to climb out of bed repeatedly.     Upon arrival to the ED, vitals were temperature 98.2, , RR 20, /85, SpO2 100% on room air. Labs significant for, creatinine 1.11, glucose 413, AST 37, ALT 62, hemoglobin A1c 7.7.  CRP 2.06, WBC 12.86.  CT head with no acute process.  CT facial bones shows soft tissue swelling stranding noted overlying the body of the right mandible consistent with edema and cellulitis. Myositis noted " affecting the musculature overlying the body of the right mandible.  No abscess or hematoma.    Patient has been admitted to the [facility] unit.  ---------------------------------------------------------------------------------------------------------------------   Review of Systems   Unable to perform ROS: Dementia      ---------------------------------------------------------------------------------------------------------------------   Past Medical History:   Diagnosis Date    Bursitis     Dementia     Diabetic acetonemia     Fatigue     Fibromyalgia     Postsurgical menopause     Tetanus toxoid vaccination status unknown     Visual impairment     Vitamin D deficiency      Past Surgical History:   Procedure Laterality Date    BREAST BIOPSY Left 7-8 yrs ago    benign    CHOLECYSTECTOMY      HYSTERECTOMY      40s    KNEE ARTHROSCOPY W/ MENISCAL REPAIR Left      Family History   Problem Relation Age of Onset    Diabetes Other     Asthma Other     Hypertension Other     Diabetes Mother     Heart disease Mother     Hypertension Mother     Hyperlipidemia Mother     Arthritis Mother     Diabetes Father     Heart disease Father     Diabetes Sister     Cancer Sister     Diabetes Brother      Social History     Socioeconomic History    Marital status:      Spouse name: alvaro    Number of children: 5    Years of education: 12   Tobacco Use    Smoking status: Former     Types: Cigarettes     Quit date: 1996     Years since quittin.6     Passive exposure: Past    Smokeless tobacco: Never   Vaping Use    Vaping Use: Never used   Substance and Sexual Activity    Alcohol use: No    Drug use: No    Sexual activity: Yes     Partners: Male     ---------------------------------------------------------------------------------------------------------------------   Allergies:  Lantus [insulin glargine], Codeine, Exenatide, and  Sitagliptin  ---------------------------------------------------------------------------------------------------------------------   Medications below are reported home medications pulling from within the system; at this time, these medications have not been reconciled unless otherwise specified and are in the verification process for further verifcation as current home medications.    Prior to Admission Medications       Prescriptions Last Dose Informant Patient Reported? Taking?    cetirizine (EQ Allergy Relief, Cetirizine,) 10 MG tablet  Child, Medication Bottle No No    Take 0.5 tablets by mouth Daily.    cyanocobalamin ([employer] Vitamin B-12) 2000 MCG tablet  Child, Medication Bottle No No    Take 1 tablet by mouth Daily.    Cyanocobalamin 1000 MCG/ML kit  Child No No    Inject 1 mL as directed Every 7 (Seven) Days.    docusate sodium (Colace) 100 MG capsule   No No    Take 1 capsule by mouth Daily. Indications: Constipation    famotidine (Pepcid) 40 MG tablet  Child, Medication Bottle No No    Take 1 tablet by mouth Daily.    Insulin Degludec (TRESIBA FLEXTOUCH) 200 UNIT/ML solution pen-injector pen injection  Child, Medication Bottle Yes No    Inject 0-45 Units under the skin into the appropriate area as directed Daily As Needed (High BG per scale).    LORazepam (ATIVAN) 1 MG tablet   No No    Take 1 tablet by mouth At Night As Needed for Anxiety (insomnia). Indications: Feeling Anxious, Trouble Sleeping    magnesium oxide 250 MG tablet  Child, Medication Bottle Yes No    Take 1 tablet by mouth Daily. OTC    memantine (NAMENDA) 10 MG tablet  Child, Medication Bottle No No    Take 1 tablet by mouth Daily.    metFORMIN (GLUCOPHAGE) 1000 MG tablet  Child, Medication Bottle No No    Take 1 tablet by mouth 2 (Two) Times a Day With Meals.    OLANZapine (zyPREXA) 7.5 MG tablet   No No    Take 1 tablet by mouth Daily With Dinner. Indications: Behavioral Disorders associated with Dementia    pravastatin (PRAVACHOL) 40 MG  tablet  Child, Medication Bottle No No    Take 1 tablet by mouth Daily.    Vortioxetine HBr (Trintellix) 20 MG tablet   No No    Take 1 tablet by mouth Daily. Indications: Major Depressive Disorder          ---------------------------------------------------------------------------------------------------------------------    Objective     Hospital Scheduled Meds:  insulin lispro, 3-14 Units, Subcutaneous, 4x Daily AC & at Bedtime  piperacillin-tazobactam, 3.375 g, Intravenous, Q8H  vancomycin, 1,000 mg, Intravenous, Q18H             Current listed hospital scheduled medications may not yet reflect those currently placed in orders that are signed and held, awaiting patient's arrival to floor/unit.    ---------------------------------------------------------------------------------------------------------------------   Vital Signs:  Temp:  [98.2 °F (36.8 °C)-98.9 °F (37.2 °C)] 98.9 °F (37.2 °C)  Heart Rate:  [121-128] 121  Resp:  [20] 20  BP: (140-149)/(84-85) 143/85  Mean Arterial Pressure (Non-Invasive) for the past 24 hrs (Last 3 readings):   Noninvasive MAP (mmHg)   12/25/23 0036 94   12/25/23 0013 107   12/24/23 2247 101     SpO2 Percentage    12/24/23 2247 12/25/23 0036 12/25/23 0457   SpO2: 100% 96% 96%     SpO2:  [96 %-100 %] 96 %  on   ;   Device (Oxygen Therapy): room air    Body mass index is 22.15 kg/m².  Wt Readings from Last 3 Encounters:   12/24/23 68 kg (150 lb)   11/25/23 66.1 kg (145 lb 12.8 oz)   11/25/23 63.5 kg (140 lb)     ---------------------------------------------------------------------------------------------------------------------   Physical Exam:  Physical Exam  Constitutional:       General: She is not in acute distress.     Appearance: She is ill-appearing (chroncially).   HENT:      Head: Normocephalic and atraumatic.      Jaw: Tenderness and swelling present.        Right Ear: External ear normal.      Left Ear: External ear normal.      Nose: No nasal deformity.      Mouth/Throat:       Lips: Pink.      Mouth: Mucous membranes are moist.   Eyes:      Conjunctiva/sclera: Conjunctivae normal.      Pupils: Pupils are equal, round, and reactive to light.   Cardiovascular:      Rate and Rhythm: Regular rhythm. Tachycardia present.      Pulses:           Dorsalis pedis pulses are 2+ on the right side and 2+ on the left side.      Heart sounds: Normal heart sounds.   Pulmonary:      Effort: Pulmonary effort is normal.      Breath sounds: Normal breath sounds. No wheezing, rhonchi or rales.   Abdominal:      General: Abdomen is flat. Bowel sounds are normal.      Palpations: Abdomen is soft.      Tenderness: There is no guarding or rebound.   Genitourinary:     Comments: No andino catheter in place   Musculoskeletal:      Cervical back: Neck supple. Normal range of motion.      Right lower leg: No edema.      Left lower leg: No edema.   Skin:     General: Skin is warm and dry.      Findings: Abscess and erythema present.      Comments: Erythematous, warm, indurated area approximately 4 cm in diameter over right mandible   Neurological:      General: No focal deficit present.      Mental Status: She is alert and oriented to person, place, and time.   Psychiatric:         Mood and Affect: Mood normal.         Behavior: Behavior normal.       ---------------------------------------------------------------------------------------------------------------------  EKG:    Pending  ---------------------------------------------------------------------------------------------------------------------             Results from last 7 days   Lab Units 12/25/23  0300 12/25/23  0016 12/24/23  2316   CRP mg/dL  --   --  2.06*   LACTATE mmol/L 1.9 3.1*  --    WBC 10*3/mm3  --   --  12.86*   HEMOGLOBIN g/dL  --   --  14.3   HEMATOCRIT %  --   --  43.5   MCV fL  --   --  93.5   MCHC g/dL  --   --  32.9   PLATELETS 10*3/mm3  --   --  265     Results from last 7 days   Lab Units 12/24/23 2316   SODIUM mmol/L 136   POTASSIUM  "mmol/L 4.2   CHLORIDE mmol/L 97*   CO2 mmol/L 20.0*   BUN mg/dL 16   CREATININE mg/dL 1.11*   CALCIUM mg/dL 9.8   GLUCOSE mg/dL 413*   ALBUMIN g/dL 4.2   BILIRUBIN mg/dL 0.7   ALK PHOS U/L 115   AST (SGOT) U/L 37*   ALT (SGPT) U/L 62*   Estimated Creatinine Clearance: 57.9 mL/min (A) (by C-G formula based on SCr of 1.11 mg/dL (H)).  No results found for: \"AMMONIA\"    Hemoglobin A1C   Date/Time Value Ref Range Status   12/24/2023 2316 7.70 (H) 4.80 - 5.60 % Final     Glucose   Date/Time Value Ref Range Status   12/25/2023 0332 430 (C) 70 - 130 mg/dL Final     Lab Results   Component Value Date    HGBA1C 7.70 (H) 12/24/2023     Lab Results   Component Value Date    TSH 1.500 06/09/2023       No results found for: \"BLOODCX\"  No results found for: \"URINECX\"  No results found for: \"WOUNDCX\"  No results found for: \"STOOLCX\"  No results found for: \"RESPCX\"  No results found for: \"MRSACX\"  Pain Management Panel          Latest Ref Rng & Units 11/25/2023   Pain Management Panel   Amphetamine, Urine Qual Negative Negative    Barbiturates Screen, Urine Negative Negative    Benzodiazepine Screen, Urine Negative Negative    Buprenorphine, Screen, Urine Negative Negative    Cocaine Screen, Urine Negative Negative    Fentanyl, Urine Negative Negative    Methadone Screen , Urine Negative Negative    Methamphetamine, Ur Negative Negative      I have personally reviewed the above laboratory results.   ---------------------------------------------------------------------------------------------------------------------  Imaging Results (Last 7 Days)       Procedure Component Value Units Date/Time    CT Facial Bones Without Contrast [[id number]] Collected: 12/25/23 0137     Updated: 12/25/23 0142    Narrative:      PROCEDURE: CT of the facial bones performed without IV contrast on  December 24, 2023. The examination was performed with 2 mm axial imaging  and sagittal and coronal reconstruction images. Total DLP = 1679. The  examination " was performed according to as low as reasonably achievable  dose protocol.     HISTORY: Facial swelling. Pain.     COMPARISON: None.     FINDINGS:     Mild mucosal thickening in the paranasal sinuses.  Intact globes.  No acute fracture or dislocation.  Right anterior face soft tissue swelling with stranding most consistent  with edema and cellulitis related changes overlying the body of the  right mandible.  Myositis also noted affecting the muscles of mastication surrounding the  right mandibular body.  No features to suggest abscess formation.  No air in the soft tissues.  No hematoma.       Impression:         1.  Soft tissue swelling with stranding noted overlying the body of the  right mandible consistent with edema and cellulitis.  2.  Myositis noted affecting the musculature overlying the body of the  right mandible.  3.  No abscess or hematoma.  4.  Mild mucosal thickening in the paranasal sinuses.  5.  No acute fracture or dislocation.  6.  No air in the soft tissues.  7.  No foreign body.     This report was finalized on 12/25/2023 1:39 AM by James Ivy MD.       CT Head Without Contrast [[id number]] Collected: 12/25/23 0126     Updated: 12/25/23 0131    Narrative:      PROCEDURE: CT of the brain performed without IV contrast on December 24, 2023. The examination was performed with 3 mm axial imaging and 3 mm  sagittal and coronal reconstruction images. Total DLP = 725. The  examination was performed according to as low as reasonably achievable  dose protocol.     HISTORY: Headache. Left facial and jaw swelling.     COMPARISON: None.     FINDINGS:     Mild to moderate generalized brain volume loss.  Mild to moderate chronic small vessel ischemic type changes in the white  matter.  Small old infarct involving the lateral aspect of the right frontal lobe  with associated small focal area of encephalomalacia.  No hydrocephalus.  No shift of midline structures.  Mild mucosal thickening in the ethmoid air  cells.  Clear mastoid air cells.  No fracture or foreign body.  Calcified plaque noted along the supraclinoid ICA segments.       Impression:         1.  No acute process seen in the brain.  2.  No acute hemorrhage, mass, infarct, or edema.  3.  No shift of midline structures.  4.  Mild to moderate generalized brain volume loss with mild to moderate  chronic small vessel ischemic type changes in the white matter.  5.  Small old infarct involving the lateral aspect of the right frontal  lobe.        This report was finalized on 12/25/2023 1:28 AM by James Ivy MD.             I have personally reviewed the above radiology results.     Last Echocardiogram:    ---------------------------------------------------------------------------------------------------------------------    Assessment & Plan      Active Hospital Problems    Diagnosis  POA    **Facial cellulitis [L03.211]  Yes     Severe sepsis secondary to facial cellulitis, POA  Myositis, POA  CT consistent with cellulitis over the right mandible.  Also notes myositis left musculature of her right mandible.  Provider spoke with , who states to this would be appropriate to keep at our facility.  Cultures obtained in the ED.  Continue empiric antibiotic treatment with vancomycin and Zosyn  Inpatient general care surgery consult, assistance appreciated  Acute Kidney Injury, POA  Baseline Cr 0.72, was up to 1.11 on admission  Continue mIVFs, Trend Cr and urine output, avoid nephrotoxins, NSAIDs, dehydration and contrast as able.  Repeat BMP in the a.m.   Advanced dementia with behavioral disturbances, POA  Agitated in the ED, requiring ativan  Restart home meds pending medication reconciliation   EKG pending for QTc measurement.  Can add as needed medications for agitation  Type II diabetes mellitus with acute hyperglycemia   Hemoglobin A1C 7.7  Start sliding scale insulin for now, titrate insulin therapy as necessary.   Hold any oral hypoglycemics to prevent  hypoglycemia. Will review home diabetes medications once available per pharmacy.   Hypoglycemia protocol in place if necessary.   AccuCheks before meals and at bedtime.    F/E/N: IV NS at 100 mL/h.  Continue monitor electrolytes.  Cardiac/diabetic diet    ---------------------------------------------------  DVT Prophylaxis: SCDs  Activity: Up with assistance    The patient is considered to be a high risk patient due to: Sepsis secondary to facial cellulitis and myositis,  ANN    INPATIENT status due to the need for care which can only be reasonably provided in an hospital setting such as aggressive/expedited ancillary services and/or consultation services, the necessity for IV medications, close physician monitoring and/or the possible need for procedures.  In such, I feel patient’s risk for adverse outcomes and need for care warrant INPATIENT evaluation and predict the patient’s care encounter to likely last beyond 2 midnights.    Code Status: Full code    Disposition/Discharge planning: Pending clinical course    I have discussed the patient's assessment and plan with Dr. Antonella Adkins PA-C  Hospitalist Service -- [facility]       12/25/23  05:12 EST    Attending Physician: Dr. Berman.

## 2023-12-25 NOTE — PLAN OF CARE
Goal Outcome Evaluation:   Received patient from ED. Patient was able to walk to bed with assistance to bed. Refused tele, provider notified. Resting in bed at this time. Tolerating oxygen well on RA. Confused and agitated at times. Bed alarm on, HOB elevated, and call bed in reach. Care plan started.

## 2023-12-25 NOTE — PLAN OF CARE
Goal Outcome Evaluation:  Plan of Care Reviewed With: patient                   Patient has been resting this shift with sitter @ bedside. No s/s of acute distress noted at this time. Will continue to follow plan of care.

## 2023-12-26 LAB
ALBUMIN SERPL-MCNC: 3.5 G/DL (ref 3.5–5.2)
ALBUMIN/GLOB SERPL: 1.2 G/DL
ALP SERPL-CCNC: 86 U/L (ref 39–117)
ALT SERPL W P-5'-P-CCNC: 52 U/L (ref 1–33)
ANION GAP SERPL CALCULATED.3IONS-SCNC: 8.3 MMOL/L (ref 5–15)
AST SERPL-CCNC: 37 U/L (ref 1–32)
BILIRUB SERPL-MCNC: 0.7 MG/DL (ref 0–1.2)
BUN SERPL-MCNC: 12 MG/DL (ref 8–23)
BUN/CREAT SERPL: 14.1 (ref 7–25)
CALCIUM SPEC-SCNC: 8.7 MG/DL (ref 8.6–10.5)
CHLORIDE SERPL-SCNC: 99 MMOL/L (ref 98–107)
CO2 SERPL-SCNC: 24.7 MMOL/L (ref 22–29)
CREAT SERPL-MCNC: 0.85 MG/DL (ref 0.57–1)
DEPRECATED RDW RBC AUTO: 40.7 FL (ref 37–54)
EGFRCR SERPLBLD CKD-EPI 2021: 78.5 ML/MIN/1.73
ERYTHROCYTE [DISTWIDTH] IN BLOOD BY AUTOMATED COUNT: 12 % (ref 12.3–15.4)
GLOBULIN UR ELPH-MCNC: 3 GM/DL
GLUCOSE BLDC GLUCOMTR-MCNC: 140 MG/DL (ref 70–130)
GLUCOSE BLDC GLUCOMTR-MCNC: 149 MG/DL (ref 70–130)
GLUCOSE BLDC GLUCOMTR-MCNC: 181 MG/DL (ref 70–130)
GLUCOSE BLDC GLUCOMTR-MCNC: 279 MG/DL (ref 70–130)
GLUCOSE SERPL-MCNC: 94 MG/DL (ref 65–99)
HCT VFR BLD AUTO: 35 % (ref 34–46.6)
HGB BLD-MCNC: 11.7 G/DL (ref 12–15.9)
MAGNESIUM SERPL-MCNC: 1.5 MG/DL (ref 1.6–2.4)
MCH RBC QN AUTO: 30.9 PG (ref 26.6–33)
MCHC RBC AUTO-ENTMCNC: 33.4 G/DL (ref 31.5–35.7)
MCV RBC AUTO: 92.3 FL (ref 79–97)
PLATELET # BLD AUTO: 212 10*3/MM3 (ref 140–450)
PMV BLD AUTO: 9.8 FL (ref 6–12)
POTASSIUM SERPL-SCNC: 3 MMOL/L (ref 3.5–5.2)
POTASSIUM SERPL-SCNC: 4 MMOL/L (ref 3.5–5.2)
PROT SERPL-MCNC: 6.5 G/DL (ref 6–8.5)
RBC # BLD AUTO: 3.79 10*6/MM3 (ref 3.77–5.28)
SODIUM SERPL-SCNC: 132 MMOL/L (ref 136–145)
VANCOMYCIN TROUGH SERPL-MCNC: 5.5 MCG/ML (ref 5–20)
WBC NRBC COR # BLD AUTO: 9.95 10*3/MM3 (ref 3.4–10.8)

## 2023-12-26 PROCEDURE — 99232 SBSQ HOSP IP/OBS MODERATE 35: CPT | Performed by: STUDENT IN AN ORGANIZED HEALTH CARE EDUCATION/TRAINING PROGRAM

## 2023-12-26 PROCEDURE — 63710000001 INSULIN LISPRO (HUMAN) PER 5 UNITS: Performed by: HOSPITALIST

## 2023-12-26 PROCEDURE — 25010000002 PIPERACILLIN SOD-TAZOBACTAM PER 1 G: Performed by: HOSPITALIST

## 2023-12-26 PROCEDURE — 83735 ASSAY OF MAGNESIUM: CPT

## 2023-12-26 PROCEDURE — 80053 COMPREHEN METABOLIC PANEL: CPT | Performed by: STUDENT IN AN ORGANIZED HEALTH CARE EDUCATION/TRAINING PROGRAM

## 2023-12-26 PROCEDURE — 25810000003 SODIUM CHLORIDE 0.9 % SOLUTION 250 ML FLEX CONT: Performed by: STUDENT IN AN ORGANIZED HEALTH CARE EDUCATION/TRAINING PROGRAM

## 2023-12-26 PROCEDURE — 25010000002 VANCOMYCIN 1 G RECONSTITUTED SOLUTION 1 EACH VIAL: Performed by: STUDENT IN AN ORGANIZED HEALTH CARE EDUCATION/TRAINING PROGRAM

## 2023-12-26 PROCEDURE — 84132 ASSAY OF SERUM POTASSIUM: CPT

## 2023-12-26 PROCEDURE — 82948 REAGENT STRIP/BLOOD GLUCOSE: CPT

## 2023-12-26 PROCEDURE — 25010000002 ZIPRASIDONE MESYLATE PER 10 MG: Performed by: STUDENT IN AN ORGANIZED HEALTH CARE EDUCATION/TRAINING PROGRAM

## 2023-12-26 PROCEDURE — 80202 ASSAY OF VANCOMYCIN: CPT

## 2023-12-26 PROCEDURE — 63710000001 INSULIN GLARGINE PER 5 UNITS: Performed by: STUDENT IN AN ORGANIZED HEALTH CARE EDUCATION/TRAINING PROGRAM

## 2023-12-26 PROCEDURE — 85027 COMPLETE CBC AUTOMATED: CPT | Performed by: STUDENT IN AN ORGANIZED HEALTH CARE EDUCATION/TRAINING PROGRAM

## 2023-12-26 RX ORDER — POTASSIUM CHLORIDE 20 MEQ/1
40 TABLET, EXTENDED RELEASE ORAL EVERY 4 HOURS
Status: DISCONTINUED | OUTPATIENT
Start: 2023-12-26 | End: 2023-12-26 | Stop reason: ALTCHOICE

## 2023-12-26 RX ORDER — POTASSIUM CHLORIDE 1.5 G/1.58G
40 POWDER, FOR SOLUTION ORAL EVERY 4 HOURS
Status: COMPLETED | OUTPATIENT
Start: 2023-12-26 | End: 2023-12-26

## 2023-12-26 RX ADMIN — POTASSIUM CHLORIDE 40 MEQ: 1.5 POWDER, FOR SOLUTION ORAL at 15:24

## 2023-12-26 RX ADMIN — VANCOMYCIN HYDROCHLORIDE 1000 MG: 1 INJECTION, POWDER, LYOPHILIZED, FOR SOLUTION INTRAVENOUS at 14:14

## 2023-12-26 RX ADMIN — PIPERACILLIN SODIUM AND TAZOBACTAM SODIUM 3.38 G: 3; .375 INJECTION, POWDER, LYOPHILIZED, FOR SOLUTION INTRAVENOUS at 06:33

## 2023-12-26 RX ADMIN — MAGNESIUM GLUCONATE 500 MG ORAL TABLET 400 MG: 500 TABLET ORAL at 10:17

## 2023-12-26 RX ADMIN — PIPERACILLIN SODIUM AND TAZOBACTAM SODIUM 3.38 G: 3; .375 INJECTION, POWDER, LYOPHILIZED, FOR SOLUTION INTRAVENOUS at 22:44

## 2023-12-26 RX ADMIN — ZIPRASIDONE MESYLATE 10 MG: 20 INJECTION, POWDER, LYOPHILIZED, FOR SOLUTION INTRAMUSCULAR at 23:48

## 2023-12-26 RX ADMIN — Medication 10 ML: at 20:08

## 2023-12-26 RX ADMIN — Medication 10 ML: at 10:18

## 2023-12-26 RX ADMIN — Medication 2000 MCG: at 10:18

## 2023-12-26 RX ADMIN — DOCUSATE SODIUM 50 MG AND SENNOSIDES 8.6 MG 2 TABLET: 8.6; 5 TABLET, FILM COATED ORAL at 10:18

## 2023-12-26 RX ADMIN — DOCUSATE SODIUM 100 MG: 100 CAPSULE, LIQUID FILLED ORAL at 10:18

## 2023-12-26 RX ADMIN — OLANZAPINE 7.5 MG: 2.5 TABLET, FILM COATED ORAL at 17:01

## 2023-12-26 RX ADMIN — CETIRIZINE HYDROCHLORIDE 5 MG: 10 TABLET, FILM COATED ORAL at 10:18

## 2023-12-26 RX ADMIN — PIPERACILLIN SODIUM AND TAZOBACTAM SODIUM 3.38 G: 3; .375 INJECTION, POWDER, LYOPHILIZED, FOR SOLUTION INTRAVENOUS at 15:25

## 2023-12-26 RX ADMIN — PRAVASTATIN SODIUM 40 MG: 40 TABLET ORAL at 10:18

## 2023-12-26 RX ADMIN — POTASSIUM CHLORIDE 40 MEQ: 1.5 POWDER, FOR SOLUTION ORAL at 06:33

## 2023-12-26 RX ADMIN — INSULIN LISPRO 8 UNITS: 100 INJECTION, SOLUTION INTRAVENOUS; SUBCUTANEOUS at 12:50

## 2023-12-26 RX ADMIN — INSULIN LISPRO 3 UNITS: 100 INJECTION, SOLUTION INTRAVENOUS; SUBCUTANEOUS at 17:01

## 2023-12-26 RX ADMIN — MEMANTINE HYDROCHLORIDE 10 MG: 10 TABLET ORAL at 10:17

## 2023-12-26 RX ADMIN — FAMOTIDINE 40 MG: 20 TABLET, FILM COATED ORAL at 10:17

## 2023-12-26 RX ADMIN — POTASSIUM CHLORIDE 40 MEQ: 1.5 POWDER, FOR SOLUTION ORAL at 10:18

## 2023-12-26 RX ADMIN — INSULIN GLARGINE 15 UNITS: 100 INJECTION, SOLUTION SUBCUTANEOUS at 10:18

## 2023-12-26 NOTE — PROGRESS NOTES
Patient continues on day 2 vancomycin. A 16 hour post infusion vancomycin level was reported as 5.5 mg/L today. Based on this level, the AUC is calculated as 355. Will increase vancomycin dose to 1 gm q12 hrs to target an AUC of 400-600. Will continue to follow and recheck a level when appropriate.    Thank you,    Sarah Thomason, PharmD  12/26/2023  13:48 EST

## 2023-12-26 NOTE — CASE MANAGEMENT/SOCIAL WORK
Discharge Planning Assessment   John     Patient Name: Sandra Leahy  MRN: [id number]  Today's Date: 12/26/2023    Admit Date: 12/25/2023    Plan: SS received a consult for discharge planning. SS spoke with pt's daughter/ JOSSE Carter on this date. Pt lives at home with spouse however,pt's spouse is currently admitted to [facility]. Pt's daughter states pt's aunt has been taking care of pt at home however they are not able to take care of pt at home anymore. PCP is John Mueller. Pt does not utilize home health services or DME at this time. Pt's daughter is POA (on file). Pt's daughter states they have been working with outpatient Ambulatory  for longterm placement. Pt's daughter states she has completed the application process and has applied for longterm medicaid/ Medicaid pending. SS reviewed Ambulatory SW notes. Referral was last pending with [facility]. SS faxed new referral to [facility] and notified Jacklyn. GERMAN to follow and assist with discharge planning.   Discharge Needs Assessment       Row Name 12/26/23 1540       Living Environment    People in Home spouse    Unique Family Situation Pt's spouse is currently admitted to [facility]. Pt's daughter states pt's aunt has been taking care of pt at home however they are not able to take care of pt at home anymore.    Current Living Arrangements home    Potentially Unsafe Housing Conditions none    Primary Care Provided by child(belen);other (see comments)  Family member    Provides Primary Care For no one, unable/limited ability to care for self    Family Caregiver if Needed none    Quality of Family Relationships involved;helpful;supportive    Able to Return to Prior Arrangements yes       Resource/Environmental Concerns    Resource/Environmental Concerns none    Transportation Concerns none       Transition Planning    Patient/Family Anticipates Transition to long-term care facility    Patient/Family  Veda Neumann, PT Anticipated Services at Transition none    Transportation Anticipated family or friend will provide       Discharge Needs Assessment    Equipment Currently Used at Home none    Anticipated Changes Related to Illness none    Equipment Needed After Discharge none                   Discharge Plan       Row Name 12/26/23 5646       Plan    Plan SS received a consult for discharge planning. SS spoke with pt's daughter/ JOSSE Carter on this date. Pt lives at home with spouse however,pt's spouse is currently admitted to [facility]. Pt's daughter states pt's aunt has been taking care of pt at home however they are not able to take care of pt at home anymore. PCP is John Mueller. Pt does not utilize home health services or DME at this time. Pt's daughter is POA (on file). Pt's daughter states they have been working with outpatient Ambulatory  for longterm placement. Pt's daughter states she has completed the application process and has applied for longterm medicaid/ Medicaid pending. SS reviewed Ambulatory SW notes. Referral was last pending with [facility]. SS faxed new referral to [facility] and notified Jacklyn. SS to follow and assist with discharge planning.    Patient/Family in Agreement with Plan yes                  Continued Care and Services - Admitted Since 12/25/2023    Coordination has not been started for this encounter.       Selected Continued Care - Episodes Includes continued care and service providers with selected services from the active episodes listed below      High Risk Care Management Episode start date: 11/6/2023   There are no active outsourced providers for this episode.                 Expected Discharge Date and Time       Expected Discharge Date Expected Discharge Time    Dec 28, 2023            Demographic Summary       Row Name 12/26/23 8211       General Information    Admission Type inpatient    Referral Source nursing    Reason for Consult discharge  planning  SS received a consult for discharge planning.               JOSS Ceja

## 2023-12-26 NOTE — CONSULTS
Diabetes Education    Patient Name:  Sandra Leahy  YOB: 1965  MRN: [id number]  Admit Date:  12/25/2023        A1C 7.7 Patient is confused has dementia with sitter at bedside.  Spoke with JOSSE Norton Daughter, Will leave AADE7 and ADA handouts at bedside for family. If any questions or concerns please re-consult or call. Thank you.       Electronically signed by:  Jeanette Kaur RN  12/26/23 16:43 EST

## 2023-12-26 NOTE — PLAN OF CARE
Goal Outcome Evaluation:  Plan of Care Reviewed With: patient   Pt resting in bed with no complaints. Sitter remains at bedside. Will continue with plan of care.       Problem: Adult Inpatient Plan of Care  Goal: Plan of Care Review  Outcome: Ongoing, Progressing

## 2023-12-26 NOTE — PROGRESS NOTES
Patient seen and evaluated earlier this afternoon after being admitted earlier this morning.  Patient still with right sided lower facial cellulitis and edema with tenderness to touch.  Patient pleasantly demented.  Overnight/early morning admission documentation reviewed as well as labs.  Patient clinically stable and will continue with broad-spectrum antibiotic therapy for now with vancomycin and Zosyn.  Likely could transition to Unasyn versus Augmentin later on the course and will plan for only a 3-day course of vancomycin as typically facial cellulitis in this region is from dental origins and is well covered by Augmentin/Unasyn/Zosyn without need for MRSA coverage.  Patient also denying any recent trauma to that area although not the most reliable historian given her dementia.

## 2023-12-26 NOTE — PLAN OF CARE
Goal Outcome Evaluation:                     Pt currently resting in bed. No s/s of acute distress noted at this time. Sitter at bedside. Potassium 3.0, being replaced this shift. Plan of care ongoing.

## 2023-12-26 NOTE — DISCHARGE PLACEMENT REQUEST
"Sandra Leahy (58 y.o. Female)       Date of Birth   1965    Social Security Number       Address   [address]  [facility] [address]    Home Phone   [phone]    MRN   [id number]       Anglican   Unknown    Marital Status                               Admission Date   12/25/23    Admission Type   Emergency    Admitting Provider   Bartolo Berman MD    Attending Provider   Tulio Sifuentes DO    Department, Room/Bed   [address], 3307/1S       Discharge Date       Discharge Disposition       Discharge Destination                                 Attending Provider: Tulio Sifuentes DO    Allergies: Lantus [Insulin Glargine], Codeine, Exenatide, Sitagliptin    Isolation: None   Infection: None   Code Status: CPR    Ht: 175.3 cm (69.02\")   Wt: 68.5 kg (151 lb 1.6 oz)    Admission Cmt: None   Principal Problem: Facial cellulitis [L03.211]                   Active Insurance as of 12/25/2023       Primary Coverage       Payor Plan Insurance Group Employer/Plan Group    HUMANA MEDICARE REPLACEMENT HUMANA MEDICARE REPLACEMENT [id number]       Payor Plan Address Payor Plan Phone Number Payor Plan Fax Number Effective Dates    [address] [address] [phone]  2/1/2020 - None Entered    [facility] [address]         Subscriber Name Subscriber Birth Date Member ID       SANDRA LEAHY 1965 [id number]                     Emergency Contacts        (Rel.) Home Phone Work Phone Mobile Phone    Charles Leahy (Spouse) [phone] -- --    emilio Ngo (Daughter) [phone] -- --              Insurance Information                  HUMANA MEDICARE REPLACEMENT/HUMANA MEDICARE REPLACEMENT Phone: [phone]    Subscriber: Sandra Leahy Subscriber#: [id number]    Group#: [id number] Precert#: --             History & Physical        Jody Adkins PA-C at 12/25/23 043       Attestation signed by Bartolo Berman MD at 12/25/23 0605 (Updated)    I have discussed this patient " "with Jody Adkins PA-C, and have reviewed this documentation and agree. Received 10 units IV insulin in the ED for glucose of 430 around 3:30 am. IV fluid bolus ordered by me as well as maintenance fluids. Awaiting repeat CMP to reassess glucose, bicarb and anion gap and ensure not developing DKA.                        [facility] Medicine Services  HISTORY & PHYSICAL    Patient Identification:  Name:  Sandra Leahy  Age:  58 y.o.  Sex:  female  :  1965  MRN:  [id number]   Visit Number:  [id number]  Admit Date: 2023   Primary Care Physician:  John Mueller APRN     Subjective     Chief complaint:   Chief Complaint   Patient presents with    Abscess    Jaw Pain     History of presenting illness:   Patient is a 58 y.o. female with past medical history significant for dementia with behavioral disturbances, DM, that presented to the [facility] emergency department for evaluation of  facial swelling.  Facial cellulitis/myositis found on imaging in ED. the ED provider spoke with Dr. Bernal at  due to myositis noted on imaging, in case this was thought to be a patient that required oromaxillary surgery, ED provider was advised the patient would be fine to remain at our facility.    Patient examined at bedside in ED. Due to patient's advanced dementia, and no family members available for history, reportedly obtained from ED report/note.  Patient was brought to ER by daughter due to several days of right lower facial swelling.  Has become progressively more painful and tender.  The patient is now complaining of difficulty opening her mouth. Patient was sleeping when I went to examine her, but quickly aroused when I entered the room. Patient would tell me her name but would only say \"shut up\" to any other questions. She was very agitated and attempting to climb out of bed repeatedly.     Upon arrival to the ED, vitals were temperature 98.2, , RR 20, /85, " SpO2 100% on room air. Labs significant for, creatinine 1.11, glucose 413, AST 37, ALT 62, hemoglobin A1c 7.7.  CRP 2.06, WBC 12.86.  CT head with no acute process.  CT facial bones shows soft tissue swelling stranding noted overlying the body of the right mandible consistent with edema and cellulitis. Myositis noted affecting the musculature overlying the body of the right mandible.  No abscess or hematoma.    Patient has been admitted to the [facility] unit.  ---------------------------------------------------------------------------------------------------------------------   Review of Systems   Unable to perform ROS: Dementia      ---------------------------------------------------------------------------------------------------------------------   Past Medical History:   Diagnosis Date    Bursitis     Dementia     Diabetic acetonemia     Fatigue     Fibromyalgia     Postsurgical menopause     Tetanus toxoid vaccination status unknown     Visual impairment     Vitamin D deficiency      Past Surgical History:   Procedure Laterality Date    BREAST BIOPSY Left 7-8 yrs ago    benign    CHOLECYSTECTOMY      HYSTERECTOMY      40s    KNEE ARTHROSCOPY W/ MENISCAL REPAIR Left      Family History   Problem Relation Age of Onset    Diabetes Other     Asthma Other     Hypertension Other     Diabetes Mother     Heart disease Mother     Hypertension Mother     Hyperlipidemia Mother     Arthritis Mother     Diabetes Father     Heart disease Father     Diabetes Sister     Cancer Sister     Diabetes Brother      Social History     Socioeconomic History    Marital status:      Spouse name: alvaro    Number of children: 5    Years of education: 12   Tobacco Use    Smoking status: Former     Types: Cigarettes     Quit date: 1996     Years since quittin.6     Passive exposure: Past    Smokeless tobacco: Never   Vaping Use    Vaping Use: Never used   Substance and Sexual Activity    Alcohol use: No    Drug use: No     Sexual activity: Yes     Partners: Male     ---------------------------------------------------------------------------------------------------------------------   Allergies:  Lantus [insulin glargine], Codeine, Exenatide, and Sitagliptin  ---------------------------------------------------------------------------------------------------------------------   Medications below are reported home medications pulling from within the system; at this time, these medications have not been reconciled unless otherwise specified and are in the verification process for further verifcation as current home medications.    Prior to Admission Medications       Prescriptions Last Dose Informant Patient Reported? Taking?    cetirizine (EQ Allergy Relief, Cetirizine,) 10 MG tablet  Child, Medication Bottle No No    Take 0.5 tablets by mouth Daily.    cyanocobalamin ([employer] Vitamin B-12) 2000 MCG tablet  Child, Medication Bottle No No    Take 1 tablet by mouth Daily.    Cyanocobalamin 1000 MCG/ML kit  Child No No    Inject 1 mL as directed Every 7 (Seven) Days.    docusate sodium (Colace) 100 MG capsule   No No    Take 1 capsule by mouth Daily. Indications: Constipation    famotidine (Pepcid) 40 MG tablet  Child, Medication Bottle No No    Take 1 tablet by mouth Daily.    Insulin Degludec (TRESIBA FLEXTOUCH) 200 UNIT/ML solution pen-injector pen injection  Child, Medication Bottle Yes No    Inject 0-45 Units under the skin into the appropriate area as directed Daily As Needed (High BG per scale).    LORazepam (ATIVAN) 1 MG tablet   No No    Take 1 tablet by mouth At Night As Needed for Anxiety (insomnia). Indications: Feeling Anxious, Trouble Sleeping    magnesium oxide 250 MG tablet  Child, Medication Bottle Yes No    Take 1 tablet by mouth Daily. OTC    memantine (NAMENDA) 10 MG tablet  Child, Medication Bottle No No    Take 1 tablet by mouth Daily.    metFORMIN (GLUCOPHAGE) 1000 MG tablet  Child, Medication Bottle No No    Take 1  tablet by mouth 2 (Two) Times a Day With Meals.    OLANZapine (zyPREXA) 7.5 MG tablet   No No    Take 1 tablet by mouth Daily With Dinner. Indications: Behavioral Disorders associated with Dementia    pravastatin (PRAVACHOL) 40 MG tablet  Child, Medication Bottle No No    Take 1 tablet by mouth Daily.    Vortioxetine HBr (Trintellix) 20 MG tablet   No No    Take 1 tablet by mouth Daily. Indications: Major Depressive Disorder          ---------------------------------------------------------------------------------------------------------------------    Objective     Hospital Scheduled Meds:  insulin lispro, 3-14 Units, Subcutaneous, 4x Daily AC & at Bedtime  piperacillin-tazobactam, 3.375 g, Intravenous, Q8H  vancomycin, 1,000 mg, Intravenous, Q18H             Current listed hospital scheduled medications may not yet reflect those currently placed in orders that are signed and held, awaiting patient's arrival to floor/unit.    ---------------------------------------------------------------------------------------------------------------------   Vital Signs:  Temp:  [98.2 °F (36.8 °C)-98.9 °F (37.2 °C)] 98.9 °F (37.2 °C)  Heart Rate:  [121-128] 121  Resp:  [20] 20  BP: (140-149)/(84-85) 143/85  Mean Arterial Pressure (Non-Invasive) for the past 24 hrs (Last 3 readings):   Noninvasive MAP (mmHg)   12/25/23 0036 94   12/25/23 0013 107   12/24/23 2247 101     SpO2 Percentage    12/24/23 2247 12/25/23 0036 12/25/23 0457   SpO2: 100% 96% 96%     SpO2:  [96 %-100 %] 96 %  on   ;   Device (Oxygen Therapy): room air    Body mass index is 22.15 kg/m².  Wt Readings from Last 3 Encounters:   12/24/23 68 kg (150 lb)   11/25/23 66.1 kg (145 lb 12.8 oz)   11/25/23 63.5 kg (140 lb)     ---------------------------------------------------------------------------------------------------------------------   Physical Exam:  Physical Exam  Constitutional:       General: She is not in acute distress.     Appearance: She is ill-appearing  (chroncially).   HENT:      Head: Normocephalic and atraumatic.      Jaw: Tenderness and swelling present.        Right Ear: External ear normal.      Left Ear: External ear normal.      Nose: No nasal deformity.      Mouth/Throat:      Lips: Pink.      Mouth: Mucous membranes are moist.   Eyes:      Conjunctiva/sclera: Conjunctivae normal.      Pupils: Pupils are equal, round, and reactive to light.   Cardiovascular:      Rate and Rhythm: Regular rhythm. Tachycardia present.      Pulses:           Dorsalis pedis pulses are 2+ on the right side and 2+ on the left side.      Heart sounds: Normal heart sounds.   Pulmonary:      Effort: Pulmonary effort is normal.      Breath sounds: Normal breath sounds. No wheezing, rhonchi or rales.   Abdominal:      General: Abdomen is flat. Bowel sounds are normal.      Palpations: Abdomen is soft.      Tenderness: There is no guarding or rebound.   Genitourinary:     Comments: No andino catheter in place   Musculoskeletal:      Cervical back: Neck supple. Normal range of motion.      Right lower leg: No edema.      Left lower leg: No edema.   Skin:     General: Skin is warm and dry.      Findings: Abscess and erythema present.      Comments: Erythematous, warm, indurated area approximately 4 cm in diameter over right mandible   Neurological:      General: No focal deficit present.      Mental Status: She is alert and oriented to person, place, and time.   Psychiatric:         Mood and Affect: Mood normal.         Behavior: Behavior normal.       ---------------------------------------------------------------------------------------------------------------------  EKG:    Pending  ---------------------------------------------------------------------------------------------------------------------             Results from last 7 days   Lab Units 12/25/23  0300 12/25/23  0016 12/24/23  2316   CRP mg/dL  --   --  2.06*   LACTATE mmol/L 1.9 3.1*  --    WBC 10*3/mm3  --   --  12.86*  "  HEMOGLOBIN g/dL  --   --  14.3   HEMATOCRIT %  --   --  43.5   MCV fL  --   --  93.5   MCHC g/dL  --   --  32.9   PLATELETS 10*3/mm3  --   --  265     Results from last 7 days   Lab Units 12/24/23  2316   SODIUM mmol/L 136   POTASSIUM mmol/L 4.2   CHLORIDE mmol/L 97*   CO2 mmol/L 20.0*   BUN mg/dL 16   CREATININE mg/dL 1.11*   CALCIUM mg/dL 9.8   GLUCOSE mg/dL 413*   ALBUMIN g/dL 4.2   BILIRUBIN mg/dL 0.7   ALK PHOS U/L 115   AST (SGOT) U/L 37*   ALT (SGPT) U/L 62*   Estimated Creatinine Clearance: 57.9 mL/min (A) (by C-G formula based on SCr of 1.11 mg/dL (H)).  No results found for: \"AMMONIA\"    Hemoglobin A1C   Date/Time Value Ref Range Status   12/24/2023 2316 7.70 (H) 4.80 - 5.60 % Final     Glucose   Date/Time Value Ref Range Status   12/25/2023 0332 430 (C) 70 - 130 mg/dL Final     Lab Results   Component Value Date    HGBA1C 7.70 (H) 12/24/2023     Lab Results   Component Value Date    TSH 1.500 06/09/2023       No results found for: \"BLOODCX\"  No results found for: \"URINECX\"  No results found for: \"WOUNDCX\"  No results found for: \"STOOLCX\"  No results found for: \"RESPCX\"  No results found for: \"MRSACX\"  Pain Management Panel          Latest Ref Rng & Units 11/25/2023   Pain Management Panel   Amphetamine, Urine Qual Negative Negative    Barbiturates Screen, Urine Negative Negative    Benzodiazepine Screen, Urine Negative Negative    Buprenorphine, Screen, Urine Negative Negative    Cocaine Screen, Urine Negative Negative    Fentanyl, Urine Negative Negative    Methadone Screen , Urine Negative Negative    Methamphetamine, Ur Negative Negative      I have personally reviewed the above laboratory results.   ---------------------------------------------------------------------------------------------------------------------  Imaging Results (Last 7 Days)       Procedure Component Value Units Date/Time    CT Facial Bones Without Contrast [[id number]] Collected: 12/25/23 0137     Updated: 12/25/23 0142    " Narrative:      PROCEDURE: CT of the facial bones performed without IV contrast on  December 24, 2023. The examination was performed with 2 mm axial imaging  and sagittal and coronal reconstruction images. Total DLP = 1679. The  examination was performed according to as low as reasonably achievable  dose protocol.     HISTORY: Facial swelling. Pain.     COMPARISON: None.     FINDINGS:     Mild mucosal thickening in the paranasal sinuses.  Intact globes.  No acute fracture or dislocation.  Right anterior face soft tissue swelling with stranding most consistent  with edema and cellulitis related changes overlying the body of the  right mandible.  Myositis also noted affecting the muscles of mastication surrounding the  right mandibular body.  No features to suggest abscess formation.  No air in the soft tissues.  No hematoma.       Impression:         1.  Soft tissue swelling with stranding noted overlying the body of the  right mandible consistent with edema and cellulitis.  2.  Myositis noted affecting the musculature overlying the body of the  right mandible.  3.  No abscess or hematoma.  4.  Mild mucosal thickening in the paranasal sinuses.  5.  No acute fracture or dislocation.  6.  No air in the soft tissues.  7.  No foreign body.     This report was finalized on 12/25/2023 1:39 AM by James Ivy MD.       CT Head Without Contrast [[id number]] Collected: 12/25/23 0126     Updated: 12/25/23 0131    Narrative:      PROCEDURE: CT of the brain performed without IV contrast on December 24, 2023. The examination was performed with 3 mm axial imaging and 3 mm  sagittal and coronal reconstruction images. Total DLP = 725. The  examination was performed according to as low as reasonably achievable  dose protocol.     HISTORY: Headache. Left facial and jaw swelling.     COMPARISON: None.     FINDINGS:     Mild to moderate generalized brain volume loss.  Mild to moderate chronic small vessel ischemic type changes in the  white  matter.  Small old infarct involving the lateral aspect of the right frontal lobe  with associated small focal area of encephalomalacia.  No hydrocephalus.  No shift of midline structures.  Mild mucosal thickening in the ethmoid air cells.  Clear mastoid air cells.  No fracture or foreign body.  Calcified plaque noted along the supraclinoid ICA segments.       Impression:         1.  No acute process seen in the brain.  2.  No acute hemorrhage, mass, infarct, or edema.  3.  No shift of midline structures.  4.  Mild to moderate generalized brain volume loss with mild to moderate  chronic small vessel ischemic type changes in the white matter.  5.  Small old infarct involving the lateral aspect of the right frontal  lobe.        This report was finalized on 12/25/2023 1:28 AM by James Ivy MD.             I have personally reviewed the above radiology results.     Last Echocardiogram:    ---------------------------------------------------------------------------------------------------------------------    Assessment & Plan      Active Hospital Problems    Diagnosis  POA    **Facial cellulitis [L03.211]  Yes     Severe sepsis secondary to facial cellulitis, POA  Myositis, POA  CT consistent with cellulitis over the right mandible.  Also notes myositis left musculature of her right mandible.  Provider spoke with , who states to this would be appropriate to keep at our facility.  Cultures obtained in the ED.  Continue empiric antibiotic treatment with vancomycin and Zosyn  Inpatient general care surgery consult, assistance appreciated  Acute Kidney Injury, POA  Baseline Cr 0.72, was up to 1.11 on admission  Continue mIVFs, Trend Cr and urine output, avoid nephrotoxins, NSAIDs, dehydration and contrast as able.  Repeat BMP in the a.m.   Advanced dementia with behavioral disturbances, POA  Agitated in the ED, requiring ativan  Restart home meds pending medication reconciliation   EKG pending for QTc  measurement.  Can add as needed medications for agitation  Type II diabetes mellitus with acute hyperglycemia   Hemoglobin A1C 7.7  Start sliding scale insulin for now, titrate insulin therapy as necessary.   Hold any oral hypoglycemics to prevent hypoglycemia. Will review home diabetes medications once available per pharmacy.   Hypoglycemia protocol in place if necessary.   AccuCheks before meals and at bedtime.    F/E/N: IV NS at 100 mL/h.  Continue monitor electrolytes.  Cardiac/diabetic diet    ---------------------------------------------------  DVT Prophylaxis: SCDs  Activity: Up with assistance    The patient is considered to be a high risk patient due to: Sepsis secondary to facial cellulitis and myositis,  ANN    INPATIENT status due to the need for care which can only be reasonably provided in an hospital setting such as aggressive/expedited ancillary services and/or consultation services, the necessity for IV medications, close physician monitoring and/or the possible need for procedures.  In such, I feel patient’s risk for adverse outcomes and need for care warrant INPATIENT evaluation and predict the patient’s care encounter to likely last beyond 2 midnights.    Code Status: Full code    Disposition/Discharge planning: Pending clinical course    I have discussed the patient's assessment and plan with Dr. Antonella Adkins PA-C  Hospitalist Service -- [facility]       12/25/23  05:12 EST    Attending Physician: Dr. Berman.    Electronically signed by Bartolo Berman MD at 12/25/23 0605       Current Facility-Administered Medications   Medication Dose Route Frequency Provider Last Rate Last Admin    sennosides-docusate (PERICOLACE) 8.6-50 MG per tablet 2 tablet  2 tablet Oral BID Bartolo Berman MD   2 tablet at 12/26/23 1018    And    polyethylene glycol (MIRALAX) packet 17 g  17 g Oral Daily PRN Bartolo Berman MD        And    bisacodyl (DULCOLAX) EC tablet 5  mg  5 mg Oral Daily PRN Bartolo Berman MD        And    bisacodyl (DULCOLAX) suppository 10 mg  10 mg Rectal Daily PRN Bartolo Berman MD        cetirizine (zyrTEC) tablet 5 mg  5 mg Oral Daily Tulio Sifuentes DO   5 mg at 12/26/23 1018    dextrose (D50W) (25 g/50 mL) IV injection 25 g  25 g Intravenous Q15 Min PRN Bartolo Berman MD        dextrose (GLUTOSE) oral gel 15 g  15 g Oral Q15 Min PRN Bartolo Berman MD        docusate sodium (COLACE) capsule 100 mg  100 mg Oral Daily Tulio Sifuentes DO   100 mg at 12/26/23 1018    famotidine (PEPCID) tablet 40 mg  40 mg Oral Daily Tulio Sifuentes DO   40 mg at 12/26/23 1017    glucagon HCl (Diagnostic) injection 1 mg  1 mg Intramuscular Q15 Min PRN Bartolo Berman MD        insulin glargine (LANTUS, SEMGLEE) injection 15 Units  15 Units Subcutaneous Daily Tulio Sifuentes DO   15 Units at 12/26/23 1018    Insulin Lispro (humaLOG) injection 3-14 Units  3-14 Units Subcutaneous 4x Daily AC & at Bedtime Bartolo Berman MD   8 Units at 12/26/23 1250    magnesium oxide (MAG-OX) tablet 400 mg  400 mg Oral Daily Tulio Sifuentes DO   400 mg at 12/26/23 1017    memantine (NAMENDA) tablet 10 mg  10 mg Oral Daily Tulio Sifuentes DO   10 mg at 12/26/23 1017    OLANZapine (zyPREXA) tablet 7.5 mg  7.5 mg Oral Daily With Dinner Tulio Sifuentes DO   7.5 mg at 12/25/23 1822    piperacillin-tazobactam (ZOSYN) IVPB 3.375 g in 100 mL NS VTB  3.375 g Intravenous Q8H Bartolo Berman MD   3.375 g at 12/26/23 1525    Potassium Replacement - Follow Nurse / BPA Driven Protocol   Does not apply PRN Jody Adkins PA-C        pravastatin (PRAVACHOL) tablet 40 mg  40 mg Oral Daily Tulio Sifuentes DO   40 mg at 12/26/23 1018    sodium chloride 0.9 % flush 10 mL  10 mL Intravenous Q12H Bartolo Berman MD   10 mL at 12/26/23 1018    sodium chloride 0.9 % flush 10 mL  10 mL Intravenous PRN Bartolo Berman MD        sodium chloride 0.9 %  infusion 40 mL  40 mL Intravenous PRN Bartolo Berman MD        sodium chloride 0.9 % infusion  100 mL/hr Intravenous Continuous Bartolo Berman  mL/hr at 12/25/23 1837 100 mL/hr at 12/25/23 1837    vancomycin (VANCOCIN) 1,000 mg in sodium chloride 0.9 % 250 mL IVPB-VTB  1,000 mg Intravenous Q12H Tulio Sifuentes  mL/hr at 12/26/23 1414 1,000 mg at 12/26/23 1414    vitamin B-12 (CYANOCOBALAMIN) tablet 2,000 mcg  2,000 mcg Oral Daily Tulio Sifuentes DO   2,000 mcg at 12/26/23 1018    ziprasidone (GEODON) 10 mg in sterile water (preservative free) 0.5 mL injection  10 mg Intramuscular Once PRN Tulio Sifuentes DO            Physician Progress Notes (most recent note)        Tulio Sifuentes DO at 12/25/23 1911              Patient seen and evaluated earlier this afternoon after being admitted earlier this morning.  Patient still with right sided lower facial cellulitis and edema with tenderness to touch.  Patient pleasantly demented.  Overnight/early morning admission documentation reviewed as well as labs.  Patient clinically stable and will continue with broad-spectrum antibiotic therapy for now with vancomycin and Zosyn.  Likely could transition to Unasyn versus Augmentin later on the course and will plan for only a 3-day course of vancomycin as typically facial cellulitis in this region is from dental origins and is well covered by Augmentin/Unasyn/Zosyn without need for MRSA coverage.  Patient also denying any recent trauma to that area although not the most reliable historian given her dementia.    Electronically signed by Tulio Sifuentes DO at 12/25/23 1913       Physical Therapy Notes (most recent note)    No notes exist for this encounter.       Occupational Therapy Notes (most recent note)    No notes exist for this encounter.       Speech Language Pathology Notes (most recent note)    No notes exist for this encounter.       ADL Documentation (most recent)      Flowsheet Row Most  Recent Value   Transferring 2 - assistive person   Toileting 2 - assistive person   Bathing 2 - assistive person   Dressing 2 - assistive person   Eating 2 - assistive person   Communication 2 - difficulty understanding (not related to language barrier)   Swallowing 0 - swallows foods/liquids without difficulty   Equipment Currently Used at Home none            Discharge Summary    No notes of this type exist for this encounter.       Discharge Order (From admission, onward)      None

## 2023-12-27 LAB
ANION GAP SERPL CALCULATED.3IONS-SCNC: 11.5 MMOL/L (ref 5–15)
BUN SERPL-MCNC: 14 MG/DL (ref 8–23)
BUN/CREAT SERPL: 18.7 (ref 7–25)
CALCIUM SPEC-SCNC: 8.7 MG/DL (ref 8.6–10.5)
CHLORIDE SERPL-SCNC: 105 MMOL/L (ref 98–107)
CO2 SERPL-SCNC: 22.5 MMOL/L (ref 22–29)
CREAT SERPL-MCNC: 0.75 MG/DL (ref 0.57–1)
DEPRECATED RDW RBC AUTO: 39.8 FL (ref 37–54)
EGFRCR SERPLBLD CKD-EPI 2021: 91.3 ML/MIN/1.73
ERYTHROCYTE [DISTWIDTH] IN BLOOD BY AUTOMATED COUNT: 11.9 % (ref 12.3–15.4)
GLUCOSE BLDC GLUCOMTR-MCNC: 169 MG/DL (ref 70–130)
GLUCOSE BLDC GLUCOMTR-MCNC: 180 MG/DL (ref 70–130)
GLUCOSE BLDC GLUCOMTR-MCNC: 206 MG/DL (ref 70–130)
GLUCOSE SERPL-MCNC: 198 MG/DL (ref 65–99)
HCT VFR BLD AUTO: 33.5 % (ref 34–46.6)
HGB BLD-MCNC: 11.5 G/DL (ref 12–15.9)
MCH RBC QN AUTO: 31.7 PG (ref 26.6–33)
MCHC RBC AUTO-ENTMCNC: 34.3 G/DL (ref 31.5–35.7)
MCV RBC AUTO: 92.3 FL (ref 79–97)
PLATELET # BLD AUTO: 211 10*3/MM3 (ref 140–450)
PMV BLD AUTO: 10.2 FL (ref 6–12)
POTASSIUM SERPL-SCNC: 3.8 MMOL/L (ref 3.5–5.2)
RBC # BLD AUTO: 3.63 10*6/MM3 (ref 3.77–5.28)
SODIUM SERPL-SCNC: 139 MMOL/L (ref 136–145)
WBC NRBC COR # BLD AUTO: 9.61 10*3/MM3 (ref 3.4–10.8)

## 2023-12-27 PROCEDURE — 25810000003 SODIUM CHLORIDE 0.9 % SOLUTION: Performed by: HOSPITALIST

## 2023-12-27 PROCEDURE — 82948 REAGENT STRIP/BLOOD GLUCOSE: CPT

## 2023-12-27 PROCEDURE — 85027 COMPLETE CBC AUTOMATED: CPT | Performed by: STUDENT IN AN ORGANIZED HEALTH CARE EDUCATION/TRAINING PROGRAM

## 2023-12-27 PROCEDURE — 25010000002 PIPERACILLIN SOD-TAZOBACTAM PER 1 G: Performed by: HOSPITALIST

## 2023-12-27 PROCEDURE — 25010000002 VANCOMYCIN 1 G RECONSTITUTED SOLUTION 1 EACH VIAL: Performed by: STUDENT IN AN ORGANIZED HEALTH CARE EDUCATION/TRAINING PROGRAM

## 2023-12-27 PROCEDURE — 63710000001 INSULIN GLARGINE PER 5 UNITS: Performed by: STUDENT IN AN ORGANIZED HEALTH CARE EDUCATION/TRAINING PROGRAM

## 2023-12-27 PROCEDURE — 80048 BASIC METABOLIC PNL TOTAL CA: CPT | Performed by: STUDENT IN AN ORGANIZED HEALTH CARE EDUCATION/TRAINING PROGRAM

## 2023-12-27 PROCEDURE — 25010000002 DIPHENHYDRAMINE PER 50 MG: Performed by: STUDENT IN AN ORGANIZED HEALTH CARE EDUCATION/TRAINING PROGRAM

## 2023-12-27 PROCEDURE — 99232 SBSQ HOSP IP/OBS MODERATE 35: CPT | Performed by: STUDENT IN AN ORGANIZED HEALTH CARE EDUCATION/TRAINING PROGRAM

## 2023-12-27 PROCEDURE — 25010000002 ZIPRASIDONE MESYLATE PER 10 MG: Performed by: STUDENT IN AN ORGANIZED HEALTH CARE EDUCATION/TRAINING PROGRAM

## 2023-12-27 PROCEDURE — 25810000003 SODIUM CHLORIDE 0.9 % SOLUTION 250 ML FLEX CONT: Performed by: STUDENT IN AN ORGANIZED HEALTH CARE EDUCATION/TRAINING PROGRAM

## 2023-12-27 PROCEDURE — 25010000002 HALOPERIDOL LACTATE PER 5 MG

## 2023-12-27 PROCEDURE — 63710000001 INSULIN LISPRO (HUMAN) PER 5 UNITS: Performed by: HOSPITALIST

## 2023-12-27 RX ORDER — HYDROXYZINE HYDROCHLORIDE 10 MG/1
10 TABLET, FILM COATED ORAL ONCE
Status: COMPLETED | OUTPATIENT
Start: 2023-12-27 | End: 2023-12-27

## 2023-12-27 RX ORDER — DIPHENHYDRAMINE HYDROCHLORIDE 50 MG/ML
25 INJECTION INTRAMUSCULAR; INTRAVENOUS ONCE
Qty: 1 ML | Refills: 0 | Status: COMPLETED | OUTPATIENT
Start: 2023-12-27 | End: 2023-12-27

## 2023-12-27 RX ORDER — CHOLECALCIFEROL (VITAMIN D3) 125 MCG
5 CAPSULE ORAL NIGHTLY PRN
Status: DISCONTINUED | OUTPATIENT
Start: 2023-12-27 | End: 2024-01-04 | Stop reason: HOSPADM

## 2023-12-27 RX ORDER — HALOPERIDOL 5 MG/ML
1 INJECTION INTRAMUSCULAR ONCE
Status: COMPLETED | OUTPATIENT
Start: 2023-12-27 | End: 2023-12-27

## 2023-12-27 RX ADMIN — PIPERACILLIN SODIUM AND TAZOBACTAM SODIUM 3.38 G: 3; .375 INJECTION, POWDER, LYOPHILIZED, FOR SOLUTION INTRAVENOUS at 06:10

## 2023-12-27 RX ADMIN — INSULIN LISPRO 3 UNITS: 100 INJECTION, SOLUTION INTRAVENOUS; SUBCUTANEOUS at 09:10

## 2023-12-27 RX ADMIN — DOCUSATE SODIUM 100 MG: 100 CAPSULE, LIQUID FILLED ORAL at 09:11

## 2023-12-27 RX ADMIN — Medication 5 MG: at 22:11

## 2023-12-27 RX ADMIN — INSULIN LISPRO 3 UNITS: 100 INJECTION, SOLUTION INTRAVENOUS; SUBCUTANEOUS at 17:53

## 2023-12-27 RX ADMIN — PRAVASTATIN SODIUM 40 MG: 40 TABLET ORAL at 09:10

## 2023-12-27 RX ADMIN — ZIPRASIDONE MESYLATE 20 MG: 20 INJECTION, POWDER, LYOPHILIZED, FOR SOLUTION INTRAMUSCULAR at 23:16

## 2023-12-27 RX ADMIN — VANCOMYCIN HYDROCHLORIDE 1000 MG: 1 INJECTION, POWDER, LYOPHILIZED, FOR SOLUTION INTRAVENOUS at 14:38

## 2023-12-27 RX ADMIN — SODIUM CHLORIDE 100 ML/HR: 9 INJECTION, SOLUTION INTRAVENOUS at 17:54

## 2023-12-27 RX ADMIN — FAMOTIDINE 40 MG: 20 TABLET, FILM COATED ORAL at 09:09

## 2023-12-27 RX ADMIN — HALOPERIDOL LACTATE 1 MG: 5 INJECTION, SOLUTION INTRAMUSCULAR at 09:09

## 2023-12-27 RX ADMIN — INSULIN LISPRO 5 UNITS: 100 INJECTION, SOLUTION INTRAVENOUS; SUBCUTANEOUS at 11:14

## 2023-12-27 RX ADMIN — PIPERACILLIN SODIUM AND TAZOBACTAM SODIUM 3.38 G: 3; .375 INJECTION, POWDER, LYOPHILIZED, FOR SOLUTION INTRAVENOUS at 22:58

## 2023-12-27 RX ADMIN — DIPHENHYDRAMINE HYDROCHLORIDE 25 MG: 50 INJECTION, SOLUTION INTRAMUSCULAR; INTRAVENOUS at 11:14

## 2023-12-27 RX ADMIN — MAGNESIUM GLUCONATE 500 MG ORAL TABLET 400 MG: 500 TABLET ORAL at 09:18

## 2023-12-27 RX ADMIN — OLANZAPINE 7.5 MG: 2.5 TABLET, FILM COATED ORAL at 17:53

## 2023-12-27 RX ADMIN — HYDROXYZINE HYDROCHLORIDE 10 MG: 10 TABLET ORAL at 22:51

## 2023-12-27 RX ADMIN — Medication 10 ML: at 09:10

## 2023-12-27 RX ADMIN — Medication 2000 MCG: at 09:10

## 2023-12-27 RX ADMIN — ZIPRASIDONE MESYLATE 20 MG: 20 INJECTION, POWDER, LYOPHILIZED, FOR SOLUTION INTRAMUSCULAR at 14:21

## 2023-12-27 RX ADMIN — INSULIN LISPRO 10 UNITS: 100 INJECTION, SOLUTION INTRAVENOUS; SUBCUTANEOUS at 21:01

## 2023-12-27 RX ADMIN — PIPERACILLIN SODIUM AND TAZOBACTAM SODIUM 3.38 G: 3; .375 INJECTION, POWDER, LYOPHILIZED, FOR SOLUTION INTRAVENOUS at 14:37

## 2023-12-27 RX ADMIN — MEMANTINE HYDROCHLORIDE 10 MG: 10 TABLET ORAL at 09:09

## 2023-12-27 RX ADMIN — VANCOMYCIN HYDROCHLORIDE 1000 MG: 1 INJECTION, POWDER, LYOPHILIZED, FOR SOLUTION INTRAVENOUS at 03:37

## 2023-12-27 RX ADMIN — CETIRIZINE HYDROCHLORIDE 5 MG: 10 TABLET, FILM COATED ORAL at 09:10

## 2023-12-27 RX ADMIN — INSULIN GLARGINE 15 UNITS: 100 INJECTION, SOLUTION SUBCUTANEOUS at 09:10

## 2023-12-27 NOTE — CASE MANAGEMENT/SOCIAL WORK
Discharge Planning Assessment  [facility]     Patient Name: Sandra Leahy  MRN: [id number]  Today's Date: 12/27/2023    Admit Date: 12/25/2023    Plan: SS left message for Jacklyn at [facility] to return call regarding referral. SS will continue to follow.       Discharge Plan       Row Name 12/27/23 1140       Plan    Plan SS left message for Jacklyn at [facility] to return call regarding referral. SS will continue to follow.                  Continued Care and Services - Admitted Since 12/25/2023       Destination       Service Provider Request Status Selected Services Address Phone Fax Patient Preferred    [facility] Pending - No Request Sent N/A [address] [facility] [address] [phone] --                  Selected Continued Care - Episodes Includes continued care and service providers with selected services from the active episodes listed below      High Risk Care Management Episode start date: 11/6/2023   There are no active outsourced providers for this episode.                 Expected Discharge Date and Time       Expected Discharge Date Expected Discharge Time    Dec 28, 2023             JOSS Murdock

## 2023-12-27 NOTE — CASE MANAGEMENT/SOCIAL WORK
Discharge Planning Assessment  [facility]     Patient Name: Sandra Leahy  MRN: [id number]  Today's Date: 12/27/2023    Admit Date: 12/25/2023    Plan: SS left message for Jacklyn at [facility] [place] to return call regarding referral.  SS will continue to follow.       Discharge Plan       Row Name 12/27/23 1517       Plan    Plan SS left message for Jacklyn at [facility] [place] to return call regarding referral.  SS will continue to follow.      Row Name 12/27/23 1234                  Continued Care and Services - Admitted Since 12/25/2023       Destination       Service Provider Request Status Selected Services Address Phone Fax Patient Preferred    [facility] Pending - No Request Sent N/A [address] [place] [address] [phone] --                  Selected Continued Care - Episodes Includes continued care and service providers with selected services from the active episodes listed below      High Risk Care Management Episode start date: 11/6/2023   There are no active outsourced providers for this episode.                 Expected Discharge Date and Time       Expected Discharge Date Expected Discharge Time    Dec 28, 2023             CATHY MurdockW

## 2023-12-27 NOTE — PLAN OF CARE
Goal Outcome Evaluation:      Patient remains alert and confused. Sitter at bedside. Patient is lying in bed with no S&S of distress. No complaints at this time. Will continue to follow plan of care.

## 2023-12-27 NOTE — PROGRESS NOTES
[facility] HOSPITALIST PROGRESS NOTE     Patient Identification:  Name:  Sandra Leahy  Age:  58 y.o.  Sex:  female  :  1965  MRN:  [id number]  Visit Number:  [id number]  ROOM: [address] [place] [address]     Primary Care Provider:  John Mueller APRN    Length of stay in inpatient status:  1    Subjective     Chief Compliant:    Chief Complaint   Patient presents with    Abscess    Jaw Pain       History of Presenting Illness: Patient seen and evaluated in follow-up for severe sepsis secondary to facial cellulitis with underlying myositis.  Patient time evaluation today with improvement in tenderness to palpation of the right face and improvement in erythema.  Patient remains confused but pleasant.    Objective     Current Hospital Meds:  cetirizine, 5 mg, Oral, Daily  docusate sodium, 100 mg, Oral, Daily  famotidine, 40 mg, Oral, Daily  insulin glargine, 15 Units, Subcutaneous, Daily  insulin lispro, 3-14 Units, Subcutaneous, 4x Daily AC & at Bedtime  magnesium oxide, 400 mg, Oral, Daily  memantine, 10 mg, Oral, Daily  OLANZapine, 7.5 mg, Oral, Daily With Dinner  piperacillin-tazobactam, 3.375 g, Intravenous, Q8H  pravastatin, 40 mg, Oral, Daily  senna-docusate sodium, 2 tablet, Oral, BID  sodium chloride, 10 mL, Intravenous, Q12H  vancomycin, 1,000 mg, Intravenous, Q12H  cyanocobalamin, 2,000 mcg, Oral, Daily      sodium chloride, 100 mL/hr, Last Rate: 100 mL/hr (23)      ----------------------------------------------------------------------------------------------------------------------  Vital Signs:  Temp:  [97.5 °F (36.4 °C)-98.3 °F (36.8 °C)] 98.3 °F (36.8 °C)  Heart Rate:  [] 82  Resp:  [18] 18  BP: (111-124)/(59-70) 124/68  SpO2:  [96 %-98 %] 96 %  on   ;   Device (Oxygen Therapy): room air  Body mass index is 22.3 kg/m².      Intake/Output Summary (Last 24 hours) at 2023  Last data filed at 2023 1300  Gross per 24 hour   Intake 720 ml   Output 450 ml   Net 270  "ml      ----------------------------------------------------------------------------------------------------------------------  Physical exam:  Constitutional:  Well-developed and well-nourished.  No acute distress.      HENT:  Head: Right lower sided facial swelling with some scant erythema but improved from prior Mouth:  Moist mucous membranes.    Eyes:  Conjunctivae and EOM are normal. No scleral icterus.    Cardiovascular:  Normal rate, regular rhythm and normal heart sounds with no murmur.  Pulmonary/Chest:  No respiratory distress, no wheezes, no crackles, with normal breath sounds and good air movement.  Abdominal:  Soft.  Bowel sounds are normal.  No distension and no tenderness.   Musculoskeletal:  No tenderness or deformity.  No red or swollen joints anywhere.  Functional ROM intact.   Neurological:  Alert and oriented to person, place, and time.  No cranial nerve deficit.  No tongue deviation.  No facial droop.  No slurred speech. Intact Sensation throughout  Skin:  Skin is warm and dry. No rash or lesion noted. No pallor.   Peripheral vascular:  Pulses in all 4 extremities with no clubbing, no cyanosis, no edema.  Psychiatric: Appropriate mood and affect, pleasant.   ----------------------------------------------------------------------------------------------------------------------  WBC/HGB/HCT/PLT   9.95/11.7/35.0/212 (12/26 0122)  BUN/CREAT/GLUC/ALT/AST/REGIS/LIP    12/0.85/94/52/37/--/-- (12/26 0122)  LYTES - Na/K/Cl/CO2: --/4.0/--/-- (12/26 1620)        No results found for: \"URINECX\"  Blood Culture   Date Value Ref Range Status   12/24/2023 No growth at 24 hours  Preliminary   12/24/2023 No growth at 24 hours  Preliminary       I have personally looked at the labs and they are summarized above.  ----------------------------------------------------------------------------------------------------------------------  Detailed radiology reports for the last 24 hours:  CT Facial Bones Without " Contrast    Result Date: 12/25/2023   1.  Soft tissue swelling with stranding noted overlying the body of the right mandible consistent with edema and cellulitis. 2.  Myositis noted affecting the musculature overlying the body of the right mandible. 3.  No abscess or hematoma. 4.  Mild mucosal thickening in the paranasal sinuses. 5.  No acute fracture or dislocation. 6.  No air in the soft tissues. 7.  No foreign body.  This report was finalized on 12/25/2023 1:39 AM by James Ivy MD.      CT Head Without Contrast    Result Date: 12/25/2023   1.  No acute process seen in the brain. 2.  No acute hemorrhage, mass, infarct, or edema. 3.  No shift of midline structures. 4.  Mild to moderate generalized brain volume loss with mild to moderate chronic small vessel ischemic type changes in the white matter. 5.  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   This report was finalized on 12/25/2023 1:28 AM by James Ivy MD.     Assessment & Plan      Severe sepsis  Facial cellulitis  Facial myositis    -Patient presenting with facial swelling and pain and tenderness with CT consistent with cellulitis overlying the right mandible.  Also noted myositis of the underlying musculature.    -Case discussed by ER provider with [facility] who recommended continued antibiotic therapy here at this facility with later referral for outpatient removal of tooth with abscess.  There was no definitive abscess to drain and no involvement of the TMJ joint and therefore above recommendations with transition to oral antibiotics for an additional 10 days before follow-up with [facility] [employer].    -Continue vancomycin and Zosyn with likely de-escalation to Zosyn monotherapy tomorrow.  Subsequent transition to Augmentin at discharge.    -Patient already showing clinical improvement we will continue antibiotic therapy.    ANN    -Likely secondary to sepsis with initial creatinine elevated at 1.1 with baseline  around 0.7.    -Continue IV fluids and trend treatment of sepsis and cellulitis as above.    Advanced dementia with behavioral disturbances, POA    -Continued home medications, sitter present at bedside    Diabetes mellitus type 2    -Basal and bolus insulin as needed to maintain glycemic control    Copied text in portions of the note has been reviewed and is accurate as of 12/26/23    VTE Prophylaxis:   Mechanical Order History:        Ordered        12/25/23 0524  Place Sequential Compression Device  Once            12/25/23 0524  Maintain Sequential Compression Device  Continuous                          Pharmalogical Order History:       None            Disposition Home with family once clinical improvement and stability to transition to oral antibiotic therapy.    Tulio Sifuentes DO  [facility] Hospitalist  12/26/23  20:39 EST

## 2023-12-27 NOTE — PLAN OF CARE
Goal Outcome Evaluation:  Pt resting in bed, No complaints. No acute signs of distress. Will continue to follow plan of care.

## 2023-12-28 LAB
ANION GAP SERPL CALCULATED.3IONS-SCNC: 10.2 MMOL/L (ref 5–15)
BUN SERPL-MCNC: 11 MG/DL (ref 8–23)
BUN/CREAT SERPL: 13.9 (ref 7–25)
CALCIUM SPEC-SCNC: 9.3 MG/DL (ref 8.6–10.5)
CHLORIDE SERPL-SCNC: 108 MMOL/L (ref 98–107)
CO2 SERPL-SCNC: 21.8 MMOL/L (ref 22–29)
CREAT SERPL-MCNC: 0.79 MG/DL (ref 0.57–1)
DEPRECATED RDW RBC AUTO: 41.6 FL (ref 37–54)
EGFRCR SERPLBLD CKD-EPI 2021: 85.8 ML/MIN/1.73
ERYTHROCYTE [DISTWIDTH] IN BLOOD BY AUTOMATED COUNT: 12 % (ref 12.3–15.4)
GLUCOSE BLDC GLUCOMTR-MCNC: 151 MG/DL (ref 70–130)
GLUCOSE BLDC GLUCOMTR-MCNC: 155 MG/DL (ref 70–130)
GLUCOSE BLDC GLUCOMTR-MCNC: 161 MG/DL (ref 70–130)
GLUCOSE BLDC GLUCOMTR-MCNC: 263 MG/DL (ref 70–130)
GLUCOSE BLDC GLUCOMTR-MCNC: 314 MG/DL (ref 70–130)
GLUCOSE BLDC GLUCOMTR-MCNC: 99 MG/DL (ref 70–130)
GLUCOSE SERPL-MCNC: 154 MG/DL (ref 65–99)
HCT VFR BLD AUTO: 37.2 % (ref 34–46.6)
HGB BLD-MCNC: 12.4 G/DL (ref 12–15.9)
MCH RBC QN AUTO: 32 PG (ref 26.6–33)
MCHC RBC AUTO-ENTMCNC: 33.3 G/DL (ref 31.5–35.7)
MCV RBC AUTO: 95.9 FL (ref 79–97)
PLATELET # BLD AUTO: 221 10*3/MM3 (ref 140–450)
PMV BLD AUTO: 9.9 FL (ref 6–12)
POTASSIUM SERPL-SCNC: 3.8 MMOL/L (ref 3.5–5.2)
RBC # BLD AUTO: 3.88 10*6/MM3 (ref 3.77–5.28)
SODIUM SERPL-SCNC: 140 MMOL/L (ref 136–145)
WBC NRBC COR # BLD AUTO: 7.63 10*3/MM3 (ref 3.4–10.8)

## 2023-12-28 PROCEDURE — 63710000001 INSULIN LISPRO (HUMAN) PER 5 UNITS: Performed by: HOSPITALIST

## 2023-12-28 PROCEDURE — 25010000002 VANCOMYCIN 1 G RECONSTITUTED SOLUTION 1 EACH VIAL: Performed by: STUDENT IN AN ORGANIZED HEALTH CARE EDUCATION/TRAINING PROGRAM

## 2023-12-28 PROCEDURE — 85027 COMPLETE CBC AUTOMATED: CPT | Performed by: STUDENT IN AN ORGANIZED HEALTH CARE EDUCATION/TRAINING PROGRAM

## 2023-12-28 PROCEDURE — 99232 SBSQ HOSP IP/OBS MODERATE 35: CPT | Performed by: STUDENT IN AN ORGANIZED HEALTH CARE EDUCATION/TRAINING PROGRAM

## 2023-12-28 PROCEDURE — 25010000002 LORAZEPAM PER 2 MG: Performed by: INTERNAL MEDICINE

## 2023-12-28 PROCEDURE — 82948 REAGENT STRIP/BLOOD GLUCOSE: CPT

## 2023-12-28 PROCEDURE — 25010000002 PIPERACILLIN SOD-TAZOBACTAM PER 1 G: Performed by: HOSPITALIST

## 2023-12-28 PROCEDURE — 63710000001 INSULIN GLARGINE PER 5 UNITS: Performed by: STUDENT IN AN ORGANIZED HEALTH CARE EDUCATION/TRAINING PROGRAM

## 2023-12-28 PROCEDURE — 25810000003 SODIUM CHLORIDE 0.9 % SOLUTION 250 ML FLEX CONT: Performed by: STUDENT IN AN ORGANIZED HEALTH CARE EDUCATION/TRAINING PROGRAM

## 2023-12-28 PROCEDURE — 80048 BASIC METABOLIC PNL TOTAL CA: CPT | Performed by: STUDENT IN AN ORGANIZED HEALTH CARE EDUCATION/TRAINING PROGRAM

## 2023-12-28 RX ORDER — OLANZAPINE 10 MG/1
10 TABLET ORAL
Status: DISCONTINUED | OUTPATIENT
Start: 2023-12-28 | End: 2024-01-04 | Stop reason: HOSPADM

## 2023-12-28 RX ORDER — AMOXICILLIN AND CLAVULANATE POTASSIUM 875; 125 MG/1; MG/1
1 TABLET, FILM COATED ORAL EVERY 12 HOURS SCHEDULED
Qty: 20 TABLET | Refills: 0 | Status: DISCONTINUED | OUTPATIENT
Start: 2023-12-28 | End: 2024-01-04 | Stop reason: HOSPADM

## 2023-12-28 RX ORDER — HALOPERIDOL 5 MG/ML
1 INJECTION INTRAMUSCULAR ONCE
Status: DISCONTINUED | OUTPATIENT
Start: 2023-12-28 | End: 2023-12-28

## 2023-12-28 RX ORDER — LORAZEPAM 2 MG/ML
1 INJECTION INTRAMUSCULAR ONCE
Status: COMPLETED | OUTPATIENT
Start: 2023-12-28 | End: 2023-12-28

## 2023-12-28 RX ADMIN — MAGNESIUM GLUCONATE 500 MG ORAL TABLET 400 MG: 500 TABLET ORAL at 11:38

## 2023-12-28 RX ADMIN — Medication 2000 MCG: at 11:38

## 2023-12-28 RX ADMIN — CETIRIZINE HYDROCHLORIDE 5 MG: 10 TABLET, FILM COATED ORAL at 11:38

## 2023-12-28 RX ADMIN — MEMANTINE HYDROCHLORIDE 10 MG: 10 TABLET ORAL at 11:38

## 2023-12-28 RX ADMIN — INSULIN GLARGINE 15 UNITS: 100 INJECTION, SOLUTION SUBCUTANEOUS at 11:39

## 2023-12-28 RX ADMIN — Medication 10 ML: at 11:39

## 2023-12-28 RX ADMIN — LORAZEPAM 1 MG: 2 INJECTION INTRAMUSCULAR; INTRAVENOUS at 00:10

## 2023-12-28 RX ADMIN — Medication 10 ML: at 20:11

## 2023-12-28 RX ADMIN — INSULIN LISPRO 3 UNITS: 100 INJECTION, SOLUTION INTRAVENOUS; SUBCUTANEOUS at 11:41

## 2023-12-28 RX ADMIN — INSULIN LISPRO 8 UNITS: 100 INJECTION, SOLUTION INTRAVENOUS; SUBCUTANEOUS at 20:10

## 2023-12-28 RX ADMIN — INSULIN LISPRO 3 UNITS: 100 INJECTION, SOLUTION INTRAVENOUS; SUBCUTANEOUS at 18:07

## 2023-12-28 RX ADMIN — AMOXICILLIN AND CLAVULANATE POTASSIUM 1 TABLET: 875; 125 TABLET, FILM COATED ORAL at 20:10

## 2023-12-28 RX ADMIN — PIPERACILLIN SODIUM AND TAZOBACTAM SODIUM 3.38 G: 3; .375 INJECTION, POWDER, LYOPHILIZED, FOR SOLUTION INTRAVENOUS at 06:31

## 2023-12-28 RX ADMIN — PRAVASTATIN SODIUM 40 MG: 40 TABLET ORAL at 11:39

## 2023-12-28 RX ADMIN — OLANZAPINE 10 MG: 10 TABLET, FILM COATED ORAL at 18:07

## 2023-12-28 RX ADMIN — DOCUSATE SODIUM 50 MG AND SENNOSIDES 8.6 MG 2 TABLET: 8.6; 5 TABLET, FILM COATED ORAL at 20:10

## 2023-12-28 RX ADMIN — FAMOTIDINE 40 MG: 20 TABLET, FILM COATED ORAL at 11:38

## 2023-12-28 RX ADMIN — DOCUSATE SODIUM 100 MG: 100 CAPSULE, LIQUID FILLED ORAL at 11:38

## 2023-12-28 RX ADMIN — AMOXICILLIN AND CLAVULANATE POTASSIUM 1 TABLET: 875; 125 TABLET, FILM COATED ORAL at 11:41

## 2023-12-28 RX ADMIN — DOCUSATE SODIUM 50 MG AND SENNOSIDES 8.6 MG 2 TABLET: 8.6; 5 TABLET, FILM COATED ORAL at 11:38

## 2023-12-28 RX ADMIN — VANCOMYCIN HYDROCHLORIDE 1000 MG: 1 INJECTION, POWDER, LYOPHILIZED, FOR SOLUTION INTRAVENOUS at 02:07

## 2023-12-28 NOTE — CASE MANAGEMENT/SOCIAL WORK
Discharge Planning Assessment  [facility]     Patient Name: Sandra Leahy  MRN: [id number]  Today's Date: 12/28/2023    Admit Date: 12/25/2023    Plan: SS spoke with Jacklyn at [facility] who stated referral received and is being reviewed.  SS spoke with Physician on this date and noted Psych evaluation consult.  SS will continue to follow.       Discharge Plan       Row Name 12/28/23 1109       Plan    Plan SS spoke with Jacklyn at [facility] who stated referral received and is being reviewed.  SS spoke with Physician on this date and noted Psych evaluation consult.  SS will continue to follow.                  Continued Care and Services - Admitted Since 12/25/2023       Destination       Service Provider Request Status Selected Services Address Phone Fax Patient Preferred    [facility] Pending - No Request Sent N/A [address] [place] [address] [phone] --                  Selected Continued Care - Episodes Includes continued care and service providers with selected services from the active episodes listed below      High Risk Care Management Episode start date: 11/6/2023   There are no active outsourced providers for this episode.                 Expected Discharge Date and Time       Expected Discharge Date Expected Discharge Time    Dec 28, 2023               JOSS Murdock

## 2023-12-28 NOTE — PROGRESS NOTES
[facility] HOSPITALIST PROGRESS NOTE     Patient Identification:  Name:  Sandra Leahy  Age:  58 y.o.  Sex:  female  :  1965  MRN:  [id number]  Visit Number:  [id number]  ROOM: [address] [place] [address]     Primary Care Provider:  John Mueller APRN    Length of stay in inpatient status:  3    Subjective     Chief Compliant:    Chief Complaint   Patient presents with    Abscess    Jaw Pain       History of Presenting Illness: Patient seen and evaluated in follow-up for severe sepsis secondary to facial cellulitis with underlying myositis.  Patient today at time evaluation feels well and eating lunch at time of evaluation.  She reports significant marked improvement in her facial swelling and discomfort since admission.  Patient with essentially resolution of previous erythema and edema on the right mandibular region.  Patient stable for discharge home with oral antibiotic therapy however patient unable to return home due to family no longer having the means to continue providing care for the patient around-the-clock.    Objective     Current Hospital Meds:  amoxicillin-clavulanate, 1 tablet, Oral, Q12H  cetirizine, 5 mg, Oral, Daily  docusate sodium, 100 mg, Oral, Daily  famotidine, 40 mg, Oral, Daily  insulin glargine, 15 Units, Subcutaneous, Daily  insulin lispro, 3-14 Units, Subcutaneous, 4x Daily AC & at Bedtime  magnesium oxide, 400 mg, Oral, Daily  memantine, 10 mg, Oral, Daily  OLANZapine, 10 mg, Oral, Daily With Dinner  pravastatin, 40 mg, Oral, Daily  senna-docusate sodium, 2 tablet, Oral, BID  sodium chloride, 10 mL, Intravenous, Q12H  cyanocobalamin, 2,000 mcg, Oral, Daily           ----------------------------------------------------------------------------------------------------------------------  Vital Signs:  Temp:  [97.6 °F (36.4 °C)-98.4 °F (36.9 °C)] 97.6 °F (36.4 °C)  Heart Rate:  [] 81  Resp:  [18-20] 20  BP: (115-176)/(68-94) 124/72  SpO2:  [95 %-97 %] 96 %  on   ;   Device  "(Oxygen Therapy): room air  Body mass index is 22.48 kg/m².      Intake/Output Summary (Last 24 hours) at 12/28/2023 1825  Last data filed at 12/28/2023 1200  Gross per 24 hour   Intake 840 ml   Output 400 ml   Net 440 ml      ----------------------------------------------------------------------------------------------------------------------  Physical exam:  Constitutional:  Well-developed and well-nourished.  No acute distress.      HENT:  Head: Right lower sided facial swelling with some essentially resolved edema and near complete resolution of erythema.  Mouth:  Moist mucous membranes.    Eyes:  Conjunctivae and EOM are normal. No scleral icterus.    Cardiovascular:  Normal rate, regular rhythm and normal heart sounds with no murmur.  Pulmonary/Chest:  No respiratory distress, no wheezes, no crackles, with normal breath sounds and good air movement.  Abdominal:  Soft.  Bowel sounds are normal.  No distension and no tenderness.   Musculoskeletal:  No tenderness or deformity.  No red or swollen joints anywhere.  Functional ROM intact.   Neurological:  Alert and oriented to person, place, and time.  No cranial nerve deficit.  No tongue deviation.  No facial droop.  No slurred speech. Intact Sensation throughout  Skin:  Skin is warm and dry. No rash or lesion noted. No pallor.   Peripheral vascular:  Pulses in all 4 extremities with no clubbing, no cyanosis, no edema.  Psychiatric: Appropriate mood and affect, pleasant.   ----------------------------------------------------------------------------------------------------------------------  WBC/HGB/HCT/PLT   7.63/12.4/37.2/221 (12/28 0634)  BUN/CREAT/GLUC/ALT/AST/REGIS/LIP    11/0.79/154/--/--/--/-- (12/28 0634)  LYTES - Na/K/Cl/CO2: 140/3.8/108*/21.8* (12/28 0634)        No results found for: \"URINECX\"  Blood Culture   Date Value Ref Range Status   12/24/2023 No growth at 24 hours  Preliminary   12/24/2023 No growth at 24 hours  Preliminary       I have personally " looked at the labs and they are summarized above.  ----------------------------------------------------------------------------------------------------------------------  Detailed radiology reports for the last 24 hours:  No radiology results for the last day  Assessment & Plan      Severe sepsis  Facial cellulitis  Facial myositis    -Patient presenting with facial swelling and pain and tenderness with CT consistent with cellulitis overlying the right mandible.  Also noted myositis of the underlying musculature.    -Case discussed by ER provider with [facility] who recommended continued antibiotic therapy here at this facility with later referral for outpatient removal of tooth with abscess.  There was no definitive abscess to drain and no involvement of the TMJ joint and therefore above recommendations with transition to oral antibiotics for an additional 10 days before [facility]-up with  [facility] [employer] [facility]    -Given significant improvement in erythema and edema and essential resolution transition patient to Augmentin monotherapy.  Plan course of 10 days.    -Patient already showing clinical improvement we will continue antibiotic therapy.    ANN    -Likely secondary to sepsis with initial creatinine elevated at 1.1 with baseline around 0.7.  Now improved and back to baseline.  Will discontinue IV fluids.    Advanced dementia with behavioral disturbances, POA    -Continued home medications, did increase Zyprexa given restlessness and noncompliance yesterday.  Patient redirectable.    -Psychiatry consult for further recommendations as patient recently hospitalized at [facility] last month and initiated on Zyprexa but appears to need further medical management.    Diabetes mellitus type 2    -Basal and bolus insulin as needed to maintain glycemic control    Copied text in portions of the note has been reviewed and is accurate as of 12/28/23    VTE Prophylaxis:   Mechanical Order History:        Ordered         12/25/23 0524  Place Sequential Compression Device  Once            12/25/23 0524  Maintain Sequential Compression Device  Continuous                          Pharmalogical Order History:       None            Disposition pending placement.    Tulio Sifuentes DO  [facility] Hospitalist  12/28/23  18:25 EST

## 2023-12-28 NOTE — PROGRESS NOTES
[facility] HOSPITALIST PROGRESS NOTE     Patient Identification:  Name:  Sandra Leahy  Age:  58 y.o.  Sex:  female  :  1965  MRN:  [id number]  Visit Number:  [id number]  ROOM: [address] [place] [address]     Primary Care Provider:  John Mueller APRN    Length of stay in inpatient status:  2    Subjective     Chief Compliant:    Chief Complaint   Patient presents with    Abscess    Jaw Pain       History of Presenting Illness: Patient seen and evaluated in follow-up for severe sepsis secondary to facial cellulitis with underlying myositis.  Patient time evaluation today with improvement in tenderness to palpation of the right face and improvement in erythema with what appears to be near return to normal of facial contours and shape and no tenderness to palpation    Objective     Current Hospital Meds:  cetirizine, 5 mg, Oral, Daily  docusate sodium, 100 mg, Oral, Daily  famotidine, 40 mg, Oral, Daily  insulin glargine, 15 Units, Subcutaneous, Daily  insulin lispro, 3-14 Units, Subcutaneous, 4x Daily AC & at Bedtime  magnesium oxide, 400 mg, Oral, Daily  memantine, 10 mg, Oral, Daily  OLANZapine, 7.5 mg, Oral, Daily With Dinner  piperacillin-tazobactam, 3.375 g, Intravenous, Q8H  pravastatin, 40 mg, Oral, Daily  senna-docusate sodium, 2 tablet, Oral, BID  sodium chloride, 10 mL, Intravenous, Q12H  vancomycin, 1,000 mg, Intravenous, Q12H  cyanocobalamin, 2,000 mcg, Oral, Daily      sodium chloride, 100 mL/hr, Last Rate: 100 mL/hr (23 5206)      ----------------------------------------------------------------------------------------------------------------------  Vital Signs:  Temp:  [97.6 °F (36.4 °C)-98.3 °F (36.8 °C)] 97.6 °F (36.4 °C)  Heart Rate:  [82-92] 87  Resp:  [18] 18  BP: (108-132)/(56-73) 108/60  SpO2:  [96 %] 96 %  on   ;   Device (Oxygen Therapy): room air  Body mass index is 22.3 kg/m².      Intake/Output Summary (Last 24 hours) at 2023 191  Last data filed at 2023  "1535  Gross per 24 hour   Intake 240 ml   Output 1850 ml   Net -1610 ml      ----------------------------------------------------------------------------------------------------------------------  Physical exam:  Constitutional:  Well-developed and well-nourished.  No acute distress.      HENT:  Head: Right lower sided facial swelling with some essentially resolved edema and near complete resolution of erythema.  Mouth:  Moist mucous membranes.    Eyes:  Conjunctivae and EOM are normal. No scleral icterus.    Cardiovascular:  Normal rate, regular rhythm and normal heart sounds with no murmur.  Pulmonary/Chest:  No respiratory distress, no wheezes, no crackles, with normal breath sounds and good air movement.  Abdominal:  Soft.  Bowel sounds are normal.  No distension and no tenderness.   Musculoskeletal:  No tenderness or deformity.  No red or swollen joints anywhere.  Functional ROM intact.   Neurological:  Alert and oriented to person, place, and time.  No cranial nerve deficit.  No tongue deviation.  No facial droop.  No slurred speech. Intact Sensation throughout  Skin:  Skin is warm and dry. No rash or lesion noted. No pallor.   Peripheral vascular:  Pulses in all 4 extremities with no clubbing, no cyanosis, no edema.  Psychiatric: Appropriate mood and affect, pleasant.   ----------------------------------------------------------------------------------------------------------------------  WBC/HGB/HCT/PLT   9.61/11.5/33.5/211 (12/27 0040)  BUN/CREAT/GLUC/ALT/AST/REGIS/LIP    14/0.75/198/--/--/--/-- (12/27 0040)  LYTES - Na/K/Cl/CO2: 139/3.8/105/22.5 (12/27 0040)        No results found for: \"URINECX\"  Blood Culture   Date Value Ref Range Status   12/24/2023 No growth at 24 hours  Preliminary   12/24/2023 No growth at 24 hours  Preliminary       I have personally looked at the labs and they are summarized " above.  ----------------------------------------------------------------------------------------------------------------------  Detailed radiology reports for the last 24 hours:  No radiology results for the last day  Assessment & Plan      Severe sepsis  Facial cellulitis  Facial myositis    -Patient presenting with facial swelling and pain and tenderness with CT consistent with cellulitis overlying the right mandible.  Also noted myositis of the underlying musculature.    -Case discussed by ER provider with [facility] who recommended continued antibiotic therapy here at this facility with later referral for outpatient removal of tooth with abscess.  There was no definitive abscess to drain and no involvement of the TMJ joint and therefore above recommendations with transition to oral antibiotics for an additional 10 days before follow-up with [facility] [employer].    -Continue vancomycin and Zosyn with likely de-escalation to Zosyn monotherapy today.  Plan for likely transition to Augmentin tomorrow and potential discharge as patient continues with improvement in facial cellulitis and edema.    -Patient already showing clinical improvement we will continue antibiotic therapy.    ANN    -Likely secondary to sepsis with initial creatinine elevated at 1.1 with baseline around 0.7.  Now improved and back to baseline.  Will discontinue IV fluids.    Advanced dementia with behavioral disturbances, POA    -Continued home medications, sitter present at bedside    Diabetes mellitus type 2    -Basal and bolus insulin as needed to maintain glycemic control    Copied text in portions of the note has been reviewed and is accurate as of 12/27/23    VTE Prophylaxis:   Mechanical Order History:        Ordered        12/25/23 0524  Place Sequential Compression Device  Once            12/25/23 0524  Maintain Sequential Compression Device  Continuous                          Pharmalogical Order History:       None            Disposition  likely home in the next 24 to 48 hours with transition to oral Augmentin.    Tulio Sifuentes DO  [facility] Hospitalist  12/27/23  19:16 EST

## 2023-12-28 NOTE — PLAN OF CARE
Goal Outcome Evaluation:                     Pt has not rested well this shift. Pt ripped out IV and refused to lay in bed, MENDEZ Adkins aware (see MAR for interventions). No s/s of acute distress noted at this time. Plan of care ongoing.

## 2023-12-28 NOTE — PLAN OF CARE
Goal Outcome Evaluation:   Patient had non eventful day. VSS at this time. Patient resting in bed at this time. Plan of care ongoing.

## 2023-12-29 LAB
BACTERIA SPEC AEROBE CULT: NORMAL
BACTERIA SPEC AEROBE CULT: NORMAL
GLUCOSE BLDC GLUCOMTR-MCNC: 117 MG/DL (ref 70–130)
GLUCOSE BLDC GLUCOMTR-MCNC: 196 MG/DL (ref 70–130)
GLUCOSE BLDC GLUCOMTR-MCNC: 196 MG/DL (ref 70–130)
GLUCOSE BLDC GLUCOMTR-MCNC: 237 MG/DL (ref 70–130)
GLUCOSE BLDC GLUCOMTR-MCNC: 96 MG/DL (ref 70–130)

## 2023-12-29 PROCEDURE — 63710000001 INSULIN LISPRO (HUMAN) PER 5 UNITS: Performed by: HOSPITALIST

## 2023-12-29 PROCEDURE — 99221 1ST HOSP IP/OBS SF/LOW 40: CPT | Performed by: PSYCHIATRY & NEUROLOGY

## 2023-12-29 PROCEDURE — 82948 REAGENT STRIP/BLOOD GLUCOSE: CPT

## 2023-12-29 PROCEDURE — 25810000003 SODIUM CHLORIDE 0.9 % SOLUTION 250 ML FLEX CONT: Performed by: HOSPITALIST

## 2023-12-29 PROCEDURE — 97162 PT EVAL MOD COMPLEX 30 MIN: CPT

## 2023-12-29 PROCEDURE — 25010000002 THIAMINE PER 100 MG: Performed by: STUDENT IN AN ORGANIZED HEALTH CARE EDUCATION/TRAINING PROGRAM

## 2023-12-29 PROCEDURE — 99231 SBSQ HOSP IP/OBS SF/LOW 25: CPT | Performed by: STUDENT IN AN ORGANIZED HEALTH CARE EDUCATION/TRAINING PROGRAM

## 2023-12-29 PROCEDURE — 63710000001 INSULIN GLARGINE PER 5 UNITS: Performed by: STUDENT IN AN ORGANIZED HEALTH CARE EDUCATION/TRAINING PROGRAM

## 2023-12-29 RX ADMIN — Medication 10 ML: at 11:12

## 2023-12-29 RX ADMIN — Medication 5 MG: at 21:02

## 2023-12-29 RX ADMIN — INSULIN LISPRO 5 UNITS: 100 INJECTION, SOLUTION INTRAVENOUS; SUBCUTANEOUS at 21:30

## 2023-12-29 RX ADMIN — INSULIN LISPRO 3 UNITS: 100 INJECTION, SOLUTION INTRAVENOUS; SUBCUTANEOUS at 17:14

## 2023-12-29 RX ADMIN — OLANZAPINE 10 MG: 10 TABLET, FILM COATED ORAL at 17:14

## 2023-12-29 RX ADMIN — FAMOTIDINE 40 MG: 20 TABLET, FILM COATED ORAL at 11:11

## 2023-12-29 RX ADMIN — DOCUSATE SODIUM 100 MG: 100 CAPSULE, LIQUID FILLED ORAL at 11:11

## 2023-12-29 RX ADMIN — Medication 2000 MCG: at 11:11

## 2023-12-29 RX ADMIN — PRAVASTATIN SODIUM 40 MG: 40 TABLET ORAL at 11:11

## 2023-12-29 RX ADMIN — DOCUSATE SODIUM 50 MG AND SENNOSIDES 8.6 MG 2 TABLET: 8.6; 5 TABLET, FILM COATED ORAL at 20:58

## 2023-12-29 RX ADMIN — AMOXICILLIN AND CLAVULANATE POTASSIUM 1 TABLET: 875; 125 TABLET, FILM COATED ORAL at 11:11

## 2023-12-29 RX ADMIN — THIAMINE HYDROCHLORIDE 250 MG: 100 INJECTION, SOLUTION INTRAMUSCULAR; INTRAVENOUS at 21:31

## 2023-12-29 RX ADMIN — CETIRIZINE HYDROCHLORIDE 5 MG: 10 TABLET, FILM COATED ORAL at 11:11

## 2023-12-29 RX ADMIN — INSULIN LISPRO 3 UNITS: 100 INJECTION, SOLUTION INTRAVENOUS; SUBCUTANEOUS at 12:24

## 2023-12-29 RX ADMIN — INSULIN GLARGINE 15 UNITS: 100 INJECTION, SOLUTION SUBCUTANEOUS at 11:12

## 2023-12-29 RX ADMIN — AMOXICILLIN AND CLAVULANATE POTASSIUM 1 TABLET: 875; 125 TABLET, FILM COATED ORAL at 20:58

## 2023-12-29 RX ADMIN — MAGNESIUM GLUCONATE 500 MG ORAL TABLET 400 MG: 500 TABLET ORAL at 11:11

## 2023-12-29 RX ADMIN — DOCUSATE SODIUM 50 MG AND SENNOSIDES 8.6 MG 2 TABLET: 8.6; 5 TABLET, FILM COATED ORAL at 11:11

## 2023-12-29 RX ADMIN — SODIUM CHLORIDE 40 ML: 9 INJECTION, SOLUTION INTRAVENOUS at 21:32

## 2023-12-29 RX ADMIN — MEMANTINE HYDROCHLORIDE 10 MG: 10 TABLET ORAL at 11:11

## 2023-12-29 NOTE — CONSULTS
Referring Provider: Maria  Reason for Consultation: AMS      Chief complaint/Focus of Exam: Dementia with behavioral disturbance    Subjective .     History of present illness: Patient is a 58-year-old female with a history of dementia with behavioral disturbances and diabetes who presents to the ER with facial swelling and perhaps mild worsening of mental status.  She is found to have cellulitis and is currently being treated.  Psychiatry is consulted to evaluate patient as she recently had an admission to the [facility] for altered mental status and family states that they are having a difficult time caring for her at this point.  Per documentation, she has been pleasant but restless and confused for the duration of her hospitalization.  Zyprexa was increased as she was getting more restless which seems to have helped somewhat.  I evaluated patient at bedside today.  She was pleasant and cooperative.  She was unable to give me any reliable history or any meaningful information about current hospitalization or even current events for the most part.  She is at her relative baseline per chart review of previous documentation from her [facility] admission.  She remained pleasantly confused but cooperative and redirectable with restlessness and was discharged in this state as she was found to have received maximum benefit from hospitalization.  She appears much the same today.  She is amenable to suggestions and redirectable when needed.  She denies any major complaints.  She reports no SI/HI/AVH but does make bizarre or inaccurate comments at times.    Review of Systems  Pertinent items are noted in HPI    History  Past Medical History:   Diagnosis Date    Bursitis     Dementia     Diabetic acetonemia     Fatigue     Fibromyalgia     Postsurgical menopause     Tetanus toxoid vaccination status unknown     Visual impairment     Vitamin D deficiency    ,   Past Surgical History:   Procedure Laterality Date     BREAST BIOPSY Left 7-8 yrs ago    benign    CHOLECYSTECTOMY      HYSTERECTOMY      40s    KNEE ARTHROSCOPY W/ MENISCAL REPAIR Left    ,   Family History   Problem Relation Age of Onset    Diabetes Other     Asthma Other     Hypertension Other     Diabetes Mother     Heart disease Mother     Hypertension Mother     Hyperlipidemia Mother     Arthritis Mother     Diabetes Father     Heart disease Father     Diabetes Sister     Cancer Sister     Diabetes Brother    ,   Social History     Socioeconomic History    Marital status:      Spouse name: alvaro    Number of children: 5    Years of education: 12   Tobacco Use    Smoking status: Former     Types: Cigarettes     Quit date: 1996     Years since quittin.6     Passive exposure: Past    Smokeless tobacco: Never   Vaping Use    Vaping Use: Never used   Substance and Sexual Activity    Alcohol use: No    Drug use: No    Sexual activity: Yes     Partners: Male     E-cigarette/Vaping    E-cigarette/Vaping Use Never User     Passive Exposure No     Counseling Given No      E-cigarette/Vaping Substances    Nicotine No     THC No     CBD No     Flavoring No      E-cigarette/Vaping Devices    Disposable No     Pre-filled or Refillable Cartridge No     Refillable Tank No     Pre-filled Pod No          ,   Medications Prior to Admission   Medication Sig Dispense Refill Last Dose    cetirizine (EQ Allergy Relief, Cetirizine,) 10 MG tablet Take 0.5 tablets by mouth Daily. 90 tablet 0 2023    cyanocobalamin ([employer] Vitamin B-12) 2000 MCG tablet Take 1 tablet by mouth Daily. 30 tablet 6 2023    docusate sodium (Colace) 100 MG capsule Take 1 capsule by mouth Daily. Indications: Constipation 30 capsule 0 2023    famotidine (Pepcid) 40 MG tablet Take 1 tablet by mouth Daily. 30 tablet 3 2023    magnesium oxide 250 MG tablet Take 1 tablet by mouth Daily. OTC  Indications: Disorder with Low Magnesium Levels   2023    memantine (NAMENDA) 10 MG  tablet Take 1 tablet by mouth Daily.   12/24/2023    metFORMIN (GLUCOPHAGE) 1000 MG tablet Take 1 tablet by mouth 2 (Two) Times a Day With Meals. 60 tablet 3 12/24/2023    OLANZapine (zyPREXA) 7.5 MG tablet Take 1 tablet by mouth Daily With Dinner. Indications: Behavioral Disorders associated with Dementia 30 tablet 0 12/24/2023    pravastatin (PRAVACHOL) 40 MG tablet Take 1 tablet by mouth Daily. 30 tablet 5 12/24/2023    Vortioxetine HBr (Trintellix) 20 MG tablet Take 1 tablet by mouth Daily. Indications: Major Depressive Disorder 30 tablet 0 12/24/2023    Insulin Degludec (TRESIBA FLEXTOUCH) 200 UNIT/ML solution pen-injector pen injection Inject 0-45 Units under the skin into the appropriate area as directed Daily As Needed (High BG per scale). Indications: Type 2 Diabetes   Unknown   , Scheduled Meds:  amoxicillin-clavulanate, 1 tablet, Oral, Q12H  cetirizine, 5 mg, Oral, Daily  docusate sodium, 100 mg, Oral, Daily  famotidine, 40 mg, Oral, Daily  insulin glargine, 15 Units, Subcutaneous, Daily  insulin lispro, 3-14 Units, Subcutaneous, 4x Daily AC & at Bedtime  magnesium oxide, 400 mg, Oral, Daily  memantine, 10 mg, Oral, Daily  OLANZapine, 10 mg, Oral, Daily With Dinner  pravastatin, 40 mg, Oral, Daily  senna-docusate sodium, 2 tablet, Oral, BID  sodium chloride, 10 mL, Intravenous, Q12H  cyanocobalamin, 2,000 mcg, Oral, Daily   , Continuous Infusions:   , PRN Meds:    senna-docusate sodium **AND** polyethylene glycol **AND** bisacodyl **AND** bisacodyl    dextrose    dextrose    glucagon (human recombinant)    melatonin    Potassium Replacement - Follow Nurse / BPA Driven Protocol    sodium chloride    sodium chloride, and Allergies:  Lantus [insulin glargine], Codeine, Exenatide, and Sitagliptin    Objective     Vital Signs   Temp:  [97.5 °F (36.4 °C)-98.6 °F (37 °C)] 98.6 °F (37 °C)  Heart Rate:  [81-95] 95  Resp:  [14-20] 20  BP: ()/(60-72) 114/60    Mental Status Exam:   Mental Status Exam:     Hygiene:   good  Cooperation:  Cooperative  Eye Contact:  Good  Psychomotor Behavior:  Appropriate  Affect:  Full range  Hopelessness: Denies  Speech:  Normal  Thought Progress:  Tangential and Flight of ieas  Thought Content:  Bizarre  Suicidal:  None  Homicidal:  None  Hallucinations:  Not demonstrated today  Delusion:  Other confusion, bizarre recollections or statements   Memory:  Deficits  Orientation:  Person  Reliability:  poor  Insight:  Poor  Judgement:  Impaired  Impulse Control:  Impaired    Results Review:   I reviewed the patient's new clinical results.  Lab Results (last 24 hours)       Procedure Component Value Units Date/Time    POC Glucose Once [[id number]]  (Abnormal) Collected: 12/29/23 1139    Specimen: Blood Updated: 12/29/23 1148     Glucose 196 mg/dL     POC Glucose Once [[id number]]  (Normal) Collected: 12/29/23 0701    Specimen: Blood Updated: 12/29/23 0707     Glucose 117 mg/dL     Blood Culture - Blood, Arm, Right [[id number]]  (Normal) Collected: 12/24/23 2316    Specimen: Blood from Arm, Right Updated: 12/28/23 2330     Blood Culture No growth at 4 days    Blood Culture - Blood, Arm, Left [[id number]]  (Normal) Collected: 12/24/23 2316    Specimen: Blood from Arm, Left Updated: 12/28/23 2330     Blood Culture No growth at 4 days    POC Glucose Once [[id number]]  (Normal) Collected: 12/28/23 2253    Specimen: Blood Updated: 12/28/23 2259     Glucose 99 mg/dL     POC Glucose Once [[id number]]  (Abnormal) Collected: 12/28/23 1956    Specimen: Blood Updated: 12/28/23 2002     Glucose 263 mg/dL     POC Glucose Once [[id number]]  (Abnormal) Collected: 12/28/23 1627    Specimen: Blood Updated: 12/28/23 1636     Glucose 151 mg/dL           Imaging Results (Last 24 Hours)       ** No results found for the last 24 hours. **              Assessment & Plan     Dementia with behavioral disturbance  -Upon evaluation, patient seems to be at her relative baseline.  After her initial agitation and behavioral  disturbance leading to admission at the [facility], her hospitalization was largely consistent with her current presentation of being pleasantly confused but redirectable if needed, cooperative, and somewhat restless.  She was pacing the unit a lot when she was previously admitted due to boredom and dementia but we were able to provide appropriate treatment and she was compliant with recommendations.  I feel that she is very similar if not exactly the same as far as mental status.  Agree with increase in Zyprexa due to restlessness but I do not feel that she would benefit from further intervention or psychiatric hospitalization.  She might benefit from high-dose thiamine as it has been shown to help with behavioral disturbances, confusion, and agitation secondary to dementia and delirium.  If family is unable to provide care, agree with nursing home placement.      I discussed the patients findings and my recommendations with patient    Pb Luque MD  12/29/23  13:01 EST

## 2023-12-29 NOTE — DISCHARGE PLACEMENT REQUEST
"Sandra Leahy (58 y.o. Female)       Date of Birth   1965    Social Security Number       Address   [address] [place] [address]    Home Phone   [phone]    MRN   [id number]       Scientologist   Unknown    Marital Status                               Admission Date   12/25/23    Admission Type   Emergency    Admitting Provider   Bartolo Berman MD    Attending Provider   Tulio Sifuentes DO    Department, Room/Bed   [address], 3307/1S       Discharge Date       Discharge Disposition       Discharge Destination                                 Attending Provider: Tulio Sifuentes DO    Allergies: Lantus [Insulin Glargine], Codeine, Exenatide, Sitagliptin    Isolation: None   Infection: None   Code Status: CPR    Ht: 175.3 cm (69.02\")   Wt: 69.9 kg (154 lb)    Admission Cmt: None   Principal Problem: Facial cellulitis [L03.211]                   Active Insurance as of 12/25/2023       Primary Coverage       Payor Plan Insurance Group Employer/Plan Group    HUMANA MEDICARE REPLACEMENT HUMANA MEDICARE REPLACEMENT [id number]       Payor Plan Address Payor Plan Phone Number Payor Plan Fax Number Effective Dates    [address] [address] [phone]  2/1/2020 - None Entered    Formerly [facility] [id number]         Subscriber Name Subscriber Birth Date Member ID       SANDRA LEAHY 1965 [id number]                     Emergency Contacts        (Rel.) Home Phone Work Phone Mobile Phone    Charles Leahy (Spouse) [phone] -- --    emilio Ngo (Daughter) [phone] -- --              Lines, Drains & Airways       Active LDAs       Name Placement date Placement time Site Days    Peripheral IV 12/28/23 2211 Left;Posterior Forearm 12/28/23  2211  Forearm  less than 1                  Lab Results (most recent)       Procedure Component Value Units Date/Time    POC Glucose Once [[id number]]  (Abnormal) Collected: 12/29/23 1139    Specimen: Blood Updated: 12/29/23 1148     Glucose " 196 mg/dL     POC Glucose Once [[id number]]  (Normal) Collected: 12/29/23 0701    Specimen: Blood Updated: 12/29/23 0707     Glucose 117 mg/dL     Blood Culture - Blood, Arm, Right [[id number]]  (Normal) Collected: 12/24/23 2316    Specimen: Blood from Arm, Right Updated: 12/28/23 2330     Blood Culture No growth at 4 days    Blood Culture - Blood, Arm, Left [[id number]]  (Normal) Collected: 12/24/23 2316    Specimen: Blood from Arm, Left Updated: 12/28/23 2330     Blood Culture No growth at 4 days    Basic Metabolic Panel [[id number]]  (Abnormal) Collected: 12/28/23 0634    Specimen: Blood Updated: 12/28/23 0802     Glucose 154 mg/dL      BUN 11 mg/dL      Creatinine 0.79 mg/dL      Sodium 140 mmol/L      Potassium 3.8 mmol/L      Comment: Slight hemolysis detected by analyzer. Result may be falsely elevated.        Chloride 108 mmol/L      CO2 21.8 mmol/L      Calcium 9.3 mg/dL      BUN/Creatinine Ratio 13.9     Anion Gap 10.2 mmol/L      eGFR 85.8 mL/min/1.73     Narrative:      GFR Normal >60  Chronic Kidney Disease <60  Kidney Failure <15      CBC (No Diff) [[id number]]  (Abnormal) Collected: 12/28/23 0634    Specimen: Blood Updated: 12/28/23 0758     WBC 7.63 10*3/mm3      RBC 3.88 10*6/mm3      Hemoglobin 12.4 g/dL      Hematocrit 37.2 %      MCV 95.9 fL      MCH 32.0 pg      MCHC 33.3 g/dL      RDW 12.0 %      RDW-SD 41.6 fl      MPV 9.9 fL      Platelets 221 10*3/mm3     Basic Metabolic Panel [[id number]]  (Abnormal) Collected: 12/27/23 0040    Specimen: Blood Updated: 12/27/23 0150     Glucose 198 mg/dL      BUN 14 mg/dL      Creatinine 0.75 mg/dL      Sodium 139 mmol/L      Potassium 3.8 mmol/L      Chloride 105 mmol/L      CO2 22.5 mmol/L      Calcium 8.7 mg/dL      BUN/Creatinine Ratio 18.7     Anion Gap 11.5 mmol/L      eGFR 91.3 mL/min/1.73     Narrative:      GFR Normal >60  Chronic Kidney Disease <60  Kidney Failure <15      CBC (No Diff) [[id number]]  (Abnormal) Collected: 12/27/23 0040    Specimen: Blood  Updated: 12/27/23 0133     WBC 9.61 10*3/mm3      RBC 3.63 10*6/mm3      Hemoglobin 11.5 g/dL      Hematocrit 33.5 %      MCV 92.3 fL      MCH 31.7 pg      MCHC 34.3 g/dL      RDW 11.9 %      RDW-SD 39.8 fl      MPV 10.2 fL      Platelets 211 10*3/mm3     Potassium [[id number]]  (Normal) Collected: 12/26/23 1620    Specimen: Blood Updated: 12/26/23 1649     Potassium 4.0 mmol/L     Vancomycin, Trough [[id number]]  (Normal) Collected: 12/26/23 1252    Specimen: Blood Updated: 12/26/23 1331     Vancomycin Trough 5.50 mcg/mL     Narrative:      Therapeutic Ranges for Vancomycin    Vancomycin Random   5.0-40.0 mcg/mL  Vancomycin Trough   5.0-20.0 mcg/mL  Vancomycin Peak     20.0-40.0 mcg/mL    Magnesium [[id number]]  (Abnormal) Collected: 12/26/23 0122    Specimen: Blood Updated: 12/26/23 0402     Magnesium 1.5 mg/dL     Comprehensive Metabolic Panel [[id number]]  (Abnormal) Collected: 12/26/23 0122    Specimen: Blood Updated: 12/26/23 0218     Glucose 94 mg/dL      BUN 12 mg/dL      Creatinine 0.85 mg/dL      Sodium 132 mmol/L      Potassium 3.0 mmol/L      Chloride 99 mmol/L      CO2 24.7 mmol/L      Calcium 8.7 mg/dL      Total Protein 6.5 g/dL      Albumin 3.5 g/dL      ALT (SGPT) 52 U/L      AST (SGOT) 37 U/L      Alkaline Phosphatase 86 U/L      Total Bilirubin 0.7 mg/dL      Globulin 3.0 gm/dL      A/G Ratio 1.2 g/dL      BUN/Creatinine Ratio 14.1     Anion Gap 8.3 mmol/L      eGFR 78.5 mL/min/1.73     Narrative:      GFR Normal >60  Chronic Kidney Disease <60  Kidney Failure <15      Comprehensive Metabolic Panel [[id number]]  (Abnormal) Collected: 12/25/23 0544    Specimen: Blood Updated: 12/25/23 0612     Glucose 344 mg/dL      BUN 15 mg/dL      Creatinine 0.95 mg/dL      Sodium 136 mmol/L      Potassium 3.9 mmol/L      Comment: Slight hemolysis detected by analyzer. Result may be falsely elevated.        Chloride 98 mmol/L      CO2 23.9 mmol/L      Calcium 9.6 mg/dL      Total Protein 7.7 g/dL      Albumin 4.3  g/dL      ALT (SGPT) 65 U/L      AST (SGOT) 39 U/L      Alkaline Phosphatase 123 U/L      Total Bilirubin 0.5 mg/dL      Globulin 3.4 gm/dL      A/G Ratio 1.3 g/dL      BUN/Creatinine Ratio 15.8     Anion Gap 14.1 mmol/L      eGFR 68.7 mL/min/1.73     Narrative:      GFR Normal >60  Chronic Kidney Disease <60  Kidney Failure <15      C-reactive Protein [[id number]]  (Abnormal) Collected: 12/25/23 0544    Specimen: Blood Updated: 12/25/23 0612     C-Reactive Protein 3.37 mg/dL     CBC Auto Differential [[id number]]  (Abnormal) Collected: 12/25/23 0544    Specimen: Blood Updated: 12/25/23 0550     WBC 12.82 10*3/mm3      RBC 4.37 10*6/mm3      Hemoglobin 13.7 g/dL      Hematocrit 40.0 %      MCV 91.5 fL      MCH 31.4 pg      MCHC 34.3 g/dL      RDW 11.9 %      RDW-SD 39.5 fl      MPV 10.0 fL      Platelets 253 10*3/mm3      Neutrophil % 72.1 %      Lymphocyte % 15.9 %      Monocyte % 9.1 %      Eosinophil % 2.1 %      Basophil % 0.3 %      Immature Grans % 0.5 %      Neutrophils, Absolute 9.24 10*3/mm3      Lymphocytes, Absolute 2.04 10*3/mm3      Monocytes, Absolute 1.17 10*3/mm3      Eosinophils, Absolute 0.27 10*3/mm3      Basophils, Absolute 0.04 10*3/mm3      Immature Grans, Absolute 0.06 10*3/mm3      nRBC 0.0 /100 WBC     STAT Lactic Acid, Reflex [[id number]]  (Normal) Collected: 12/25/23 0300    Specimen: Blood from Arm, Left Updated: 12/25/23 0332     Lactate 1.9 mmol/L     Hemoglobin A1c [[id number]]  (Abnormal) Collected: 12/24/23 2316    Specimen: Blood Updated: 12/25/23 0115     Hemoglobin A1C 7.70 %     Narrative:      Hemoglobin A1C Ranges:    Increased Risk for Diabetes  5.7% to 6.4%  Diabetes                     >= 6.5%  Diabetic Goal                < 7.0%    Lactic Acid, Plasma [[id number]]  (Abnormal) Collected: 12/25/23 0016    Specimen: Blood from Arm, Left Updated: 12/25/23 0050     Lactate 3.1 mmol/L     [place] Draw [[id number]] Collected: 12/24/23 2316    Specimen: Blood Updated: 12/25/23 0031     Narrative:      The following orders were created for panel order [place] Draw.  Procedure                               Abnormality         Status                     ---------                               -----------         ------                     Green Top (Gel)[[id number]]                                  Final result               Lavender Top[[id number]]                                     Final result               Gold Top - SST[[id number]]                                   Final result               Light Blue Top[[id number]]                                   Final result                 Please view results for these tests on the individual orders.    Green Top (Gel) [[id number]] Collected: 12/24/23 2316    Specimen: Blood Updated: 12/25/23 0031     Extra Tube Hold for add-ons.     Comment: Auto resulted.       Lavender Top [[id number]] Collected: 12/24/23 2316    Specimen: Blood Updated: 12/25/23 0031     Extra Tube hold for add-on     Comment: Auto resulted       Gold Top - SST [[id number]] Collected: 12/24/23 2316    Specimen: Blood Updated: 12/25/23 0031     Extra Tube Hold for add-ons.     Comment: Auto resulted.       Light Blue Top [[id number]] Collected: 12/24/23 2316    Specimen: Blood Updated: 12/25/23 0031     Extra Tube Hold for add-ons.     Comment: Auto resulted       Urinalysis With Microscopic If Indicated (No Culture) - Urine, Clean Catch [[id number]]  (Abnormal) Collected: 12/25/23 0015    Specimen: Urine, Clean Catch Updated: 12/25/23 0019     Color, UA Yellow     Appearance, UA Clear     pH, UA 5.5     Specific Gravity, UA 1.015     Glucose, UA >=1000 mg/dL (3+)     Ketones, UA Negative     Bilirubin, UA Negative     Blood, UA Negative     Protein, UA Negative     Leuk Esterase, UA Negative     Nitrite, UA Negative     Urobilinogen, UA 0.2 E.U./dL    Narrative:      Urine microscopic not indicated.    C-reactive Protein [[id number]]  (Abnormal) Collected: 12/24/23 2316    Specimen: Blood  Updated: 12/24/23 2344     C-Reactive Protein 2.06 mg/dL     Sedimentation Rate [[id number]]  (Normal) Collected: 12/24/23 2316    Specimen: Blood Updated: 12/24/23 2331     Sed Rate 13 mm/hr     CBC & Differential [[id number]]  (Abnormal) Collected: 12/24/23 2316    Specimen: Blood Updated: 12/24/23 2326    Narrative:      The following orders were created for panel order CBC & Differential.  Procedure                               Abnormality         Status                     ---------                               -----------         ------                     CBC Auto Differential[[id number]]        Abnormal            Final result                 Please view results for these tests on the individual orders.    CBC Auto Differential [[id number]]  (Abnormal) Collected: 12/24/23 2316    Specimen: Blood Updated: 12/24/23 2326     WBC 12.86 10*3/mm3      RBC 4.65 10*6/mm3      Hemoglobin 14.3 g/dL      Hematocrit 43.5 %      MCV 93.5 fL      MCH 30.8 pg      MCHC 32.9 g/dL      RDW 12.0 %      RDW-SD 41.2 fl      MPV 10.2 fL      Platelets 265 10*3/mm3      Neutrophil % 74.4 %      Lymphocyte % 13.0 %      Monocyte % 9.8 %      Eosinophil % 2.0 %      Basophil % 0.4 %      Immature Grans % 0.4 %      Neutrophils, Absolute 9.57 10*3/mm3      Lymphocytes, Absolute 1.67 10*3/mm3      Monocytes, Absolute 1.26 10*3/mm3      Eosinophils, Absolute 0.26 10*3/mm3      Basophils, Absolute 0.05 10*3/mm3      Immature Grans, Absolute 0.05 10*3/mm3      nRBC 0.0 /100 WBC           Orders (active)        Start     Ordered    12/28/23 1800  OLANZapine (zyPREXA) tablet 10 mg  Daily With Dinner         12/28/23 0852    12/28/23 0945  amoxicillin-clavulanate (AUGMENTIN) 875-125 MG per tablet 1 tablet  Every 12 Hours Scheduled         12/28/23 0848    12/28/23 0910  PT Consult: Eval & Treat Discharge Placement Assessment  Once         12/28/23 0909    12/28/23 0910  OT Consult: Eval & Treat  Once         12/28/23 0909    12/27/23 8587   melatonin tablet 5 mg  Nightly PRN         12/27/23 2145    12/26/23 0335  Potassium Replacement - Follow Nurse / BPA Driven Protocol  As Needed         12/26/23 0335    12/25/23 1515  magnesium oxide (MAG-OX) tablet 400 mg  Daily        Note to Pharmacy: Verified with Dr. Sifuentes that ok to change from magnesium 250 mg home dose to Mag-Ox 400 mg daily.    12/25/23 1429    12/25/23 0945  cetirizine (zyrTEC) tablet 5 mg  Daily         12/25/23 0850    12/25/23 0945  vitamin B-12 (CYANOCOBALAMIN) tablet 2,000 mcg  Daily         12/25/23 0850    12/25/23 0945  docusate sodium (COLACE) capsule 100 mg  Daily         12/25/23 0850    12/25/23 0945  famotidine (PEPCID) tablet 40 mg  Daily         12/25/23 0850    12/25/23 0945  memantine (NAMENDA) tablet 10 mg  Daily         12/25/23 0850    12/25/23 0945  pravastatin (PRAVACHOL) tablet 40 mg  Daily         12/25/23 0850    12/25/23 0945  insulin glargine (LANTUS, SEMGLEE) injection 15 Units  Daily         12/25/23 0851    12/25/23 0900  sodium chloride 0.9 % flush 10 mL  Every 12 Hours Scheduled         12/25/23 0524    12/25/23 0900  sennosides-docusate (PERICOLACE) 8.6-50 MG per tablet 2 tablet  2 Times Daily        See Hyperspace for full Linked Orders Report.    12/25/23 0524    12/25/23 0800  Vital Signs  Every 8 Hours        Comments: Per per hospital policy    12/25/23 0524    12/25/23 0800  Oral Care  2 Times Daily       12/25/23 0524    12/25/23 0730  Insulin Lispro (humaLOG) injection 3-14 Units  4 Times Daily Before Meals & Nightly         12/25/23 0432    12/25/23 0700  POC Glucose 4x Daily Before Meals & at Bedtime  4 Times Daily Before Meals & at Bedtime      Comments: Complete no more than 45 minutes prior to patient eating      12/25/23 0432    12/25/23 0600  Strict Intake & Output  Every 6 Hours         12/25/23 0524 12/25/23 0525  Notify Physician (with Parameters)  Until Discontinued         12/25/23 0524 12/25/23 0525  Insert Peripheral IV  Once          12/25/23 0524 12/25/23 0525  Saline Lock & Maintain IV Access  Continuous         12/25/23 0524 12/25/23 0525  Maintain Sequential Compression Device  Continuous         12/25/23 0524 12/25/23 0525  Daily Weights  Daily       12/25/23 0524 12/25/23 0525  Diet: Cardiac Diets, Diabetic Diets; Healthy Heart (2-3 Na+); Consistent Carbohydrate; Texture: Regular Texture (IDDSI 7); Fluid Consistency: Thin (IDDSI 0)  Diet Effective Now         12/25/23 0524 12/25/23 0524  sodium chloride 0.9 % flush 10 mL  As Needed         12/25/23 0524 12/25/23 0524  sodium chloride 0.9 % infusion 40 mL  As Needed         12/25/23 0524 12/25/23 0524  polyethylene glycol (MIRALAX) packet 17 g  Daily PRN        See Hyperspace for full Linked Orders Report.    12/25/23 0524 12/25/23 0524  bisacodyl (DULCOLAX) EC tablet 5 mg  Daily PRN        See Hyperspace for full Linked Orders Report.    12/25/23 0524 12/25/23 0524  bisacodyl (DULCOLAX) suppository 10 mg  Daily PRN        See Hyperspace for full Linked Orders Report.    12/25/23 0524 12/25/23 0520  ECG 12 Lead QT Measurement  STAT         12/25/23 0519 12/25/23 0434  Code Status and Medical Interventions:  Continuous         12/25/23 0436 12/25/23 0431  dextrose (GLUTOSE) oral gel 15 g  Every 15 Minutes PRN         12/25/23 0432 12/25/23 0431  dextrose (D50W) (25 g/50 mL) IV injection 25 g  Every 15 Minutes PRN         12/25/23 0432 12/25/23 0431  glucagon HCl (Diagnostic) injection 1 mg  Every 15 Minutes PRN         12/25/23 0432    Unscheduled  Follow Hypoglycemia Standing Orders For Blood Glucose <70 & Notify Provider of Treatment  As Needed      Comments: Follow Hypoglycemia Orders As Outlined in Process Instructions (Open Order Report to View Full Instructions)  Notify Provider Any Time Hypoglycemia Treatment is Administered    12/25/23 0432    Unscheduled  Up With Assistance  As Needed       12/25/23 0524                     Physician  Progress Notes (most recent note)        Tulio Sifuentes DO at 23 1825              [facility]     Patient Identification:  Name:  Sandra Leahy  Age:  58 y.o.  Sex:  female  :  1965  MRN:  [id number]  Visit Number:  [id number]  ROOM: [address] [place] [address]     Primary Care Provider:  John Mueller APRN    Length of stay in inpatient status:  3    Subjective     Chief Compliant:    Chief Complaint   Patient presents with    Abscess    Jaw Pain       History of Presenting Illness: Patient seen and evaluated in follow-up for severe sepsis secondary to facial cellulitis with underlying myositis.  Patient today at time evaluation feels well and eating lunch at time of evaluation.  She reports significant marked improvement in her facial swelling and discomfort since admission.  Patient with essentially resolution of previous erythema and edema on the right mandibular region.  Patient stable for discharge home with oral antibiotic therapy however patient unable to return home due to family no longer having the means to continue providing care for the patient around-the-clock.    Objective     Current Hospital Meds:  amoxicillin-clavulanate, 1 tablet, Oral, Q12H  cetirizine, 5 mg, Oral, Daily  docusate sodium, 100 mg, Oral, Daily  famotidine, 40 mg, Oral, Daily  insulin glargine, 15 Units, Subcutaneous, Daily  insulin lispro, 3-14 Units, Subcutaneous, 4x Daily AC & at Bedtime  magnesium oxide, 400 mg, Oral, Daily  memantine, 10 mg, Oral, Daily  OLANZapine, 10 mg, Oral, Daily With Dinner  pravastatin, 40 mg, Oral, Daily  senna-docusate sodium, 2 tablet, Oral, BID  sodium chloride, 10 mL, Intravenous, Q12H  cyanocobalamin, 2,000 mcg, Oral, Daily           ----------------------------------------------------------------------------------------------------------------------  Vital Signs:  Temp:  [97.6 °F (36.4 °C)-98.4 °F (36.9 °C)] 97.6 °F (36.4 °C)  Heart Rate:  []  "81  Resp:  [18-20] 20  BP: (115-176)/(68-94) 124/72  SpO2:  [95 %-97 %] 96 %  on   ;   Device (Oxygen Therapy): room air  Body mass index is 22.48 kg/m².      Intake/Output Summary (Last 24 hours) at 12/28/2023 1825  Last data filed at 12/28/2023 1200  Gross per 24 hour   Intake 840 ml   Output 400 ml   Net 440 ml      ----------------------------------------------------------------------------------------------------------------------  Physical exam:  Constitutional:  Well-developed and well-nourished.  No acute distress.      HENT:  Head: Right lower sided facial swelling with some essentially resolved edema and near complete resolution of erythema.  Mouth:  Moist mucous membranes.    Eyes:  Conjunctivae and EOM are normal. No scleral icterus.    Cardiovascular:  Normal rate, regular rhythm and normal heart sounds with no murmur.  Pulmonary/Chest:  No respiratory distress, no wheezes, no crackles, with normal breath sounds and good air movement.  Abdominal:  Soft.  Bowel sounds are normal.  No distension and no tenderness.   Musculoskeletal:  No tenderness or deformity.  No red or swollen joints anywhere.  Functional ROM intact.   Neurological:  Alert and oriented to person, place, and time.  No cranial nerve deficit.  No tongue deviation.  No facial droop.  No slurred speech. Intact Sensation throughout  Skin:  Skin is warm and dry. No rash or lesion noted. No pallor.   Peripheral vascular:  Pulses in all 4 extremities with no clubbing, no cyanosis, no edema.  Psychiatric: Appropriate mood and affect, pleasant.   ----------------------------------------------------------------------------------------------------------------------  WBC/HGB/HCT/PLT   7.63/12.4/37.2/221 (12/28 0634)  BUN/CREAT/GLUC/ALT/AST/REGIS/LIP    11/0.79/154/--/--/--/-- (12/28 0634)  LYTES - Na/K/Cl/CO2: 140/3.8/108*/21.8* (12/28 0634)        No results found for: \"URINECX\"  Blood Culture   Date Value Ref Range Status   12/24/2023 No growth at " 24 hours  Preliminary   12/24/2023 No growth at 24 hours  Preliminary       I have personally looked at the labs and they are summarized above.  ----------------------------------------------------------------------------------------------------------------------  Detailed radiology reports for the last 24 hours:  No radiology results for the last day  Assessment & Plan      Severe sepsis  Facial cellulitis  Facial myositis    -Patient presenting with facial swelling and pain and tenderness with CT consistent with cellulitis overlying the right mandible.  Also noted myositis of the underlying musculature.    -Case discussed by ER provider with  who recommended continued antibiotic therapy here at this facility with later referral for outpatient removal of tooth with abscess.  There was no definitive abscess to drain and no involvement of the TMJ joint and therefore above recommendations with transition to oral antibiotics for an additional 10 days before follow-up with  [facility]    -Given significant improvement in erythema and edema and essential resolution transition patient to Augmentin monotherapy.  Plan course of 10 days.    -Patient already showing clinical improvement we will continue antibiotic therapy.    ANN    -Likely secondary to sepsis with initial creatinine elevated at 1.1 with baseline around 0.7.  Now improved and back to baseline.  Will discontinue IV fluids.    Advanced dementia with behavioral disturbances, POA    -Continued home medications, did increase Zyprexa given restlessness and noncompliance yesterday.  Patient redirectable.    -Psychiatry consult for further recommendations as patient recently hospitalized at [facility] last month and initiated on Zyprexa but appears to need further medical management.    Diabetes mellitus type 2    -Basal and bolus insulin as needed to maintain glycemic control    Copied text in portions of the note has been reviewed and is  accurate as of 12/28/23    VTE Prophylaxis:   Mechanical Order History:        Ordered        12/25/23 0524  Place Sequential Compression Device  Once            12/25/23 0524  Maintain Sequential Compression Device  Continuous                          Pharmalogical Order History:       None            Disposition pending placement.    Tulio Sifuentes DO  [facility] Hospitalist  12/28/23  18:25 EST      Electronically signed by Tulio Sifuentes DO at 12/28/23 1827          Consult Notes (most recent note)        Pb Luque MD at 12/29/23 1300        Consult Orders    1. Inpatient Psychiatrist Consult [[id number]] ordered by Tulio Sifuentes DO at 12/28/23 0900                     Referring Provider: Maria  Reason for Consultation: AMS      Chief complaint/Focus of Exam: Dementia with behavioral disturbance    Subjective .     History of present illness: Patient is a 58-year-old female with a history of dementia with behavioral disturbances and diabetes who presents to the ER with facial swelling and perhaps mild worsening of mental status.  She is found to have cellulitis and is currently being treated.  Psychiatry is consulted to evaluate patient as she recently had an admission to the [facility] for altered mental status and family states that they are having a difficult time caring for her at this point.  Per documentation, she has been pleasant but restless and confused for the duration of her hospitalization.  Zyprexa was increased as she was getting more restless which seems to have helped somewhat.  I evaluated patient at bedside today.  She was pleasant and cooperative.  She was unable to give me any reliable history or any meaningful information about current hospitalization or even current events for the most part.  She is at her relative baseline per chart review of previous documentation from her [facility] admission.  She remained pleasantly confused but cooperative and  redirectable with restlessness and was discharged in this state as she was found to have received maximum benefit from hospitalization.  She appears much the same today.  She is amenable to suggestions and redirectable when needed.  She denies any major complaints.  She reports no SI/HI/AVH but does make bizarre or inaccurate comments at times.    Review of Systems  Pertinent items are noted in HPI    History  Past Medical History:   Diagnosis Date    Bursitis     Dementia     Diabetic acetonemia     Fatigue     Fibromyalgia     Postsurgical menopause     Tetanus toxoid vaccination status unknown     Visual impairment     Vitamin D deficiency    ,   Past Surgical History:   Procedure Laterality Date    BREAST BIOPSY Left 7-8 yrs ago    benign    CHOLECYSTECTOMY      HYSTERECTOMY      40s    KNEE ARTHROSCOPY W/ MENISCAL REPAIR Left    ,   Family History   Problem Relation Age of Onset    Diabetes Other     Asthma Other     Hypertension Other     Diabetes Mother     Heart disease Mother     Hypertension Mother     Hyperlipidemia Mother     Arthritis Mother     Diabetes Father     Heart disease Father     Diabetes Sister     Cancer Sister     Diabetes Brother    ,   Social History     Socioeconomic History    Marital status:      Spouse name: alvaro    Number of children: 5    Years of education: 12   Tobacco Use    Smoking status: Former     Types: Cigarettes     Quit date: 1996     Years since quittin.6     Passive exposure: Past    Smokeless tobacco: Never   Vaping Use    Vaping Use: Never used   Substance and Sexual Activity    Alcohol use: No    Drug use: No    Sexual activity: Yes     Partners: Male     E-cigarette/Vaping    E-cigarette/Vaping Use Never User     Passive Exposure No     Counseling Given No      E-cigarette/Vaping Substances    Nicotine No     THC No     CBD No     Flavoring No      E-cigarette/Vaping Devices    Disposable No     Pre-filled or Refillable Cartridge No     Refillable  Tank No     Pre-filled Pod No          ,   Medications Prior to Admission   Medication Sig Dispense Refill Last Dose    cetirizine (EQ Allergy Relief, Cetirizine,) 10 MG tablet Take 0.5 tablets by mouth Daily. 90 tablet 0 12/24/2023    cyanocobalamin ([facility] Vitamin B-12) 2000 MCG tablet Take 1 tablet by mouth Daily. 30 tablet 6 12/24/2023    docusate sodium (Colace) 100 MG capsule Take 1 capsule by mouth Daily. Indications: Constipation 30 capsule 0 12/24/2023    famotidine (Pepcid) 40 MG tablet Take 1 tablet by mouth Daily. 30 tablet 3 12/24/2023    magnesium oxide 250 MG tablet Take 1 tablet by mouth Daily. OTC  Indications: Disorder with Low Magnesium Levels   12/24/2023    memantine (NAMENDA) 10 MG tablet Take 1 tablet by mouth Daily.   12/24/2023    metFORMIN (GLUCOPHAGE) 1000 MG tablet Take 1 tablet by mouth 2 (Two) Times a Day With Meals. 60 tablet 3 12/24/2023    OLANZapine (zyPREXA) 7.5 MG tablet Take 1 tablet by mouth Daily With Dinner. Indications: Behavioral Disorders associated with Dementia 30 tablet 0 12/24/2023    pravastatin (PRAVACHOL) 40 MG tablet Take 1 tablet by mouth Daily. 30 tablet 5 12/24/2023    Vortioxetine HBr (Trintellix) 20 MG tablet Take 1 tablet by mouth Daily. Indications: Major Depressive Disorder 30 tablet 0 12/24/2023    Insulin Degludec (TRESIBA FLEXTOUCH) 200 UNIT/ML solution pen-injector pen injection Inject 0-45 Units under the skin into the appropriate area as directed Daily As Needed (High BG per scale). Indications: Type 2 Diabetes   Unknown   , Scheduled Meds:  amoxicillin-clavulanate, 1 tablet, Oral, Q12H  cetirizine, 5 mg, Oral, Daily  docusate sodium, 100 mg, Oral, Daily  famotidine, 40 mg, Oral, Daily  insulin glargine, 15 Units, Subcutaneous, Daily  insulin lispro, 3-14 Units, Subcutaneous, 4x Daily AC & at Bedtime  magnesium oxide, 400 mg, Oral, Daily  memantine, 10 mg, Oral, Daily  OLANZapine, 10 mg, Oral, Daily With Dinner  pravastatin, 40 mg, Oral,  Daily  senna-docusate sodium, 2 tablet, Oral, BID  sodium chloride, 10 mL, Intravenous, Q12H  cyanocobalamin, 2,000 mcg, Oral, Daily   , Continuous Infusions:   , PRN Meds:    senna-docusate sodium **AND** polyethylene glycol **AND** bisacodyl **AND** bisacodyl    dextrose    dextrose    glucagon (human recombinant)    melatonin    Potassium Replacement - Follow Nurse / BPA Driven Protocol    sodium chloride    sodium chloride, and Allergies:  Lantus [insulin glargine], Codeine, Exenatide, and Sitagliptin    Objective     Vital Signs   Temp:  [97.5 °F (36.4 °C)-98.6 °F (37 °C)] 98.6 °F (37 °C)  Heart Rate:  [81-95] 95  Resp:  [14-20] 20  BP: ()/(60-72) 114/60    Mental Status Exam:   Mental Status Exam:    Hygiene:   good  Cooperation:  Cooperative  Eye Contact:  Good  Psychomotor Behavior:  Appropriate  Affect:  Full range  Hopelessness: Denies  Speech:  Normal  Thought Progress:  Tangential and Flight of ieas  Thought Content:  Bizarre  Suicidal:  None  Homicidal:  None  Hallucinations:  Not demonstrated today  Delusion:  Other confusion, bizarre recollections or statements   Memory:  Deficits  Orientation:  Person  Reliability:  poor  Insight:  Poor  Judgement:  Impaired  Impulse Control:  Impaired    Results Review:   I reviewed the patient's new clinical results.  Lab Results (last 24 hours)       Procedure Component Value Units Date/Time    POC Glucose Once [[id number]]  (Abnormal) Collected: 12/29/23 1139    Specimen: Blood Updated: 12/29/23 1148     Glucose 196 mg/dL     POC Glucose Once [[id number]]  (Normal) Collected: 12/29/23 0701    Specimen: Blood Updated: 12/29/23 0707     Glucose 117 mg/dL     Blood Culture - Blood, Arm, Right [[id number]]  (Normal) Collected: 12/24/23 2316    Specimen: Blood from Arm, Right Updated: 12/28/23 2330     Blood Culture No growth at 4 days    Blood Culture - Blood, Arm, Left [[id number]]  (Normal) Collected: 12/24/23 2316    Specimen: Blood from Arm, Left Updated:  12/28/23 2330     Blood Culture No growth at 4 days    POC Glucose Once [[id number]]  (Normal) Collected: 12/28/23 2253    Specimen: Blood Updated: 12/28/23 2259     Glucose 99 mg/dL     POC Glucose Once [[id number]]  (Abnormal) Collected: 12/28/23 1956    Specimen: Blood Updated: 12/28/23 2002     Glucose 263 mg/dL     POC Glucose Once [[id number]]  (Abnormal) Collected: 12/28/23 1627    Specimen: Blood Updated: 12/28/23 1636     Glucose 151 mg/dL           Imaging Results (Last 24 Hours)       ** No results found for the last 24 hours. **              Assessment & Plan     Dementia with behavioral disturbance  -Upon evaluation, patient seems to be at her relative baseline.  After her initial agitation and behavioral disturbance leading to admission at the [facility], her hospitalization was largely consistent with her current presentation of being pleasantly confused but redirectable if needed, cooperative, and somewhat restless.  She was pacing the unit a lot when she was previously admitted due to boredom and dementia but we were able to provide appropriate treatment and she was compliant with recommendations.  I feel that she is very similar if not exactly the same as far as mental status.  Agree with increase in Zyprexa due to restlessness but I do not feel that she would benefit from further intervention or psychiatric hospitalization.  She might benefit from high-dose thiamine as it has been shown to help with behavioral disturbances, confusion, and agitation secondary to dementia and delirium.  If family is unable to provide care, agree with nursing home placement.      I discussed the patients findings and my recommendations with patient    Pb Luque MD  12/29/23  13:01 EST            Electronically signed by Pb Luque MD at 12/29/23 1313       Physical Therapy Notes (most recent note)    No notes exist for this encounter.       Occupational Therapy Notes (most recent note)    No notes  exist for this encounter.       Speech Language Pathology Notes (most recent note)    No notes exist for this encounter.

## 2023-12-29 NOTE — THERAPY EVALUATION
Acute Care - Physical Therapy Initial Evaluation   John     Patient Name: Sandra Leahy  : 1965  MRN: [id number]  Today's Date: 2023      Visit Dx:     ICD-10-CM ICD-9-CM   1. Facial cellulitis  L03.211 682.0   2. Severe sepsis  A41.9 038.9    R65.20 995.92   3. Severe dementia associated with other underlying disease, with anxiety  F02.C4 294.11     Patient Active Problem List   Diagnosis    Type 2 diabetes mellitus treated with insulin    Dyslipidemia    Vitamin B 12 deficiency    Vitamin D deficiency    S/P KEVIN (total abdominal hysterectomy)    Major neurocognitive disorder    Anxiety and depression    Postsurgical menopause    Early onset Alzheimer's dementia    Facial cellulitis     Past Medical History:   Diagnosis Date    Bursitis     Dementia     Diabetic acetonemia     Fatigue     Fibromyalgia     Postsurgical menopause     Tetanus toxoid vaccination status unknown     Visual impairment     Vitamin D deficiency      Past Surgical History:   Procedure Laterality Date    BREAST BIOPSY Left 7-8 yrs ago    benign    CHOLECYSTECTOMY      HYSTERECTOMY      40s    KNEE ARTHROSCOPY W/ MENISCAL REPAIR Left      PT Assessment (last 12 hours)       PT Evaluation and Treatment       Row Name 23 1426          Physical Therapy Time and Intention    Subjective Information no complaints  -KM     Document Type evaluation  -KM     Mode of Treatment individual therapy;physical therapy  -KM     Patient Effort good  -KM     Symptoms Noted During/After Treatment none  -KM       Row Name 23 1426          General Information    Patient Profile Reviewed yes  -KM     Patient Observations alert;cooperative;agree to therapy  -KM     Prior Level of Function independent:;all household mobility;ADL's  -KM     Existing Precautions/Restrictions no known precautions/restrictions  -KM     Risks Reviewed patient:;LOB;nausea/vomiting;dizziness;increased discomfort  -KM     Benefits Reviewed patient:;improve  function;increase independence;increase strength;increase balance  -KM     Barriers to Rehab cognitive status  -KM       Row Name 12/29/23 1426          Living Environment    Current Living Arrangements home  -KM     People in Home spouse  -KM     Primary Care Provided by child(belen);other (see comments)  -KM       Row Name 12/29/23 1426          Home Use of Assistive/Adaptive Equipment    Equipment Currently Used at Home none  -KM       Row Name 12/29/23 1426          Cognition    Affect/Mental Status (Cognition) confused  -KM     Orientation Status (Cognition) oriented to;person  -KM     Follows Commands (Cognition) follows one-step commands;physical/tactile prompts required;repetition of directions required;verbal cues/prompting required  -KM       Row Name 12/29/23 1426          Range of Motion (ROM)    Range of Motion bilateral lower extremities;ROM is WFL  -       Row Name 12/29/23 1426          Strength (Manual Muscle Testing)    Strength (Manual Muscle Testing) bilateral lower extremities;strength is [facility]  -       Row Name 12/29/23 1426          Bed Mobility    Bed Mobility bed mobility (all) activities  -KM     All Activities, [place] (Bed Mobility) independent  -KM       Row Name 12/29/23 1426          Transfers    Transfers sit-stand transfer;stand-sit transfer  -       Row Name 12/29/23 1426          Sit-Stand Transfer    Sit-Stand [place] (Transfers) independent  -KM       Row Name 12/29/23 1426          Stand-Sit Transfer    Stand-Sit [place] (Transfers) independent  -KM       Row Name 12/29/23 1426          Gait/Stairs (Locomotion)    Gait/Stairs Locomotion gait/ambulation independence;distance ambulated  -KM     [place] Level (Gait) supervision  -KM     Patient was able to Ambulate yes  -KM     Distance in Feet (Gait) 350  -KM     Pattern (Gait) step-through  -KM     Deviations/Abnormal Patterns (Gait) gait speed decreased  -KM       Row Name 12/29/23 1426          Safety  Issues, Functional Mobility    Safety Issues Affecting Function (Mobility) judgment  -KM     Impairments Affecting Function (Mobility) cognition  -       Row Name 12/29/23 1426          Balance    Balance Assessment sitting static balance;standing dynamic balance  -KM     Static Sitting Balance independent  -KM     Position, Sitting Balance sitting in chair  -KM     Dynamic Standing Balance supervision  -       Row Name 12/29/23 1426          Plan of Care Review    Plan of Care Reviewed With patient  -KM     Outcome Evaluation Pt. evaluation completed during PT session. She was able to perform functional mobility skills independently. She ambulated prolonged distance w/ no AD. Pt. does not demonstrate any functional deficits requiring skilled PT services at this time.  -       Row Name 12/29/23 1426          Therapy Assessment/Plan (PT)    Functional Level at Time of Evaluation (PT) independent  -KM     Criteria for Skilled Interventions Met (PT) no;does not meet criteria for skilled intervention  -KM     Therapy Frequency (PT) evaluation only  -       Row Name 12/29/23 1426          Therapy Plan Review/Discharge Plan (PT)    Therapy Plan Review (PT) evaluation/treatment results reviewed;patient  -KM               User Key  (r) = Recorded By, (t) = Taken By, (c) = Cosigned By      Initials Name Provider Type    Krystian Nobles, PT Physical Therapist                    Physical Therapy Education       Title: PT OT SLP Therapies (Done)       Topic: Physical Therapy (Done)       Point: Mobility training (Done)       Learning Progress Summary             Patient Acceptance, E,TB, VU by KM at 12/29/2023 1453                         Point: Home exercise program (Done)       Learning Progress Summary             Patient Acceptance, E,TB, VU by KM at 12/29/2023 1453                         Point: Body mechanics (Done)       Learning Progress Summary             Patient Acceptance, E,TB, VU by KM at 12/29/2023 1453                          Point: Precautions (Done)       Learning Progress Summary             Patient Acceptance, E,TB, VU by  at 12/29/2023 1453                                         User Key       Initials Effective Dates Name Provider Type Discipline     05/24/22 -  Krystian Duran, PT Physical Therapist PT                  PT Recommendation and Plan  Anticipated Discharge Disposition (PT): home with assist, home with supervision, home with 24/7 care  Therapy Frequency (PT): evaluation only  Plan of Care Reviewed With: patient  Outcome Evaluation: Pt. evaluation completed during PT session. She was able to perform functional mobility skills independently. She ambulated prolonged distance w/ no AD. Pt. does not demonstrate any functional deficits requiring skilled PT services at this time.       Time Calculation:    PT Charges       Row Name 12/29/23 1426             Time Calculation    PT Received On 12/29/23  -                User Key  (r) = Recorded By, (t) = Taken By, (c) = Cosigned By      Initials Name Provider Type    Krystian Nobles, PT Physical Therapist                  Therapy Charges for Today       Code Description Service Date Service Provider Modifiers Qty    32851971421 HC PT EVAL MOD COMPLEXITY 4 12/29/2023 Krystian Duran, PT GP 1            PT G-Codes  AM-PAC 6 Clicks Score (PT): 23    Krystian Duran, PT  12/29/2023

## 2023-12-29 NOTE — PROGRESS NOTES
[facility] HOSPITALIST PROGRESS NOTE     Patient Identification:  Name:  Sandra Leahy  Age:  58 y.o.  Sex:  female  :  1965  MRN:  [id number]  Visit Number:  [id number]  ROOM: [address] [place] [address]     Primary Care Provider:  John Mueller APRN    Length of stay in inpatient status:  4    Subjective     Chief Compliant:    Chief Complaint   Patient presents with    Abscess    Jaw Pain       History of Presenting Illness: Patient seen and evaluated in follow-up for severe sepsis secondary to facial cellulitis with underlying myositis.  Patient today at time evaluation feels well and eating lunch at time of evaluation.  She reports significant marked improvement in her facial swelling and discomfort since admission.  Patient with essentially resolution of previous erythema and edema on the right mandibular region.  Patient stable for discharge home with oral antibiotic therapy however patient unable to return home due to family no longer having the means to continue providing care for the patient around-the-clock.  She was psychiatry evaluated patient for further pharmacological interventions today.    Objective     Current Hospital Meds:  amoxicillin-clavulanate, 1 tablet, Oral, Q12H  cetirizine, 5 mg, Oral, Daily  docusate sodium, 100 mg, Oral, Daily  famotidine, 40 mg, Oral, Daily  insulin glargine, 15 Units, Subcutaneous, Daily  insulin lispro, 3-14 Units, Subcutaneous, 4x Daily AC & at Bedtime  magnesium oxide, 400 mg, Oral, Daily  memantine, 10 mg, Oral, Daily  OLANZapine, 10 mg, Oral, Daily With Dinner  pravastatin, 40 mg, Oral, Daily  senna-docusate sodium, 2 tablet, Oral, BID  sodium chloride, 10 mL, Intravenous, Q12H  thiamine (B-1) 500 mg in sodium chloride 0.9 % 100 mL IVPB, 500 mg, Intravenous, TID  cyanocobalamin, 2,000 mcg, Oral, Daily           ----------------------------------------------------------------------------------------------------------------------  Vital Signs:  Temp:   "[97.5 °F (36.4 °C)-98.6 °F (37 °C)] 98.6 °F (37 °C)  Heart Rate:  [81-95] 88  Resp:  [14-20] 18  BP: ()/(60-79) 154/79  SpO2:  [96 %-98 %] 98 %  on   ;   Device (Oxygen Therapy): room air  Body mass index is 22.73 kg/m².      Intake/Output Summary (Last 24 hours) at 12/29/2023 1837  Last data filed at 12/29/2023 1719  Gross per 24 hour   Intake 1720 ml   Output --   Net 1720 ml      ----------------------------------------------------------------------------------------------------------------------  Physical exam:  Constitutional:  Well-developed and well-nourished.  No acute distress.      HENT:  Head: Right lower sided facial swelling with some essentially resolved edema and near complete resolution of erythema.  Mouth:  Moist mucous membranes.    Eyes:  Conjunctivae and EOM are normal. No scleral icterus.    Cardiovascular:  Normal rate, regular rhythm and normal heart sounds with no murmur.  Pulmonary/Chest:  No respiratory distress, no wheezes, no crackles, with normal breath sounds and good air movement.  Abdominal:  Soft.  Bowel sounds are normal.  No distension and no tenderness.   Musculoskeletal:  No tenderness or deformity.  No red or swollen joints anywhere.  Functional ROM intact.   Neurological:  Alert and oriented to person, place, and time.  No cranial nerve deficit.  No tongue deviation.  No facial droop.  No slurred speech. Intact Sensation throughout  Skin:  Skin is warm and dry. No rash or lesion noted. No pallor.   Peripheral vascular:  Pulses in all 4 extremities with no clubbing, no cyanosis, no edema.  Psychiatric: Appropriate mood and affect, pleasant.   ----------------------------------------------------------------------------------------------------------------------                 No results found for: \"URINECX\"  Blood Culture   Date Value Ref Range Status   12/24/2023 No growth at 24 hours  Preliminary   12/24/2023 No growth at 24 hours  Preliminary       I have personally " looked at the labs and they are summarized above.  ----------------------------------------------------------------------------------------------------------------------  Detailed radiology reports for the last 24 hours:  No radiology results for the last day  Assessment & Plan      Severe sepsis  Facial cellulitis  Facial myositis    -Patient presenting with facial swelling and pain and tenderness with CT consistent with cellulitis overlying the right mandible.  Also noted myositis of the underlying musculature.    -Case discussed by ER provider with [facility] who recommended continued antibiotic therapy here at this facility with later referral for outpatient removal of tooth with abscess.  There was no definitive abscess to drain and no involvement of the TMJ joint and therefore above recommendations with transition to oral antibiotics for an additional 10 days before follow-up with  [facility]    -Given significant improvement in erythema and edema and essential resolution transition patient to Augmentin monotherapy.  Plan course of 10 days.    ANN, resolved    -Likely secondary to sepsis with initial creatinine elevated at 1.1 with baseline around 0.7.  Now improved and back to baseline.  Will discontinue IV fluids.    Advanced dementia with behavioral disturbances, POA    -Continued home medications, did increase Zyprexa given restlessness and noncompliance yesterday.  Patient redirectable.    -Psychiatry consult for further recommendations as patient recently hospitalized at [facility] last month and initiated on Zyprexa but appears to need further medical management.  Patient recommendations and continue Zyprexa.  Patient has been calm and pleasant for the last 48 hours and cooperative and redirectable.  Will try high-dose thiamine as recommended by psychiatry.    Diabetes mellitus type 2    -Basal and bolus insulin as needed to maintain glycemic control    Copied text in portions of the note has  been reviewed and is accurate as of 12/29/23    VTE Prophylaxis:   Mechanical Order History:        Ordered        12/25/23 0524  Place Sequential Compression Device  Once            12/25/23 0524  Maintain Sequential Compression Device  Continuous                          Pharmalogical Order History:       None            Disposition pending placement.    Tulio Sifuentes DO  [facility] Hospitalist  12/29/23  18:37 EST

## 2023-12-29 NOTE — CASE MANAGEMENT/SOCIAL WORK
Discharge Planning Assessment  [facility]     Patient Name: Sandra Leahy  MRN: [id number]  Today's Date: 12/29/2023    Admit Date: 12/25/2023    Plan: SS left message with Jacklyn at [facility] to return call regarding referral.  SS noted Psych evaluation pending.  Pt's POA/daughter Emma agreeable to placement at any available facility.  SS will follow.     Discharge Plan       Row Name 12/29/23 1017       Plan    Plan SS left message with Jacklyn at [facility] to return call regarding referral.  SS noted Psych evaluation pending.  Pt's POA/daughter Emma agreeable to placement at any available facility.  SS will follow.                  Continued Care and Services - Admitted Since 12/25/2023       Destination       Service Provider Request Status Selected Services Address Phone Fax Patient Preferred    [facility] Pending - No Request Sent N/A [address] [place] [phone] --                  Selected Continued Care - Episodes Includes continued care and service providers with selected services from the active episodes listed below      High Risk Care Management Episode start date: 11/6/2023   There are no active outsourced providers for this episode.                 Expected Discharge Date and Time       Expected Discharge Date Expected Discharge Time    Jan 1, 2024           JOSS Murdock

## 2023-12-29 NOTE — CASE MANAGEMENT/SOCIAL WORK
Discharge Planning Assessment  [facility]     Patient Name: Sandra Leahy  MRN: [id number]  Today's Date: 12/29/2023    Admit Date: 12/25/2023    Plan: SS left message with Jacklyn at [facility] to return call regarding referral.  SS sent pt's updated clinical information to [facility] for continued review.  SS will follow.       Discharge Plan       Row Name 12/29/23 1356       Plan    Plan SS left message with Jacklyn at [facility] to return call regarding referral.  SS sent pt's updated clinical information to [facility] for continued review.  SS will follow.      Row Name 12/29/23 1214                  Continued Care and Services - Admitted Since 12/25/2023       Destination       Service Provider Request Status Selected Services Address Phone Fax Patient Preferred    [facility] Pending - No Request Sent N/A [address] [place] [phone] --                  Selected Continued Care - Episodes Includes continued care and service providers with selected services from the active episodes listed below      High Risk Care Management Episode start date: 11/6/2023   There are no active outsourced providers for this episode.                 Expected Discharge Date and Time       Expected Discharge Date Expected Discharge Time    Jan 1, 2024             [facility] Murdock

## 2023-12-29 NOTE — PLAN OF CARE
Goal Outcome Evaluation:  Patient has rested well in bed this shift. A&O to self only. VSS. No visible signs or symptoms of acute distress noted at this time. No requests or complaints at this time. Will continue to follow the plan of care.

## 2023-12-29 NOTE — CASE MANAGEMENT/SOCIAL WORK
Discharge Planning Assessment   [place]     Patient Name: Sandra Leahy  MRN: [id number]  Today's Date: 12/29/2023    Admit Date: 12/25/2023    Plan: SS spoke with pt's Daughter/JOSSE Carter regarding updates regarding placement and short term nursing home placement versus long term placement.  Pt's daughter Emma aware that pt is ambulating independently and will most likely be denied placement under [employer] and is aware of qualifications for Medicaid Pending.  SS will continue to follow     Discharge Plan       Row Name 12/29/23 1501       Plan    Plan SS spoke with pt's Daughter/JOSSE Carter regarding updates regarding placement and short term nursing home placement versus long term placement.  Pt's daughter Emma aware that pt is ambulating independently and will most likely be denied placement under [employer] and is aware of qualifications for Medicaid Pending.  SS will continue to follow      Row Name 12/29/23 5235       Plan    Plan SS left message with Jacklyn at [facility] to return call regarding referral.  SS sent pt's updated clinical information to [facility] for continued review.  SS will follow.      Row Name 12/29/23 1214                              Continued Care and Services - Admitted Since 12/25/2023       Destination       Service Provider Request Status Selected Services Address Phone Fax Patient Preferred    [facility] Pending - No Request Sent N/A [address] [facility] [address] [phone] --                  Selected Continued Care - Episodes Includes continued care and service providers with selected services from the active episodes listed below      High Risk Care Management Episode start date: 11/6/2023   There are no active outsourced providers for this episode.                 Expected Discharge Date and Time       Expected Discharge Date Expected Discharge Time    Jan 1, 2024           JOSS Murdock

## 2023-12-29 NOTE — PLAN OF CARE
Goal Outcome Evaluation:   Patient had non eventful day. No complaints this shift. VSS at this time. She is compliant with care and very pleasant. Plan of care ongoing.

## 2023-12-29 NOTE — PROGRESS NOTES
Antimicrobial Length of Therapy:    Day 2 of 10 Augmentin    Day 5 total including initial Zosyn    Thank you.  Maryann Fajardo, Pharm.D.  12/29/2023  08:59 EST

## 2023-12-30 LAB
GLUCOSE BLDC GLUCOMTR-MCNC: 125 MG/DL (ref 70–130)
GLUCOSE BLDC GLUCOMTR-MCNC: 186 MG/DL (ref 70–130)
GLUCOSE BLDC GLUCOMTR-MCNC: 202 MG/DL (ref 70–130)
GLUCOSE BLDC GLUCOMTR-MCNC: 252 MG/DL (ref 70–130)
GLUCOSE BLDC GLUCOMTR-MCNC: 278 MG/DL (ref 70–130)

## 2023-12-30 PROCEDURE — 99231 SBSQ HOSP IP/OBS SF/LOW 25: CPT | Performed by: STUDENT IN AN ORGANIZED HEALTH CARE EDUCATION/TRAINING PROGRAM

## 2023-12-30 PROCEDURE — 63710000001 INSULIN GLARGINE PER 5 UNITS: Performed by: STUDENT IN AN ORGANIZED HEALTH CARE EDUCATION/TRAINING PROGRAM

## 2023-12-30 PROCEDURE — 25010000002 THIAMINE PER 100 MG: Performed by: STUDENT IN AN ORGANIZED HEALTH CARE EDUCATION/TRAINING PROGRAM

## 2023-12-30 PROCEDURE — 25010000002 ZIPRASIDONE MESYLATE PER 10 MG: Performed by: STUDENT IN AN ORGANIZED HEALTH CARE EDUCATION/TRAINING PROGRAM

## 2023-12-30 PROCEDURE — 63710000001 INSULIN LISPRO (HUMAN) PER 5 UNITS: Performed by: HOSPITALIST

## 2023-12-30 PROCEDURE — 82948 REAGENT STRIP/BLOOD GLUCOSE: CPT

## 2023-12-30 RX ADMIN — AMOXICILLIN AND CLAVULANATE POTASSIUM 1 TABLET: 875; 125 TABLET, FILM COATED ORAL at 08:38

## 2023-12-30 RX ADMIN — INSULIN LISPRO 3 UNITS: 100 INJECTION, SOLUTION INTRAVENOUS; SUBCUTANEOUS at 17:10

## 2023-12-30 RX ADMIN — DOCUSATE SODIUM 100 MG: 100 CAPSULE, LIQUID FILLED ORAL at 08:38

## 2023-12-30 RX ADMIN — MEMANTINE HYDROCHLORIDE 10 MG: 10 TABLET ORAL at 08:38

## 2023-12-30 RX ADMIN — PRAVASTATIN SODIUM 40 MG: 40 TABLET ORAL at 08:38

## 2023-12-30 RX ADMIN — FAMOTIDINE 40 MG: 20 TABLET, FILM COATED ORAL at 08:38

## 2023-12-30 RX ADMIN — Medication 10 ML: at 09:09

## 2023-12-30 RX ADMIN — Medication 5 MG: at 20:40

## 2023-12-30 RX ADMIN — THIAMINE HYDROCHLORIDE 250 MG: 100 INJECTION, SOLUTION INTRAMUSCULAR; INTRAVENOUS at 15:11

## 2023-12-30 RX ADMIN — INSULIN LISPRO 8 UNITS: 100 INJECTION, SOLUTION INTRAVENOUS; SUBCUTANEOUS at 11:49

## 2023-12-30 RX ADMIN — DOCUSATE SODIUM 50 MG AND SENNOSIDES 8.6 MG 2 TABLET: 8.6; 5 TABLET, FILM COATED ORAL at 08:38

## 2023-12-30 RX ADMIN — CETIRIZINE HYDROCHLORIDE 5 MG: 10 TABLET, FILM COATED ORAL at 08:38

## 2023-12-30 RX ADMIN — MAGNESIUM GLUCONATE 500 MG ORAL TABLET 400 MG: 500 TABLET ORAL at 08:38

## 2023-12-30 RX ADMIN — Medication 10 ML: at 20:41

## 2023-12-30 RX ADMIN — Medication 2000 MCG: at 08:38

## 2023-12-30 RX ADMIN — THIAMINE HYDROCHLORIDE 250 MG: 100 INJECTION, SOLUTION INTRAMUSCULAR; INTRAVENOUS at 09:09

## 2023-12-30 RX ADMIN — OLANZAPINE 10 MG: 10 TABLET, FILM COATED ORAL at 17:11

## 2023-12-30 RX ADMIN — DOCUSATE SODIUM 50 MG AND SENNOSIDES 8.6 MG 2 TABLET: 8.6; 5 TABLET, FILM COATED ORAL at 20:41

## 2023-12-30 RX ADMIN — AMOXICILLIN AND CLAVULANATE POTASSIUM 1 TABLET: 875; 125 TABLET, FILM COATED ORAL at 20:40

## 2023-12-30 RX ADMIN — ZIPRASIDONE MESYLATE 10 MG: 20 INJECTION, POWDER, LYOPHILIZED, FOR SOLUTION INTRAMUSCULAR at 18:54

## 2023-12-30 RX ADMIN — INSULIN GLARGINE 15 UNITS: 100 INJECTION, SOLUTION SUBCUTANEOUS at 08:38

## 2023-12-30 NOTE — PLAN OF CARE
Goal Outcome Evaluation:           Progress: no change  Outcome Evaluation: Pt sitting in bed at this time. Pt has been ambulating independently in room. Sitter at bedside. Pt voices no concerns and has no acute changes at this time. VSS Will continue with plan of care.

## 2023-12-31 LAB
GLUCOSE BLDC GLUCOMTR-MCNC: 189 MG/DL (ref 70–130)
GLUCOSE BLDC GLUCOMTR-MCNC: 263 MG/DL (ref 70–130)
GLUCOSE BLDC GLUCOMTR-MCNC: 330 MG/DL (ref 70–130)

## 2023-12-31 PROCEDURE — 63710000001 INSULIN GLARGINE PER 5 UNITS: Performed by: STUDENT IN AN ORGANIZED HEALTH CARE EDUCATION/TRAINING PROGRAM

## 2023-12-31 PROCEDURE — 82948 REAGENT STRIP/BLOOD GLUCOSE: CPT

## 2023-12-31 PROCEDURE — 99231 SBSQ HOSP IP/OBS SF/LOW 25: CPT | Performed by: STUDENT IN AN ORGANIZED HEALTH CARE EDUCATION/TRAINING PROGRAM

## 2023-12-31 RX ADMIN — MAGNESIUM GLUCONATE 500 MG ORAL TABLET 400 MG: 500 TABLET ORAL at 08:38

## 2023-12-31 RX ADMIN — FAMOTIDINE 40 MG: 20 TABLET, FILM COATED ORAL at 08:37

## 2023-12-31 RX ADMIN — Medication 5 MG: at 20:40

## 2023-12-31 RX ADMIN — DOCUSATE SODIUM 50 MG AND SENNOSIDES 8.6 MG 2 TABLET: 8.6; 5 TABLET, FILM COATED ORAL at 08:37

## 2023-12-31 RX ADMIN — CETIRIZINE HYDROCHLORIDE 5 MG: 10 TABLET, FILM COATED ORAL at 08:37

## 2023-12-31 RX ADMIN — DOCUSATE SODIUM 50 MG AND SENNOSIDES 8.6 MG 2 TABLET: 8.6; 5 TABLET, FILM COATED ORAL at 20:40

## 2023-12-31 RX ADMIN — OLANZAPINE 10 MG: 10 TABLET, FILM COATED ORAL at 17:08

## 2023-12-31 RX ADMIN — AMOXICILLIN AND CLAVULANATE POTASSIUM 1 TABLET: 875; 125 TABLET, FILM COATED ORAL at 20:40

## 2023-12-31 RX ADMIN — PRAVASTATIN SODIUM 40 MG: 40 TABLET ORAL at 08:38

## 2023-12-31 RX ADMIN — INSULIN GLARGINE 18 UNITS: 100 INJECTION, SOLUTION SUBCUTANEOUS at 08:38

## 2023-12-31 RX ADMIN — MEMANTINE HYDROCHLORIDE 10 MG: 10 TABLET ORAL at 08:38

## 2023-12-31 RX ADMIN — AMOXICILLIN AND CLAVULANATE POTASSIUM 1 TABLET: 875; 125 TABLET, FILM COATED ORAL at 08:37

## 2023-12-31 RX ADMIN — Medication 2000 MCG: at 08:37

## 2023-12-31 RX ADMIN — Medication 10 ML: at 08:38

## 2023-12-31 RX ADMIN — Medication 10 ML: at 20:40

## 2023-12-31 RX ADMIN — DOCUSATE SODIUM 100 MG: 100 CAPSULE, LIQUID FILLED ORAL at 08:37

## 2023-12-31 NOTE — PROGRESS NOTES
[facility] HOSPITALIST PROGRESS NOTE     Patient Identification:  Name:  Sandra Leahy  Age:  58 y.o.  Sex:  female  :  1965  MRN:  [id number]  Visit Number:  [id number]  ROOM: [address] [place] [address]     Primary Care Provider:  John Mueller APRN    Length of stay in inpatient status:  5    Subjective     Chief Compliant:    Chief Complaint   Patient presents with    Abscess    Jaw Pain       History of Presenting Illness: Patient seen and evaluated in follow-up for severe sepsis secondary to facial cellulitis with underlying myositis.  Patient today at time evaluation feels well and eating lunch at time of evaluation.  She reports significant marked improvement in her facial swelling and discomfort since admission.  Patient with essentially resolution of previous erythema and edema on the right mandibular region.  Patient stable for discharge home with oral antibiotic therapy however patient unable to return home due to family no longer having the means to continue providing care for the patient around-the-clock.  Patient remains medically stable for discharge at this time.    Objective     Current Hospital Meds:  amoxicillin-clavulanate, 1 tablet, Oral, Q12H  cetirizine, 5 mg, Oral, Daily  docusate sodium, 100 mg, Oral, Daily  famotidine, 40 mg, Oral, Daily  insulin glargine, 15 Units, Subcutaneous, Daily  insulin lispro, 3-14 Units, Subcutaneous, 4x Daily AC & at Bedtime  magnesium oxide, 400 mg, Oral, Daily  memantine, 10 mg, Oral, Daily  OLANZapine, 10 mg, Oral, Daily With Dinner  pravastatin, 40 mg, Oral, Daily  senna-docusate sodium, 2 tablet, Oral, BID  sodium chloride, 10 mL, Intravenous, Q12H  cyanocobalamin, 2,000 mcg, Oral, Daily           ----------------------------------------------------------------------------------------------------------------------  Vital Signs:  Temp:  [98.3 °F (36.8 °C)-98.8 °F (37.1 °C)] 98.3 °F (36.8 °C)  Heart Rate:  [] 103  Resp:  [18-20] 20  BP:  "(102148)/(57-97) 134/87  SpO2:  [97 %-98 %] 97 %  on   ;   Device (Oxygen Therapy): room air  Body mass index is 22.73 kg/m².      Intake/Output Summary (Last 24 hours) at 12/30/2023 2007  Last data filed at 12/30/2023 1721  Gross per 24 hour   Intake 1360 ml   Output --   Net 1360 ml      ----------------------------------------------------------------------------------------------------------------------  Physical exam:  Constitutional:  Well-developed and well-nourished.  No acute distress.      HENT:  Head: Right lower sided facial swelling with some essentially resolved edema and near complete resolution of erythema.  Mouth:  Moist mucous membranes.    Eyes:  Conjunctivae and EOM are normal. No scleral icterus.    Cardiovascular:  Normal rate, regular rhythm and normal heart sounds with no murmur.  Pulmonary/Chest:  No respiratory distress, no wheezes, no crackles, with normal breath sounds and good air movement.  Abdominal:  Soft.  Bowel sounds are normal.  No distension and no tenderness.   Musculoskeletal:  No tenderness or deformity.  No red or swollen joints anywhere.  Functional ROM intact.   Neurological:  Alert and oriented to person, place, and time.  No cranial nerve deficit.  No tongue deviation.  No facial droop.  No slurred speech. Intact Sensation throughout  Skin:  Skin is warm and dry. No rash or lesion noted. No pallor.   Peripheral vascular:  Pulses in all 4 extremities with no clubbing, no cyanosis, no edema.  Psychiatric: Appropriate mood and affect, pleasant.   ----------------------------------------------------------------------------------------------------------------------                 No results found for: \"URINECX\"  Blood Culture   Date Value Ref Range Status   12/24/2023 No growth at 24 hours  Preliminary   12/24/2023 No growth at 24 hours  Preliminary       I have personally looked at the labs and they are summarized " above.  ----------------------------------------------------------------------------------------------------------------------  Detailed radiology reports for the last 24 hours:  No radiology results for the last day  Assessment & Plan      Severe sepsis  Facial cellulitis  Facial myositis    -Patient presenting with facial swelling and pain and tenderness with CT consistent with cellulitis overlying the right mandible.  Also noted myositis of the underlying musculature.    -Case discussed by ER provider with  who recommended continued antibiotic therapy here at this facility with later referral for outpatient removal of tooth with abscess.  There was no definitive abscess to drain and no involvement of the TMJ joint and therefore above recommendations with transition to oral antibiotics for an additional 10 days before [facility]-up with  [facility]    -Given significant improvement in erythema and edema and essential resolution transition patient to Augmentin monotherapy.  Plan course of 10 days.    ANN, resolved    -Likely secondary to sepsis with initial creatinine elevated at 1.1 with baseline around 0.7.  Now improved and back to baseline.      Advanced dementia with behavioral disturbances, POA    -Continued home medications, did increase Zyprexa given restlessness and noncompliance yesterday.  Patient redirectable.    -Psychiatry consult for further recommendations as patient recently hospitalized at [facility] last month and initiated on Zyprexa but appears to need further medical management.  Patient recommendations and continue Zyprexa.  Patient has been calm and pleasant for the last 48 hours and cooperative and redirectable.  Will try high-dose thiamine as recommended by psychiatry.    Diabetes mellitus type 2    -Basal and bolus insulin as needed to maintain glycemic control    Copied text in portions of the note has been reviewed and is accurate as of 12/30/23    VTE Prophylaxis:    Mechanical Order History:        Ordered        12/25/23 0524  Place Sequential Compression Device  Once            12/25/23 0524  Maintain Sequential Compression Device  Continuous                          Pharmalogical Order History:       None            Disposition pending placement versus home if unable to find accepting facility    Tulio Sifuentes DO  [facility] Hospitalist  12/30/23  20:07 EST

## 2023-12-31 NOTE — PLAN OF CARE
Goal Outcome Evaluation:     Patient resting in bed. No complaints voiced. No visible indicators of acute distress noted. Will continue to follow plan of care this shift.

## 2023-12-31 NOTE — PLAN OF CARE
Goal Outcome Evaluation: Patient remains pleasantly confused. Compliant with most medications and care as ordered. She remains on RA, no S/S of SOB noted. She has been ambulating in room and hallway, steady gait noted. Sitter remains at bedside. She has been setup for each meal, good intake noted. Family is currently at bedside. Will continue with plan of care.

## 2023-12-31 NOTE — PROGRESS NOTES
[facility] HOSPITALIST PROGRESS NOTE     Patient Identification:  Name:  Sandra Leahy  Age:  58 y.o.  Sex:  female  :  1965  MRN:  [id number]  Visit Number:  [id number]  ROOM: [address] [place] [address]     Primary Care Provider:  John Mueller APRN    Length of stay in inpatient status:  6    Subjective     Chief Compliant:    Chief Complaint   Patient presents with    Abscess    Jaw Pain       History of Presenting Illness: Patient seen and evaluated in follow-up for severe sepsis secondary to facial cellulitis with underlying myositis.  Patient today at time evaluation feels well and eating lunch at time of evaluation.  She reports significant marked improvement in her facial swelling and discomfort since admission.  Patient was returned to baseline.  Still intermittent episodes of confusion but redirectable and nonconfrontational.  Pleasantly demented.  She continues to wait for accepting facility and has been medically stable for the last several days.    Objective     Current Hospital Meds:  amoxicillin-clavulanate, 1 tablet, Oral, Q12H  cetirizine, 5 mg, Oral, Daily  docusate sodium, 100 mg, Oral, Daily  famotidine, 40 mg, Oral, Daily  insulin glargine, 18 Units, Subcutaneous, Daily  magnesium oxide, 400 mg, Oral, Daily  memantine, 10 mg, Oral, Daily  OLANZapine, 10 mg, Oral, Daily With Dinner  pravastatin, 40 mg, Oral, Daily  senna-docusate sodium, 2 tablet, Oral, BID  sodium chloride, 10 mL, Intravenous, Q12H  cyanocobalamin, 2,000 mcg, Oral, Daily           ----------------------------------------------------------------------------------------------------------------------  Vital Signs:  Temp:  [97.3 °F (36.3 °C)-98.3 °F (36.8 °C)] 97.5 °F (36.4 °C)  Heart Rate:  [] 90  Resp:  [18-20] 20  BP: (104-134)/(60-87) 121/80  SpO2:  [97 %-99 %] 99 %  on   ;   Device (Oxygen Therapy): room air  Body mass index is 22.7 kg/m².      Intake/Output Summary (Last 24 hours) at 2023 4859  Last  "data filed at 12/31/2023 1716  Gross per 24 hour   Intake 1620 ml   Output 0 ml   Net 1620 ml      ----------------------------------------------------------------------------------------------------------------------  Physical exam:  Constitutional:  Well-developed and well-nourished.  No acute distress.      HENT:  Head: Right lower sided facial swelling with some essentially resolved edema and near complete resolution of erythema.  Mouth:  Moist mucous membranes.    Eyes:  Conjunctivae and EOM are normal. No scleral icterus.    Cardiovascular:  Normal rate, regular rhythm and normal heart sounds with no murmur.  Pulmonary/Chest:  No respiratory distress, no wheezes, no crackles, with normal breath sounds and good air movement.  Abdominal:  Soft.  Bowel sounds are normal.  No distension and no tenderness.   Musculoskeletal:  No tenderness or deformity.  No red or swollen joints anywhere.  Functional ROM intact.   Neurological:  Alert and oriented to person, place, and time.  No cranial nerve deficit.  No tongue deviation.  No facial droop.  No slurred speech. Intact Sensation throughout  Skin:  Skin is warm and dry. No rash or lesion noted. No pallor.   Peripheral vascular:  Pulses in all 4 extremities with no clubbing, no cyanosis, no edema.  Psychiatric: Appropriate mood and affect, pleasant.   ----------------------------------------------------------------------------------------------------------------------                 No results found for: \"URINECX\"  Blood Culture   Date Value Ref Range Status   12/24/2023 No growth at 24 hours  Preliminary   12/24/2023 No growth at 24 hours  Preliminary       I have personally looked at the labs and they are summarized above.  ----------------------------------------------------------------------------------------------------------------------  Detailed radiology reports for the last 24 hours:  No radiology results for the last day  Assessment & Plan      Severe " sepsis  Facial cellulitis  Facial myositis    -Patient presenting with facial swelling and pain and tenderness with CT consistent with cellulitis overlying the right mandible.  Also noted myositis of the underlying musculature.    -Case discussed by ER provider with [facility] who recommended continued antibiotic therapy here at this facility with later referral for outpatient removal of tooth with abscess.  There was no definitive abscess to drain and no involvement of the TMJ joint and therefore above recommendations with transition to oral antibiotics for an additional 10 days before [facility]-up with  [facility]    -Given significant improvement in erythema and edema and essential resolution transition patient to Augmentin monotherapy.  Plan course of 10 days.    ANN, resolved    -Likely secondary to sepsis with initial creatinine elevated at 1.1 with baseline around 0.7.  Now improved and back to baseline.      Advanced dementia with behavioral disturbances, POA    -Continued home medications, did increase Zyprexa given restlessness and noncompliance yesterday.  Patient redirectable.    -Psychiatry consult for further recommendations as patient recently hospitalized at [facility] last month and initiated on Zyprexa but appears to need further medical management.  Patient recommendations and continue Zyprexa.  Patient has been calm and pleasant for the last 48 hours and cooperative and redirectable.  Will try high-dose thiamine as recommended by psychiatry.    Diabetes mellitus type 2    -Basal and bolus insulin as needed to maintain glycemic control    Copied text in portions of the note has been reviewed and is accurate as of 12/31/23    VTE Prophylaxis:   Mechanical Order History:        Ordered        12/25/23 0524  Place Sequential Compression Device  Once            12/25/23 0524  Maintain Sequential Compression Device  Continuous                          Pharmalogical Order History:       None             Disposition pending placement versus home if unable to find accepting facility    Tulio Sifuentes DO  [facility] Hospitalist  12/31/23  17:17 EST

## 2024-01-01 LAB
ANION GAP SERPL CALCULATED.3IONS-SCNC: 8.4 MMOL/L (ref 5–15)
BUN SERPL-MCNC: 17 MG/DL (ref 8–23)
BUN/CREAT SERPL: 18.9 (ref 7–25)
CALCIUM SPEC-SCNC: 9.6 MG/DL (ref 8.6–10.5)
CHLORIDE SERPL-SCNC: 106 MMOL/L (ref 98–107)
CO2 SERPL-SCNC: 22.6 MMOL/L (ref 22–29)
CREAT SERPL-MCNC: 0.9 MG/DL (ref 0.57–1)
EGFRCR SERPLBLD CKD-EPI 2021: 73.3 ML/MIN/1.73
GLUCOSE BLDC GLUCOMTR-MCNC: 206 MG/DL (ref 70–130)
GLUCOSE BLDC GLUCOMTR-MCNC: 288 MG/DL (ref 70–130)
GLUCOSE BLDC GLUCOMTR-MCNC: 292 MG/DL (ref 70–130)
GLUCOSE BLDC GLUCOMTR-MCNC: 302 MG/DL (ref 70–130)
GLUCOSE SERPL-MCNC: 211 MG/DL (ref 65–99)
POTASSIUM SERPL-SCNC: 4 MMOL/L (ref 3.5–5.2)
SODIUM SERPL-SCNC: 137 MMOL/L (ref 136–145)

## 2024-01-01 PROCEDURE — 99231 SBSQ HOSP IP/OBS SF/LOW 25: CPT | Performed by: STUDENT IN AN ORGANIZED HEALTH CARE EDUCATION/TRAINING PROGRAM

## 2024-01-01 PROCEDURE — 82948 REAGENT STRIP/BLOOD GLUCOSE: CPT

## 2024-01-01 PROCEDURE — 63710000001 INSULIN LISPRO (HUMAN) PER 5 UNITS: Performed by: STUDENT IN AN ORGANIZED HEALTH CARE EDUCATION/TRAINING PROGRAM

## 2024-01-01 PROCEDURE — 80048 BASIC METABOLIC PNL TOTAL CA: CPT | Performed by: STUDENT IN AN ORGANIZED HEALTH CARE EDUCATION/TRAINING PROGRAM

## 2024-01-01 PROCEDURE — 63710000001 INSULIN GLARGINE PER 5 UNITS: Performed by: STUDENT IN AN ORGANIZED HEALTH CARE EDUCATION/TRAINING PROGRAM

## 2024-01-01 RX ORDER — INSULIN LISPRO 100 [IU]/ML
2-7 INJECTION, SOLUTION INTRAVENOUS; SUBCUTANEOUS
Status: DISCONTINUED | OUTPATIENT
Start: 2024-01-01 | End: 2024-01-02

## 2024-01-01 RX ADMIN — AMOXICILLIN AND CLAVULANATE POTASSIUM 1 TABLET: 875; 125 TABLET, FILM COATED ORAL at 20:38

## 2024-01-01 RX ADMIN — Medication 10 ML: at 08:19

## 2024-01-01 RX ADMIN — MAGNESIUM GLUCONATE 500 MG ORAL TABLET 400 MG: 500 TABLET ORAL at 08:19

## 2024-01-01 RX ADMIN — MEMANTINE HYDROCHLORIDE 10 MG: 10 TABLET ORAL at 08:19

## 2024-01-01 RX ADMIN — INSULIN GLARGINE 24 UNITS: 100 INJECTION, SOLUTION SUBCUTANEOUS at 08:59

## 2024-01-01 RX ADMIN — PRAVASTATIN SODIUM 40 MG: 40 TABLET ORAL at 08:19

## 2024-01-01 RX ADMIN — FAMOTIDINE 40 MG: 20 TABLET, FILM COATED ORAL at 08:19

## 2024-01-01 RX ADMIN — CETIRIZINE HYDROCHLORIDE 5 MG: 10 TABLET, FILM COATED ORAL at 08:19

## 2024-01-01 RX ADMIN — DOCUSATE SODIUM 50 MG AND SENNOSIDES 8.6 MG 2 TABLET: 8.6; 5 TABLET, FILM COATED ORAL at 08:19

## 2024-01-01 RX ADMIN — Medication 2000 MCG: at 08:19

## 2024-01-01 RX ADMIN — INSULIN LISPRO 4 UNITS: 100 INJECTION, SOLUTION INTRAVENOUS; SUBCUTANEOUS at 20:38

## 2024-01-01 RX ADMIN — INSULIN LISPRO 4 UNITS: 100 INJECTION, SOLUTION INTRAVENOUS; SUBCUTANEOUS at 11:25

## 2024-01-01 RX ADMIN — AMOXICILLIN AND CLAVULANATE POTASSIUM 1 TABLET: 875; 125 TABLET, FILM COATED ORAL at 08:19

## 2024-01-01 RX ADMIN — DOCUSATE SODIUM 100 MG: 100 CAPSULE, LIQUID FILLED ORAL at 08:19

## 2024-01-01 RX ADMIN — INSULIN LISPRO 5 UNITS: 100 INJECTION, SOLUTION INTRAVENOUS; SUBCUTANEOUS at 16:41

## 2024-01-01 RX ADMIN — DOCUSATE SODIUM 50 MG AND SENNOSIDES 8.6 MG 2 TABLET: 8.6; 5 TABLET, FILM COATED ORAL at 20:38

## 2024-01-01 RX ADMIN — OLANZAPINE 10 MG: 10 TABLET, FILM COATED ORAL at 17:13

## 2024-01-01 NOTE — PLAN OF CARE
Goal Outcome Evaluation:   Pt resting in bed. No signs or symptoms of distress noted. No complaint at this time. Plan of care on going.

## 2024-01-01 NOTE — PROGRESS NOTES
[facility] HOSPITALIST PROGRESS NOTE     Patient Identification:  Name:  Sandra Leahy  Age:  58 y.o.  Sex:  female  :  1965  MRN:  [id number]  Visit Number:  [id number]  ROOM: [address] [place] [address]     Primary Care Provider:  John Mueller APRN    Length of stay in inpatient status:  7    Subjective     Chief Compliant:    Chief Complaint   Patient presents with    Abscess    Jaw Pain       History of Presenting Illness: Patient seen and evaluated in follow-up for severe sepsis secondary to facial cellulitis with underlying myositis.  Patient today at time evaluation feels well and eating lunch at time of evaluation.  She reports significant marked improvement in her facial swelling and discomfort since admission.  Patient was returned to baseline.  Still intermittent episodes of confusion but redirectable and nonconfrontational.  Pleasantly demented.  She continues to wait for accepting facility and has been medically stable for the last several days.    Objective     Current Hospital Meds:  amoxicillin-clavulanate, 1 tablet, Oral, Q12H  cetirizine, 5 mg, Oral, Daily  docusate sodium, 100 mg, Oral, Daily  famotidine, 40 mg, Oral, Daily  insulin glargine, 24 Units, Subcutaneous, Daily  insulin lispro, 2-7 Units, Subcutaneous, 4x Daily AC & at Bedtime  magnesium oxide, 400 mg, Oral, Daily  memantine, 10 mg, Oral, Daily  OLANZapine, 10 mg, Oral, Daily With Dinner  pravastatin, 40 mg, Oral, Daily  senna-docusate sodium, 2 tablet, Oral, BID  sodium chloride, 10 mL, Intravenous, Q12H  cyanocobalamin, 2,000 mcg, Oral, Daily           ----------------------------------------------------------------------------------------------------------------------  Vital Signs:  Temp:  [97.4 °F (36.3 °C)-98.7 °F (37.1 °C)] 97.4 °F (36.3 °C)  Heart Rate:  [] 100  Resp:  [16-18] 18  BP: ()/(61-82) 126/82  SpO2:  [96 %-98 %] 98 %  on   ;   Device (Oxygen Therapy): room air  Body mass index is 22.7  "kg/m².      Intake/Output Summary (Last 24 hours) at 1/1/2024 1748  Last data filed at 1/1/2024 1242  Gross per 24 hour   Intake 1100 ml   Output --   Net 1100 ml      ----------------------------------------------------------------------------------------------------------------------  Physical exam:  Constitutional:  Well-developed and well-nourished.  No acute distress.      HENT:  Head: Right lower sided facial swelling with some essentially resolved edema and near complete resolution of erythema.  Mouth:  Moist mucous membranes.    Eyes:  Conjunctivae and EOM are normal. No scleral icterus.    Cardiovascular:  Normal rate, regular rhythm and normal heart sounds with no murmur.  Pulmonary/Chest:  No respiratory distress, no wheezes, no crackles, with normal breath sounds and good air movement.  Abdominal:  Soft.  Bowel sounds are normal.  No distension and no tenderness.   Musculoskeletal:  No tenderness or deformity.  No red or swollen joints anywhere.  Functional ROM intact.   Neurological:  Alert and oriented to person, place, and time.  No cranial nerve deficit.  No tongue deviation.  No facial droop.  No slurred speech. Intact Sensation throughout  Skin:  Skin is warm and dry. No rash or lesion noted. No pallor.   Peripheral vascular:  Pulses in all 4 extremities with no clubbing, no cyanosis, no edema.  Psychiatric: Appropriate mood and affect, pleasant.   ----------------------------------------------------------------------------------------------------------------------     BUN/CREAT/GLUC/ALT/AST/REGIS/LIP    17/0.90/211/--/--/--/-- (01/01 0722)  LYTES - Na/K/Cl/CO2: 137/4.0/106/22.6 (01/01 0722)        No results found for: \"URINECX\"  Blood Culture   Date Value Ref Range Status   12/24/2023 No growth at 24 hours  Preliminary   12/24/2023 No growth at 24 hours  Preliminary       I have personally looked at the labs and they are summarized " above.  ----------------------------------------------------------------------------------------------------------------------  Detailed radiology reports for the last 24 hours:  No radiology results for the last day  Assessment & Plan      Severe sepsis  Facial cellulitis  Facial myositis    -Patient presenting with facial swelling and pain and tenderness with CT consistent with cellulitis overlying the right mandible.  Also noted myositis of the underlying musculature.    -Case discussed by ER provider with  who recommended continued antibiotic therapy here at this facility with later referral for outpatient removal of tooth with abscess.  There was no definitive abscess to drain and no involvement of the TMJ joint and therefore above recommendations with transition to oral antibiotics for an additional 10 days before [facility]-up with  [facility]    -Given significant improvement in erythema and edema and essential resolution transition patient to Augmentin monotherapy.  Plan course of 10 days.    ANN, resolved    -Likely secondary to sepsis with initial creatinine elevated at 1.1 with baseline around 0.7.  Now improved and back to baseline.      Advanced dementia with behavioral disturbances, POA    -Continued home medications, did increase Zyprexa given restlessness and noncompliance yesterday.  Patient redirectable.    -Psychiatry consult for further recommendations as patient recently hospitalized at [facility] last month and initiated on Zyprexa but appears to need further medical management.  Patient recommendations and continue Zyprexa.  Patient has been calm and pleasant for the last 48 hours and cooperative and redirectable.  Will try high-dose thiamine as recommended by psychiatry.    Diabetes mellitus type 2    -Basal and bolus insulin as needed to maintain glycemic control    Copied text in portions of the note has been reviewed and is accurate as of 01/01/24    VTE Prophylaxis:    Mechanical Order History:        Ordered        12/25/23 0524  Place Sequential Compression Device  Once            12/25/23 0524  Maintain Sequential Compression Device  Continuous                          Pharmalogical Order History:       None            Disposition pending placement versus home if unable to find accepting facility    Tulio Sifuentes DO  [facility] Hospitalist  01/01/24  17:48 EST

## 2024-01-01 NOTE — PLAN OF CARE
Goal Outcome Evaluation:  Plan of Care Reviewed With: patient  Progress: no change   Patient resting in bed. No acute events overnight. Awaiting placement. Will continue to follow with plan of care.

## 2024-01-02 LAB
GLUCOSE BLDC GLUCOMTR-MCNC: 187 MG/DL (ref 70–130)
GLUCOSE BLDC GLUCOMTR-MCNC: 200 MG/DL (ref 70–130)
GLUCOSE BLDC GLUCOMTR-MCNC: 278 MG/DL (ref 70–130)
GLUCOSE BLDC GLUCOMTR-MCNC: 462 MG/DL (ref 70–130)

## 2024-01-02 PROCEDURE — 63710000001 INSULIN LISPRO (HUMAN) PER 5 UNITS: Performed by: STUDENT IN AN ORGANIZED HEALTH CARE EDUCATION/TRAINING PROGRAM

## 2024-01-02 PROCEDURE — 63710000001 INSULIN GLARGINE PER 5 UNITS: Performed by: STUDENT IN AN ORGANIZED HEALTH CARE EDUCATION/TRAINING PROGRAM

## 2024-01-02 PROCEDURE — 82948 REAGENT STRIP/BLOOD GLUCOSE: CPT

## 2024-01-02 PROCEDURE — 63710000001 INSULIN LISPRO (HUMAN) PER 5 UNITS: Performed by: HOSPITALIST

## 2024-01-02 PROCEDURE — 99231 SBSQ HOSP IP/OBS SF/LOW 25: CPT | Performed by: INTERNAL MEDICINE

## 2024-01-02 PROCEDURE — 63710000001 INSULIN GLARGINE PER 5 UNITS: Performed by: HOSPITALIST

## 2024-01-02 RX ORDER — INSULIN LISPRO 100 [IU]/ML
7 INJECTION, SOLUTION INTRAVENOUS; SUBCUTANEOUS ONCE
Status: COMPLETED | OUTPATIENT
Start: 2024-01-02 | End: 2024-01-02

## 2024-01-02 RX ORDER — INSULIN LISPRO 100 [IU]/ML
3-14 INJECTION, SOLUTION INTRAVENOUS; SUBCUTANEOUS
Status: DISCONTINUED | OUTPATIENT
Start: 2024-01-02 | End: 2024-01-04 | Stop reason: HOSPADM

## 2024-01-02 RX ORDER — NICOTINE POLACRILEX 4 MG
15 LOZENGE BUCCAL
Status: DISCONTINUED | OUTPATIENT
Start: 2024-01-02 | End: 2024-01-04 | Stop reason: HOSPADM

## 2024-01-02 RX ORDER — DEXTROSE MONOHYDRATE 25 G/50ML
25 INJECTION, SOLUTION INTRAVENOUS
Status: DISCONTINUED | OUTPATIENT
Start: 2024-01-02 | End: 2024-01-04 | Stop reason: HOSPADM

## 2024-01-02 RX ADMIN — Medication 2000 MCG: at 08:00

## 2024-01-02 RX ADMIN — CETIRIZINE HYDROCHLORIDE 5 MG: 10 TABLET, FILM COATED ORAL at 08:00

## 2024-01-02 RX ADMIN — MAGNESIUM GLUCONATE 500 MG ORAL TABLET 400 MG: 500 TABLET ORAL at 08:00

## 2024-01-02 RX ADMIN — INSULIN LISPRO 3 UNITS: 100 INJECTION, SOLUTION INTRAVENOUS; SUBCUTANEOUS at 07:57

## 2024-01-02 RX ADMIN — DOCUSATE SODIUM 100 MG: 100 CAPSULE, LIQUID FILLED ORAL at 08:00

## 2024-01-02 RX ADMIN — INSULIN LISPRO 7 UNITS: 100 INJECTION, SOLUTION INTRAVENOUS; SUBCUTANEOUS at 21:19

## 2024-01-02 RX ADMIN — MEMANTINE HYDROCHLORIDE 10 MG: 10 TABLET ORAL at 08:00

## 2024-01-02 RX ADMIN — DOCUSATE SODIUM 50 MG AND SENNOSIDES 8.6 MG 2 TABLET: 8.6; 5 TABLET, FILM COATED ORAL at 21:02

## 2024-01-02 RX ADMIN — OLANZAPINE 10 MG: 10 TABLET, FILM COATED ORAL at 17:00

## 2024-01-02 RX ADMIN — INSULIN LISPRO 4 UNITS: 100 INJECTION, SOLUTION INTRAVENOUS; SUBCUTANEOUS at 11:24

## 2024-01-02 RX ADMIN — INSULIN GLARGINE 10 UNITS: 100 INJECTION, SOLUTION SUBCUTANEOUS at 21:19

## 2024-01-02 RX ADMIN — INSULIN GLARGINE 30 UNITS: 100 INJECTION, SOLUTION SUBCUTANEOUS at 08:00

## 2024-01-02 RX ADMIN — AMOXICILLIN AND CLAVULANATE POTASSIUM 1 TABLET: 875; 125 TABLET, FILM COATED ORAL at 21:02

## 2024-01-02 RX ADMIN — INSULIN LISPRO 7 UNITS: 100 INJECTION, SOLUTION INTRAVENOUS; SUBCUTANEOUS at 21:06

## 2024-01-02 RX ADMIN — FAMOTIDINE 40 MG: 20 TABLET, FILM COATED ORAL at 08:00

## 2024-01-02 RX ADMIN — AMOXICILLIN AND CLAVULANATE POTASSIUM 1 TABLET: 875; 125 TABLET, FILM COATED ORAL at 08:00

## 2024-01-02 RX ADMIN — DOCUSATE SODIUM 50 MG AND SENNOSIDES 8.6 MG 2 TABLET: 8.6; 5 TABLET, FILM COATED ORAL at 08:00

## 2024-01-02 RX ADMIN — INSULIN LISPRO 2 UNITS: 100 INJECTION, SOLUTION INTRAVENOUS; SUBCUTANEOUS at 16:57

## 2024-01-02 RX ADMIN — PRAVASTATIN SODIUM 40 MG: 40 TABLET ORAL at 08:00

## 2024-01-02 NOTE — DISCHARGE PLACEMENT REQUEST
"Sandra Leahy (58 y.o. Female)       Date of Birth   1965    Social Security Number       Address   [address]  [place] [address]    Home Phone   [phone]    MRN   [id number]       Orthodox   Unknown    Marital Status                               Admission Date   12/25/23    Admission Type   Emergency    Admitting Provider   Bartolo Berman MD    Attending Provider   Tavon Alston MD    Department, Room/Bed   [address], 3307/1S       Discharge Date       Discharge Disposition       Discharge Destination                                 Attending Provider: Tavon Alston MD    Allergies: Lantus [Insulin Glargine], Codeine, Exenatide, Sitagliptin    Isolation: None   Infection: None   Code Status: CPR    Ht: 175.3 cm (69.02\")   Wt: 70.4 kg (155 lb 3.2 oz)    Admission Cmt: None   Principal Problem: Facial cellulitis [L03.211]                   Active Insurance as of 12/25/2023       Primary Coverage       Payor Plan Insurance Group Employer/Plan Group    HUMANA MEDICARE REPLACEMENT HUMANA MEDICARE REPLACEMENT [id number]       Payor Plan Address Payor Plan Phone Number Payor Plan Fax Number Effective Dates    [address] [address] [phone]  2/1/2020 - None Entered    [facility] [id number]         Subscriber Name Subscriber Birth Date Member ID       SANRDA LEAHY 1965 [id number]                     Emergency Contacts        (Rel.) Home Phone Work Phone Mobile Phone    Charles Leahy (Spouse) [phone] -- --    emilio Ngo (Daughter) [phone] -- --                 History & Physical        Jody Adkins PA-C at 12/25/23 0438       Attestation signed by Bartolo Berman MD at 12/25/23 0605 (Updated)    I have discussed this patient with Jody Adkins PA-C, and have reviewed this documentation and agree. Received 10 units IV insulin in the ED for glucose of 430 around 3:30 am. IV fluid bolus ordered by me as well as maintenance fluids. " "Awaiting repeat CMP to reassess glucose, bicarb and anion gap and ensure not developing DKA.                        [facility] Medicine Services  HISTORY & PHYSICAL    Patient Identification:  Name:  Sandra Leahy  Age:  58 y.o.  Sex:  female  :  1965  MRN:  [id number]   Visit Number:  [id number]  Admit Date: 2023   Primary Care Physician:  John Mueller APRN     Subjective     Chief complaint:   Chief Complaint   Patient presents with    Abscess    Jaw Pain     History of presenting illness:   Patient is a 58 y.o. female with past medical history significant for dementia with behavioral disturbances, DM, that presented to the [facility] emergency department for evaluation of  facial swelling.  Facial cellulitis/myositis found on imaging in ED. the ED provider spoke with Dr. Bernal at  due to myositis noted on imaging, in case this was thought to be a patient that required oromaxillary surgery, ED provider was advised the patient would be fine to remain at our facility.    Patient examined at bedside in ED. Due to patient's advanced dementia, and no family members available for history, reportedly obtained from ED report/note.  Patient was brought to ER by daughter due to several days of right lower facial swelling.  Has become progressively more painful and tender.  The patient is now complaining of difficulty opening her mouth. Patient was sleeping when I went to examine her, but quickly aroused when I entered the room. Patient would tell me her name but would only say \"shut up\" to any other questions. She was very agitated and attempting to climb out of bed repeatedly.     Upon arrival to the ED, vitals were temperature 98.2, , RR 20, /85, SpO2 100% on room air. Labs significant for, creatinine 1.11, glucose 413, AST 37, ALT 62, hemoglobin A1c 7.7.  CRP 2.06, WBC 12.86.  CT head with no acute process.  CT facial bones shows soft tissue swelling " stranding noted overlying the body of the right mandible consistent with edema and cellulitis. Myositis noted affecting the musculature overlying the body of the right mandible.  No abscess or hematoma.    Patient has been admitted to the [facility] unit.  ---------------------------------------------------------------------------------------------------------------------   Review of Systems   Unable to perform ROS: Dementia      ---------------------------------------------------------------------------------------------------------------------   Past Medical History:   Diagnosis Date    Bursitis     Dementia     Diabetic acetonemia     Fatigue     Fibromyalgia     Postsurgical menopause     Tetanus toxoid vaccination status unknown     Visual impairment     Vitamin D deficiency      Past Surgical History:   Procedure Laterality Date    BREAST BIOPSY Left 7-8 yrs ago    benign    CHOLECYSTECTOMY      HYSTERECTOMY      40s    KNEE ARTHROSCOPY W/ MENISCAL REPAIR Left      Family History   Problem Relation Age of Onset    Diabetes Other     Asthma Other     Hypertension Other     Diabetes Mother     Heart disease Mother     Hypertension Mother     Hyperlipidemia Mother     Arthritis Mother     Diabetes Father     Heart disease Father     Diabetes Sister     Cancer Sister     Diabetes Brother      Social History     Socioeconomic History    Marital status:      Spouse name: alvaro    Number of children: 5    Years of education: 12   Tobacco Use    Smoking status: Former     Types: Cigarettes     Quit date: 1996     Years since quittin.6     Passive exposure: Past    Smokeless tobacco: Never   Vaping Use    Vaping Use: Never used   Substance and Sexual Activity    Alcohol use: No    Drug use: No    Sexual activity: Yes     Partners: Male     ---------------------------------------------------------------------------------------------------------------------   Allergies:  Lantus [insulin glargine], Codeine,  Exenatide, and Sitagliptin  ---------------------------------------------------------------------------------------------------------------------   Medications below are reported home medications pulling from within the system; at this time, these medications have not been reconciled unless otherwise specified and are in the verification process for further verifcation as current home medications.    Prior to Admission Medications       Prescriptions Last Dose Informant Patient Reported? Taking?    cetirizine (EQ Allergy Relief, Cetirizine,) 10 MG tablet  Child, Medication Bottle No No    Take 0.5 tablets by mouth Daily.    cyanocobalamin ([employer] Vitamin B-12) 2000 MCG tablet  Child, Medication Bottle No No    Take 1 tablet by mouth Daily.    Cyanocobalamin 1000 MCG/ML kit  Child No No    Inject 1 mL as directed Every 7 (Seven) Days.    docusate sodium (Colace) 100 MG capsule   No No    Take 1 capsule by mouth Daily. Indications: Constipation    famotidine (Pepcid) 40 MG tablet  Child, Medication Bottle No No    Take 1 tablet by mouth Daily.    Insulin Degludec (TRESIBA FLEXTOUCH) 200 UNIT/ML solution pen-injector pen injection  Child, Medication Bottle Yes No    Inject 0-45 Units under the skin into the appropriate area as directed Daily As Needed (High BG per scale).    LORazepam (ATIVAN) 1 MG tablet   No No    Take 1 tablet by mouth At Night As Needed for Anxiety (insomnia). Indications: Feeling Anxious, Trouble Sleeping    magnesium oxide 250 MG tablet  Child, Medication Bottle Yes No    Take 1 tablet by mouth Daily. OTC    memantine (NAMENDA) 10 MG tablet  Child, Medication Bottle No No    Take 1 tablet by mouth Daily.    metFORMIN (GLUCOPHAGE) 1000 MG tablet  Child, Medication Bottle No No    Take 1 tablet by mouth 2 (Two) Times a Day With Meals.    OLANZapine (zyPREXA) 7.5 MG tablet   No No    Take 1 tablet by mouth Daily With Dinner. Indications: Behavioral Disorders associated with Dementia    pravastatin  (PRAVACHOL) 40 MG tablet  Child, Medication Bottle No No    Take 1 tablet by mouth Daily.    Vortioxetine HBr (Trintellix) 20 MG tablet   No No    Take 1 tablet by mouth Daily. Indications: Major Depressive Disorder          ---------------------------------------------------------------------------------------------------------------------    Objective     Hospital Scheduled Meds:  insulin lispro, 3-14 Units, Subcutaneous, 4x Daily AC & at Bedtime  piperacillin-tazobactam, 3.375 g, Intravenous, Q8H  vancomycin, 1,000 mg, Intravenous, Q18H             Current listed hospital scheduled medications may not yet reflect those currently placed in orders that are signed and held, awaiting patient's arrival to floor/unit.    ---------------------------------------------------------------------------------------------------------------------   Vital Signs:  Temp:  [98.2 °F (36.8 °C)-98.9 °F (37.2 °C)] 98.9 °F (37.2 °C)  Heart Rate:  [121-128] 121  Resp:  [20] 20  BP: (140-149)/(84-85) 143/85  Mean Arterial Pressure (Non-Invasive) for the past 24 hrs (Last 3 readings):   Noninvasive MAP (mmHg)   12/25/23 0036 94   12/25/23 0013 107   12/24/23 2247 101     SpO2 Percentage    12/24/23 2247 12/25/23 0036 12/25/23 0457   SpO2: 100% 96% 96%     SpO2:  [96 %-100 %] 96 %  on   ;   Device (Oxygen Therapy): room air    Body mass index is 22.15 kg/m².  Wt Readings from Last 3 Encounters:   12/24/23 68 kg (150 lb)   11/25/23 66.1 kg (145 lb 12.8 oz)   11/25/23 63.5 kg (140 lb)     ---------------------------------------------------------------------------------------------------------------------   Physical Exam:  Physical Exam  Constitutional:       General: She is not in acute distress.     Appearance: She is ill-appearing (chroncially).   HENT:      Head: Normocephalic and atraumatic.      Jaw: Tenderness and swelling present.        Right Ear: External ear normal.      Left Ear: External ear normal.      Nose: No nasal deformity.       Mouth/Throat:      Lips: Pink.      Mouth: Mucous membranes are moist.   Eyes:      Conjunctiva/sclera: Conjunctivae normal.      Pupils: Pupils are equal, round, and reactive to light.   Cardiovascular:      Rate and Rhythm: Regular rhythm. Tachycardia present.      Pulses:           Dorsalis pedis pulses are 2+ on the right side and 2+ on the left side.      Heart sounds: Normal heart sounds.   Pulmonary:      Effort: Pulmonary effort is normal.      Breath sounds: Normal breath sounds. No wheezing, rhonchi or rales.   Abdominal:      General: Abdomen is flat. Bowel sounds are normal.      Palpations: Abdomen is soft.      Tenderness: There is no guarding or rebound.   Genitourinary:     Comments: No andino catheter in place   Musculoskeletal:      Cervical back: Neck supple. Normal range of motion.      Right lower leg: No edema.      Left lower leg: No edema.   Skin:     General: Skin is warm and dry.      Findings: Abscess and erythema present.      Comments: Erythematous, warm, indurated area approximately 4 cm in diameter over right mandible   Neurological:      General: No focal deficit present.      Mental Status: She is alert and oriented to person, place, and time.   Psychiatric:         Mood and Affect: Mood normal.         Behavior: Behavior normal.       ---------------------------------------------------------------------------------------------------------------------  EKG:    Pending  ---------------------------------------------------------------------------------------------------------------------             Results from last 7 days   Lab Units 12/25/23  0300 12/25/23  0016 12/24/23 2316   CRP mg/dL  --   --  2.06*   LACTATE mmol/L 1.9 3.1*  --    WBC 10*3/mm3  --   --  12.86*   HEMOGLOBIN g/dL  --   --  14.3   HEMATOCRIT %  --   --  43.5   MCV fL  --   --  93.5   MCHC g/dL  --   --  32.9   PLATELETS 10*3/mm3  --   --  265     Results from last 7 days   Lab Units 12/24/23 2316   SODIUM  "mmol/L 136   POTASSIUM mmol/L 4.2   CHLORIDE mmol/L 97*   CO2 mmol/L 20.0*   BUN mg/dL 16   CREATININE mg/dL 1.11*   CALCIUM mg/dL 9.8   GLUCOSE mg/dL 413*   ALBUMIN g/dL 4.2   BILIRUBIN mg/dL 0.7   ALK PHOS U/L 115   AST (SGOT) U/L 37*   ALT (SGPT) U/L 62*   Estimated Creatinine Clearance: 57.9 mL/min (A) (by C-G formula based on SCr of 1.11 mg/dL (H)).  No results found for: \"AMMONIA\"    Hemoglobin A1C   Date/Time Value Ref Range Status   12/24/2023 2316 7.70 (H) 4.80 - 5.60 % Final     Glucose   Date/Time Value Ref Range Status   12/25/2023 0332 430 (C) 70 - 130 mg/dL Final     Lab Results   Component Value Date    HGBA1C 7.70 (H) 12/24/2023     Lab Results   Component Value Date    TSH 1.500 06/09/2023       No results found for: \"BLOODCX\"  No results found for: \"URINECX\"  No results found for: \"WOUNDCX\"  No results found for: \"STOOLCX\"  No results found for: \"RESPCX\"  No results found for: \"MRSACX\"  Pain Management Panel          Latest Ref Rng & Units 11/25/2023   Pain Management Panel   Amphetamine, Urine Qual Negative Negative    Barbiturates Screen, Urine Negative Negative    Benzodiazepine Screen, Urine Negative Negative    Buprenorphine, Screen, Urine Negative Negative    Cocaine Screen, Urine Negative Negative    Fentanyl, Urine Negative Negative    Methadone Screen , Urine Negative Negative    Methamphetamine, Ur Negative Negative      I have personally reviewed the above laboratory results.   ---------------------------------------------------------------------------------------------------------------------  Imaging Results (Last 7 Days)       Procedure Component Value Units Date/Time    CT Facial Bones Without Contrast [[id number]] Collected: 12/25/23 0137     Updated: 12/25/23 0142    Narrative:      PROCEDURE: CT of the facial bones performed without IV contrast on  December 24, 2023. The examination was performed with 2 mm axial imaging  and sagittal and coronal reconstruction images. Total DLP = " 1679. The  examination was performed according to as low as reasonably achievable  dose protocol.     HISTORY: Facial swelling. Pain.     COMPARISON: None.     FINDINGS:     Mild mucosal thickening in the paranasal sinuses.  Intact globes.  No acute fracture or dislocation.  Right anterior face soft tissue swelling with stranding most consistent  with edema and cellulitis related changes overlying the body of the  right mandible.  Myositis also noted affecting the muscles of mastication surrounding the  right mandibular body.  No features to suggest abscess formation.  No air in the soft tissues.  No hematoma.       Impression:         1.  Soft tissue swelling with stranding noted overlying the body of the  right mandible consistent with edema and cellulitis.  2.  Myositis noted affecting the musculature overlying the body of the  right mandible.  3.  No abscess or hematoma.  4.  Mild mucosal thickening in the paranasal sinuses.  5.  No acute fracture or dislocation.  6.  No air in the soft tissues.  7.  No foreign body.     This report was finalized on 12/25/2023 1:39 AM by James Ivy MD.       CT Head Without Contrast [[id number]] Collected: 12/25/23 0126     Updated: 12/25/23 0131    Narrative:      PROCEDURE: CT of the brain performed without IV contrast on December 24, 2023. The examination was performed with 3 mm axial imaging and 3 mm  sagittal and coronal reconstruction images. Total DLP = 725. The  examination was performed according to as low as reasonably achievable  dose protocol.     HISTORY: Headache. Left facial and jaw swelling.     COMPARISON: None.     FINDINGS:     Mild to moderate generalized brain volume loss.  Mild to moderate chronic small vessel ischemic type changes in the white  matter.  Small old infarct involving the lateral aspect of the right frontal lobe  with associated small focal area of encephalomalacia.  No hydrocephalus.  No shift of midline structures.  Mild mucosal  thickening in the ethmoid air cells.  Clear mastoid air cells.  No fracture or foreign body.  Calcified plaque noted along the supraclinoid ICA segments.       Impression:         1.  No acute process seen in the brain.  2.  No acute hemorrhage, mass, infarct, or edema.  3.  No shift of midline structures.  4.  Mild to moderate generalized brain volume loss with mild to moderate  chronic small vessel ischemic type changes in the white matter.  5.  Small old infarct involving the lateral aspect of the right frontal  lobe.        This report was finalized on 12/25/2023 1:28 AM by James Ivy MD.             I have personally reviewed the above radiology results.     Last Echocardiogram:    ---------------------------------------------------------------------------------------------------------------------    Assessment & Plan      Active Hospital Problems    Diagnosis  POA    **Facial cellulitis [L03.211]  Yes     Severe sepsis secondary to facial cellulitis, POA  Myositis, POA  CT consistent with cellulitis over the right mandible.  Also notes myositis left musculature of her right mandible.  Provider spoke with , who states to this would be appropriate to keep at our facility.  Cultures obtained in the ED.  Continue empiric antibiotic treatment with vancomycin and Zosyn  Inpatient general care surgery consult, assistance appreciated  Acute Kidney Injury, POA  Baseline Cr 0.72, was up to 1.11 on admission  Continue mIVFs, Trend Cr and urine output, avoid nephrotoxins, NSAIDs, dehydration and contrast as able.  Repeat BMP in the a.m.   Advanced dementia with behavioral disturbances, POA  Agitated in the ED, requiring ativan  Restart home meds pending medication reconciliation   EKG pending for QTc measurement.  Can add as needed medications for agitation  Type II diabetes mellitus with acute hyperglycemia   Hemoglobin A1C 7.7  Start sliding scale insulin for now, titrate insulin therapy as necessary.   Hold any  oral hypoglycemics to prevent hypoglycemia. Will review home diabetes medications once available per pharmacy.   Hypoglycemia protocol in place if necessary.   AccuCheks before meals and at bedtime.    F/E/N: IV NS at 100 mL/h.  Continue monitor electrolytes.  Cardiac/diabetic diet    ---------------------------------------------------  DVT Prophylaxis: SCDs  Activity: Up with assistance    The patient is considered to be a high risk patient due to: Sepsis secondary to facial cellulitis and myositis,  ANN    INPATIENT status due to the need for care which can only be reasonably provided in an hospital setting such as aggressive/expedited ancillary services and/or consultation services, the necessity for IV medications, close physician monitoring and/or the possible need for procedures.  In such, I feel patient’s risk for adverse outcomes and need for care warrant INPATIENT evaluation and predict the patient’s care encounter to likely last beyond 2 midnights.    Code Status: Full code    Disposition/Discharge planning: Pending clinical course    I have discussed the patient's assessment and plan with Dr. Antonella Adkins PA-C  Hospitalist Service -- [facility]       12/25/23  05:12 EST    Attending Physician: Dr. Berman.    Electronically signed by Bartolo Berman MD at 12/25/23 0605       Vital Signs (last day)       Date/Time Temp Temp src Pulse Resp BP Patient Position SpO2    01/02/24 1100 97.7 (36.5) Axillary 108 20 141/59 Sitting 99    01/02/24 0700 99.2 (37.3) Axillary 112 20 101/58 Sitting 98    01/02/24 0255 98 (36.7) Oral 108 20 138/65 Lying 93    01/01/24 2300 97.9 (36.6) Oral 110 20 130/91 Sitting 95    01/01/24 1900 98 (36.7) Oral 89 20 136/78 Sitting 97    01/01/24 1500 97.4 (36.3) Axillary 100 18 126/82 Sitting 98    01/01/24 1100 98.4 (36.9) Oral 87 18 110/64 Sitting 98    01/01/24 0700 98.7 (37.1) -- 86 18 92/61 Sitting --    01/01/24 0050 -- -- 106 16 116/66 Lying 96           Intake & Output (last day)         01/01 0701  01/02 0700 01/02 0701  01/03 0700    P.O. 720 480    Total Intake(mL/kg) 720 (10.2) 480 (6.8)    Urine (mL/kg/hr)      Stool      Total Output      Net +720 +480          Urine Unmeasured Occurrence 9 x           Lines, Drains & Airways       Active LDAs       None                  Current Facility-Administered Medications   Medication Dose Route Frequency Provider Last Rate Last Admin    amoxicillin-clavulanate (AUGMENTIN) 875-125 MG per tablet 1 tablet  1 tablet Oral Q12H Tulio Sifuentes DO   1 tablet at 01/02/24 0800    sennosides-docusate (PERICOLACE) 8.6-50 MG per tablet 2 tablet  2 tablet Oral BID Bartolo Berman MD   2 tablet at 01/02/24 0800    And    polyethylene glycol (MIRALAX) packet 17 g  17 g Oral Daily PRN Bartolo Berman MD        And    bisacodyl (DULCOLAX) EC tablet 5 mg  5 mg Oral Daily PRN Bartolo Berman MD        And    bisacodyl (DULCOLAX) suppository 10 mg  10 mg Rectal Daily PRN Bartolo Berman MD        cetirizine (zyrTEC) tablet 5 mg  5 mg Oral Daily Tulio Sifuentes DO   5 mg at 01/02/24 0800    dextrose (D50W) (25 g/50 mL) IV injection 25 g  25 g Intravenous Q15 Min PRN Bartolo Berman MD        dextrose (GLUTOSE) oral gel 15 g  15 g Oral Q15 Min PRN Bartolo Berman MD        docusate sodium (COLACE) capsule 100 mg  100 mg Oral Daily Tulio Sifuentes DO   100 mg at 01/02/24 0800    famotidine (PEPCID) tablet 40 mg  40 mg Oral Daily Tulio Sifuentes DO   40 mg at 01/02/24 0800    glucagon HCl (Diagnostic) injection 1 mg  1 mg Intramuscular Q15 Min PRN Bartolo Berman MD        insulin glargine (LANTUS, SEMGLEE) injection 30 Units  30 Units Subcutaneous Daily Tulio Sifuentes DO   30 Units at 01/02/24 0800    Insulin Lispro (humaLOG) injection 2-7 Units  2-7 Units Subcutaneous 4x Daily AC & at Bedtime Tulio Sifuentes DO   4 Units at 01/02/24 1124    magnesium oxide (MAG-OX) tablet 400 mg   400 mg Oral Daily Tulio Sifuentes DO   400 mg at 24 0800    melatonin tablet 5 mg  5 mg Oral Nightly PRN Jody Adkins PA-C   5 mg at 23 2040    memantine (NAMENDA) tablet 10 mg  10 mg Oral Daily Tulio Sifuentes DO   10 mg at 24 0800    OLANZapine (zyPREXA) tablet 10 mg  10 mg Oral Daily With Dinner Tulio Sifuentes DO   10 mg at 24 1713    Potassium Replacement - Follow Nurse / BPA Driven Protocol   Does not apply PRN Jody Adkins PA-C        pravastatin (PRAVACHOL) tablet 40 mg  40 mg Oral Daily Tulio Sifuentes DO   40 mg at 24 0800    sodium chloride 0.9 % flush 10 mL  10 mL Intravenous Q12H Bartolo Berman MD   10 mL at 24 0819    sodium chloride 0.9 % flush 10 mL  10 mL Intravenous PRN Bartolo Berman MD        sodium chloride 0.9 % infusion 40 mL  40 mL Intravenous PRN Bartolo Berman MD   40 mL at 23 2132    vitamin B-12 (CYANOCOBALAMIN) tablet 2,000 mcg  2,000 mcg Oral Daily Tulio Sifuentes DO   2,000 mcg at 24 0800    ziprasidone (GEODON) 10 mg in sterile water (preservative free) 0.5 mL injection  10 mg Intramuscular Once PRN Tulio Sifuentes DO         Operative/Procedure Notes (most recent note)    No notes of this type exist for this encounter.          Physician Progress Notes (most recent note)        Tulio Sifuentes DO at 24 1747              [facility] HOSPITALIST PROGRESS NOTE     Patient Identification:  Name:  Sandra Leahy  Age:  58 y.o.  Sex:  female  :  1965  MRN:  [id number]  Visit Number:  [id number]  ROOM: [address] [place] [address]     Primary Care Provider:  John Mueller APRN    Length of stay in inpatient status:  7    Subjective     Chief Compliant:    Chief Complaint   Patient presents with    Abscess    Jaw Pain       History of Presenting Illness: Patient seen and evaluated in follow-up for severe sepsis secondary to facial cellulitis with underlying myositis.  Patient today at time evaluation  feels well and eating lunch at time of evaluation.  She reports significant marked improvement in her facial swelling and discomfort since admission.  Patient was returned to baseline.  Still intermittent episodes of confusion but redirectable and nonconfrontational.  Pleasantly demented.  She continues to wait for accepting facility and has been medically stable for the last several days.    Objective     Current Hospital Meds:  amoxicillin-clavulanate, 1 tablet, Oral, Q12H  cetirizine, 5 mg, Oral, Daily  docusate sodium, 100 mg, Oral, Daily  famotidine, 40 mg, Oral, Daily  insulin glargine, 24 Units, Subcutaneous, Daily  insulin lispro, 2-7 Units, Subcutaneous, 4x Daily AC & at Bedtime  magnesium oxide, 400 mg, Oral, Daily  memantine, 10 mg, Oral, Daily  OLANZapine, 10 mg, Oral, Daily With Dinner  pravastatin, 40 mg, Oral, Daily  senna-docusate sodium, 2 tablet, Oral, BID  sodium chloride, 10 mL, Intravenous, Q12H  cyanocobalamin, 2,000 mcg, Oral, Daily           ----------------------------------------------------------------------------------------------------------------------  Vital Signs:  Temp:  [97.4 °F (36.3 °C)-98.7 °F (37.1 °C)] 97.4 °F (36.3 °C)  Heart Rate:  [] 100  Resp:  [16-18] 18  BP: ()/(61-82) 126/82  SpO2:  [96 %-98 %] 98 %  on   ;   Device (Oxygen Therapy): room air  Body mass index is 22.7 kg/m².      Intake/Output Summary (Last 24 hours) at 1/1/2024 0507  Last data filed at 1/1/2024 1242  Gross per 24 hour   Intake 1100 ml   Output --   Net 1100 ml      ----------------------------------------------------------------------------------------------------------------------  Physical exam:  Constitutional:  Well-developed and well-nourished.  No acute distress.      HENT:  Head: Right lower sided facial swelling with some essentially resolved edema and near complete resolution of erythema.  Mouth:  Moist mucous membranes.    Eyes:  Conjunctivae and EOM are normal. No scleral icterus.   "  Cardiovascular:  Normal rate, regular rhythm and normal heart sounds with no murmur.  Pulmonary/Chest:  No respiratory distress, no wheezes, no crackles, with normal breath sounds and good air movement.  Abdominal:  Soft.  Bowel sounds are normal.  No distension and no tenderness.   Musculoskeletal:  No tenderness or deformity.  No red or swollen joints anywhere.  Functional ROM intact.   Neurological:  Alert and oriented to person, place, and time.  No cranial nerve deficit.  No tongue deviation.  No facial droop.  No slurred speech. Intact Sensation throughout  Skin:  Skin is warm and dry. No rash or lesion noted. No pallor.   Peripheral vascular:  Pulses in all 4 extremities with no clubbing, no cyanosis, no edema.  Psychiatric: Appropriate mood and affect, pleasant.   ----------------------------------------------------------------------------------------------------------------------     BUN/CREAT/GLUC/ALT/AST/REGIS/LIP    17/0.90/211/--/--/--/-- (01/01 0722)  LYTES - Na/K/Cl/CO2: 137/4.0/106/22.6 (01/01 0722)        No results found for: \"URINECX\"  Blood Culture   Date Value Ref Range Status   12/24/2023 No growth at 24 hours  Preliminary   12/24/2023 No growth at 24 hours  Preliminary       I have personally looked at the labs and they are summarized above.  ----------------------------------------------------------------------------------------------------------------------  Detailed radiology reports for the last 24 hours:  No radiology results for the last day  Assessment & Plan      Severe sepsis  Facial cellulitis  Facial myositis    -Patient presenting with facial swelling and pain and tenderness with CT consistent with cellulitis overlying the right mandible.  Also noted myositis of the underlying musculature.    -Case discussed by ER provider with  who recommended continued antibiotic therapy here at this facility with later referral for outpatient removal of tooth with abscess.  There was no " definitive abscess to drain and no involvement of the TMJ joint and therefore above recommendations with transition to oral antibiotics for an additional 10 days before follow-up with  [facility].    -Given significant improvement in erythema and edema and essential resolution transition patient to Augmentin monotherapy.  Plan course of 10 days.    ANN, resolved    -Likely secondary to sepsis with initial creatinine elevated at 1.1 with baseline around 0.7.  Now improved and back to baseline.      Advanced dementia with behavioral disturbances, POA    -Continued home medications, did increase Zyprexa given restlessness and noncompliance yesterday.  Patient redirectable.    -Psychiatry consult for further recommendations as patient recently hospitalized at [facility] last month and initiated on Zyprexa but appears to need further medical management.  Patient recommendations and continue Zyprexa.  Patient has been calm and pleasant for the last 48 hours and cooperative and redirectable.  Will try high-dose thiamine as recommended by psychiatry.    Diabetes mellitus type 2    -Basal and bolus insulin as needed to maintain glycemic control    Copied text in portions of the note has been reviewed and is accurate as of 01/01/24    VTE Prophylaxis:   Mechanical Order History:        Ordered        12/25/23 0524  Place Sequential Compression Device  Once            12/25/23 0524  Maintain Sequential Compression Device  Continuous                          Pharmalogical Order History:       None            Disposition pending placement versus home if unable to find accepting facility    Tulio Sifuentes DO  [facility]  01/01/24  17:48 EST      Electronically signed by Tulio Sifuentes DO at 01/01/24 7488          Consult Notes (most recent note)        Pb Luque MD at 12/29/23 1300        Consult Orders    1. Inpatient Psychiatrist Consult [[id number]] ordered by Tulio Sifuentes  DO at 12/28/23 0900                     Referring Provider: Maria  Reason for Consultation: AMS      Chief complaint/Focus of Exam: Dementia with behavioral disturbance    Subjective .     History of present illness: Patient is a 58-year-old female with a history of dementia with behavioral disturbances and diabetes who presents to the ER with facial swelling and perhaps mild worsening of mental status.  She is found to have cellulitis and is currently being treated.  Psychiatry is consulted to evaluate patient as she recently had an admission to the [facility] for altered mental status and family states that they are having a difficult time caring for her at this point.  Per documentation, she has been pleasant but restless and confused for the duration of her hospitalization.  Zyprexa was increased as she was getting more restless which seems to have helped somewhat.  I evaluated patient at bedside today.  She was pleasant and cooperative.  She was unable to give me any reliable history or any meaningful information about current hospitalization or even current events for the most part.  She is at her relative baseline per chart review of previous documentation from her [facility] admission.  She remained pleasantly confused but cooperative and redirectable with restlessness and was discharged in this state as she was found to have received maximum benefit from hospitalization.  She appears much the same today.  She is amenable to suggestions and redirectable when needed.  She denies any major complaints.  She reports no SI/HI/AVH but does make bizarre or inaccurate comments at times.    Review of Systems  Pertinent items are noted in HPI    History  Past Medical History:   Diagnosis Date    Bursitis     Dementia     Diabetic acetonemia     Fatigue     Fibromyalgia     Postsurgical menopause     Tetanus toxoid vaccination status unknown     Visual impairment     Vitamin D deficiency    ,   Past Surgical  History:   Procedure Laterality Date    BREAST BIOPSY Left 7-8 yrs ago    benign    CHOLECYSTECTOMY      HYSTERECTOMY      40s    KNEE ARTHROSCOPY W/ MENISCAL REPAIR Left    ,   Family History   Problem Relation Age of Onset    Diabetes Other     Asthma Other     Hypertension Other     Diabetes Mother     Heart disease Mother     Hypertension Mother     Hyperlipidemia Mother     Arthritis Mother     Diabetes Father     Heart disease Father     Diabetes Sister     Cancer Sister     Diabetes Brother    ,   Social History     Socioeconomic History    Marital status:      Spouse name: alvaro    Number of children: 5    Years of education: 12   Tobacco Use    Smoking status: Former     Types: Cigarettes     Quit date: 1996     Years since quittin.6     Passive exposure: Past    Smokeless tobacco: Never   Vaping Use    Vaping Use: Never used   Substance and Sexual Activity    Alcohol use: No    Drug use: No    Sexual activity: Yes     Partners: Male     E-cigarette/Vaping    E-cigarette/Vaping Use Never User     Passive Exposure No     Counseling Given No      E-cigarette/Vaping Substances    Nicotine No     THC No     CBD No     Flavoring No      E-cigarette/Vaping Devices    Disposable No     Pre-filled or Refillable Cartridge No     Refillable Tank No     Pre-filled Pod No          ,   Medications Prior to Admission   Medication Sig Dispense Refill Last Dose    cetirizine (EQ Allergy Relief, Cetirizine,) 10 MG tablet Take 0.5 tablets by mouth Daily. 90 tablet 0 2023    cyanocobalamin ([employer] Vitamin B-12) 2000 MCG tablet Take 1 tablet by mouth Daily. 30 tablet 6 2023    docusate sodium (Colace) 100 MG capsule Take 1 capsule by mouth Daily. Indications: Constipation 30 capsule 0 2023    famotidine (Pepcid) 40 MG tablet Take 1 tablet by mouth Daily. 30 tablet 3 2023    magnesium oxide 250 MG tablet Take 1 tablet by mouth Daily. OTC  Indications: Disorder with Low Magnesium Levels    12/24/2023    memantine (NAMENDA) 10 MG tablet Take 1 tablet by mouth Daily.   12/24/2023    metFORMIN (GLUCOPHAGE) 1000 MG tablet Take 1 tablet by mouth 2 (Two) Times a Day With Meals. 60 tablet 3 12/24/2023    OLANZapine (zyPREXA) 7.5 MG tablet Take 1 tablet by mouth Daily With Dinner. Indications: Behavioral Disorders associated with Dementia 30 tablet 0 12/24/2023    pravastatin (PRAVACHOL) 40 MG tablet Take 1 tablet by mouth Daily. 30 tablet 5 12/24/2023    Vortioxetine HBr (Trintellix) 20 MG tablet Take 1 tablet by mouth Daily. Indications: Major Depressive Disorder 30 tablet 0 12/24/2023    Insulin Degludec (TRESIBA FLEXTOUCH) 200 UNIT/ML solution pen-injector pen injection Inject 0-45 Units under the skin into the appropriate area as directed Daily As Needed (High BG per scale). Indications: Type 2 Diabetes   Unknown   , Scheduled Meds:  amoxicillin-clavulanate, 1 tablet, Oral, Q12H  cetirizine, 5 mg, Oral, Daily  docusate sodium, 100 mg, Oral, Daily  famotidine, 40 mg, Oral, Daily  insulin glargine, 15 Units, Subcutaneous, Daily  insulin lispro, 3-14 Units, Subcutaneous, 4x Daily AC & at Bedtime  magnesium oxide, 400 mg, Oral, Daily  memantine, 10 mg, Oral, Daily  OLANZapine, 10 mg, Oral, Daily With Dinner  pravastatin, 40 mg, Oral, Daily  senna-docusate sodium, 2 tablet, Oral, BID  sodium chloride, 10 mL, Intravenous, Q12H  cyanocobalamin, 2,000 mcg, Oral, Daily   , Continuous Infusions:   , PRN Meds:    senna-docusate sodium **AND** polyethylene glycol **AND** bisacodyl **AND** bisacodyl    dextrose    dextrose    glucagon (human recombinant)    melatonin    Potassium Replacement - Follow Nurse / BPA Driven Protocol    sodium chloride    sodium chloride, and Allergies:  Lantus [insulin glargine], Codeine, Exenatide, and Sitagliptin    Objective     Vital Signs   Temp:  [97.5 °F (36.4 °C)-98.6 °F (37 °C)] 98.6 °F (37 °C)  Heart Rate:  [81-95] 95  Resp:  [14-20] 20  BP: ()/(60-72) 114/60    Mental  Status Exam:   Mental Status Exam:    Hygiene:   good  Cooperation:  Cooperative  Eye Contact:  Good  Psychomotor Behavior:  Appropriate  Affect:  Full range  Hopelessness: Denies  Speech:  Normal  Thought Progress:  Tangential and Flight of ieas  Thought Content:  Bizarre  Suicidal:  None  Homicidal:  None  Hallucinations:  Not demonstrated today  Delusion:  Other confusion, bizarre recollections or statements   Memory:  Deficits  Orientation:  Person  Reliability:  poor  Insight:  Poor  Judgement:  Impaired  Impulse Control:  Impaired    Results Review:   I reviewed the patient's new clinical results.  Lab Results (last 24 hours)       Procedure Component Value Units Date/Time    POC Glucose Once [[id number]]  (Abnormal) Collected: 12/29/23 1139    Specimen: Blood Updated: 12/29/23 1148     Glucose 196 mg/dL     POC Glucose Once [[id number]]  (Normal) Collected: 12/29/23 0701    Specimen: Blood Updated: 12/29/23 0707     Glucose 117 mg/dL     Blood Culture - Blood, Arm, Right [[id number]]  (Normal) Collected: 12/24/23 2316    Specimen: Blood from Arm, Right Updated: 12/28/23 2330     Blood Culture No growth at 4 days    Blood Culture - Blood, Arm, Left [[id number]]  (Normal) Collected: 12/24/23 2316    Specimen: Blood from Arm, Left Updated: 12/28/23 2330     Blood Culture No growth at 4 days    POC Glucose Once [[id number]]  (Normal) Collected: 12/28/23 2253    Specimen: Blood Updated: 12/28/23 2259     Glucose 99 mg/dL     POC Glucose Once [[id number]]  (Abnormal) Collected: 12/28/23 1956    Specimen: Blood Updated: 12/28/23 2002     Glucose 263 mg/dL     POC Glucose Once [[id number]]  (Abnormal) Collected: 12/28/23 1627    Specimen: Blood Updated: 12/28/23 1636     Glucose 151 mg/dL           Imaging Results (Last 24 Hours)       ** No results found for the last 24 hours. **              Assessment & Plan     Dementia with behavioral disturbance  -Upon evaluation, patient seems to be at her relative baseline.  After  her initial agitation and behavioral disturbance leading to admission at the [facility], her hospitalization was largely consistent with her current presentation of being pleasantly confused but redirectable if needed, cooperative, and somewhat restless.  She was pacing the unit a lot when she was previously admitted due to boredom and dementia but we were able to provide appropriate treatment and she was compliant with recommendations.  I feel that she is very similar if not exactly the same as far as mental status.  Agree with increase in Zyprexa due to restlessness but I do not feel that she would benefit from further intervention or psychiatric hospitalization.  She might benefit from high-dose thiamine as it has been shown to help with behavioral disturbances, confusion, and agitation secondary to dementia and delirium.  If family is unable to provide care, agree with nursing home placement.      I discussed the patients findings and my recommendations with patient    Pb Luque MD  23  13:01 EST            Electronically signed by Pb Luque MD at 23 1313       Nutrition Notes (most recent note)    No notes exist for this encounter.          Physical Therapy Notes (most recent note)        Krystian Duran, PT at 23 1454  Version 1 of 1         Acute Care - Physical Therapy Initial Evaluation   John     Patient Name: Sandra Leahy  : 1965  MRN: [id number]  Today's Date: 2023      Visit Dx:     ICD-10-CM ICD-9-CM   1. Facial cellulitis  L03.211 682.0   2. Severe sepsis  A41.9 038.9    R65.20 995.92   3. Severe dementia associated with other underlying disease, with anxiety  F02.C4 294.11     Patient Active Problem List   Diagnosis    Type 2 diabetes mellitus treated with insulin    Dyslipidemia    Vitamin B 12 deficiency    Vitamin D deficiency    S/P KEVIN (total abdominal hysterectomy)    Major neurocognitive disorder    Anxiety and depression     Postsurgical menopause    Early onset Alzheimer's dementia    Facial cellulitis     Past Medical History:   Diagnosis Date    Bursitis     Dementia     Diabetic acetonemia     Fatigue     Fibromyalgia     Postsurgical menopause     Tetanus toxoid vaccination status unknown     Visual impairment     Vitamin D deficiency      Past Surgical History:   Procedure Laterality Date    BREAST BIOPSY Left 7-8 yrs ago    benign    CHOLECYSTECTOMY      HYSTERECTOMY      40s    KNEE ARTHROSCOPY W/ MENISCAL REPAIR Left      PT Assessment (last 12 hours)       PT Evaluation and Treatment       Row Name 12/29/23 1426          Physical Therapy Time and Intention    Subjective Information no complaints  -KM     Document Type evaluation  -KM     Mode of Treatment individual therapy;physical therapy  -KM     Patient Effort good  -KM     Symptoms Noted During/After Treatment none  -KM       Row Name 12/29/23 1426          General Information    Patient Profile Reviewed yes  -KM     Patient Observations alert;cooperative;agree to therapy  -KM     Prior Level of Function independent:;all household mobility;ADL's  -KM     Existing Precautions/Restrictions no known precautions/restrictions  -KM     Risks Reviewed patient:;LOB;nausea/vomiting;dizziness;increased discomfort  -KM     Benefits Reviewed patient:;improve function;increase independence;increase strength;increase balance  -KM     Barriers to Rehab cognitive status  -KM       Row Name 12/29/23 1426          Living Environment    Current Living Arrangements home  -KM     People in Home spouse  -KM     Primary Care Provided by child(belen);other (see comments)  -KM       Row Name 12/29/23 1426          Home Use of Assistive/Adaptive Equipment    Equipment Currently Used at Home none  -KM       Row Name 12/29/23 1426          Cognition    Affect/Mental Status (Cognition) confused  -KM     Orientation Status (Cognition) oriented to;person  -KM     Follows Commands (Cognition) follows  one-step commands;physical/tactile prompts required;repetition of directions required;verbal cues/prompting required  -KM       Row Name 12/29/23 1426          Range of Motion (ROM)    Range of Motion bilateral lower extremities;ROM is WFL  -KM       Row Name 12/29/23 1426          Strength (Manual Muscle Testing)    Strength (Manual Muscle Testing) bilateral lower extremities;strength is WFL  -       Row Name 12/29/23 1426          Bed Mobility    Bed Mobility bed mobility (all) activities  -KM     All Activities, [place] (Bed Mobility) independent  -KM       Row Name 12/29/23 1426          Transfers    Transfers sit-stand transfer;stand-sit transfer  -KM       Row Name 12/29/23 1426          Sit-Stand Transfer    Sit-Stand [place] (Transfers) independent  -KM       Row Name 12/29/23 1426          Stand-Sit Transfer    Stand-Sit [place] (Transfers) independent  -KM       Row Name 12/29/23 1426          Gait/Stairs (Locomotion)    Gait/Stairs Locomotion gait/ambulation independence;distance ambulated  -KM     [place] Level (Gait) supervision  -KM     Patient was able to Ambulate yes  -KM     Distance in Feet (Gait) 350  -KM     Pattern (Gait) step-through  -KM     Deviations/Abnormal Patterns (Gait) gait speed decreased  -KM       Row Name 12/29/23 1426          Safety Issues, Functional Mobility    Safety Issues Affecting Function (Mobility) judgment  -KM     Impairments Affecting Function (Mobility) cognition  -KM       Row Name 12/29/23 1426          Balance    Balance Assessment sitting static balance;standing dynamic balance  -KM     Static Sitting Balance independent  -KM     Position, Sitting Balance sitting in chair  -KM     Dynamic Standing Balance supervision  -KM       Row Name 12/29/23 1426          Plan of Care Review    Plan of Care Reviewed With patient  -KM     Outcome Evaluation Pt. evaluation completed during PT session. She was able to perform functional mobility skills  independently. She ambulated prolonged distance w/ no AD. Pt. does not demonstrate any functional deficits requiring skilled PT services at this time.  -       Row Name 12/29/23 1426          Therapy Assessment/Plan (PT)    Functional Level at Time of Evaluation (PT) independent  -     Criteria for Skilled Interventions Met (PT) no;does not meet criteria for skilled intervention  -     Therapy Frequency (PT) evaluation only  -       Row Name 12/29/23 1426          Therapy Plan Review/Discharge Plan (PT)    Therapy Plan Review (PT) evaluation/treatment results reviewed;patient  -               User Key  (r) = Recorded By, (t) = Taken By, (c) = Cosigned By      Initials Name Provider Type    Krystian Nobles, PT Physical Therapist                    Physical Therapy Education       Title: PT OT SLP Therapies (Done)       Topic: Physical Therapy (Done)       Point: Mobility training (Done)       Learning Progress Summary             Patient Acceptance, E,TB, VU by  at 12/29/2023 1453                         Point: Home exercise program (Done)       Learning Progress Summary             Patient Acceptance, E,TB, VU by  at 12/29/2023 1453                         Point: Body mechanics (Done)       Learning Progress Summary             Patient Acceptance, E,TB, VU by  at 12/29/2023 1453                         Point: Precautions (Done)       Learning Progress Summary             Patient Acceptance, E,TB, VU by  at 12/29/2023 1453                                         User Key       Initials Effective Dates Name Provider Type Discipline     05/24/22 -  Krystian Duran, TONE Physical Therapist PT                  PT Recommendation and Plan  Anticipated Discharge Disposition (PT): home with assist, home with supervision, home with 24/7 care  Therapy Frequency (PT): evaluation only  Plan of Care Reviewed With: patient  Outcome Evaluation: Pt. evaluation completed during PT session. She was able to perform  functional mobility skills independently. She ambulated prolonged distance w/ no AD. Pt. does not demonstrate any functional deficits requiring skilled PT services at this time.       Time Calculation:    PT Charges       Row Name 12/29/23 1426             Time Calculation    PT Received On 12/29/23  -KM                User Key  (r) = Recorded By, (t) = Taken By, (c) = Cosigned By      Initials Name Provider Type     Krystian Duran, PT Physical Therapist                  Therapy Charges for Today       Code Description Service Date Service Provider Modifiers Qty    30238032687 HC PT EVAL MOD COMPLEXITY 4 12/29/2023 Krystian Duran, PT GP 1            PT G-Codes  AM-PAC 6 Clicks Score (PT): 23    Krystian Duran PT  12/29/2023      Electronically signed by Krystian Duarn, PT at 12/29/23 1454       Occupational Therapy Notes (most recent note)    No notes exist for this encounter.       Speech Language Pathology Notes (most recent note)    No notes exist for this encounter.       Respiratory Therapy Notes (most recent note)    No notes exist for this encounter.       ADL Documentation (most recent)      Flowsheet Row Most Recent Value   Transferring 0 - independent   Toileting 0 - independent   Bathing 2 - assistive person   Dressing 2 - assistive person   Eating 2 - assistive person   Communication 2 - difficulty understanding (not related to language barrier)   Swallowing 0 - swallows foods/liquids without difficulty   Equipment Currently Used at Home none

## 2024-01-02 NOTE — PLAN OF CARE
Goal Outcome Evaluation:   Patient resting in bed. No complaints at this time. Vital signs stable. Pleasantly confused. Easy to redirect. Will continue to monitor.

## 2024-01-02 NOTE — PLAN OF CARE
Goal Outcome Evaluation:   Pt sitting up in chair. No signs or symptoms of distress noted. No complaints at this time. Plan of care on going.

## 2024-01-02 NOTE — CASE MANAGEMENT/SOCIAL WORK
Discharge Planning Assessment  [facility]     Patient Name: Sandra Leahy  MRN: [id number]  Today's Date: 1/2/2024    Admit Date: 12/25/2023       Discharge Plan       Row Name 01/02/24 1131       Plan    Plan SS spoke to [facility] per Jacklyn who states she will discuss referral with  due to Medicaid pending. Pt is independent with ambulation, therefore SNF pre-auth request will most likely be denied.     Ambulatory SW was working on long-term care placement prior to admit. SS contacted POA/daughter, Emma Norton [phone] who states pt was denied by [facility] and [facility] prior to admit. Spouse is still hospitalized in [place] and was pt's caregiver prior to admit. Dtr voices agreement to LTC placement outside of the area if needed. Dtr voices that pt does not have behaviors. Pt has declined cognitively over the past six months. Dtr voices she is unable to care for pt at home at this time.     SS contacted [facility] per Lillian and they do not have a bed available. SS sent a message to [facility], [facility] per Duc, [facility] Sarah and [facility] per Danielle to inquire about bed availability. SS sent referral to [facility] and notified Akash Tinsley, and [facility]. SS to follow.    11:59: SS received a message from [facility] per Sarah stating she anticipates bed to be available. [facility] request referral for review. SS sent referral to [facility]. SS to follow.     12:35: SS received a message from [facility] per Holly stating they will review referral. SS sent referral to [facility]. SS received call from Jacklyn at [facility] stating she discussed referral with . Jacklyn plans to contact POA/dtr to discuss possible Medicaid pending admit. SS to follow.                   Continued Care and Services - Admitted Since 12/25/2023       Destination       Service  Provider Request Status Selected Services Address Phone Fax Patient Preferred    [facility] Pending - Request Sent N/A [address] [facility] [address] [phone] --    [facility] Declined  Bed not available N/A [address] [facility] [address] [phone] --                  Expected Discharge Date and Time       Expected Discharge Date Expected Discharge Time    Jan 1, 2024         VERONICA Sequeira

## 2024-01-03 ENCOUNTER — APPOINTMENT (OUTPATIENT)
Dept: ULTRASOUND IMAGING | Facility: HOSPITAL | Age: 59
End: 2024-01-03
Payer: MEDICARE

## 2024-01-03 ENCOUNTER — APPOINTMENT (OUTPATIENT)
Dept: GENERAL RADIOLOGY | Facility: HOSPITAL | Age: 59
End: 2024-01-03
Payer: MEDICARE

## 2024-01-03 LAB
ALBUMIN SERPL-MCNC: 4 G/DL (ref 3.5–5.2)
ALBUMIN/GLOB SERPL: 1.2 G/DL
ALP SERPL-CCNC: 89 U/L (ref 39–117)
ALT SERPL W P-5'-P-CCNC: 18 U/L (ref 1–33)
ANION GAP SERPL CALCULATED.3IONS-SCNC: 13.1 MMOL/L (ref 5–15)
AST SERPL-CCNC: 23 U/L (ref 1–32)
BASOPHILS # BLD AUTO: 0.03 10*3/MM3 (ref 0–0.2)
BASOPHILS NFR BLD AUTO: 0.4 % (ref 0–1.5)
BILIRUB SERPL-MCNC: 0.3 MG/DL (ref 0–1.2)
BUN SERPL-MCNC: 19 MG/DL (ref 8–23)
BUN/CREAT SERPL: 20.4 (ref 7–25)
CALCIUM SPEC-SCNC: 9.1 MG/DL (ref 8.6–10.5)
CHLORIDE SERPL-SCNC: 103 MMOL/L (ref 98–107)
CO2 SERPL-SCNC: 22.9 MMOL/L (ref 22–29)
CREAT SERPL-MCNC: 0.93 MG/DL (ref 0.57–1)
CRP SERPL-MCNC: 0.58 MG/DL (ref 0–0.5)
D-LACTATE SERPL-SCNC: 1.2 MMOL/L (ref 0.5–2)
DEPRECATED RDW RBC AUTO: 40.9 FL (ref 37–54)
EGFRCR SERPLBLD CKD-EPI 2021: 70.5 ML/MIN/1.73
EOSINOPHIL # BLD AUTO: 0.05 10*3/MM3 (ref 0–0.4)
EOSINOPHIL NFR BLD AUTO: 0.7 % (ref 0.3–6.2)
ERYTHROCYTE [DISTWIDTH] IN BLOOD BY AUTOMATED COUNT: 11.9 % (ref 12.3–15.4)
FLUAV RNA RESP QL NAA+PROBE: NOT DETECTED
FLUBV RNA RESP QL NAA+PROBE: NOT DETECTED
GLOBULIN UR ELPH-MCNC: 3.4 GM/DL
GLUCOSE BLDC GLUCOMTR-MCNC: 193 MG/DL (ref 70–130)
GLUCOSE BLDC GLUCOMTR-MCNC: 225 MG/DL (ref 70–130)
GLUCOSE BLDC GLUCOMTR-MCNC: 227 MG/DL (ref 70–130)
GLUCOSE BLDC GLUCOMTR-MCNC: 269 MG/DL (ref 70–130)
GLUCOSE BLDC GLUCOMTR-MCNC: 274 MG/DL (ref 70–130)
GLUCOSE SERPL-MCNC: 262 MG/DL (ref 65–99)
HCT VFR BLD AUTO: 37.8 % (ref 34–46.6)
HGB BLD-MCNC: 12.4 G/DL (ref 12–15.9)
IMM GRANULOCYTES # BLD AUTO: 0.03 10*3/MM3 (ref 0–0.05)
IMM GRANULOCYTES NFR BLD AUTO: 0.4 % (ref 0–0.5)
LYMPHOCYTES # BLD AUTO: 1.35 10*3/MM3 (ref 0.7–3.1)
LYMPHOCYTES NFR BLD AUTO: 18.9 % (ref 19.6–45.3)
MCH RBC QN AUTO: 30.5 PG (ref 26.6–33)
MCHC RBC AUTO-ENTMCNC: 32.8 G/DL (ref 31.5–35.7)
MCV RBC AUTO: 92.9 FL (ref 79–97)
MONOCYTES # BLD AUTO: 1.3 10*3/MM3 (ref 0.1–0.9)
MONOCYTES NFR BLD AUTO: 18.2 % (ref 5–12)
NEUTROPHILS NFR BLD AUTO: 4.38 10*3/MM3 (ref 1.7–7)
NEUTROPHILS NFR BLD AUTO: 61.4 % (ref 42.7–76)
NRBC BLD AUTO-RTO: 0 /100 WBC (ref 0–0.2)
PLATELET # BLD AUTO: 265 10*3/MM3 (ref 140–450)
PMV BLD AUTO: 9.8 FL (ref 6–12)
POTASSIUM SERPL-SCNC: 3.8 MMOL/L (ref 3.5–5.2)
PROCALCITONIN SERPL-MCNC: 0.05 NG/ML (ref 0–0.25)
PROT SERPL-MCNC: 7.4 G/DL (ref 6–8.5)
RBC # BLD AUTO: 4.07 10*6/MM3 (ref 3.77–5.28)
SARS-COV-2 RNA RESP QL NAA+PROBE: DETECTED
SODIUM SERPL-SCNC: 139 MMOL/L (ref 136–145)
WBC NRBC COR # BLD AUTO: 7.14 10*3/MM3 (ref 3.4–10.8)

## 2024-01-03 PROCEDURE — 71045 X-RAY EXAM CHEST 1 VIEW: CPT | Performed by: RADIOLOGY

## 2024-01-03 PROCEDURE — 87636 SARSCOV2 & INF A&B AMP PRB: CPT | Performed by: INTERNAL MEDICINE

## 2024-01-03 PROCEDURE — 93970 EXTREMITY STUDY: CPT

## 2024-01-03 PROCEDURE — 63710000001 INSULIN LISPRO (HUMAN) PER 5 UNITS: Performed by: HOSPITALIST

## 2024-01-03 PROCEDURE — 80053 COMPREHEN METABOLIC PANEL: CPT | Performed by: HOSPITALIST

## 2024-01-03 PROCEDURE — 63710000001 INSULIN GLARGINE PER 5 UNITS: Performed by: HOSPITALIST

## 2024-01-03 PROCEDURE — 84145 PROCALCITONIN (PCT): CPT | Performed by: HOSPITALIST

## 2024-01-03 PROCEDURE — 25010000002 ENOXAPARIN PER 10 MG: Performed by: INTERNAL MEDICINE

## 2024-01-03 PROCEDURE — 86140 C-REACTIVE PROTEIN: CPT | Performed by: HOSPITALIST

## 2024-01-03 PROCEDURE — 85025 COMPLETE CBC W/AUTO DIFF WBC: CPT | Performed by: HOSPITALIST

## 2024-01-03 PROCEDURE — 83605 ASSAY OF LACTIC ACID: CPT | Performed by: HOSPITALIST

## 2024-01-03 PROCEDURE — 87040 BLOOD CULTURE FOR BACTERIA: CPT | Performed by: HOSPITALIST

## 2024-01-03 PROCEDURE — 99233 SBSQ HOSP IP/OBS HIGH 50: CPT | Performed by: INTERNAL MEDICINE

## 2024-01-03 PROCEDURE — 82948 REAGENT STRIP/BLOOD GLUCOSE: CPT

## 2024-01-03 PROCEDURE — 71045 X-RAY EXAM CHEST 1 VIEW: CPT

## 2024-01-03 RX ORDER — ENOXAPARIN SODIUM 100 MG/ML
40 INJECTION SUBCUTANEOUS EVERY 24 HOURS
Status: DISCONTINUED | OUTPATIENT
Start: 2024-01-03 | End: 2024-01-04 | Stop reason: HOSPADM

## 2024-01-03 RX ORDER — NITROGLYCERIN 0.4 MG/1
0.4 TABLET SUBLINGUAL
Status: DISCONTINUED | OUTPATIENT
Start: 2024-01-03 | End: 2024-01-04 | Stop reason: HOSPADM

## 2024-01-03 RX ADMIN — CETIRIZINE HYDROCHLORIDE 5 MG: 10 TABLET, FILM COATED ORAL at 09:16

## 2024-01-03 RX ADMIN — MEMANTINE HYDROCHLORIDE 10 MG: 10 TABLET ORAL at 09:16

## 2024-01-03 RX ADMIN — Medication 5 MG: at 23:09

## 2024-01-03 RX ADMIN — INSULIN GLARGINE 40 UNITS: 100 INJECTION, SOLUTION SUBCUTANEOUS at 09:19

## 2024-01-03 RX ADMIN — INSULIN LISPRO 5 UNITS: 100 INJECTION, SOLUTION INTRAVENOUS; SUBCUTANEOUS at 09:19

## 2024-01-03 RX ADMIN — ENOXAPARIN SODIUM 40 MG: 40 INJECTION SUBCUTANEOUS at 12:59

## 2024-01-03 RX ADMIN — DOCUSATE SODIUM 100 MG: 100 CAPSULE, LIQUID FILLED ORAL at 09:16

## 2024-01-03 RX ADMIN — Medication 2000 MCG: at 09:16

## 2024-01-03 RX ADMIN — INSULIN LISPRO 8 UNITS: 100 INJECTION, SOLUTION INTRAVENOUS; SUBCUTANEOUS at 11:21

## 2024-01-03 RX ADMIN — FAMOTIDINE 40 MG: 20 TABLET, FILM COATED ORAL at 09:16

## 2024-01-03 RX ADMIN — AMOXICILLIN AND CLAVULANATE POTASSIUM 1 TABLET: 875; 125 TABLET, FILM COATED ORAL at 20:43

## 2024-01-03 RX ADMIN — INSULIN LISPRO 3 UNITS: 100 INJECTION, SOLUTION INTRAVENOUS; SUBCUTANEOUS at 17:58

## 2024-01-03 RX ADMIN — DOCUSATE SODIUM 50 MG AND SENNOSIDES 8.6 MG 2 TABLET: 8.6; 5 TABLET, FILM COATED ORAL at 09:16

## 2024-01-03 RX ADMIN — MAGNESIUM GLUCONATE 500 MG ORAL TABLET 400 MG: 500 TABLET ORAL at 09:16

## 2024-01-03 RX ADMIN — AMOXICILLIN AND CLAVULANATE POTASSIUM 1 TABLET: 875; 125 TABLET, FILM COATED ORAL at 09:16

## 2024-01-03 RX ADMIN — OLANZAPINE 10 MG: 10 TABLET, FILM COATED ORAL at 17:58

## 2024-01-03 RX ADMIN — INSULIN LISPRO 8 UNITS: 100 INJECTION, SOLUTION INTRAVENOUS; SUBCUTANEOUS at 20:43

## 2024-01-03 RX ADMIN — PRAVASTATIN SODIUM 40 MG: 40 TABLET ORAL at 09:16

## 2024-01-03 NOTE — PROGRESS NOTES
[facility] HOSPITALIST PROGRESS NOTE     Patient Identification:  Name:  Sandra Leahy  Age:  58 y.o.  Sex:  female  :  1965  MRN:  [id number]  Visit Number:  [id number]  ROOM: [address] [place] [address]     Primary Care Provider:  John Mueller APRN    Length of stay in inpatient status:  9    Subjective     Chief Compliant:    Chief Complaint   Patient presents with    Abscess    Jaw Pain     History of Presenting Illness:    Patient remains ill but stable today, overnight with new fevers, initial workup unrevealing but obtained Coronavirus screen this AM and was positive, not meeting treatment criteria at this time but will continue to monitor, check d-dimer level, obtain bilateral venous duplex, add pharmacological DVT prophylaxis.   Objective     Current Hospital Meds:  amoxicillin-clavulanate, 1 tablet, Oral, Q12H  cetirizine, 5 mg, Oral, Daily  docusate sodium, 100 mg, Oral, Daily  famotidine, 40 mg, Oral, Daily  insulin glargine, 40 Units, Subcutaneous, Daily  insulin lispro, 3-14 Units, Subcutaneous, 4x Daily AC & at Bedtime  magnesium oxide, 400 mg, Oral, Daily  memantine, 10 mg, Oral, Daily  OLANZapine, 10 mg, Oral, Daily With Dinner  pravastatin, 40 mg, Oral, Daily  senna-docusate sodium, 2 tablet, Oral, BID  sodium chloride, 10 mL, Intravenous, Q12H  cyanocobalamin, 2,000 mcg, Oral, Daily      Pharmacy to Dose enoxaparin (LOVENOX),     ----------------------------------------------------------------------------------------------------------------------  Vital Signs:  Temp:  [97.7 °F (36.5 °C)-102.3 °F (39.1 °C)] 99.4 °F (37.4 °C)  Heart Rate:  [102-122] 102  Resp:  [17-20] 18  BP: (123-160)/(59-91) 124/64  SpO2:  [92 %-99 %] 92 %  on   ;   Device (Oxygen Therapy): room air  Body mass index is 21.96 kg/m².      Intake/Output Summary (Last 24 hours) at 1/3/2024 1040  Last data filed at 1/3/2024 1000  Gross per 24 hour   Intake 600 ml   Output --   Net 600 ml     "  ----------------------------------------------------------------------------------------------------------------------  Physical exam:  Constitutional:  older than stated age, No acute distress.      HENT:  Head:  Normocephalic and atraumatic.  Mouth:  Moist mucous membranes.    Eyes:  Conjunctivae and EOM are normal. No scleral icterus.    Neck:  Neck supple.  No JVD present.    Cardiovascular:  Normal rate, regular rhythm and normal heart sounds with no murmur.  Pulmonary/Chest:  No respiratory distress, no wheezes, no crackles, on room air  Abdominal:  Soft. No distension and no tenderness.   Musculoskeletal:  No tenderness, and no deformity.  No red or swollen joints anywhere.    Neurological:  Alert and oriented to person only.  No gross focal deficits   Skin:  Skin is warm and dry. No rash noted. No pallor.   Peripheral vascular:  No clubbing, no cyanosis, no edema.  Psychiatric: Appropriate mood and affect    Edited by: Tavon Alston MD at 1/3/2024 1040  ----------------------------------------------------------------------------------------------------------------------  WBC/HGB/HCT/PLT   7.14/12.4/37.8/265 (01/03 0203)  BUN/CREAT/GLUC/ALT/AST/REGIS/LIP    19/0.93/262/18/23/--/-- (01/03 0203)  PAM - Na/K/Cl/CO2: 139/3.8/103/22.9 (01/03 0203)     No results found for: \"URINECX\"  No results found for: \"BLOODCX\"    I have personally looked at the labs and they are summarized above.  ----------------------------------------------------------------------------------------------------------------------  Detailed radiology reports for the last 24 hours:  XR Chest 1 View    Result Date: 1/3/2024   1.  Normal heart size. 2.  Coarsened bronchovascular pattern to the lungs. 3.  No edema, pneumonia, pleural effusion, or pneumothorax. 4.  Mild levoconvex curvature at the mid thoracic spine.  This report was finalized on 1/3/2024 3:26 AM by James Ivy MD.     Assessment & Plan    #Coronavirus 19 Infection  - 1/2 " overnight with new fevers, infectious workup unrevealing initially, screened for Coronavirus 19 and now +, not meeting treatment criteria for Dexamethasone or Remdesivir at this time, check D-dimer and venous duplex, further workup as indicated. Trend Coronavirus 19 progression labs with CBC, CMP, D-dimer daily, add enhanced droplet/contact isolation precautions, continuous pulse ox    #Severe sepsis due to Facial cellulitis/Facial myositis - Improved   - Patient presented with facial swelling and pain and tenderness with CT consistent with cellulitis overlying the right mandible.  Also noted myositis of the underlying musculature. Case previously discussed by emergency room provider with  who recommended continued antibiotic therapy here at this facility with later referral for outpatient removal of tooth with abscess.   - There was no definitive abscess to drain and no involvement of the TMJ joint and therefore above recommendations with transition to oral antibiotics for an additional 10 days before follow-up with  [facility].  - Continue Augmentin x 10 days total, outpatient follow up at  if able.      #Non-oliguric Acute Kidney Injury, due to sepsis - Resolved   - Baseline creatinine 0.7, was up to 1.1 on admission, now back to baseline; Trend creatinine and urine output, avoid nephrotoxins, NSAIDs, dehydration and contrast as able.    #Advanced dementia with behavioral disturbances, POA  - Psychiatry consulted with recommendations   - Continue home medications, with increased Zyprexa given restlessness and noncompliance.  Patient redirectable.  - Will try high-dose thiamine as recommended by psychiatry.     #Diabetes mellitus type 2  - Basal and bolus insulin as needed to maintain glycemic control    F: Oral  E: Monitor & Replace as needed   N: Diet: Cardiac Diets, Diabetic Diets; Healthy Heart (2-3 Na+); Consistent Carbohydrate; Texture: Regular Texture (IDDSI 7); Fluid Consistency: Thin  (IDDSI 0)   Ppx: Prophylactic Lovenox (Level pending d-dimer)  Code Status (Patient has no pulse and is not breathing): CPR  Medical Interventions (Patient has pulse or is breathing): Full Support     Dispo: Pending workup and clinical improvement     *This patient is considered high risk due to new fevers, Coronavirus 19 infection.     Edited by: Tavon Alston MD at 1/3/2024 1040  [facility]

## 2024-01-03 NOTE — CASE MANAGEMENT/SOCIAL WORK
Discharge Planning Assessment  [facility]     Patient Name: Sandra Leahy  MRN: [id number]  Today's Date: 1/3/2024    Admit Date: 12/25/2023       Discharge Plan       Row Name 01/03/24 1232       Plan    Plan Pt is Covid positive on this date. Pt is ambulatory. Pt continues to have a sitter per RN due to not being on a secure unit. SS notified [facility] per Jacklyn. [facility] is agreeable to accept pt after admission papers are completed and financial information is provided by daughter. [facility] states daughter plans to complete admission papers on 1/4/24. [facility] per Jacklyn states SNF pre-authorization request is not required. Pt will be admitted Medicaid pending to [facility]. SS notified Dr. Alston. Pt could potentially be stable for discharge on 1/4/24. SS to follow.                  Continued Care and Services - Admitted Since 12/25/2023       Destination Coordination complete.      Service Provider Request Status Selected Services Address Phone Fax Patient Preferred    [facility]  Selected Nursing Home [address] [place] [address] [phone] --    [facility] Pending - Request Sent N/A [address] [facility] [address] [phone] --    [facility] Pending - Request Sent N/A [address] [facility] [address] [phone] --    [facility] Pending - Request Sent N/A [address] [facility] [address] [phone] --    [facility] Declined  Bed not available N/A [address] [address] [phone] --                  Expected Discharge Date and Time       Expected Discharge Date Expected Discharge Time    Lexa 3, 2024            Demographic Summary    No documentation.            VERONICA Sequeira

## 2024-01-03 NOTE — PLAN OF CARE
Goal Outcome Evaluation:              Outcome Evaluation: Pt transferred from 3S this shift with sitter at bedside. Pt is independent, alert to self, and on RA. Pt ran fever and refused any therapy to help bring down, Dr Berman notified. VSS and will continue with POC.

## 2024-01-03 NOTE — PLAN OF CARE
Goal Outcome Evaluation:           Progress: no change  Outcome Evaluation: pt resting in bed, no complaints from pt, pt tested postive for covid on this date, MD aware, pt remains confused but pleasant, will continue plan of care.

## 2024-01-04 VITALS
TEMPERATURE: 98.5 F | HEIGHT: 69 IN | OXYGEN SATURATION: 95 % | WEIGHT: 148.8 LBS | HEART RATE: 99 BPM | DIASTOLIC BLOOD PRESSURE: 57 MMHG | SYSTOLIC BLOOD PRESSURE: 110 MMHG | RESPIRATION RATE: 18 BRPM | BODY MASS INDEX: 22.04 KG/M2

## 2024-01-04 PROBLEM — D89.831 CYTOKINE RELEASE SYNDROME, GRADE 1: Status: ACTIVE | Noted: 2024-01-04

## 2024-01-04 LAB
ALBUMIN SERPL-MCNC: 3.7 G/DL (ref 3.5–5.2)
ALBUMIN/GLOB SERPL: 1.2 G/DL
ALP SERPL-CCNC: 77 U/L (ref 39–117)
ALT SERPL W P-5'-P-CCNC: 17 U/L (ref 1–33)
ANION GAP SERPL CALCULATED.3IONS-SCNC: 10.5 MMOL/L (ref 5–15)
AST SERPL-CCNC: 25 U/L (ref 1–32)
BILIRUB SERPL-MCNC: 0.3 MG/DL (ref 0–1.2)
BUN SERPL-MCNC: 21 MG/DL (ref 8–23)
BUN/CREAT SERPL: 23.6 (ref 7–25)
CALCIUM SPEC-SCNC: 8.9 MG/DL (ref 8.6–10.5)
CHLORIDE SERPL-SCNC: 105 MMOL/L (ref 98–107)
CO2 SERPL-SCNC: 25.5 MMOL/L (ref 22–29)
CREAT SERPL-MCNC: 0.89 MG/DL (ref 0.57–1)
D DIMER PPP FEU-MCNC: 0.39 MCGFEU/ML (ref 0–0.6)
DEPRECATED RDW RBC AUTO: 41.4 FL (ref 37–54)
EGFRCR SERPLBLD CKD-EPI 2021: 74.3 ML/MIN/1.73
EOSINOPHIL # BLD MANUAL: 0.09 10*3/MM3 (ref 0–0.4)
EOSINOPHIL NFR BLD MANUAL: 2 % (ref 0.3–6.2)
ERYTHROCYTE [DISTWIDTH] IN BLOOD BY AUTOMATED COUNT: 11.9 % (ref 12.3–15.4)
GLOBULIN UR ELPH-MCNC: 3.2 GM/DL
GLUCOSE BLDC GLUCOMTR-MCNC: 122 MG/DL (ref 70–130)
GLUCOSE BLDC GLUCOMTR-MCNC: 196 MG/DL (ref 70–130)
GLUCOSE BLDC GLUCOMTR-MCNC: 239 MG/DL (ref 70–130)
GLUCOSE SERPL-MCNC: 128 MG/DL (ref 65–99)
HCT VFR BLD AUTO: 37.1 % (ref 34–46.6)
HGB BLD-MCNC: 12.3 G/DL (ref 12–15.9)
LYMPHOCYTES # BLD MANUAL: 1.76 10*3/MM3 (ref 0.7–3.1)
LYMPHOCYTES NFR BLD MANUAL: 12 % (ref 5–12)
MCH RBC QN AUTO: 31.2 PG (ref 26.6–33)
MCHC RBC AUTO-ENTMCNC: 33.2 G/DL (ref 31.5–35.7)
MCV RBC AUTO: 94.2 FL (ref 79–97)
METAMYELOCYTES NFR BLD MANUAL: 2 % (ref 0–0)
MONOCYTES # BLD: 0.53 10*3/MM3 (ref 0.1–0.9)
NEUTROPHILS # BLD AUTO: 1.93 10*3/MM3 (ref 1.7–7)
NEUTROPHILS NFR BLD MANUAL: 41 % (ref 42.7–76)
NEUTS BAND NFR BLD MANUAL: 3 % (ref 0–5)
PLAT MORPH BLD: NORMAL
PLATELET # BLD AUTO: 241 10*3/MM3 (ref 140–450)
PMV BLD AUTO: 9.9 FL (ref 6–12)
POTASSIUM SERPL-SCNC: 3.8 MMOL/L (ref 3.5–5.2)
PROT SERPL-MCNC: 6.9 G/DL (ref 6–8.5)
RBC # BLD AUTO: 3.94 10*6/MM3 (ref 3.77–5.28)
RBC MORPH BLD: NORMAL
SCAN SLIDE: NORMAL
SODIUM SERPL-SCNC: 141 MMOL/L (ref 136–145)
VARIANT LYMPHS NFR BLD MANUAL: 40 % (ref 19.6–45.3)
WBC NRBC COR # BLD AUTO: 4.39 10*3/MM3 (ref 3.4–10.8)

## 2024-01-04 PROCEDURE — 99239 HOSP IP/OBS DSCHRG MGMT >30: CPT | Performed by: INTERNAL MEDICINE

## 2024-01-04 PROCEDURE — 97166 OT EVAL MOD COMPLEX 45 MIN: CPT

## 2024-01-04 PROCEDURE — 85025 COMPLETE CBC W/AUTO DIFF WBC: CPT | Performed by: INTERNAL MEDICINE

## 2024-01-04 PROCEDURE — 63710000001 INSULIN LISPRO (HUMAN) PER 5 UNITS: Performed by: HOSPITALIST

## 2024-01-04 PROCEDURE — 85379 FIBRIN DEGRADATION QUANT: CPT | Performed by: INTERNAL MEDICINE

## 2024-01-04 PROCEDURE — 80053 COMPREHEN METABOLIC PANEL: CPT | Performed by: INTERNAL MEDICINE

## 2024-01-04 PROCEDURE — 93970 EXTREMITY STUDY: CPT | Performed by: RADIOLOGY

## 2024-01-04 PROCEDURE — 82948 REAGENT STRIP/BLOOD GLUCOSE: CPT

## 2024-01-04 PROCEDURE — 85007 BL SMEAR W/DIFF WBC COUNT: CPT | Performed by: INTERNAL MEDICINE

## 2024-01-04 PROCEDURE — 25010000002 ENOXAPARIN PER 10 MG: Performed by: INTERNAL MEDICINE

## 2024-01-04 PROCEDURE — 63710000001 INSULIN GLARGINE PER 5 UNITS: Performed by: HOSPITALIST

## 2024-01-04 RX ORDER — AMOXICILLIN AND CLAVULANATE POTASSIUM 875; 125 MG/1; MG/1
1 TABLET, FILM COATED ORAL EVERY 12 HOURS SCHEDULED
Qty: 5 TABLET | Refills: 0 | Status: SHIPPED | OUTPATIENT
Start: 2024-01-04 | End: 2024-01-07

## 2024-01-04 RX ORDER — AMOXICILLIN AND CLAVULANATE POTASSIUM 875; 125 MG/1; MG/1
1 TABLET, FILM COATED ORAL EVERY 12 HOURS SCHEDULED
Qty: 20 TABLET | Refills: 0 | Status: CANCELLED | OUTPATIENT
Start: 2024-01-04 | End: 2024-01-07

## 2024-01-04 RX ORDER — OLANZAPINE 10 MG/1
10 TABLET ORAL
Start: 2024-01-04

## 2024-01-04 RX ORDER — INSULIN LISPRO 100 [IU]/ML
3-14 INJECTION, SOLUTION INTRAVENOUS; SUBCUTANEOUS
Qty: 1680 UNITS | Refills: 0 | Status: SHIPPED | OUTPATIENT
Start: 2024-01-04

## 2024-01-04 RX ADMIN — PRAVASTATIN SODIUM 40 MG: 40 TABLET ORAL at 09:10

## 2024-01-04 RX ADMIN — DOCUSATE SODIUM 100 MG: 100 CAPSULE, LIQUID FILLED ORAL at 09:09

## 2024-01-04 RX ADMIN — ENOXAPARIN SODIUM 40 MG: 40 INJECTION SUBCUTANEOUS at 11:46

## 2024-01-04 RX ADMIN — Medication 2000 MCG: at 09:10

## 2024-01-04 RX ADMIN — FAMOTIDINE 40 MG: 20 TABLET, FILM COATED ORAL at 09:09

## 2024-01-04 RX ADMIN — DOCUSATE SODIUM 50 MG AND SENNOSIDES 8.6 MG 2 TABLET: 8.6; 5 TABLET, FILM COATED ORAL at 09:10

## 2024-01-04 RX ADMIN — MEMANTINE HYDROCHLORIDE 10 MG: 10 TABLET ORAL at 09:09

## 2024-01-04 RX ADMIN — AMOXICILLIN AND CLAVULANATE POTASSIUM 1 TABLET: 875; 125 TABLET, FILM COATED ORAL at 09:09

## 2024-01-04 RX ADMIN — CETIRIZINE HYDROCHLORIDE 5 MG: 10 TABLET, FILM COATED ORAL at 09:10

## 2024-01-04 RX ADMIN — INSULIN GLARGINE 40 UNITS: 100 INJECTION, SOLUTION SUBCUTANEOUS at 09:09

## 2024-01-04 RX ADMIN — MAGNESIUM GLUCONATE 500 MG ORAL TABLET 400 MG: 500 TABLET ORAL at 09:10

## 2024-01-04 RX ADMIN — INSULIN LISPRO 5 UNITS: 100 INJECTION, SOLUTION INTRAVENOUS; SUBCUTANEOUS at 11:46

## 2024-01-04 NOTE — NURSING NOTE
Patient being discharged this shift. Returning to [facility]. Patient had no IV or tele box in place. Belongings will be sent with patient.

## 2024-01-04 NOTE — DISCHARGE SUMMARY
[facility] HOSPITALISTS DISCHARGE SUMMARY    Patient Identification:  Name:  Sandra Leahy  Age:  58 y.o.  Sex:  female  :  1965  MRN:  [id number]  Visit Number:  [id number]    Date of Admission: 2023  Date of Discharge:  2024     PCP: John Mueller APRN    DISCHARGE DIAGNOSIS  Severe sepsis - Resolved  Facial cellulitis/Facial myositis - Improved   Non-oliguric Acute Kidney Injury, due to sepsis - Resolved   Coronavirus 19 Infection - Stable   Advanced dementia with behavioral disturbances, POA  Diabetes mellitus type 2    CONSULTS   Consults       Date and Time Order Name Status Description    2023  9:00 AM Inpatient Psychiatrist Consult Completed     2023  5:24 AM Inpatient General Surgery Consult Completed           PROCEDURES PERFORMED  None    HOSPITAL COURSE  Patient is a 58 y.o. female presented to [facility] complaining of facial swelling.  Please see the admitting history and physical for further details.      #Severe sepsis due to Facial cellulitis/Facial myositis - Improved   Patient presented with facial swelling and pain and tenderness with CT consistent with cellulitis overlying the right mandible.  Also noted myositis of the underlying musculature. Case previously discussed by emergency room provider with [facility] who recommended continued antibiotic therapy here at this facility with later referral for outpatient removal of tooth with abscess. There was no definitive abscess to drain and no involvement of the TMJ joint and therefore above recommendations with transition to oral antibiotics for an additional 10 days before follow-up with [facility].  Patient will complete oral antibiotics in 5 doses, facial swelling has resolved, will discharge to nursing home for placement, follow up PCP 1 week, follow up [facility] 1 week.    #Non-oliguric Acute Kidney Injury, due to sepsis - Resolved   Baseline creatinine 0.7, was up to 1.1  on admission, now back to baseline, follow up PCP for basic labs.     #Coronavirus 19 Infection  1/2 overnight with new fevers, infectious workup unrevealing initially, screened for Coronavirus 19 and now +, not meeting treatment criteria for Dexamethasone or Remdesivir at this time, venous duplex negative for DVTs, patient continues to sat high 90's on room air. No treatments needed. Would continue isolation for 5 days total.     #Advanced dementia with behavioral disturbances, POA  Psychiatry consulted with recommendations.  Continued home regimen plus increased dose Zyprexa. Has been appropriate and redirectable.     #Diabetes mellitus type 2  Continue new long acting and SSI upon discharge, follow up PCP.     Edited by: Tavon Alston MD at 1/4/2024 1231    VITAL SIGNS:  Temp:  [98.6 °F (37 °C)-99.1 °F (37.3 °C)] 98.6 °F (37 °C)  Heart Rate:  [] 94  Resp:  [18-20] 18  BP: (100-135)/(62-71) 106/67  SpO2:  [96 %-98 %] 97 %  on   ;   Device (Oxygen Therapy): room air    Body mass index is 21.96 kg/m².  Wt Readings from Last 3 Encounters:   01/03/24 67.5 kg (148 lb 12.8 oz)   11/25/23 66.1 kg (145 lb 12.8 oz)   11/25/23 63.5 kg (140 lb)     PHYSICAL EXAM:  Constitutional:  older than stated age, No acute distress.      HENT:  Head:  Normocephalic and atraumatic.  Mouth:  Moist mucous membranes.    Eyes:  Conjunctivae and EOM are normal. No scleral icterus.    Neck:  Neck supple.  No JVD present.    Cardiovascular:  Normal rate, regular rhythm and normal heart sounds with no murmur.  Pulmonary/Chest:  No respiratory distress, no wheezes, no crackles, on room air  Abdominal:  Soft. No distension and no tenderness.   Musculoskeletal:  No tenderness, and no deformity.  No red or swollen joints anywhere.    Neurological:  Alert and oriented to person only.  No gross focal deficits   Skin:  Skin is warm and dry. No rash noted. No pallor.   Peripheral vascular:  No clubbing, no cyanosis, no edema.  Psychiatric:  Appropriate mood and affect    *Examination stable today 1/4    Edited by: Tavon Alston MD at 1/4/2024 1229    DISCHARGE DISPOSITION   Stable    DISCHARGE MEDICATIONS:     Discharge Medications        New Medications        Instructions Start Date   amoxicillin-clavulanate 875-125 MG per tablet  Commonly known as: AUGMENTIN   1 tablet, Oral, Every 12 Hours Scheduled      insulin glargine 100 UNIT/ML injection  Commonly known as: LANTUS, SEMGLEE   40 Units, Subcutaneous, Daily   Start Date: January 5, 2024     Insulin Lispro 100 UNIT/ML injection  Commonly known as: humaLOG   3-14 Units, Subcutaneous, 4 Times Daily Before Meals & Nightly             Continue These Medications        Instructions Start Date   cetirizine 10 MG tablet  Commonly known as: EQ Allergy Relief (Cetirizine)   5 mg, Oral, Daily      cyanocobalamin 2000 MCG tablet  Commonly known as: [facility] Vitamin B-12   2,000 mcg, Oral, Daily      docusate sodium 100 MG capsule  Commonly known as: Colace   100 mg, Oral, Daily      famotidine 40 MG tablet  Commonly known as: Pepcid   40 mg, Oral, Daily      magnesium oxide 250 MG tablet   250 mg, Oral, Daily, OTC      memantine 10 MG tablet  Commonly known as: NAMENDA   10 mg, Oral, Daily      metFORMIN 1000 MG tablet  Commonly known as: GLUCOPHAGE   1,000 mg, Oral, 2 Times Daily With Meals      OLANZapine 7.5 MG tablet  Commonly known as: zyPREXA   7.5 mg, Oral, Daily With Dinner      pravastatin 40 MG tablet  Commonly known as: PRAVACHOL   40 mg, Oral, Daily             Stop These Medications      Insulin Degludec 200 UNIT/ML solution pen-injector pen injection  Commonly known as: TRESIBA FLEXTOUCH     Trintellix 20 MG tablet  Generic drug: Vortioxetine HBr            Diet Instructions    Cardiac, Consistent Carb         Activity Instructions       Activity as Tolerated            Additional Instructions for the Follow-ups that You Need to Schedule       Discharge Follow-up with PCP   As directed        Currently Documented PCP:    John Mueller APRN    PCP Phone Number:    [phone]     Follow Up Details: 1 week post hospital discharge        Discharge Follow-up with Specified Provider:  Dentistry 1 week   As directed      To:  Dentistry 1 week               Contact information for follow-up providers       John Mueller APRN .    Specialty: Family Medicine  Why: 1 week post hospital discharge  Contact information:  [address] [facility] [address]  [phone]                       Contact information for after-discharge care       Destination       [facility] .    Service: Nursing Home  Contact information:  [address] [place] [address]  [phone]                                  TEST  RESULTS PENDING AT DISCHARGE  Pending Labs       Order Current Status    Blood Culture - Blood, Arm, Left Preliminary result    Blood Culture - Blood, Arm, Right Preliminary result          CODE STATUS  Code Status and Medical Interventions:   Ordered at: 12/25/23 0436     Code Status (Patient has no pulse and is not breathing):    CPR (Attempt to Resuscitate)     Medical Interventions (Patient has pulse or is breathing):    Full Support     Tavon Alston MD  [facility]  01/04/24  12:31 EST    Please note that this discharge summary required more than 30 minutes to complete.

## 2024-01-04 NOTE — CASE MANAGEMENT/SOCIAL WORK
Discharge Planning Assessment  [facility]     Patient Name: Sandra Leahy  MRN: [id number]  Today's Date: 1/4/2024    Admit Date: 12/25/2023     Discharge Plan       Row Name 01/04/24 1335       Plan    Final Discharge Disposition Code 03 - skilled nursing facility (SNF)    Final Note Pt has discharge orders. SS notified [facility] per Jacklyn. SS faxed discharge clinicals to [facility]. SS to follow.    14:06: RN to call report to [facility]. SS to follow.     14:57: SS spoke to POA/daughter, Emma. POA is agreeable for pt to be discharged to [facility]. POA plans to discuss transporting pt to [facility] with her brother. POA to call SS back. SS to follow.     16:50: SS spoke to POA and she is present to transport pt to [facility]. No other needs identified.                  Continued Care and Services - Admitted Since 12/25/2023       Destination Coordination complete.      Service Provider Request Status Selected Services Address Phone Fax Patient Preferred    [facility]  Selected Nursing Home [address] [place] [address] [phone] --    [facility] Pending - Request Sent N/A [address] [facility] [address] [phone] --    [facility] Pending - Request Sent N/A [address] [facility] [address] [phone] --    [facility] Pending - Request Sent N/A [address] [facility] [address] [phone] --    [facility] Declined  Bed not available N/A [address] [address] [phone] --                    Expected Discharge Date and Time       Expected Discharge Date Expected Discharge Time    Jan 4, 2024         VERONICA Sequeira

## 2024-01-04 NOTE — THERAPY EVALUATION
Patient Name: Sandra Leahy  : 1965    MRN: [id number]                              Today's Date: 2024       Admit Date: 2023    Visit Dx:     ICD-10-CM ICD-9-CM   1. Facial cellulitis  L03.211 682.0   2. Severe sepsis  A41.9 038.9    R65.20 995.92   3. Severe dementia associated with other underlying disease, with anxiety  F02.C4 294.11     Patient Active Problem List   Diagnosis    Type 2 diabetes mellitus treated with insulin    Dyslipidemia    Vitamin B 12 deficiency    Vitamin D deficiency    S/P KEVIN (total abdominal hysterectomy)    Major neurocognitive disorder    Anxiety and depression    Postsurgical menopause    Early onset Alzheimer's dementia    Facial cellulitis    Cytokine release syndrome, grade 1     Past Medical History:   Diagnosis Date    Bursitis     Dementia     Diabetic acetonemia     Fatigue     Fibromyalgia     Postsurgical menopause     Tetanus toxoid vaccination status unknown     Visual impairment     Vitamin D deficiency      Past Surgical History:   Procedure Laterality Date    BREAST BIOPSY Left 7-8 yrs ago    benign    CHOLECYSTECTOMY      HYSTERECTOMY      40s    KNEE ARTHROSCOPY W/ MENISCAL REPAIR Left       General Information       Row Name 24 1314          OT Time and Intention    Document Type evaluation  -LA     Mode of Treatment occupational therapy  -LA       Row Name 24 1314          General Information    Patient Profile Reviewed yes  -LA     Prior Level of Function independent:;ADL's;all household mobility  verba cues required due to cognition  -LA     Existing Precautions/Restrictions no known precautions/restrictions  -LA     Barriers to Rehab cognitive status  -LA       Row Name 24 1314          Occupational Profile    Reason for Services/Referral (Occupational Profile) OT to assess for changes in level of independence/safety with ADLs  -LA     Patient Goals (Occupational Profile) unable to state  -LA       Row Name 24 1148           Living Environment    People in Home --  unknown  -[facility] Name 01/04/24 1314          Cognition    Orientation Status (Cognition) oriented to;person  Patient poor historian  -[facility] Name 01/04/24 1314          Safety Issues, Functional Mobility    Safety Issues Affecting Function (Mobility) judgment  -LA     Impairments Affecting Function (Mobility) cognition  -LA     Cognitive Impairments, Mobility Safety/Performance problem-solving/reasoning;safety precaution follow-through  -LA               User Key  (r) = Recorded By, (t) = Taken By, (c) = Cosigned By      Initials Name Provider Type    Cinthia Alcala OT Occupational Therapist                     Mobility/ADL's       Row Name 01/04/24 1316          Bed Mobility    Bed Mobility bed mobility (all) activities  -LA     All Activities, [place] (Bed Mobility) independent  -LA       Row Name 01/04/24 1316          Transfers    Transfers bed-chair transfer;toilet transfer  -LA       Row Name 01/04/24 1316          Bed-Chair Transfer    Bed-Chair [place] (Transfers) independent;supervision  -LA       Row Name 01/04/24 1316          Sit-Stand Transfer    Sit-Stand [place] (Transfers) independent;supervision  -LA       Row Name 01/04/24 1316          Stand-Sit Transfer    Stand-Sit [place] (Transfers) independent  -LA       Row Name 01/04/24 1316          Toilet Transfer    Type (Toilet Transfer) stand pivot/stand step  -LA     [place] Level (Toilet Transfer) supervision  -LA       Row Name 01/04/24 1316          Functional Mobility    Functional Mobility- Ind. Level supervision required  -LA       Row Name 01/04/24 1316          Activities of Daily Living    BADL Assessment/Intervention bathing;upper body dressing;lower body dressing;grooming;toileting;feeding  -LA       Row Name 01/04/24 1316          Bathing Assessment/Intervention    [place] Level (Bathing) bathing skills;set up;supervision  -LA       Row Name 01/04/24  1316          Upper Body Dressing Assessment/Training    [place] Level (Upper Body Dressing) upper body dressing skills;set up;supervision  -LA       Row Name 01/04/24 1316          Lower Body Dressing Assessment/Training    [place] Level (Lower Body Dressing) set up;supervision  -LA       Row Name 01/04/24 1316          Grooming Assessment/Training    [place] Level (Grooming) set up;independent  -LA       Row Name 01/04/24 1316          Toileting Assessment/Training    [place] Level (Toileting) supervision;standby assist  -LA       Row Name 01/04/24 1316          Self-Feeding Assessment/Training    [place] Level (Feeding) independent  -LA               User Key  (r) = Recorded By, (t) = Taken By, (c) = Cosigned By      Initials Name Provider Type    Cinthia Alcala OT Occupational Therapist                   Obj/Interventions       [facility] Name 01/04/24 1317          Sensory Assessment (Somatosensory)    Sensory Assessment (Somatosensory) sensation intact  -LA       Row Name 01/04/24 1317          Vision Assessment/Intervention    Visual Impairment/Limitations WFL  -LA       Row Name 01/04/24 1317          Range of Motion Comprehensive    General Range of Motion no range of motion deficits identified  -LA     Comment, General Range of Motion UE ROM grossly WFL  -LA       Row Name 01/04/24 1317          Strength Comprehensive (MMT)    General Manual Muscle Testing (MMT) Assessment no strength deficits identified  -LA     Comment, General Manual Muscle Testing (MMT) Assessment UE strength grossly 4+/5  -LA       Row Name 01/04/24 1317          Motor Skills    Motor Skills coordination;functional endurance  -LA     Coordination WFL  -LA     Functional Endurance good  -LA       Row Name 01/04/24 1317          Balance    Balance Assessment sitting static balance;sitting dynamic balance  -LA     Static Sitting Balance independent  -LA     Dynamic Sitting Balance independent  -LA     Dynamic  Standing Balance supervision  -LA               User Key  (r) = Recorded By, (t) = Taken By, (c) = Cosigned By      Initials Name Provider Type    Cinthia Alcala OT Occupational Therapist                   Goals/Plan    No documentation.                  Clinical Impression       Row Name 01/04/24 1318          Plan of Care Review    Plan of Care Reviewed With patient  -LA     Outcome Evaluation OT evaluation completed this date. Patient appears to be at baseline with independence/safety with ADLs. Patient does require cues for safety; sitter present during evaluation. No further OT indicated at this time. Patient to d/c to SNF per nursing.  -LA       Row Name 01/04/24 1318          Therapy Assessment/Plan (OT)    Criteria for Skilled Therapeutic Interventions Met (OT) no problems identified which require skilled intervention  -LA     Therapy Frequency (OT) evaluation only  -[facility] Name 01/04/24 1318          Therapy Plan Review/Discharge Plan (OT)    Anticipated Discharge Disposition (OT) skilled nursing facility  -[facility] Name 01/04/24 1318          Positioning and Restraints    Pre-Treatment Position in bed  -LA     Post Treatment Position bed  -LA     In Bed sitting EOB;call light within reach;encouraged to call for assist;exit alarm on;with other staff  sitter present at OT departure  -LA               User Key  (r) = Recorded By, (t) = Taken By, (c) = Cosigned By      Initials Name Provider Type    Cinthia Alcala OT Occupational Therapist                   Outcome Measures    No documentation.                     OT Recommendation and Plan  Therapy Frequency (OT): evaluation only  Plan of Care Review  Plan of Care Reviewed With: patient  Outcome Evaluation: OT evaluation completed this date. Patient appears to be at baseline with independence/safety with ADLs. Patient does require cues for safety; sitter present during evaluation. No further OT indicated at this time. Patient to d/c to SNF  per nursing.     Time Calculation:          Therapy Charges for Today       Code Description Service Date Service Provider Modifiers Qty    [id number] HC OT EVAL MOD COMPLEXITY 4 1/4/2024 Cinthia Hirsch OT GO 1                 Cinthia Hirsch OT  1/4/2024

## 2024-01-04 NOTE — DISCHARGE PLACEMENT REQUEST
"Sandra Leahy (58 y.o. Female)       Date of Birth   1965    Social Security Number       Address   [address]  [facility] [address]    Home Phone   [phone]    MRN   [id number]       Zoroastrianism   Unknown    Marital Status                               Admission Date   12/25/23    Admission Type   Emergency    Admitting Provider   Bartolo Berman MD    Attending Provider   Tavon Alston MD    Department, Room/Bed   [address], 3335/1P       Discharge Date       Discharge Disposition   Skilled Nursing Facility (DC - External)    Discharge Destination                                 Attending Provider: Tavon Alston MD    Allergies: Lantus [Insulin Glargine], Codeine, Exenatide, Sitagliptin    Isolation: Enh Drop/Con   Infection: COVID (confirmed) (01/03/24)   Code Status: CPR    Ht: 175.3 cm (69.02\")   Wt: 67.5 kg (148 lb 12.8 oz)    Admission Cmt: None   Principal Problem: Facial cellulitis [L03.211]                   Active Insurance as of 12/25/2023       Primary Coverage       Payor Plan Insurance Group Employer/Plan Group    HUMANA MEDICARE REPLACEMENT HUMANA MEDICARE REPLACEMENT [id number]       Payor Plan Address Payor Plan Phone Number Payor Plan Fax Number Effective Dates    [address] [address] [phone]  2/1/2020 - None Entered    Formerly [facility] [id number]         Subscriber Name Subscriber Birth Date Member ID       SANDRA LEAHY 1965 [id number]                     Emergency Contacts        (Rel.) Home Phone Work Phone Mobile Phone    Charles Leahy (Spouse) [phone] -- --    Huberemilio (Daughter) [phone] -- --              Current Facility-Administered Medications   Medication Dose Route Frequency Provider Last Rate Last Admin    amoxicillin-clavulanate (AUGMENTIN) 875-125 MG per tablet 1 tablet  1 tablet Oral Q12H Tulio Sifuentes DO   1 tablet at 01/04/24 0909    sennosides-docusate (PERICOLACE) 8.6-50 MG per tablet 2 tablet  2 tablet " Oral BID Bartolo Berman MD   2 tablet at 01/04/24 0910    And    polyethylene glycol (MIRALAX) packet 17 g  17 g Oral Daily PRN Bartolo Berman MD        And    bisacodyl (DULCOLAX) EC tablet 5 mg  5 mg Oral Daily PRN Bartolo Berman MD        And    bisacodyl (DULCOLAX) suppository 10 mg  10 mg Rectal Daily PRN Bartolo Berman MD        cetirizine (zyrTEC) tablet 5 mg  5 mg Oral Daily Tulio Sifuentes DO   5 mg at 01/04/24 0910    dextrose (D50W) (25 g/50 mL) IV injection 25 g  25 g Intravenous Q15 Min PRN Bartolo Berman MD        dextrose (D50W) (25 g/50 mL) IV injection 25 g  25 g Intravenous Q15 Min PRBartolo Smith MD        dextrose (GLUTOSE) oral gel 15 g  15 g Oral Q15 Min PRN Bartolo Berman MD        dextrose (GLUTOSE) oral gel 15 g  15 g Oral Q15 Min PRN Bartolo Berman MD        docusate sodium (COLACE) capsule 100 mg  100 mg Oral Daily Tulio Sifuentes DO   100 mg at 01/04/24 0909    Enoxaparin Sodium (LOVENOX) syringe 40 mg  40 mg Subcutaneous Q24H Tavon Alston MD   40 mg at 01/04/24 1146    famotidine (PEPCID) tablet 40 mg  40 mg Oral Daily Tulio Sifuentes DO   40 mg at 01/04/24 0909    glucagon HCl (Diagnostic) injection 1 mg  1 mg Intramuscular Q15 Min PRN Bartolo Berman MD        insulin glargine (LANTUS, SEMGLEE) injection 40 Units  40 Units Subcutaneous Daily Bartolo Berman MD   40 Units at 01/04/24 0909    Insulin Lispro (humaLOG) injection 3-14 Units  3-14 Units Subcutaneous 4x Daily AC & at Bedtime Bartolo Berman MD   5 Units at 01/04/24 1146    magnesium oxide (MAG-OX) tablet 400 mg  400 mg Oral Daily Tulio Sifuentes DO   400 mg at 01/04/24 0910    melatonin tablet 5 mg  5 mg Oral Nightly PRN Jody Adkins PA-C   5 mg at 01/03/24 2309    memantine (NAMENDA) tablet 10 mg  10 mg Oral Daily Tulio Sifuentes DO   10 mg at 01/04/24 0909    nitroglycerin (NITROSTAT) SL tablet 0.4 mg  0.4 mg Sublingual Q5 Min PRN  Tavon Alston MD        OLANZapine (zyPREXA) tablet 10 mg  10 mg Oral Daily With Dinner Tulio Sifuentes DO   10 mg at 01/03/24 1758    Pharmacy to Dose enoxaparin (LOVENOX)   Does not apply Continuous PRN Tavon Alston MD        Potassium Replacement - Follow Nurse / BPA Driven Protocol   Does not apply PRN Jody Adkins PA-C        pravastatin (PRAVACHOL) tablet 40 mg  40 mg Oral Daily Tulio Sifuentes DO   40 mg at 01/04/24 0910    sodium chloride 0.9 % flush 10 mL  10 mL Intravenous Q12H aBrtolo Berman MD   10 mL at 01/01/24 0819    sodium chloride 0.9 % flush 10 mL  10 mL Intravenous PRN Bartolo Berman MD        sodium chloride 0.9 % infusion 40 mL  40 mL Intravenous PRN Bartolo Berman MD   40 mL at 12/29/23 2132    vitamin B-12 (CYANOCOBALAMIN) tablet 2,000 mcg  2,000 mcg Oral Daily Tulio Sifuentes DO   2,000 mcg at 01/04/24 0910    ziprasidone (GEODON) 10 mg in sterile water (preservative free) 0.5 mL injection  10 mg Intramuscular Once PRN Tulio Sifuentes DO         Lab Results (most recent)       Procedure Component Value Units Date/Time    POC Glucose Once [[id number]]  (Abnormal) Collected: 01/04/24 1127    Specimen: Blood Updated: 01/04/24 1133     Glucose 239 mg/dL     POC Glucose Once [[id number]]  (Normal) Collected: 01/04/24 0618    Specimen: Blood Updated: 01/04/24 0626     Glucose 122 mg/dL     CBC & Differential [[id number]]  (Abnormal) Collected: 01/04/24 0308    Specimen: Blood Updated: 01/04/24 0443    Narrative:      The following orders were created for panel order CBC & Differential.  Procedure                               Abnormality         Status                     ---------                               -----------         ------                     CBC Auto Differential[[id number]]        Abnormal            Final result               Scan Slide[[id number]]                                       Final result                 Please view results for these tests on  the individual orders.    CBC Auto Differential [[id number]]  (Abnormal) Collected: 01/04/24 0308    Specimen: Blood Updated: 01/04/24 0443     WBC 4.39 10*3/mm3      RBC 3.94 10*6/mm3      Hemoglobin 12.3 g/dL      Hematocrit 37.1 %      MCV 94.2 fL      MCH 31.2 pg      MCHC 33.2 g/dL      RDW 11.9 %      RDW-SD 41.4 fl      MPV 9.9 fL      Platelets 241 10*3/mm3     Scan Slide [[id number]] Collected: 01/04/24 0308    Specimen: Blood Updated: 01/04/24 0443     Scan Slide --     Comment: See Manual Differential Results       Manual Differential [[id number]]  (Abnormal) Collected: 01/04/24 0308    Specimen: Blood Updated: 01/04/24 0443     Neutrophil % 41.0 %      Lymphocyte % 40.0 %      Monocyte % 12.0 %      Eosinophil % 2.0 %      Bands %  3.0 %      Metamyelocyte % 2.0 %      Neutrophils Absolute 1.93 10*3/mm3      Lymphocytes Absolute 1.76 10*3/mm3      Monocytes Absolute 0.53 10*3/mm3      Eosinophils Absolute 0.09 10*3/mm3      RBC Morphology Normal     Platelet Morphology Normal    Comprehensive Metabolic Panel [[id number]]  (Abnormal) Collected: 01/04/24 0308    Specimen: Blood Updated: 01/04/24 0343     Glucose 128 mg/dL      BUN 21 mg/dL      Creatinine 0.89 mg/dL      Sodium 141 mmol/L      Potassium 3.8 mmol/L      Chloride 105 mmol/L      CO2 25.5 mmol/L      Calcium 8.9 mg/dL      Total Protein 6.9 g/dL      Albumin 3.7 g/dL      ALT (SGPT) 17 U/L      AST (SGOT) 25 U/L      Alkaline Phosphatase 77 U/L      Total Bilirubin 0.3 mg/dL      Globulin 3.2 gm/dL      A/G Ratio 1.2 g/dL      BUN/Creatinine Ratio 23.6     Anion Gap 10.5 mmol/L      eGFR 74.3 mL/min/1.73     Narrative:      GFR Normal >60  Chronic Kidney Disease <60  Kidney Failure <15      D-dimer, Quantitative [[id number]]  (Normal) Collected: 01/04/24 0308    Specimen: Blood Updated: 01/04/24 0333     D-Dimer, Quantitative 0.39 MCGFEU/mL     Narrative:      According to the assay 's published package insert, a normal (<0.50  "MCGFEU/mL) D-dimer result in conjunction with a non-high clinical probability assessment, excludes deep vein thrombosis (DVT) and pulmonary embolism (PE) with high sensitivity.    D-dimer values increase with age and this can make VTE exclusion of an older population difficult. To address this, the American College of [employer], based on best available evidence and recent guidelines, recommends that clinicians use age-adjusted D-dimer thresholds in patients greater than 50 years of age with: a) a low probability of PE who do not meet all Pulmonary Embolism Rule Out Criteria, or b) in those with intermediate probability of PE.   The formula for an age-adjusted D-dimer cut-off is \"age/100\".  For example, a 60 year old patient would have an age-adjusted cut-off of 0.60 MCGFEU/mL and an 80 year old 0.80 MCGFEU/mL.    Blood Culture - Blood, Arm, Left [[id number]]  (Normal) Collected: 01/03/24 0203    Specimen: Blood from Arm, Left Updated: 01/04/24 0215     Blood Culture No growth at 24 hours    Blood Culture - Blood, Arm, Right [[id number]]  (Normal) Collected: 01/03/24 0203    Specimen: Blood from Arm, Right Updated: 01/04/24 0215     Blood Culture No growth at 24 hours    COVID PRE-OP / PRE-PROCEDURE SCREENING ORDER (NO ISOLATION) - Swab, Nasopharynx [[id number]]  (Abnormal) Collected: 01/03/24 0922    Specimen: Swab from Nasopharynx Updated: 01/03/24 1021    Narrative:      The following orders were created for panel order COVID PRE-OP / PRE-PROCEDURE SCREENING ORDER (NO ISOLATION) - Swab, Nasopharynx.  Procedure                               Abnormality         Status                     ---------                               -----------         ------                     COVID-19 and FLU A/B PCR...[[id number]]  Abnormal            Final result                 Please view results for these tests on the individual orders.    COVID-19 and FLU A/B PCR, 1 HR TAT - Swab, Nasopharynx [[id number]]  (Abnormal) Collected: " "01/03/24 0922    Specimen: Swab from Nasopharynx Updated: 01/03/24 1021     COVID19 Detected     Influenza A PCR Not Detected     Influenza B PCR Not Detected    Narrative:      Fact sheet for providers: https://www.fda.gov/media/016936/download    Fact sheet for patients: https://www.fda.gov/media/142544/download    Test performed by PCR.  Influenza A and Influenza B negative results should be considered presumptive in samples that have a positive SARS-CoV-2 result.    Competitive inhibition studies showed that SARS-CoV-2 virus, when present at concentrations above 3.6E+04 copies/mL, can inhibit the detection and amplification of influenza A and influenza B virus RNA if present at or below 1.8E+02 copies/mL or 4.9E+02 copies/mL, respectively, and may lead to false negative influenza virus results. If co-infection with influenza A or influenza B virus is suspected in samples with a positive SARS-CoV-2 result, the sample should be re-tested with another FDA cleared, approved, or authorized influenza test, if influenza virus detection would change clinical management.    Procalcitonin [[id number]]  (Normal) Collected: 01/03/24 0203    Specimen: Blood Updated: 01/03/24 0243     Procalcitonin 0.05 ng/mL     Narrative:      As a Marker for Sepsis (Non-Neonates):    1. <0.5 ng/mL represents a low risk of severe sepsis and/or septic shock.  2. >2 ng/mL represents a high risk of severe sepsis and/or septic shock.    As a Marker for Lower Respiratory Tract Infections that require antibiotic therapy:    PCT on Admission    Antibiotic Therapy       6-12 Hrs later    >0.5                Strongly Recommended  >0.25 - <0.5        Recommended   0.1 - 0.25          Discouraged              Remeasure/reassess PCT  <0.1                Strongly Discouraged     Remeasure/reassess PCT    As 28 day mortality risk marker: \"Change in Procalcitonin Result\" (>80% or <=80%) if Day 0 (or Day 1) and Day 4 values are available. Refer to " http://www.[facility]-pct-calculator.com    Change in PCT <=80%  A decrease of PCT levels below or equal to 80% defines a positive change in PCT test result representing a higher risk for 28-day all-cause mortality of patients diagnosed with severe sepsis for septic shock.    Change in PCT >80%  A decrease of PCT levels of more than 80% defines a negative change in PCT result representing a lower risk for 28-day all-cause mortality of patients diagnosed with severe sepsis or septic shock.       Comprehensive Metabolic Panel [[id number]]  (Abnormal) Collected: 01/03/24 0203    Specimen: Blood Updated: 01/03/24 0235     Glucose 262 mg/dL      BUN 19 mg/dL      Creatinine 0.93 mg/dL      Sodium 139 mmol/L      Potassium 3.8 mmol/L      Comment: Slight hemolysis detected by analyzer. Result may be falsely elevated.        Chloride 103 mmol/L      CO2 22.9 mmol/L      Calcium 9.1 mg/dL      Total Protein 7.4 g/dL      Albumin 4.0 g/dL      ALT (SGPT) 18 U/L      AST (SGOT) 23 U/L      Alkaline Phosphatase 89 U/L      Total Bilirubin 0.3 mg/dL      Globulin 3.4 gm/dL      A/G Ratio 1.2 g/dL      BUN/Creatinine Ratio 20.4     Anion Gap 13.1 mmol/L      eGFR 70.5 mL/min/1.73     Narrative:      GFR Normal >60  Chronic Kidney Disease <60  Kidney Failure <15      C-reactive Protein [[id number]]  (Abnormal) Collected: 01/03/24 0203    Specimen: Blood Updated: 01/03/24 0235     C-Reactive Protein 0.58 mg/dL     Lactic Acid, Plasma [[id number]]  (Normal) Collected: 01/03/24 0203    Specimen: Blood Updated: 01/03/24 0230     Lactate 1.2 mmol/L     CBC & Differential [[id number]]  (Abnormal) Collected: 01/03/24 0203    Specimen: Blood Updated: 01/03/24 0210    Narrative:      The following orders were created for panel order CBC & Differential.  Procedure                               Abnormality         Status                     ---------                               -----------         ------                     CBC Auto  Differential[[id number]]        Abnormal            Final result                 Please view results for these tests on the individual orders.    CBC Auto Differential [[id number]]  (Abnormal) Collected: 01/03/24 0203    Specimen: Blood Updated: 01/03/24 0210     WBC 7.14 10*3/mm3      RBC 4.07 10*6/mm3      Hemoglobin 12.4 g/dL      Hematocrit 37.8 %      MCV 92.9 fL      MCH 30.5 pg      MCHC 32.8 g/dL      RDW 11.9 %      RDW-SD 40.9 fl      MPV 9.8 fL      Platelets 265 10*3/mm3      Neutrophil % 61.4 %      Lymphocyte % 18.9 %      Monocyte % 18.2 %      Eosinophil % 0.7 %      Basophil % 0.4 %      Immature Grans % 0.4 %      Neutrophils, Absolute 4.38 10*3/mm3      Lymphocytes, Absolute 1.35 10*3/mm3      Monocytes, Absolute 1.30 10*3/mm3      Eosinophils, Absolute 0.05 10*3/mm3      Basophils, Absolute 0.03 10*3/mm3      Immature Grans, Absolute 0.03 10*3/mm3      nRBC 0.0 /100 WBC     Basic Metabolic Panel [[id number]]  (Abnormal) Collected: 01/01/24 0722    Specimen: Blood Updated: 01/01/24 0848     Glucose 211 mg/dL      BUN 17 mg/dL      Creatinine 0.90 mg/dL      Sodium 137 mmol/L      Potassium 4.0 mmol/L      Comment: Slight hemolysis detected by analyzer. Result may be falsely elevated.        Chloride 106 mmol/L      CO2 22.6 mmol/L      Calcium 9.6 mg/dL      BUN/Creatinine Ratio 18.9     Anion Gap 8.4 mmol/L      eGFR 73.3 mL/min/1.73     Narrative:      GFR Normal >60  Chronic Kidney Disease <60  Kidney Failure <15      Basic Metabolic Panel [[id number]]  (Abnormal) Collected: 12/28/23 0634    Specimen: Blood Updated: 12/28/23 0802     Glucose 154 mg/dL      BUN 11 mg/dL      Creatinine 0.79 mg/dL      Sodium 140 mmol/L      Potassium 3.8 mmol/L      Comment: Slight hemolysis detected by analyzer. Result may be falsely elevated.        Chloride 108 mmol/L      CO2 21.8 mmol/L      Calcium 9.3 mg/dL      BUN/Creatinine Ratio 13.9     Anion Gap 10.2 mmol/L      eGFR 85.8 mL/min/1.73     Narrative:       GFR Normal >60  Chronic Kidney Disease <60  Kidney Failure <15      CBC (No Diff) [[id number]]  (Abnormal) Collected: 12/28/23 0634    Specimen: Blood Updated: 12/28/23 0758     WBC 7.63 10*3/mm3      RBC 3.88 10*6/mm3      Hemoglobin 12.4 g/dL      Hematocrit 37.2 %      MCV 95.9 fL      MCH 32.0 pg      MCHC 33.3 g/dL      RDW 12.0 %      RDW-SD 41.6 fl      MPV 9.9 fL      Platelets 221 10*3/mm3     CBC (No Diff) [[id number]]  (Abnormal) Collected: 12/27/23 0040    Specimen: Blood Updated: 12/27/23 0133     WBC 9.61 10*3/mm3      RBC 3.63 10*6/mm3      Hemoglobin 11.5 g/dL      Hematocrit 33.5 %      MCV 92.3 fL      MCH 31.7 pg      MCHC 34.3 g/dL      RDW 11.9 %      RDW-SD 39.8 fl      MPV 10.2 fL      Platelets 211 10*3/mm3     Potassium [[id number]]  (Normal) Collected: 12/26/23 1620    Specimen: Blood Updated: 12/26/23 1649     Potassium 4.0 mmol/L     Vancomycin, Trough [[id number]]  (Normal) Collected: 12/26/23 1252    Specimen: Blood Updated: 12/26/23 1331     Vancomycin Trough 5.50 mcg/mL     Narrative:      Therapeutic Ranges for Vancomycin    Vancomycin Random   5.0-40.0 mcg/mL  Vancomycin Trough   5.0-20.0 mcg/mL  Vancomycin Peak     20.0-40.0 mcg/mL    Magnesium [[id number]]  (Abnormal) Collected: 12/26/23 0122    Specimen: Blood Updated: 12/26/23 0402     Magnesium 1.5 mg/dL     C-reactive Protein [[id number]]  (Abnormal) Collected: 12/25/23 0544    Specimen: Blood Updated: 12/25/23 0612     C-Reactive Protein 3.37 mg/dL     STAT Lactic Acid, Reflex [[id number]]  (Normal) Collected: 12/25/23 0300    Specimen: Blood from Arm, Left Updated: 12/25/23 0332     Lactate 1.9 mmol/L     Hemoglobin A1c [[id number]]  (Abnormal) Collected: 12/24/23 2316    Specimen: Blood Updated: 12/25/23 0115     Hemoglobin A1C 7.70 %     Narrative:      Hemoglobin A1C Ranges:    Increased Risk for Diabetes  5.7% to 6.4%  Diabetes                     >= 6.5%  Diabetic Goal                < 7.0%    Lactic Acid, Plasma [[id number]]   (Abnormal) Collected: 12/25/23 0016    Specimen: Blood from Arm, Left Updated: 12/25/23 0050     Lactate 3.1 mmol/L     [place] Draw [[id number]] Collected: 12/24/23 2316    Specimen: Blood Updated: 12/25/23 0031    Narrative:      The following orders were created for panel order [place] Draw.  Procedure                               Abnormality         Status                     ---------                               -----------         ------                     Green Top (Gel)[[id number]]                                  Final result               Lavender Top[[id number]]                                     Final result               Gold Top - SST[[id number]]                                   Final result               Light Blue Top[[id number]]                                   Final result                 Please view results for these tests on the individual orders.    Green Top (Gel) [[id number]] Collected: 12/24/23 2316    Specimen: Blood Updated: 12/25/23 0031     Extra Tube Hold for add-ons.     Comment: Auto resulted.       Lavender Top [[id number]] Collected: 12/24/23 2316    Specimen: Blood Updated: 12/25/23 0031     Extra Tube hold for add-on     Comment: Auto resulted       Gold Top - SST [[id number]] Collected: 12/24/23 2316    Specimen: Blood Updated: 12/25/23 0031     Extra Tube Hold for add-ons.     Comment: Auto resulted.       Light Blue Top [[id number]] Collected: 12/24/23 2316    Specimen: Blood Updated: 12/25/23 0031     Extra Tube Hold for add-ons.     Comment: Auto resulted       Urinalysis With Microscopic If Indicated (No Culture) - Urine, Clean Catch [[id number]]  (Abnormal) Collected: 12/25/23 0015    Specimen: Urine, Clean Catch Updated: 12/25/23 0019     Color, UA Yellow     Appearance, UA Clear     pH, UA 5.5     Specific Gravity, UA 1.015     Glucose, UA >=1000 mg/dL (3+)     Ketones, UA Negative     Bilirubin, UA Negative     Blood, UA Negative     Protein, UA Negative     Leuk Esterase,  UA Negative     Nitrite, UA Negative     Urobilinogen, UA 0.2 E.U./dL    Narrative:      Urine microscopic not indicated.    Sedimentation Rate [[id number]]  (Normal) Collected: 23    Specimen: Blood Updated: 23     Sed Rate 13 mm/hr              Discharge Summary        Tavon Alston MD at 24 1231              [facility]OSPITALISTS DISCHARGE SUMMARY    Patient Identification:  Name:  Sandra Leahy  Age:  58 y.o.  Sex:  female  :  1965  MRN:  [id number]  Visit Number:  [id number]    Date of Admission: 2023  Date of Discharge:  2024     PCP: John Mueller APRN    DISCHARGE DIAGNOSIS  Severe sepsis - Resolved  Facial cellulitis/Facial myositis - Improved   Non-oliguric Acute Kidney Injury, due to sepsis - Resolved   Coronavirus 19 Infection - Stable   Advanced dementia with behavioral disturbances, POA  Diabetes mellitus type 2    CONSULTS   Consults       Date and Time Order Name Status Description    2023  9:00 AM Inpatient Psychiatrist Consult Completed     2023  5:24 AM Inpatient General Surgery Consult Completed           PROCEDURES PERFORMED  None    HOSPITAL COURSE  Patient is a 58 y.o. female presented to [facility] complaining of facial swelling.  Please see the admitting history and physical for further details.      #Severe sepsis due to Facial cellulitis/Facial myositis - Improved   Patient presented with facial swelling and pain and tenderness with CT consistent with cellulitis overlying the right mandible.  Also noted myositis of the underlying musculature. Case previously discussed by emergency room provider with  who recommended continued antibiotic therapy here at this facility with later referral for outpatient removal of tooth with abscess. There was no definitive abscess to drain and no involvement of the TMJ joint and therefore above recommendations with transition to oral antibiotics for an additional 10  days before [facility] with  [facility]  Patient will complete oral antibiotics in 5 doses, facial swelling has resolved, will discharge to nursing home for placement, follow up PCP 1 week, follow up [facility] 1 week.    #Non-oliguric Acute Kidney Injury, due to sepsis - Resolved   Baseline creatinine 0.7, was up to 1.1 on admission, now back to baseline, follow up PCP for basic labs.     #Coronavirus 19 Infection  1/2 overnight with new fevers, infectious workup unrevealing initially, screened for Coronavirus 19 and now +, not meeting treatment criteria for Dexamethasone or Remdesivir at this time, venous duplex negative for DVTs, patient continues to sat high 90's on room air. No treatments needed. Would continue isolation for 5 days total.     #Advanced dementia with behavioral disturbances, POA  Psychiatry consulted with recommendations.  Continued home regimen plus increased dose Zyprexa. Has been appropriate and redirectable.     #Diabetes mellitus type 2  Continue new long acting and SSI upon discharge, follow up PCP.     Edited by: Tavon Alston MD at 1/4/2024 1231    VITAL SIGNS:  Temp:  [98.6 °F (37 °C)-99.1 °F (37.3 °C)] 98.6 °F (37 °C)  Heart Rate:  [] 94  Resp:  [18-20] 18  BP: (100-135)/(62-71) 106/67  SpO2:  [96 %-98 %] 97 %  on   ;   Device (Oxygen Therapy): room air    Body mass index is 21.96 kg/m².  Wt Readings from Last 3 Encounters:   01/03/24 67.5 kg (148 lb 12.8 oz)   11/25/23 66.1 kg (145 lb 12.8 oz)   11/25/23 63.5 kg (140 lb)     PHYSICAL EXAM:  Constitutional:  older than stated age, No acute distress.      HENT:  Head:  Normocephalic and atraumatic.  Mouth:  Moist mucous membranes.    Eyes:  Conjunctivae and EOM are normal. No scleral icterus.    Neck:  Neck supple.  No JVD present.    Cardiovascular:  Normal rate, regular rhythm and normal heart sounds with no murmur.  Pulmonary/Chest:  No respiratory distress, no wheezes, no crackles, on room air  Abdominal:  Soft.  No distension and no tenderness.   Musculoskeletal:  No tenderness, and no deformity.  No red or swollen joints anywhere.    Neurological:  Alert and oriented to person only.  No gross focal deficits   Skin:  Skin is warm and dry. No rash noted. No pallor.   Peripheral vascular:  No clubbing, no cyanosis, no edema.  Psychiatric: Appropriate mood and affect    *Examination stable today 1/4    Edited by: Tavon Alston MD at 1/4/2024 1229    DISCHARGE DISPOSITION   Stable    DISCHARGE MEDICATIONS:     Discharge Medications        New Medications        Instructions Start Date   amoxicillin-clavulanate 875-125 MG per tablet  Commonly known as: AUGMENTIN   1 tablet, Oral, Every 12 Hours Scheduled      insulin glargine 100 UNIT/ML injection  Commonly known as: LANTUS, SEMGLEE   40 Units, Subcutaneous, Daily   Start Date: January 5, 2024     Insulin Lispro 100 UNIT/ML injection  Commonly known as: humaLOG   3-14 Units, Subcutaneous, 4 Times Daily Before Meals & Nightly             Continue These Medications        Instructions Start Date   cetirizine 10 MG tablet  Commonly known as: EQ Allergy Relief (Cetirizine)   5 mg, Oral, Daily      cyanocobalamin 2000 MCG tablet  Commonly known as: [facility] Vitamin B-12   2,000 mcg, Oral, Daily      docusate sodium 100 MG capsule  Commonly known as: Colace   100 mg, Oral, Daily      famotidine 40 MG tablet  Commonly known as: Pepcid   40 mg, Oral, Daily      magnesium oxide 250 MG tablet   250 mg, Oral, Daily, OTC      memantine 10 MG tablet  Commonly known as: NAMENDA   10 mg, Oral, Daily      metFORMIN 1000 MG tablet  Commonly known as: GLUCOPHAGE   1,000 mg, Oral, 2 Times Daily With Meals      OLANZapine 7.5 MG tablet  Commonly known as: zyPREXA   7.5 mg, Oral, Daily With Dinner      pravastatin 40 MG tablet  Commonly known as: PRAVACHOL   40 mg, Oral, Daily             Stop These Medications      Insulin Degludec 200 UNIT/ML solution pen-injector pen injection  Commonly known as:  TRESIBA FLEXTOUCH     Trintellix 20 MG tablet  Generic drug: Vortioxetine HBr            Diet Instructions    Cardiac, Consistent Carb         Activity Instructions       Activity as Tolerated            Additional Instructions for the Follow-ups that You Need to Schedule       Discharge Follow-up with PCP   As directed       Currently Documented PCP:    John Mueller APRN    PCP Phone Number:    [phone]     Follow Up Details: 1 week post hospital discharge        Discharge Follow-up with Specified Provider:  Dentistry 1 week   As directed      To:  Dentistry 1 week               Contact information for follow-up providers       John Mueller APRN .    Specialty: Family Medicine  Why: 1 week post hospital discharge  Contact information:  [address] [facility] [address]  [phone]                       Contact information for after-discharge care       Destination       [facility] .    Service: Nursing Home  Contact information:  [address] [place] [address]  [phone]                                  TEST  RESULTS PENDING AT DISCHARGE  Pending Labs       Order Current Status    Blood Culture - Blood, Arm, Left Preliminary result    Blood Culture - Blood, Arm, Right Preliminary result          CODE STATUS  Code Status and Medical Interventions:   Ordered at: 12/25/23 0436     Code Status (Patient has no pulse and is not breathing):    CPR (Attempt to Resuscitate)     Medical Interventions (Patient has pulse or is breathing):    Full Support     Tavon Alston MD  [facility]  01/04/24  12:31 EST    Please note that this discharge summary required more than 30 minutes to complete.        Electronically signed by Tavon Alston MD at 01/04/24 1232       Discharge Order (From admission, onward)       Start     Ordered    01/04/24 1204  Discharge patient  Once        Expected Discharge Date: 01/04/24   Expected Discharge Time: Morning   Discharge  Disposition: Skilled Nursing Facility (DC - External)   Physician of Record for Attribution - Please select from Treatment Team: AVELINO CID [[id number]]   Review needed by CMO to determine Physician of Record: No      Question Answer Comment   Physician of Record for Attribution - Please select from Treatment Team AVELINO CID    Review needed by CMO to determine Physician of Record No        01/04/24 120

## 2024-01-04 NOTE — PLAN OF CARE
Goal Outcome Evaluation:              Outcome Evaluation: Pt rested in bed this shift. No new complaints or concerns. PRN melatonin given. VSS and will continue with POC.

## 2024-01-04 NOTE — DISCHARGE PLACEMENT REQUEST
"Sandra Leahy (58 y.o. Female)       Date of Birth   1965    Social Security Number       Address   [address]  [facility] [address]    Home Phone   [phone]    MRN   [id number]       Lutheran   Unknown    Marital Status                               Admission Date   12/25/23    Admission Type   Emergency    Admitting Provider   Bartolo Berman MD    Attending Provider   Tavon Alston MD    Department, Room/Bed   [address], 3335/1P       Discharge Date       Discharge Disposition   Skilled Nursing Facility (DC - External)    Discharge Destination                                 Attending Provider: Tavon Alston MD    Allergies: Lantus [Insulin Glargine], Codeine, Exenatide, Sitagliptin    Isolation: Enh Drop/Con   Infection: COVID (confirmed) (01/03/24)   Code Status: CPR    Ht: 175.3 cm (69.02\")   Wt: 67.5 kg (148 lb 12.8 oz)    Admission Cmt: None   Principal Problem: Facial cellulitis [L03.211]                   Active Insurance as of 12/25/2023       Primary Coverage       Payor Plan Insurance Group Employer/Plan Group    HUMANA MEDICARE REPLACEMENT HUMANA MEDICARE REPLACEMENT [id number]       Payor Plan Address Payor Plan Phone Number Payor Plan Fax Number Effective Dates    [address] [address] [phone]  2/1/2020 - None Entered    formerly [facility] [id number]         Subscriber Name Subscriber Birth Date Member ID       SANDRA LEAHY 1965 [id number]                     Emergency Contacts        (Rel.) Home Phone Work Phone Mobile Phone    Charles Leahy (Spouse) [phone] -- --    Huberemilio (Daughter) [phone] -- --              Current Facility-Administered Medications   Medication Dose Route Frequency Provider Last Rate Last Admin    amoxicillin-clavulanate (AUGMENTIN) 875-125 MG per tablet 1 tablet  1 tablet Oral Q12H Tulio Sifuentes DO   1 tablet at 01/04/24 0909    sennosides-docusate (PERICOLACE) 8.6-50 MG per tablet 2 tablet  2 tablet " Oral BID Bartolo Berman MD   2 tablet at 01/04/24 0910    And    polyethylene glycol (MIRALAX) packet 17 g  17 g Oral Daily PRN Bartolo Berman MD        And    bisacodyl (DULCOLAX) EC tablet 5 mg  5 mg Oral Daily PRN Bartolo Berman MD        And    bisacodyl (DULCOLAX) suppository 10 mg  10 mg Rectal Daily PRN Bartolo Berman MD        cetirizine (zyrTEC) tablet 5 mg  5 mg Oral Daily Tulio Sifuentes DO   5 mg at 01/04/24 0910    dextrose (D50W) (25 g/50 mL) IV injection 25 g  25 g Intravenous Q15 Min PRN Bartolo Berman MD        dextrose (D50W) (25 g/50 mL) IV injection 25 g  25 g Intravenous Q15 Min PRBartolo Smith MD        dextrose (GLUTOSE) oral gel 15 g  15 g Oral Q15 Min PRN Bartolo Berman MD        dextrose (GLUTOSE) oral gel 15 g  15 g Oral Q15 Min PRN Bartolo Berman MD        docusate sodium (COLACE) capsule 100 mg  100 mg Oral Daily Tulio Sifuentes DO   100 mg at 01/04/24 0909    Enoxaparin Sodium (LOVENOX) syringe 40 mg  40 mg Subcutaneous Q24H Tavon Alston MD   40 mg at 01/04/24 1146    famotidine (PEPCID) tablet 40 mg  40 mg Oral Daily Tulio Sifuentes DO   40 mg at 01/04/24 0909    glucagon HCl (Diagnostic) injection 1 mg  1 mg Intramuscular Q15 Min PRN Bartolo Berman MD        insulin glargine (LANTUS, SEMGLEE) injection 40 Units  40 Units Subcutaneous Daily Bartolo Berman MD   40 Units at 01/04/24 0909    Insulin Lispro (humaLOG) injection 3-14 Units  3-14 Units Subcutaneous 4x Daily AC & at Bedtime Bartolo Berman MD   5 Units at 01/04/24 1146    magnesium oxide (MAG-OX) tablet 400 mg  400 mg Oral Daily Tulio Sifuentes DO   400 mg at 01/04/24 0910    melatonin tablet 5 mg  5 mg Oral Nightly PRN Jody Adkins PA-C   5 mg at 01/03/24 2309    memantine (NAMENDA) tablet 10 mg  10 mg Oral Daily Tulio Sifuentes DO   10 mg at 01/04/24 0909    nitroglycerin (NITROSTAT) SL tablet 0.4 mg  0.4 mg Sublingual Q5 Min PRN  Tavon Alston MD        OLANZapine (zyPREXA) tablet 10 mg  10 mg Oral Daily With Dinner Tulio Sifuentes DO   10 mg at 24 1758    Pharmacy to Dose enoxaparin (LOVENOX)   Does not apply Continuous PRN Tavon Alston MD        Potassium Replacement - Follow Nurse / BPA Driven Protocol   Does not apply PRN Jody Adkins PA-C        pravastatin (PRAVACHOL) tablet 40 mg  40 mg Oral Daily Tulio Sifuentes DO   40 mg at 24 0910    sodium chloride 0.9 % flush 10 mL  10 mL Intravenous Q12H Bartolo Berman MD   10 mL at 24 0819    sodium chloride 0.9 % flush 10 mL  10 mL Intravenous PRN Bartolo Berman MD        sodium chloride 0.9 % infusion 40 mL  40 mL Intravenous PRN Bartolo Berman MD   40 mL at 23 2132    vitamin B-12 (CYANOCOBALAMIN) tablet 2,000 mcg  2,000 mcg Oral Daily Tulio Sifuentes DO   2,000 mcg at 24 0910    ziprasidone (GEODON) 10 mg in sterile water (preservative free) 0.5 mL injection  10 mg Intramuscular Once PRN Tulio Sifuentes DO            Discharge Summary        Tavon Alston MD at 24 1231              [facility] DISCHARGE SUMMARY    Patient Identification:  Name:  Sandra Leahy  Age:  58 y.o.  Sex:  female  :  1965  MRN:  [id number]  Visit Number:  [id number]    Date of Admission: 2023  Date of Discharge:  2024     PCP: John Mueller APRN    DISCHARGE DIAGNOSIS  Severe sepsis - Resolved  Facial cellulitis/Facial myositis - Improved   Non-oliguric Acute Kidney Injury, due to sepsis - Resolved   Coronavirus 19 Infection - Stable   Advanced dementia with behavioral disturbances, POA  Diabetes mellitus type 2    CONSULTS   Consults       Date and Time Order Name Status Description    2023  9:00 AM Inpatient Psychiatrist Consult Completed     2023  5:24 AM Inpatient General Surgery Consult Completed           PROCEDURES PERFORMED  None    HOSPITAL COURSE  Patient is a 58 y.o. female  presented to [facility] complaining of facial swelling.  Please see the admitting history and physical for further details.      #Severe sepsis due to Facial cellulitis/Facial myositis - Improved   Patient presented with facial swelling and pain and tenderness with CT consistent with cellulitis overlying the right mandible.  Also noted myositis of the underlying musculature. Case previously discussed by emergency room provider with  who recommended continued antibiotic therapy here at this facility with later referral for outpatient removal of tooth with abscess. There was no definitive abscess to drain and no involvement of the TMJ joint and therefore above recommendations with transition to oral antibiotics for an additional 10 days before follow-up with  [facility]  Patient will complete oral antibiotics in 5 doses, facial swelling has resolved, will discharge to nursing home for placement, follow up PCP 1 week, follow up [facility] 1 week.    #Non-oliguric Acute Kidney Injury, due to sepsis - Resolved   Baseline creatinine 0.7, was up to 1.1 on admission, now back to baseline, follow up PCP for basic labs.     #Coronavirus 19 Infection  1/2 overnight with new fevers, infectious workup unrevealing initially, screened for Coronavirus 19 and now +, not meeting treatment criteria for Dexamethasone or Remdesivir at this time, venous duplex negative for DVTs, patient continues to sat high 90's on room air. No treatments needed. Would continue isolation for 5 days total.     #Advanced dementia with behavioral disturbances, POA  Psychiatry consulted with recommendations.  Continued home regimen plus increased dose Zyprexa. Has been appropriate and redirectable.     #Diabetes mellitus type 2  Continue new long acting and SSI upon discharge, follow up PCP.     Edited by: Tavon Alston MD at 1/4/2024 1231    VITAL SIGNS:  Temp:  [98.6 °F (37 °C)-99.1 °F (37.3 °C)] 98.6 °F (37 °C)  Heart Rate:   [] 94  Resp:  [18-20] 18  BP: (100-135)/(62-71) 106/67  SpO2:  [96 %-98 %] 97 %  on   ;   Device (Oxygen Therapy): room air    Body mass index is 21.96 kg/m².  Wt Readings from Last 3 Encounters:   01/03/24 67.5 kg (148 lb 12.8 oz)   11/25/23 66.1 kg (145 lb 12.8 oz)   11/25/23 63.5 kg (140 lb)     PHYSICAL EXAM:  Constitutional:  older than stated age, No acute distress.      HENT:  Head:  Normocephalic and atraumatic.  Mouth:  Moist mucous membranes.    Eyes:  Conjunctivae and EOM are normal. No scleral icterus.    Neck:  Neck supple.  No JVD present.    Cardiovascular:  Normal rate, regular rhythm and normal heart sounds with no murmur.  Pulmonary/Chest:  No respiratory distress, no wheezes, no crackles, on room air  Abdominal:  Soft. No distension and no tenderness.   Musculoskeletal:  No tenderness, and no deformity.  No red or swollen joints anywhere.    Neurological:  Alert and oriented to person only.  No gross focal deficits   Skin:  Skin is warm and dry. No rash noted. No pallor.   Peripheral vascular:  No clubbing, no cyanosis, no edema.  Psychiatric: Appropriate mood and affect    *Examination stable today 1/4    Edited by: Tavon Alston MD at 1/4/2024 1229    DISCHARGE DISPOSITION   Stable    DISCHARGE MEDICATIONS:     Discharge Medications        New Medications        Instructions Start Date   amoxicillin-clavulanate 875-125 MG per tablet  Commonly known as: AUGMENTIN   1 tablet, Oral, Every 12 Hours Scheduled      insulin glargine 100 UNIT/ML injection  Commonly known as: LANTUS, SEMGLEE   40 Units, Subcutaneous, Daily   Start Date: January 5, 2024     Insulin Lispro 100 UNIT/ML injection  Commonly known as: humaLOG   3-14 Units, Subcutaneous, 4 Times Daily Before Meals & Nightly             Continue These Medications        Instructions Start Date   cetirizine 10 MG tablet  Commonly known as: EQ Allergy Relief (Cetirizine)   5 mg, Oral, Daily      cyanocobalamin 2000 MCG tablet  Commonly  known as: [facility] Vitamin B-12   2,000 mcg, Oral, Daily      docusate sodium 100 MG capsule  Commonly known as: Colace   100 mg, Oral, Daily      famotidine 40 MG tablet  Commonly known as: Pepcid   40 mg, Oral, Daily      magnesium oxide 250 MG tablet   250 mg, Oral, Daily, OTC      memantine 10 MG tablet  Commonly known as: NAMENDA   10 mg, Oral, Daily      metFORMIN 1000 MG tablet  Commonly known as: GLUCOPHAGE   1,000 mg, Oral, 2 Times Daily With Meals      OLANZapine 7.5 MG tablet  Commonly known as: zyPREXA   7.5 mg, Oral, Daily With Dinner      pravastatin 40 MG tablet  Commonly known as: PRAVACHOL   40 mg, Oral, Daily             Stop These Medications      Insulin Degludec 200 UNIT/ML solution pen-injector pen injection  Commonly known as: TRESIBA FLEXTOUCH     Trintellix 20 MG tablet  Generic drug: Vortioxetine HBr            Diet Instructions    Cardiac, Consistent Carb         Activity Instructions       Activity as Tolerated            Additional Instructions for the Follow-ups that You Need to Schedule       Discharge Follow-up with PCP   As directed       Currently Documented PCP:    John Mueller APRN    PCP Phone Number:    [phone]     Follow Up Details: 1 week post hospital discharge        Discharge Follow-up with Specified Provider:  Dentistry 1 week   As directed      To:  Dentistry 1 week               Contact information for follow-up providers       John Mueller APRN .    Specialty: Family Medicine  Why: 1 week post hospital discharge  Contact information:  [address] [facility] [address] [phone]                       Contact information for after-discharge care       Destination       [facility] .    Service: Nursing Home  Contact information:  [address] [place] [address] [phone]                                  TEST  RESULTS PENDING AT DISCHARGE  Pending Labs       Order Current Status    Blood Culture - Blood, Arm, Left Preliminary  result    Blood Culture - Blood, Arm, Right Preliminary result          CODE STATUS  Code Status and Medical Interventions:   Ordered at: 12/25/23 0436     Code Status (Patient has no pulse and is not breathing):    CPR (Attempt to Resuscitate)     Medical Interventions (Patient has pulse or is breathing):    Full Support     Avelino Alston MD  [facility]  01/04/24  12:31 EST    Please note that this discharge summary required more than 30 minutes to complete.        Electronically signed by Avelino Alston MD at 01/04/24 1232       Discharge Order (From admission, onward)       Start     Ordered    01/04/24 1204  Discharge patient  Once        Expected Discharge Date: 01/04/24   Expected Discharge Time: Morning   Discharge Disposition: Skilled Nursing Facility (DC - External)   Physician of Record for Attribution - Please select from Treatment Team: AVELINO ALSTON [[id number]]   Review needed by CMO to determine Physician of Record: No      Question Answer Comment   Physician of Record for Attribution - Please select from Treatment Team AVELINO ALSTON    Review needed by CMO to determine Physician of Record No        01/04/24 1208

## 2024-01-04 NOTE — CASE MANAGEMENT/SOCIAL WORK
Discharge Planning Assessment  [facility]     Patient Name: Sandra Leahy  MRN: [id number]  Today's Date: 1/4/2024    Admit Date: 12/25/2023     Discharge Plan       Row Name 01/04/24 1047       Plan    Plan SS was notified by Dr. Alston that pt is stable for discharge today. SS contacted [facility] per Jacklyn who states pt can be admitted today. Daughter plans to complete nursing home admission papers today. SS notified Dr. Alston. SS to follow.                  Continued Care and Services - Admitted Since 12/25/2023       Destination Coordination complete.      Service Provider Request Status Selected Services Address Phone Fax Patient Preferred    [facility]  Selected Nursing Home [address] [facility] [address] [phone] --    [facility] P[facility] - Request Sent N/A [address] [facility] [address] [phone] --    [facility] Pending - Request Sent N/A [address] [facility] [address] [phone] --    [facility] Pending - Request Sent N/A [address] [facility] [address] [phone] --    [facility] Declined  Bed not available N/A [address] [address] [phone] --                  Expected Discharge Date and Time       Expected Discharge Date Expected Discharge Time    Jan 4, 2024         VERONICA Sequeira

## 2024-01-05 ENCOUNTER — PATIENT OUTREACH (OUTPATIENT)
Dept: CASE MANAGEMENT | Facility: OTHER | Age: 59
End: 2024-01-05
Payer: MEDICARE

## 2024-01-05 NOTE — OUTREACH NOTE
AMBULATORY CASE MANAGEMENT NOTE    Name and Relationship of Patient/Support Person:  -     SNF Follow-up    Questions/Answers      Flowsheet Row Responses   Acute Facility Discharged From [facility]   Acute Discharge Date 01/04/24   Name of the Skilled Nursing Facility? [facility]   Purpose of SNF Admission SN   Estimated length of stay for the patient? Undetermined,  potential for transition to LTC   Who is the insurance provider or payor of patient stay? Medicare  [Medicaid Pending]   Progression of Patient? New SNF admission following an acute event at [facility]  Ambulatory Case Management    1/5/2024, 08:47 EST

## 2024-01-05 NOTE — PAYOR COMM NOTE
"CONTACT:  CASSIE PINK RN  UTILIZATION MANAGEMENT DEPT.   [facility]   [address] [facility], [address]   PHONE:  [phone]   FAX: [phone]       DC NOTIFICATION TO SKILLED NURSING FACILITY 1/4/2023    REF# [id number]          Sandra Leahy (58 y.o. Female)       Date of Birth   1965    Social Security Number       Address   [address]  [facility] [address]    Home Phone   [phone]    MRN   [id number]       Jehovah's witness   Unknown    Marital Status                               Admission Date   12/25/23    Admission Type   Emergency    Admitting Provider   Bartolo Berman MD    Attending Provider       Department, Room/Bed   [address], 3335/       Discharge Date   1/4/2024    Discharge Disposition   Skilled Nursing Facility (DC - External)    Discharge Destination   Home                              Attending Provider: (none)   Allergies: Lantus [Insulin Glargine], Codeine, Exenatide, Sitagliptin    Isolation: None   Infection: COVID (confirmed) (01/03/24)   Code Status: Prior    Ht: 175.3 cm (69.02\")   Wt: 67.5 kg (148 lb 12.8 oz)    Admission Cmt: None   Principal Problem: Facial cellulitis [L03.211]                   Active Insurance as of 12/25/2023       Primary Coverage       Payor Plan Insurance Group Employer/Plan Group    HUMANA MEDICARE REPLACEMENT HUMANA MEDICARE REPLACEMENT [id number]       Payor Plan Address Payor Plan Phone Number Payor Plan Fax Number Effective Dates    [address] [address] [phone]  2/1/2020 - None Entered    Former[facility] [address]         Subscriber Name Subscriber Birth Date Member ID       SANDRA LEAHY 1965 [id number]                     Emergency Contacts        (Rel.) Home Phone Work Phone Mobile Phone    Charles Leahy (Spouse) [phone] -- --    Huberemilio (Daughter) [phone] -- --                 Discharge Summary        Tavon Alston MD at 01/04/24 [address] [place] [address] " [facility] DISCHARGE SUMMARY    Patient Identification:  Name:  Sandra Leahy  Age:  58 y.o.  Sex:  female  :  1965  MRN:  [id number]  Visit Number:  [id number]    Date of Admission: 2023  Date of Discharge:  2024     PCP: John Mueller APRN    DISCHARGE DIAGNOSIS  Severe sepsis - Resolved  Facial cellulitis/Facial myositis - Improved   Non-oliguric Acute Kidney Injury, due to sepsis - Resolved   Coronavirus 19 Infection - Stable   Advanced dementia with behavioral disturbances, POA  Diabetes mellitus type 2    CONSULTS   Consults       Date and Time Order Name Status Description    2023  9:00 AM Inpatient Psychiatrist Consult Completed     2023  5:24 AM Inpatient General Surgery Consult Completed           PROCEDURES PERFORMED  None    HOSPITAL COURSE  Patient is a 58 y.o. female presented to [facility] complaining of facial swelling.  Please see the admitting history and physical for further details.      #Severe sepsis due to Facial cellulitis/Facial myositis - Improved   Patient presented with facial swelling and pain and tenderness with CT consistent with cellulitis overlying the right mandible.  Also noted myositis of the underlying musculature. Case previously discussed by emergency room provider with [facility] who recommended continued antibiotic therapy here at this facility with later referral for outpatient removal of tooth with abscess. There was no definitive abscess to drain and no involvement of the TMJ joint and therefore above recommendations with transition to oral antibiotics for an additional 10 days before follow-up with [facility] [employer] [facility].  Patient will complete oral antibiotics in 5 doses, facial swelling has resolved, will discharge to nursing home for placement, follow up PCP 1 week, follow up [facility] 1 week.    #Non-oliguric Acute Kidney Injury, due to sepsis - Resolved   Baseline creatinine 0.7, was up to 1.1 on admission, now  back to baseline, follow up PCP for basic labs.     #Coronavirus 19 Infection  1/2 overnight with new fevers, infectious workup unrevealing initially, screened for Coronavirus 19 and now +, not meeting treatment criteria for Dexamethasone or Remdesivir at this time, venous duplex negative for DVTs, patient continues to sat high 90's on room air. No treatments needed. Would continue isolation for 5 days total.     #Advanced dementia with behavioral disturbances, POA  Psychiatry consulted with recommendations.  Continued home regimen plus increased dose Zyprexa. Has been appropriate and redirectable.     #Diabetes mellitus type 2  Continue new long acting and SSI upon discharge, follow up PCP.     Edited by: Tavon Alston MD at 1/4/2024 1231    VITAL SIGNS:  Temp:  [98.6 °F (37 °C)-99.1 °F (37.3 °C)] 98.6 °F (37 °C)  Heart Rate:  [] 94  Resp:  [18-20] 18  BP: (100-135)/(62-71) 106/67  SpO2:  [96 %-98 %] 97 %  on   ;   Device (Oxygen Therapy): room air    Body mass index is 21.96 kg/m².  Wt Readings from Last 3 Encounters:   01/03/24 67.5 kg (148 lb 12.8 oz)   11/25/23 66.1 kg (145 lb 12.8 oz)   11/25/23 63.5 kg (140 lb)     PHYSICAL EXAM:  Constitutional:  older than stated age, No acute distress.      HENT:  Head:  Normocephalic and atraumatic.  Mouth:  Moist mucous membranes.    Eyes:  Conjunctivae and EOM are normal. No scleral icterus.    Neck:  Neck supple.  No JVD present.    Cardiovascular:  Normal rate, regular rhythm and normal heart sounds with no murmur.  Pulmonary/Chest:  No respiratory distress, no wheezes, no crackles, on room air  Abdominal:  Soft. No distension and no tenderness.   Musculoskeletal:  No tenderness, and no deformity.  No red or swollen joints anywhere.    Neurological:  Alert and oriented to person only.  No gross focal deficits   Skin:  Skin is warm and dry. No rash noted. No pallor.   Peripheral vascular:  No clubbing, no cyanosis, no edema.  Psychiatric: Appropriate mood and  affect    *Examination stable today 1/4    Edited by: Tavon Alston MD at 1/4/2024 1229    DISCHARGE DISPOSITION   Stable    DISCHARGE MEDICATIONS:     Discharge Medications        New Medications        Instructions Start Date   amoxicillin-clavulanate 875-125 MG per tablet  Commonly known as: AUGMENTIN   1 tablet, Oral, Every 12 Hours Scheduled      insulin glargine 100 UNIT/ML injection  Commonly known as: LANTUS, SEMGLEE   40 Units, Subcutaneous, Daily   Start Date: January 5, 2024     Insulin Lispro 100 UNIT/ML injection  Commonly known as: humaLOG   3-14 Units, Subcutaneous, 4 Times Daily Before Meals & Nightly             Continue These Medications        Instructions Start Date   cetirizine 10 MG tablet  Commonly known as: EQ Allergy Relief (Cetirizine)   5 mg, Oral, Daily      cyanocobalamin 2000 MCG tablet  Commonly known as: [facility] Vitamin B-12   2,000 mcg, Oral, Daily      docusate sodium 100 MG capsule  Commonly known as: Colace   100 mg, Oral, Daily      famotidine 40 MG tablet  Commonly known as: Pepcid   40 mg, Oral, Daily      magnesium oxide 250 MG tablet   250 mg, Oral, Daily, OTC      memantine 10 MG tablet  Commonly known as: NAMENDA   10 mg, Oral, Daily      metFORMIN 1000 MG tablet  Commonly known as: GLUCOPHAGE   1,000 mg, Oral, 2 Times Daily With Meals      OLANZapine 7.5 MG tablet  Commonly known as: zyPREXA   7.5 mg, Oral, Daily With Dinner      pravastatin 40 MG tablet  Commonly known as: PRAVACHOL   40 mg, Oral, Daily             Stop These Medications      Insulin Degludec 200 UNIT/ML solution pen-injector pen injection  Commonly known as: TRESIBA FLEXTOUCH     Trintellix 20 MG tablet  Generic drug: Vortioxetine HBr            Diet Instructions    Cardiac, Consistent Carb         Activity Instructions       Activity as Tolerated            Additional Instructions for the Follow-ups that You Need to Schedule       Discharge Follow-up with PCP   As directed       Currently Documented PCP:     John Mueller APRN    PCP Phone Number:    [phone]     Follow Up Details: 1 week post hospital discharge        Discharge Follow-up with Specified Provider:  Dentistry 1 week   As directed      To:  Dentistry 1 week               Contact information for follow-up providers       John Mueller APRN .    Specialty: Family Medicine  Why: 1 week post hospital discharge  Contact information:  [address] [facility] [address]  [phone]                       Contact information for after-discharge care       Destination       [facility] .    Service: Nursing Home  Contact information:  [address] [place] [id number]  [phone]                                  TEST  RESULTS PENDING AT DISCHARGE  Pending Labs       Order Current Status    Blood Culture - Blood, Arm, Left Preliminary result    Blood Culture - Blood, Arm, Right Preliminary result          CODE STATUS  Code Status and Medical Interventions:   Ordered at: 12/25/23 0436     Code Status (Patient has no pulse and is not breathing):    CPR (Attempt to Resuscitate)     Medical Interventions (Patient has pulse or is breathing):    Full Support     Avelino Cid MD  [facility]  01/04/24  12:31 EST    Please note that this discharge summary required more than 30 minutes to complete.        Electronically signed by Avelino Cid MD at 01/04/24 1232       Discharge Order (From admission, onward)       Start     Ordered    01/04/24 1204  Discharge patient  Once        Expected Discharge Date: 01/04/24   Expected Discharge Time: Morning   Discharge Disposition: Skilled Nursing Facility (DC - External)   Physician of Record for Attribution - Please select from Treatment Team: AVELINO CID [[id number]]   Review needed by CMO to determine Physician of Record: No      Question Answer Comment   Physician of Record for Attribution - Please select from Treatment Team AVELINO CID    Review needed by CMO  to determine Physician of Record No        01/04/24 120

## 2024-01-08 LAB
BACTERIA SPEC AEROBE CULT: NORMAL
BACTERIA SPEC AEROBE CULT: NORMAL

## 2024-03-14 NOTE — PROGRESS NOTES
[facility] HOSPITALIST PROGRESS NOTE     Patient Identification:  Name:  Sandra Leahy  Age:  58 y.o.  Sex:  female  :  1965  MRN:  [id number]  Visit Number:  [id number]  ROOM: [address] [place] [address]     Primary Care Provider:  John Mueller APRN    Length of stay in inpatient status:  8    Subjective     Chief Compliant:    Chief Complaint   Patient presents with    Abscess    Jaw Pain     History of Presenting Illness:    Patient remains ill but stable today, no acute events overnight, no new complaints, denies any fevers or chills, facial swelling essentially resolved, oriented only to person, pending placement, will discuss at multidisciplinary rounds today.   Objective     Current Hospital Meds:  amoxicillin-clavulanate, 1 tablet, Oral, Q12H  cetirizine, 5 mg, Oral, Daily  docusate sodium, 100 mg, Oral, Daily  famotidine, 40 mg, Oral, Daily  insulin glargine, 30 Units, Subcutaneous, Daily  insulin lispro, 2-7 Units, Subcutaneous, 4x Daily AC & at Bedtime  magnesium oxide, 400 mg, Oral, Daily  memantine, 10 mg, Oral, Daily  OLANZapine, 10 mg, Oral, Daily With Dinner  pravastatin, 40 mg, Oral, Daily  senna-docusate sodium, 2 tablet, Oral, BID  sodium chloride, 10 mL, Intravenous, Q12H  cyanocobalamin, 2,000 mcg, Oral, Daily         ----------------------------------------------------------------------------------------------------------------------  Vital Signs:  Temp:  [97.4 °F (36.3 °C)-99.2 °F (37.3 °C)] 97.7 °F (36.5 °C)  Heart Rate:  [] 108  Resp:  [18-20] 20  BP: (101-141)/(58-91) 141/59  SpO2:  [93 %-99 %] 99 %  on   ;   Device (Oxygen Therapy): room air  Body mass index is 22.91 kg/m².      Intake/Output Summary (Last 24 hours) at 2024 1314  Last data filed at 2024 0830  Gross per 24 hour   Intake 720 ml   Output --   Net 720 ml      ----------------------------------------------------------------------------------------------------------------------  Physical  Consider hold oral anti-histamines temporary until less congested - intra-nasal saline water flush, azelastine  Consider wear mask when out gardening  Consider air-filter for bedroom  Consider hypoallergenic covers for mattress and bed  Let us know if no improvement in sinus allergy symptoms or develop fever or other concerning symptoms       "exam:  Constitutional:  older than stated age, No acute distress.      HENT:  Head:  Normocephalic and atraumatic.  Mouth:  Moist mucous membranes.    Eyes:  Conjunctivae and EOM are normal. No scleral icterus.    Neck:  Neck supple.  No JVD present.    Cardiovascular:  Normal rate, regular rhythm and normal heart sounds with no murmur.  Pulmonary/Chest:  No respiratory distress, no wheezes, no crackles, on room air  Abdominal:  Soft. No distension and no tenderness.   Musculoskeletal:  No tenderness, and no deformity.  No red or swollen joints anywhere.    Neurological:  Alert and oriented to person only.  No cranial nerve deficit.    Skin:  Skin is warm and dry. No rash noted. No pallor.   Peripheral vascular:  No clubbing, no cyanosis, no edema.  Psychiatric: Appropriate mood and affect    Edited by: Tavon Alston MD at 1/2/2024 1313  ----------------------------------------------------------------------------------------------------------------------     No results found for: \"URINECX\"  No results found for: \"BLOODCX\"    I have personally looked at the labs and they are summarized above.  ----------------------------------------------------------------------------------------------------------------------  Detailed radiology reports for the last 24 hours:  No radiology results for the last day  Assessment & Plan    #Severe sepsis due to Facial cellulitis/Facial myositis  - Patient presented with facial swelling and pain and tenderness with CT consistent with cellulitis overlying the right mandible.  Also noted myositis of the underlying musculature. Case previously discussed by emergency room provider with  who recommended continued antibiotic therapy here at this facility with later referral for outpatient removal of tooth with abscess.    - There was no definitive abscess to drain and no involvement of the TMJ joint and therefore above recommendations with transition to oral antibiotics for an additional 10 " days before follow-up with  [facility].  - Continue Augmentin x 10 days total, outpatient follow up at  if able.      #Non-oliguric Acute Kidney Injury, due to sepsis - Resolved   - Baseline creatinine 0.7, was up to 1.1 on admission, now back to baseline; Trend creatinine and urine output, avoid nephrotoxins, NSAIDs, dehydration and contrast as able.    #Advanced dementia with behavioral disturbances, POA  - Psychiatry consulted with recommendations   - Continue home medications, with increased Zyprexa given restlessness and noncompliance.  Patient redirectable.  - Will try high-dose thiamine as recommended by psychiatry.     #Diabetes mellitus type 2  - Basal and bolus insulin as needed to maintain glycemic control    F: Oral  E: Monitor & Replace as needed   N: Diet: Cardiac Diets, Diabetic Diets; Healthy Heart (2-3 Na+); Consistent Carbohydrate; Texture: Regular Texture (IDDSI 7); Fluid Consistency: Thin (IDDSI 0)  PPx: Ambulatory, defer SQ injections and won't wear SCDs  Code Status (Patient has no pulse and is not breathing): CPR (Attempt to Resuscitate)  Medical Interventions (Patient has pulse or is breathing): Full Support     Dispo: Pending placement, will discuss at multidisciplinary rounds today  Edited by: Tavon Alston MD at 1/2/2024 1314  [facility]

## 2024-04-19 NOTE — ED NOTES
MEDICAL SCREENING:    Reason for Visit: Family states she woke up th is morning with a small area on the right side of her jaw that has increased severly today. Patient has dementia and has word disassociation. Patient has fallen several times today.     Patient initially seen in triage.  The patient was advised further evaluation and diagnostic testing will be needed, some of the treatment and testing will be initiated in the lobby in order to begin the process.  The patient will be returned to the waiting area for the time being and possibly be re-assessed by a subsequent ED provider.  The patient will be brought back to the treatment area in as timely manner as possible.       Luzma Estrada PA  12/24/23 6364    
Called [facility]'s spoke with Amarjit for possible transfer. Placed on hold while she gets Dr. Bernal on the line.   
Called radiology spoke with Lexa, requested images to be power shared with .   
Dr. De Souza is on the line with Dr. Valverde.   
[Follow-up Visit ___] : a follow-up visit  for [unfilled]

## 2024-08-13 NOTE — TELEPHONE ENCOUNTER
Review of Systems  Please [place] any of the following symptoms that you are currently experiencing. If any additional comments are necessary, please write them in the section it pertains to.    General  Weight loss  Weight gain  Fever or chills  Trouble sleeping  Fatigue   Weakness    Neurologic  Dizziness  Numbness  Tingling   Paralysis  Seizures  Cognitive Issues  Decreased alertness Tremor  Headache  Migraine  Difficulty Reading Difficulty Writing  Skin  Rashes   Itching   Lumps   Color changes  Dryness   Hair changes  Nail changes  Eyes  Blurry vision  Double vision  Cataracts  Pain    Flashing lights  Glaucoma  Redness  Vision changes   Ears  Decreased hearing Ringing in ears  Earache   Drainage    Nose  Stuffiness  Discharge  Itching   Hay fever  Nose bleeds  Sinus pressure/pain  Throat  Hoarseness  Bleeding  Pain   Dry mouth  Sore tongue  Thrush   Denture use  Neck  Lumps   Swollen glands  Pain   Stiffness      Respiratory  Cough    Sputum production Shortness of breath Wheezing  Pain with breathing   Cardiovascular  Chest pain  Chest tightness  Palpitations  Swelling (edema)  Gastrointestinal  Swallowing difficulties Heartburn  Nausea/Vomiting  Constipation  Diarrhea  Yellowing of skin/eyes Change in bowel habits  Urinary  Frequency  Urgency  Burning/pain  Blood in urine  Incontinence    Vascular  Calf pain  Leg cramping  History of blood clots  Musculoskeletal  Muscle/Joint pain Back pain  Stiffness  Swelling/Redness of joints   Hematologic  Bruises easily  Bleeds easily   Autoimmune disorder  Endocrine  Heat/Cold intolerance Sweating  Thirst   Change in appetite  Psychiatric  Anxiety/Nervousness  Depression  Memory Loss  Stress  Additional comments/symptoms not listed above:     She wants to know if vitamin d would cause her to have memory loss not sure yet if she wants to see kylah she will talk to alvaro
